# Patient Record
Sex: FEMALE | Race: WHITE | NOT HISPANIC OR LATINO | Employment: UNEMPLOYED | URBAN - NONMETROPOLITAN AREA
[De-identification: names, ages, dates, MRNs, and addresses within clinical notes are randomized per-mention and may not be internally consistent; named-entity substitution may affect disease eponyms.]

---

## 2017-02-18 RX ORDER — OSELTAMIVIR PHOSPHATE 75 MG/1
75 CAPSULE ORAL DAILY
Qty: 10 CAPSULE | Refills: 0 | Status: SHIPPED | OUTPATIENT
Start: 2017-02-18 | End: 2017-03-09

## 2017-03-08 DIAGNOSIS — M79.672 LEFT FOOT PAIN: Primary | ICD-10-CM

## 2017-03-09 ENCOUNTER — OFFICE VISIT (OUTPATIENT)
Dept: ORTHOPEDIC SURGERY | Facility: CLINIC | Age: 57
End: 2017-03-09

## 2017-03-09 VITALS
SYSTOLIC BLOOD PRESSURE: 90 MMHG | DIASTOLIC BLOOD PRESSURE: 60 MMHG | BODY MASS INDEX: 33.66 KG/M2 | WEIGHT: 190 LBS | HEIGHT: 63 IN

## 2017-03-09 DIAGNOSIS — M77.32 CALCANEAL SPUR OF LEFT FOOT: Primary | ICD-10-CM

## 2017-03-09 PROCEDURE — 99214 OFFICE O/P EST MOD 30 MIN: CPT | Performed by: ORTHOPAEDIC SURGERY

## 2017-03-09 RX ORDER — DICLOFENAC SODIUM 75 MG/1
75 TABLET, DELAYED RELEASE ORAL 2 TIMES DAILY
Qty: 60 TABLET | Refills: 3 | Status: SHIPPED | OUTPATIENT
Start: 2017-03-09 | End: 2018-01-02

## 2017-03-09 RX ORDER — DICLOFENAC SODIUM 75 MG/1
75 TABLET, DELAYED RELEASE ORAL 2 TIMES DAILY
COMMUNITY
End: 2017-03-09 | Stop reason: SDUPTHER

## 2017-03-09 RX ORDER — OMEPRAZOLE 40 MG/1
40 CAPSULE, DELAYED RELEASE ORAL 2 TIMES DAILY
COMMUNITY
End: 2020-11-02

## 2017-03-09 NOTE — PROGRESS NOTES
Subjective   Lashaun Bernal is a 56 y.o. female. Patient is here today with left foot pain.     History of Present Illness Patient complains that her left heel and foot are causing pain. Patient complains with swelling and throbbing in her left heel. She has been previously scheduled for  Removal of the spurs but she cancelled and wanted to wait ,this was several months ago. Now her symptoms have worsened despite conservative treatment, and she wants to proceed with removal of the spurs.      The following portions of the patient's history were reviewed and updated as appropriate:   She  has a past medical history of Allergic rhinitis; Asthma; Breast lump; Chest pain; Chronic low back pain; Constipation; Depressive disorder; Diarrhea; Diverticular disease of colon; Epigastric pain; DIETER (generalized anxiety disorder); GERD (gastroesophageal reflux disease); Head ache; Hematochezia; Herpes zoster; and Periumbilical pain.  She  does not have any pertinent problems on file.  She  has a past surgical history that includes Abdominoplasty (12/22/2004); Hand surgery (06/03/2013); Finger/Thumb Lesion/Cyst Excision (10/29/2014); ORIF radius & ulna fractures (07/22/2013); Cyst Removal (08/18/1961); and Tubal ligation (08/14/1985).  Her family history includes Diabetes in her other; Heart disease in her other; Hypertension in her other.  She  reports that she has never smoked. She does not have any smokeless tobacco history on file. Her alcohol and drug histories are not on file.  Current Outpatient Prescriptions   Medication Sig Dispense Refill   • diclofenac (VOLTAREN) 75 MG EC tablet Take 75 mg by mouth 2 (Two) Times a Day.     • omeprazole (priLOSEC) 40 MG capsule Take 40 mg by mouth Daily.     • estradiol (ESTRACE) 1 MG tablet        No current facility-administered medications for this visit.      Current Outpatient Prescriptions on File Prior to Visit   Medication Sig   • estradiol (ESTRACE) 1 MG tablet    •  "[DISCONTINUED] oseltamivir (TAMIFLU) 75 MG capsule Take 1 capsule by mouth Daily.     No current facility-administered medications on file prior to visit.      She is allergic to penicillins..    Review of Systems  Visit Vitals   • BP 90/60   • Ht 63\" (160 cm)   • Wt 190 lb (86.2 kg)   • LMP 03/09/1991   • BMI 33.66 kg/m2       Social History     Social History   • Marital status:      Spouse name: N/A   • Number of children: N/A   • Years of education: N/A     Occupational History   • Not on file.     Social History Main Topics   • Smoking status: Never Smoker   • Smokeless tobacco: Not on file   • Alcohol use Not on file   • Drug use: Not on file   • Sexual activity: Not on file     Other Topics Concern   • Not on file     Social History Narrative       HEENT: Normocephalic.  PERRLA.  TM's clear bilaterally.  Oropharynx: Clear.  Neck: Supple, with no adenopathy.  Chest: Equal bilateral expansion.  Clear to auscultation and percussion.  Heart: Regular sinus rhythm, S1 and S2 normal.  No murmurs or extra heart sounds heard.  Abdomen: Soft, nontender, and no organomegaly.  Neurological: cranial nerves II-XII normal Vascular: pulses are present  Dermatological: no rashes  or blemishes, or any abnormality of the skin.    REVIEW OF SYSTEMS:  Negative, other than presenting complaint.  HEENT: No headaches, diplopia, blurred vision, tinnitus, vertigo, epistaxis, hoarseness or sore throat.  Pulmonary: No cough, sputum, hemoptysis, dyspnea, wheezing, or chest pain.  Cardiac: No chest pain, palpitations, orthopnea, paroxysmal nocturnal dyspnea, shortness of breath, or pedal edema.  Gastrointestinal: No diarrhea, melena, or constipation.  Genitourinary: No dysuria, hematuria, nocturia, frequency, bladder or bowel incontinence.  Hematology: No history of any anemia, fatigue, fever, or chills or night sweats.  Dermatology: No rashes, pruritus, or increased pigmentation changes of the skin.   Objective   Physical Exam  " Pain on palpation of the spurs at the insertion of the achilles tendon and at the medial border of the calcaneous. Neuro is intact.  X rays reveal large spurs at both of those areas.      Assessment/Plan  painful sprs of the calcaneous, Plan: excision . SURGERY RISKS    Procedure has been explained to the patient.  Risks and benefits have been explained, including the risk of bleeding, stiffness, recurrent problems, blood vessel or nerve injury, blood clots, infection, and lack of healing.  Patient understands and agrees to the procedure, and we will proceed ahead with this, as an outpatient under general anesthesia, on   3/20/17        Lashaun was seen today for pain.    Diagnoses and all orders for this visit:    Calcaneal spur of left foot

## 2017-03-10 ENCOUNTER — PREP FOR SURGERY (OUTPATIENT)
Dept: ORTHOPEDIC SURGERY | Facility: CLINIC | Age: 57
End: 2017-03-10

## 2017-03-10 DIAGNOSIS — M77.31 BILATERAL CALCANEAL SPURS: Primary | ICD-10-CM

## 2017-03-10 DIAGNOSIS — M77.32 BILATERAL CALCANEAL SPURS: Primary | ICD-10-CM

## 2017-03-10 RX ORDER — SODIUM CHLORIDE 9 MG/ML
100 INJECTION, SOLUTION INTRAVENOUS CONTINUOUS
Status: CANCELLED | OUTPATIENT
Start: 2017-03-10

## 2017-03-17 ENCOUNTER — APPOINTMENT (OUTPATIENT)
Dept: PREADMISSION TESTING | Facility: HOSPITAL | Age: 57
End: 2017-03-17

## 2017-03-17 ENCOUNTER — ANESTHESIA EVENT (OUTPATIENT)
Dept: PERIOP | Facility: HOSPITAL | Age: 57
End: 2017-03-17

## 2017-03-17 VITALS
BODY MASS INDEX: 33.49 KG/M2 | WEIGHT: 189 LBS | OXYGEN SATURATION: 98 % | DIASTOLIC BLOOD PRESSURE: 80 MMHG | SYSTOLIC BLOOD PRESSURE: 130 MMHG | RESPIRATION RATE: 18 BRPM | HEIGHT: 63 IN | HEART RATE: 81 BPM

## 2017-03-20 ENCOUNTER — ANESTHESIA (OUTPATIENT)
Dept: PERIOP | Facility: HOSPITAL | Age: 57
End: 2017-03-20

## 2017-03-20 ENCOUNTER — HOSPITAL ENCOUNTER (OUTPATIENT)
Facility: HOSPITAL | Age: 57
Setting detail: HOSPITAL OUTPATIENT SURGERY
Discharge: HOME | End: 2017-03-20
Attending: ORTHOPAEDIC SURGERY | Admitting: ORTHOPAEDIC SURGERY

## 2017-03-20 VITALS
SYSTOLIC BLOOD PRESSURE: 124 MMHG | HEART RATE: 62 BPM | BODY MASS INDEX: 33.71 KG/M2 | WEIGHT: 190.26 LBS | OXYGEN SATURATION: 98 % | DIASTOLIC BLOOD PRESSURE: 66 MMHG | RESPIRATION RATE: 18 BRPM | HEIGHT: 63 IN | TEMPERATURE: 97.7 F

## 2017-03-20 DIAGNOSIS — M77.32 CALCANEAL SPUR OF LEFT FOOT: ICD-10-CM

## 2017-03-20 DIAGNOSIS — M77.31 BILATERAL CALCANEAL SPURS: ICD-10-CM

## 2017-03-20 DIAGNOSIS — M77.32 BILATERAL CALCANEAL SPURS: ICD-10-CM

## 2017-03-20 PROCEDURE — 88311 DECALCIFY TISSUE: CPT | Performed by: PATHOLOGY

## 2017-03-20 PROCEDURE — 25010000002 DEXAMETHASONE PER 1 MG: Performed by: NURSE ANESTHETIST, CERTIFIED REGISTERED

## 2017-03-20 PROCEDURE — 27654 REPAIR OF ACHILLES TENDON: CPT | Performed by: ORTHOPAEDIC SURGERY

## 2017-03-20 PROCEDURE — 25010000002 HYDROMORPHONE PER 4 MG: Performed by: NURSE ANESTHETIST, CERTIFIED REGISTERED

## 2017-03-20 PROCEDURE — 28118 REMOVAL OF HEEL BONE: CPT | Performed by: ORTHOPAEDIC SURGERY

## 2017-03-20 PROCEDURE — C1713 ANCHOR/SCREW BN/BN,TIS/BN: HCPCS | Performed by: ORTHOPAEDIC SURGERY

## 2017-03-20 PROCEDURE — 25010000002 MIDAZOLAM PER 1 MG: Performed by: NURSE ANESTHETIST, CERTIFIED REGISTERED

## 2017-03-20 PROCEDURE — 25010000002 ONDANSETRON PER 1 MG: Performed by: NURSE ANESTHETIST, CERTIFIED REGISTERED

## 2017-03-20 PROCEDURE — 88311 DECALCIFY TISSUE: CPT | Performed by: ORTHOPAEDIC SURGERY

## 2017-03-20 PROCEDURE — 25010000002 FENTANYL CITRATE (PF) 100 MCG/2ML SOLUTION: Performed by: NURSE ANESTHETIST, CERTIFIED REGISTERED

## 2017-03-20 PROCEDURE — 88304 TISSUE EXAM BY PATHOLOGIST: CPT | Performed by: ORTHOPAEDIC SURGERY

## 2017-03-20 PROCEDURE — 25010000002 NEOSTIGMINE PER 0.5 MG: Performed by: NURSE ANESTHETIST, CERTIFIED REGISTERED

## 2017-03-20 PROCEDURE — 88304 TISSUE EXAM BY PATHOLOGIST: CPT | Performed by: PATHOLOGY

## 2017-03-20 PROCEDURE — 25010000002 MEPERIDINE 25 MG/0.5ML SOLUTION: Performed by: NURSE ANESTHETIST, CERTIFIED REGISTERED

## 2017-03-20 PROCEDURE — 25010000002 PROPOFOL 10 MG/ML EMULSION: Performed by: NURSE ANESTHETIST, CERTIFIED REGISTERED

## 2017-03-20 PROCEDURE — 25010000002 SUCCINYLCHOLINE PER 20 MG: Performed by: NURSE ANESTHETIST, CERTIFIED REGISTERED

## 2017-03-20 DEVICE — SYS SUT/ANCH ACHILLES SPEEDBRIDGE BIOCOMP: Type: IMPLANTABLE DEVICE | Site: HEEL | Status: FUNCTIONAL

## 2017-03-20 RX ORDER — BACTERIOSTATIC SODIUM CHLORIDE 0.9 %
VIAL (ML) INJECTION AS NEEDED
Status: DISCONTINUED | OUTPATIENT
Start: 2017-03-20 | End: 2017-03-20 | Stop reason: HOSPADM

## 2017-03-20 RX ORDER — GLYCOPYRROLATE 0.2 MG/ML
INJECTION INTRAMUSCULAR; INTRAVENOUS AS NEEDED
Status: DISCONTINUED | OUTPATIENT
Start: 2017-03-20 | End: 2017-03-20 | Stop reason: SURG

## 2017-03-20 RX ORDER — ROCURONIUM BROMIDE 10 MG/ML
INJECTION, SOLUTION INTRAVENOUS AS NEEDED
Status: DISCONTINUED | OUTPATIENT
Start: 2017-03-20 | End: 2017-03-20 | Stop reason: SURG

## 2017-03-20 RX ORDER — ONDANSETRON 2 MG/ML
4 INJECTION INTRAMUSCULAR; INTRAVENOUS ONCE AS NEEDED
Status: DISCONTINUED | OUTPATIENT
Start: 2017-03-20 | End: 2017-03-20 | Stop reason: HOSPADM

## 2017-03-20 RX ORDER — FLUMAZENIL 0.1 MG/ML
0.2 INJECTION INTRAVENOUS AS NEEDED
Status: DISCONTINUED | OUTPATIENT
Start: 2017-03-20 | End: 2017-03-20 | Stop reason: HOSPADM

## 2017-03-20 RX ORDER — ACETAMINOPHEN 325 MG/1
650 TABLET ORAL ONCE AS NEEDED
Status: DISCONTINUED | OUTPATIENT
Start: 2017-03-20 | End: 2017-03-20 | Stop reason: HOSPADM

## 2017-03-20 RX ORDER — OXYCODONE AND ACETAMINOPHEN 7.5; 325 MG/1; MG/1
1-2 TABLET ORAL EVERY 4 HOURS PRN
Qty: 45 TABLET | Refills: 0 | Status: SHIPPED | OUTPATIENT
Start: 2017-03-20 | End: 2017-10-31

## 2017-03-20 RX ORDER — LIDOCAINE HYDROCHLORIDE 20 MG/ML
INJECTION, SOLUTION INFILTRATION; PERINEURAL AS NEEDED
Status: DISCONTINUED | OUTPATIENT
Start: 2017-03-20 | End: 2017-03-20 | Stop reason: SURG

## 2017-03-20 RX ORDER — SCOLOPAMINE TRANSDERMAL SYSTEM 1 MG/1
1 PATCH, EXTENDED RELEASE TRANSDERMAL ONCE
Status: COMPLETED | OUTPATIENT
Start: 2017-03-20 | End: 2017-03-20

## 2017-03-20 RX ORDER — HYDROMORPHONE HCL 110MG/55ML
PATIENT CONTROLLED ANALGESIA SYRINGE INTRAVENOUS AS NEEDED
Status: DISCONTINUED | OUTPATIENT
Start: 2017-03-20 | End: 2017-03-20 | Stop reason: SURG

## 2017-03-20 RX ORDER — ONDANSETRON 2 MG/ML
INJECTION INTRAMUSCULAR; INTRAVENOUS AS NEEDED
Status: DISCONTINUED | OUTPATIENT
Start: 2017-03-20 | End: 2017-03-20 | Stop reason: SURG

## 2017-03-20 RX ORDER — NALOXONE HCL 0.4 MG/ML
0.2 VIAL (ML) INJECTION AS NEEDED
Status: DISCONTINUED | OUTPATIENT
Start: 2017-03-20 | End: 2017-03-20 | Stop reason: HOSPADM

## 2017-03-20 RX ORDER — SODIUM CHLORIDE 9 MG/ML
100 INJECTION, SOLUTION INTRAVENOUS CONTINUOUS
Status: DISCONTINUED | OUTPATIENT
Start: 2017-03-20 | End: 2017-03-20 | Stop reason: HOSPADM

## 2017-03-20 RX ORDER — DEXAMETHASONE SODIUM PHOSPHATE 4 MG/ML
INJECTION, SOLUTION INTRA-ARTICULAR; INTRALESIONAL; INTRAMUSCULAR; INTRAVENOUS; SOFT TISSUE AS NEEDED
Status: DISCONTINUED | OUTPATIENT
Start: 2017-03-20 | End: 2017-03-20 | Stop reason: SURG

## 2017-03-20 RX ORDER — BACITRACIN 50000 [USP'U]/1
INJECTION, POWDER, LYOPHILIZED, FOR SOLUTION INTRAMUSCULAR AS NEEDED
Status: DISCONTINUED | OUTPATIENT
Start: 2017-03-20 | End: 2017-03-20 | Stop reason: HOSPADM

## 2017-03-20 RX ORDER — FENTANYL CITRATE 50 UG/ML
INJECTION, SOLUTION INTRAMUSCULAR; INTRAVENOUS AS NEEDED
Status: DISCONTINUED | OUTPATIENT
Start: 2017-03-20 | End: 2017-03-20 | Stop reason: SURG

## 2017-03-20 RX ORDER — PROPOFOL 10 MG/ML
VIAL (ML) INTRAVENOUS AS NEEDED
Status: DISCONTINUED | OUTPATIENT
Start: 2017-03-20 | End: 2017-03-20 | Stop reason: SURG

## 2017-03-20 RX ORDER — HYDROMORPHONE HCL 110MG/55ML
PATIENT CONTROLLED ANALGESIA SYRINGE INTRAVENOUS AS NEEDED
Status: DISCONTINUED | OUTPATIENT
Start: 2017-03-20 | End: 2017-03-20

## 2017-03-20 RX ORDER — DIPHENHYDRAMINE HYDROCHLORIDE 50 MG/ML
12.5 INJECTION INTRAMUSCULAR; INTRAVENOUS
Status: DISCONTINUED | OUTPATIENT
Start: 2017-03-20 | End: 2017-03-20 | Stop reason: HOSPADM

## 2017-03-20 RX ORDER — LABETALOL HYDROCHLORIDE 5 MG/ML
5 INJECTION, SOLUTION INTRAVENOUS
Status: DISCONTINUED | OUTPATIENT
Start: 2017-03-20 | End: 2017-03-20 | Stop reason: HOSPADM

## 2017-03-20 RX ORDER — OXYCODONE AND ACETAMINOPHEN 7.5; 325 MG/1; MG/1
1 TABLET ORAL ONCE AS NEEDED
Status: DISCONTINUED | OUTPATIENT
Start: 2017-03-20 | End: 2017-03-20 | Stop reason: HOSPADM

## 2017-03-20 RX ORDER — ACETAMINOPHEN 650 MG/1
650 SUPPOSITORY RECTAL ONCE AS NEEDED
Status: DISCONTINUED | OUTPATIENT
Start: 2017-03-20 | End: 2017-03-20 | Stop reason: HOSPADM

## 2017-03-20 RX ORDER — MIDAZOLAM HYDROCHLORIDE 1 MG/ML
INJECTION INTRAMUSCULAR; INTRAVENOUS AS NEEDED
Status: DISCONTINUED | OUTPATIENT
Start: 2017-03-20 | End: 2017-03-20 | Stop reason: SURG

## 2017-03-20 RX ORDER — SUCCINYLCHOLINE CHLORIDE 20 MG/ML
INJECTION INTRAMUSCULAR; INTRAVENOUS AS NEEDED
Status: DISCONTINUED | OUTPATIENT
Start: 2017-03-20 | End: 2017-03-20 | Stop reason: SURG

## 2017-03-20 RX ORDER — PROMETHAZINE HYDROCHLORIDE 12.5 MG/1
12.5 TABLET ORAL ONCE AS NEEDED
Status: DISCONTINUED | OUTPATIENT
Start: 2017-03-20 | End: 2017-03-20 | Stop reason: HOSPADM

## 2017-03-20 RX ADMIN — OXYCODONE HYDROCHLORIDE AND ACETAMINOPHEN 1 TABLET: 7.5; 325 TABLET ORAL at 11:27

## 2017-03-20 RX ADMIN — MEPERIDINE HYDROCHLORIDE 12.5 MG: 50 INJECTION, SOLUTION INTRAMUSCULAR; INTRAVENOUS; SUBCUTANEOUS at 09:50

## 2017-03-20 RX ADMIN — HYDROMORPHONE HYDROCHLORIDE 1 MG: 2 INJECTION, SOLUTION INTRAMUSCULAR; INTRAVENOUS; SUBCUTANEOUS at 09:40

## 2017-03-20 RX ADMIN — LIDOCAINE HYDROCHLORIDE 80 MG: 20 INJECTION, SOLUTION INFILTRATION; PERINEURAL at 07:24

## 2017-03-20 RX ADMIN — ROCURONIUM BROMIDE 5 MG: 10 INJECTION INTRAVENOUS at 07:24

## 2017-03-20 RX ADMIN — SODIUM CHLORIDE 100 ML/HR: 9 INJECTION, SOLUTION INTRAVENOUS at 06:12

## 2017-03-20 RX ADMIN — HYDROMORPHONE HYDROCHLORIDE 0.5 MG: 1 INJECTION, SOLUTION INTRAMUSCULAR; INTRAVENOUS; SUBCUTANEOUS at 10:13

## 2017-03-20 RX ADMIN — PROPOFOL 180 MG: 10 INJECTION, EMULSION INTRAVENOUS at 07:24

## 2017-03-20 RX ADMIN — SUCCINYLCHOLINE CHLORIDE 140 MG: 20 INJECTION, SOLUTION INTRAMUSCULAR; INTRAVENOUS at 07:24

## 2017-03-20 RX ADMIN — ROCURONIUM BROMIDE 20 MG: 10 INJECTION INTRAVENOUS at 07:30

## 2017-03-20 RX ADMIN — MEPERIDINE HYDROCHLORIDE 12.5 MG: 50 INJECTION, SOLUTION INTRAMUSCULAR; INTRAVENOUS; SUBCUTANEOUS at 10:00

## 2017-03-20 RX ADMIN — FENTANYL CITRATE 50 MCG: 50 INJECTION, SOLUTION INTRAMUSCULAR; INTRAVENOUS at 08:05

## 2017-03-20 RX ADMIN — SCOPALAMINE 1 PATCH: 1 PATCH, EXTENDED RELEASE TRANSDERMAL at 07:15

## 2017-03-20 RX ADMIN — MEPERIDINE HYDROCHLORIDE 12.5 MG: 50 INJECTION, SOLUTION INTRAMUSCULAR; INTRAVENOUS; SUBCUTANEOUS at 10:15

## 2017-03-20 RX ADMIN — MIDAZOLAM 2 MG: 1 INJECTION INTRAMUSCULAR; INTRAVENOUS at 07:18

## 2017-03-20 RX ADMIN — FENTANYL CITRATE 50 MCG: 50 INJECTION, SOLUTION INTRAMUSCULAR; INTRAVENOUS at 08:30

## 2017-03-20 RX ADMIN — MEPERIDINE HYDROCHLORIDE 12.5 MG: 50 INJECTION, SOLUTION INTRAMUSCULAR; INTRAVENOUS; SUBCUTANEOUS at 10:10

## 2017-03-20 RX ADMIN — VANCOMYCIN HYDROCHLORIDE 1 G: 1 INJECTION, POWDER, LYOPHILIZED, FOR SOLUTION INTRAVENOUS at 07:19

## 2017-03-20 RX ADMIN — MEPERIDINE HYDROCHLORIDE 12.5 MG: 50 INJECTION, SOLUTION INTRAMUSCULAR; INTRAVENOUS; SUBCUTANEOUS at 10:05

## 2017-03-20 RX ADMIN — ONDANSETRON 4 MG: 2 INJECTION INTRAMUSCULAR; INTRAVENOUS at 09:05

## 2017-03-20 RX ADMIN — MEPERIDINE HYDROCHLORIDE 12.5 MG: 50 INJECTION, SOLUTION INTRAMUSCULAR; INTRAVENOUS; SUBCUTANEOUS at 09:55

## 2017-03-20 RX ADMIN — GLYCOPYRROLATE 0.6 MG: 0.2 INJECTION, SOLUTION INTRAMUSCULAR; INTRAVENOUS at 09:35

## 2017-03-20 RX ADMIN — DEXAMETHASONE SODIUM PHOSPHATE 4 MG: 4 INJECTION, SOLUTION INTRAMUSCULAR; INTRAVENOUS at 07:30

## 2017-03-20 RX ADMIN — FENTANYL CITRATE 50 MCG: 50 INJECTION, SOLUTION INTRAMUSCULAR; INTRAVENOUS at 08:20

## 2017-03-20 RX ADMIN — HYDROMORPHONE HYDROCHLORIDE 0.5 MG: 1 INJECTION, SOLUTION INTRAMUSCULAR; INTRAVENOUS; SUBCUTANEOUS at 10:03

## 2017-03-20 RX ADMIN — VANCOMYCIN HYDROCHLORIDE 1000 MG: 1 INJECTION, POWDER, LYOPHILIZED, FOR SOLUTION INTRAVENOUS at 06:30

## 2017-03-20 RX ADMIN — FENTANYL CITRATE 100 MCG: 50 INJECTION, SOLUTION INTRAMUSCULAR; INTRAVENOUS at 07:24

## 2017-03-20 RX ADMIN — NEOSTIGMINE METHYLSULFATE 3 MG: 1 INJECTION, SOLUTION INTRAMUSCULAR; INTRAVENOUS; SUBCUTANEOUS at 09:35

## 2017-03-20 RX ADMIN — ROCURONIUM BROMIDE 10 MG: 10 INJECTION INTRAVENOUS at 08:10

## 2017-03-20 RX ADMIN — FENTANYL CITRATE 50 MCG: 50 INJECTION, SOLUTION INTRAMUSCULAR; INTRAVENOUS at 08:15

## 2017-03-20 NOTE — BRIEF OP NOTE
BUNIONECTOMY  Procedure Note    Lashaun Bernal  3/20/2017    Pre-op Diagnosis:   Calcaneal spur of left foot [M77.32]    Post-op Diagnosis:     Post-Op Diagnosis Codes:     * Calcaneal spur of left foot [M77.32]    Procedure/CPT® Codes:  06798, and 80708.        Procedure(s):  EXCISION OF CALCANEAL SPURS LEFT FOOT AND REATTACHMENT OF ACHILLES TENDON     Surgeon(s):  Jarrell Mar MD    Anesthesia: General    Staff:   Circulator: Og Barahona RN  Scrub Person: Arianne Casillas  Assistant: Marvin Sumner    Estimated Blood Loss: minimal    Urine Voided: none      Specimens:                  ID Type Source Tests Collected by Time Destination   A : retrocalcaneal spur left foot Tissue Foot, Left TISSUE EXAM Jarrell Mar MD 3/20/2017 0910          Drains:  None            Findings: retrocalcaneal spur.    Complications: none        Jarrell Mar MD     Date: 3/20/2017  Time: 10:01 AM

## 2017-03-20 NOTE — ANESTHESIA POSTPROCEDURE EVALUATION
Patient: Lashaun Bernal    Procedure Summary     Date Anesthesia Start Anesthesia Stop Room / Location    03/20/17 0721 0948  [facility] / [facility]       Procedure Diagnosis Surgeon Provider    EXCISION OF CALCANEAL SPURS LEFT FOOT AND REATTACHMENT OF ACHILLES TENDON  (Left Foot) Calcaneal spur of left foot  (Calcaneal spur of left foot [M77.32]) MD Jamie Bledsoe MD          Anesthesia Type: general  Last vitals  BP      Temp      Pulse     Resp      SpO2        Post Anesthesia Care and Evaluation    Patient location during evaluation: bedside  Patient participation: complete - patient participated  Level of consciousness: responsive to verbal stimuli  Pain management: adequate  Airway patency: patent  Anesthetic complications: No anesthetic complications    Cardiovascular status: acceptable  Respiratory status: acceptable  Hydration status: acceptable

## 2017-03-20 NOTE — ANESTHESIA PROCEDURE NOTES
Airway  Urgency: elective    Difficult airway    General Information and Staff    Patient location during procedure: OR  CRNA: ALLEN LOMBARDI    Indications and Patient Condition  Indications for airway management: airway protection    Preoxygenated: yes  Mask difficulty assessment: 1 - vent by mask    Final Airway Details  Final airway type: endotracheal airway      Successful airway: ETT  Cuffed: yes   Successful intubation technique: direct laryngoscopy  Facilitating devices/methods: intubating stylet  Endotracheal tube insertion site: oral  Blade: José  Blade size: #3  ETT size: 7.0 mm  Cormack-Lehane Classification: grade I - full view of glottis  Placement verified by: chest auscultation and capnometry   Measured from: teeth  ETT to teeth (cm): 20  Number of attempts at approach: 1

## 2017-03-20 NOTE — DISCHARGE INSTRUCTIONS
Minor Surgery  Outpatient Instructions    General Information  You have had a minor surgical procedure and are not expected to require extensive treatment or a long recovery period.  However, the following information/instructions that are listed serve as guidelines to help you recover at home.    Activity: non-weight bearing, use crutches    Hygiene: sponge bath, do not get cast wet.    Dressing/Wound Care: Keep cast clean and dry till seen in the office. Keep foot/leg elevated above heart when sitting or laying.    Diet: as tolerated    Important Points:  -Call your doctor to report any of the following:   -unusual or excessive bleeding/drainage   -pain not reduced/controlled by medication   -elevated temperature consistently greater that 100 degrees F   -increased swelling, redness, bruising, or tenderness in surgical area  -Take medications as prescribed by your physician  -If you have any questions, please contact your doctor or the Same Day Surgery Unit at   [phone]  -If a post-operative emergency occurs, report to your nearest ER

## 2017-03-20 NOTE — PLAN OF CARE
Problem: Patient Care Overview (Adult)  Goal: Plan of Care Review  Outcome: Ongoing (interventions implemented as appropriate)    03/20/17 1037   Coping/Psychosocial Response Interventions   Plan Of Care Reviewed With patient   Patient Care Overview   Progress improving   Outcome Evaluation   Outcome Summary/Follow up Plan vss, pain management begun, dsg noted cdi no distress noted       Goal: Adult Individualization and Mutuality  Outcome: Ongoing (interventions implemented as appropriate)    Problem: Perioperative Period (Adult)  Goal: Signs and Symptoms of Listed Potential Problems Will be Absent or Manageable (Perioperative Period)  Outcome: Ongoing (interventions implemented as appropriate)

## 2017-03-20 NOTE — OP NOTE
BUNIONECTOMY  Procedure Note    Name:    Lashaun Bernal  YOB: 1960  Date of surgery:   3/20/2017    Pre-op Diagnosis:   Calcaneal spur of left foot [M77.32]    Post-op Diagnosis:     Same    Procedure:  Procedure(s):  EXCISION OF CALCANEAL SPURS LEFT FOOT AND REATTACHMENT OF ACHILLES TENDON     Surgeon:  Surgeon(s):  Jarrell Mar MD    ASSISTANT:   [facility]    Anesthesia: General    Staff:   Circulator: Og Barahona RN  Scrub Person: Arianne Casillas  Assistant: Marvin Sumner    Estimated Blood Loss:  Minimal      Specimens:                  ID Type Source Tests Collected by Time Destination   A : retrocalcaneal spur left foot Tissue Foot, Left TISSUE EXAM Jarrell Mar MD 3/20/2017 0910          Drains:  None                             Retrocalcaneal spur.    Findings:      Complications: None    IMPLANTS:     Implant Name Type Inv. Item Serial No.  Lot No. LRB No. Used   SYS ACHILLES [employer] - [id number] - [id number] Implant SYS ACHILLES [employer] [id number] ARTHREX [id number] Left 1         PROCEDURE:excision of retrocalcaneal spur and re-attachment of achilles tendon.   after satisfactory anesthesia was accomplished, the left leg was prepped and draped in the usual fashion, and tourniquet elevated to 300 mm hg. [id number], an incision was made over the posterior aspect of the foot, down to the Achilles tendon  s splitting the tendon longitudinally, down to the spur.  The tendon was taken down to expose the spur and the spur was removed with an oscillating saw. Following removal of the spur and the retrocalcaneal bursa, the wound was irrigated and the tendon was re-attached to the calcaneous using the speed bridge  5 mm   Following reattachment of the tendon, the wound was irrigated and closed using  Number 2 fiberwire for the running locking suture of the longitudinal aspect of the tendon, the subcutaneous tissue was closed with 3-0  vicryl and the skin with 4-0nylon. Dressing was placed on the wound and a fiberglass cast applied with the ankle in 30 degrees, of plantar flexion.   procedure terminated, patient left O.R. In satisfactory condition.      Jarrell Mar MD     Date: 3/20/2017  Time: 10:04 AM

## 2017-03-20 NOTE — PLAN OF CARE
Problem: Patient Care Overview (Adult)  Goal: Plan of Care Review  Outcome: Outcome(s) achieved Date Met:  03/20/17  Discharge criteria met.  Goal: Adult Individualization and Mutuality  Outcome: Outcome(s) achieved Date Met:  03/20/17  Goal: Discharge Needs Assessment  Outcome: Outcome(s) achieved Date Met:  03/20/17    Problem: Perioperative Period (Adult)  Goal: Signs and Symptoms of Listed Potential Problems Will be Absent or Manageable (Perioperative Period)  Outcome: Outcome(s) achieved Date Met:  03/20/17

## 2017-03-20 NOTE — ANESTHESIA PREPROCEDURE EVALUATION
Anesthesia Evaluation     Patient summary reviewed and Nursing notes reviewed   NPO Status: > 8 hours   Airway   Mallampati: II  TM distance: >3 FB  possible difficult intubation  Comment: She has a thick/fatty neck  Dental - normal exam     Comment: All her upper teeth are crowned    Pulmonary - normal exam   (+) asthma ( doing well), sleep apnea ( has MAX and is waiting for her C-PAP machine),   Cardiovascular - negative cardio ROS and normal exam        Neuro/Psych  (+) psychiatric history Depression,    GI/Hepatic/Renal/Endo    (+) obesity,  GERD ( she took her omeprazole today and has no GERD symptoms today) well controlled,     Musculoskeletal     (+) back pain,   Arthralgias:  has spurs on her left heel, for surgery today.  Abdominal   (+) obese,    Substance History - negative use     OB/GYN negative ob/gyn ROS         Other   (+) arthritis                                 Anesthesia Plan    ASA 3     general     intravenous induction   Anesthetic plan and risks discussed with patient.    Plan discussed with CRNA.

## 2017-03-21 LAB
LAB AP CASE REPORT: NORMAL
Lab: NORMAL
PATH REPORT.FINAL DX SPEC: NORMAL
PATH REPORT.GROSS SPEC: NORMAL

## 2017-04-04 ENCOUNTER — OFFICE VISIT (OUTPATIENT)
Dept: ORTHOPEDIC SURGERY | Facility: CLINIC | Age: 57
End: 2017-04-04

## 2017-04-04 VITALS
DIASTOLIC BLOOD PRESSURE: 70 MMHG | BODY MASS INDEX: 33.66 KG/M2 | HEIGHT: 63 IN | SYSTOLIC BLOOD PRESSURE: 122 MMHG | WEIGHT: 190 LBS

## 2017-04-04 DIAGNOSIS — M92.62 HAGLUND'S DEFORMITY OF LEFT HEEL: Primary | ICD-10-CM

## 2017-04-04 PROCEDURE — 99024 POSTOP FOLLOW-UP VISIT: CPT | Performed by: ORTHOPAEDIC SURGERY

## 2017-04-04 PROCEDURE — 29405 APPL SHORT LEG CAST: CPT | Performed by: ORTHOPAEDIC SURGERY

## 2017-04-04 NOTE — PROGRESS NOTES
Subjective   Lashaun Bernal is a 56 y.o. female. Status post excision of calcaneal spurs left foot and reattachment of achilles tendon of the left heel.     History of Present Illness Patient is here today for follow up of excision of calcaneal spurs of the left foot with reattachment of achilles tendon. Patient is here today for cast change and suture removal. Patient is still having some pain when moving but states that she does not have pain when she is not moving.     The following portions of the patient's history were reviewed and updated as appropriate:   She  has a past medical history of Allergic rhinitis; Arthritis; Asthma; Breast lump; Chest pain; Chronic low back pain; Constipation; Depressive disorder; Diarrhea; Diverticular disease of colon; Epigastric pain; DIETER (generalized anxiety disorder); GERD (gastroesophageal reflux disease); Head ache; Hematochezia; Herpes zoster; Periumbilical pain; and PONV (postoperative nausea and vomiting).  She  does not have any pertinent problems on file.  She  has a past surgical history that includes Abdominoplasty (12/22/2004); Hand surgery (06/03/2013); Finger/Thumb Lesion/Cyst Excision (10/29/2014); ORIF radius & ulna fractures (07/22/2013); Cyst Removal (08/18/1961); Tubal ligation (08/14/1985); breast augmentation bilateral mastopexy; and Bunionectomy (Left, 3/20/2017).  Her family history includes Diabetes in her other; Heart disease in her other; Hypertension in her other.  She  reports that she has never smoked. She has never used smokeless tobacco. She reports that she drinks alcohol. She reports that she does not use illicit drugs.  Current Outpatient Prescriptions   Medication Sig Dispense Refill   • diclofenac (VOLTAREN) 75 MG EC tablet Take 1 tablet by mouth 2 (Two) Times a Day. 60 tablet 3   • estradiol (ESTRACE) 1 MG tablet Take 1 mg by mouth Daily.     • omeprazole (priLOSEC) 40 MG capsule Take 40 mg by mouth Daily.     • oxyCODONE-acetaminophen  "(PERCOCET) 7.5-325 MG per tablet Take 1-2 tablets by mouth Every 4 (Four) Hours As Needed (Pain). 45 tablet 0     No current facility-administered medications for this visit.      Current Outpatient Prescriptions on File Prior to Visit   Medication Sig   • diclofenac (VOLTAREN) 75 MG EC tablet Take 1 tablet by mouth 2 (Two) Times a Day.   • estradiol (ESTRACE) 1 MG tablet Take 1 mg by mouth Daily.   • omeprazole (priLOSEC) 40 MG capsule Take 40 mg by mouth Daily.   • oxyCODONE-acetaminophen (PERCOCET) 7.5-325 MG per tablet Take 1-2 tablets by mouth Every 4 (Four) Hours As Needed (Pain).     No current facility-administered medications on file prior to visit.      She is allergic to penicillins..    Review of Systems  /70  Ht 63\" (160 cm)  Wt 190 lb (86.2 kg)  LMP 03/09/1991  BMI 33.66 kg/m2    Social History     Social History   • Marital status:      Spouse name: N/A   • Number of children: N/A   • Years of education: N/A     Occupational History   • Not on file.     Social History Main Topics   • Smoking status: Never Smoker   • Smokeless tobacco: Never Used   • Alcohol use Yes      Comment: socially   • Drug use: No   • Sexual activity: Not on file     Other Topics Concern   • Not on file     Social History Narrative       HEENT: Normocephalic.  PERRLA.  TM's clear bilaterally.  Oropharynx: Clear.  Neck: Supple, with no adenopathy.  Chest: Equal bilateral expansion.  Clear to auscultation and percussion.  Heart: Regular sinus rhythm, S1 and S2 normal.  No murmurs or extra heart sounds heard.  Abdomen: Soft, nontender, and no organomegaly.  Neurological: cranial nerves II-XII normal Vascular: pulses are present  Dermatological: no rashes  or blemishes, or any abnormality of the skin.    REVIEW OF SYSTEMS:  Negative, other than presenting complaint.  HEENT: No headaches, diplopia, blurred vision, tinnitus, vertigo, epistaxis, hoarseness or sore throat.  Pulmonary: No cough, sputum, hemoptysis, " dyspnea, wheezing, or chest pain.  Cardiac: No chest pain, palpitations, orthopnea, paroxysmal nocturnal dyspnea, shortness of breath, or pedal edema.  Gastrointestinal: No diarrhea, melena, or constipation.  Genitourinary: No dysuria, hematuria, nocturia, frequency, bladder or bowel incontinence.  Hematology: No history of any anemia, fatigue, fever, or chills or night sweats.  Dermatology: No rashes, pruritus, or increased pigmentation changes of the skin.   Objective   Physical Examincision is well healed ,sutures removed, new cast is applied.  See back in 2 weeks.      Assessment/Plan   Lashaun was seen today for follow-up.    Diagnoses and all orders for this visit:    Chloe's deformity of left heel

## 2017-04-18 ENCOUNTER — OFFICE VISIT (OUTPATIENT)
Dept: ORTHOPEDIC SURGERY | Facility: CLINIC | Age: 57
End: 2017-04-18

## 2017-04-18 VITALS
SYSTOLIC BLOOD PRESSURE: 122 MMHG | HEIGHT: 63 IN | BODY MASS INDEX: 33.66 KG/M2 | DIASTOLIC BLOOD PRESSURE: 70 MMHG | WEIGHT: 190 LBS

## 2017-04-18 DIAGNOSIS — M92.62 HAGLUND'S DEFORMITY OF LEFT HEEL: Primary | ICD-10-CM

## 2017-04-18 PROCEDURE — 99024 POSTOP FOLLOW-UP VISIT: CPT | Performed by: ORTHOPAEDIC SURGERY

## 2017-04-18 NOTE — PROGRESS NOTES
Subjective   Lashaun Bernal is a 56 y.o. female. Status post excision of calcaneal spurs left foot and reattachment of achilles tendon of the left heel.    History of Present Illness Patient is here today for follow up of excision of calcaneal spurs of the left foot with reattachment of achilles tendon. Patient is here today for cast change.    The following portions of the patient's history were reviewed and updated as appropriate:   She  has a past medical history of Allergic rhinitis; Arthritis; Asthma; Breast lump; Chest pain; Chronic low back pain; Constipation; Depressive disorder; Diarrhea; Diverticular disease of colon; Epigastric pain; DIETER (generalized anxiety disorder); GERD (gastroesophageal reflux disease); Head ache; Hematochezia; Herpes zoster; Periumbilical pain; and PONV (postoperative nausea and vomiting).  She  does not have any pertinent problems on file.  She  has a past surgical history that includes Abdominoplasty (12/22/2004); Hand surgery (06/03/2013); Finger/Thumb Lesion/Cyst Excision (10/29/2014); ORIF radius & ulna fractures (07/22/2013); Cyst Removal (08/18/1961); Tubal ligation (08/14/1985); breast augmentation bilateral mastopexy; and Bunionectomy (Left, 3/20/2017).  Her family history includes Diabetes in her other; Heart disease in her other; Hypertension in her other.  She  reports that she has never smoked. She has never used smokeless tobacco. She reports that she drinks alcohol. She reports that she does not use illicit drugs.  Current Outpatient Prescriptions   Medication Sig Dispense Refill   • diclofenac (VOLTAREN) 75 MG EC tablet Take 1 tablet by mouth 2 (Two) Times a Day. 60 tablet 3   • estradiol (ESTRACE) 1 MG tablet Take 1 mg by mouth Daily.     • omeprazole (priLOSEC) 40 MG capsule Take 40 mg by mouth Daily.     • oxyCODONE-acetaminophen (PERCOCET) 7.5-325 MG per tablet Take 1-2 tablets by mouth Every 4 (Four) Hours As Needed (Pain). 45 tablet 0     No current  "facility-administered medications for this visit.      Current Outpatient Prescriptions on File Prior to Visit   Medication Sig   • diclofenac (VOLTAREN) 75 MG EC tablet Take 1 tablet by mouth 2 (Two) Times a Day.   • estradiol (ESTRACE) 1 MG tablet Take 1 mg by mouth Daily.   • omeprazole (priLOSEC) 40 MG capsule Take 40 mg by mouth Daily.   • oxyCODONE-acetaminophen (PERCOCET) 7.5-325 MG per tablet Take 1-2 tablets by mouth Every 4 (Four) Hours As Needed (Pain).     No current facility-administered medications on file prior to visit.      She is allergic to penicillins..    Review of Systems  /70  Ht 63\" (160 cm)  Wt 190 lb (86.2 kg)  LMP 03/09/1991  BMI 33.66 kg/m2    Social History     Social History   • Marital status:      Spouse name: N/A   • Number of children: N/A   • Years of education: N/A     Occupational History   • Not on file.     Social History Main Topics   • Smoking status: Never Smoker   • Smokeless tobacco: Never Used   • Alcohol use Yes      Comment: socially   • Drug use: No   • Sexual activity: Not on file     Other Topics Concern   • Not on file     Social History Narrative       HEENT: Normocephalic.  PERRLA.  TM's clear bilaterally.  Oropharynx: Clear.  Neck: Supple, with no adenopathy.  Chest: Equal bilateral expansion.  Clear to auscultation and percussion.  Heart: Regular sinus rhythm, S1 and S2 normal.  No murmurs or extra heart sounds heard.  Abdomen: Soft, nontender, and no organomegaly.  Neurological: cranial nerves II-XII normal Vascular: pulses are present  Dermatological: no rashes  or blemishes, or any abnormality of the skin.    REVIEW OF SYSTEMS:  Negative, other than presenting complaint.  HEENT: No headaches, diplopia, blurred vision, tinnitus, vertigo, epistaxis, hoarseness or sore throat.  Pulmonary: No cough, sputum, hemoptysis, dyspnea, wheezing, or chest pain.  Cardiac: No chest pain, palpitations, orthopnea, paroxysmal nocturnal dyspnea, shortness of " breath, or pedal edema.  Gastrointestinal: No diarrhea, melena, or constipation.  Genitourinary: No dysuria, hematuria, nocturia, frequency, bladder or bowel incontinence.  Hematology: No history of any anemia, fatigue, fever, or chills or night sweats.  Dermatology: No rashes, pruritus, or increased pigmentation changes of the skin.   Objective   Physical Exam  Wound is macerated at the distal pole the area is placed in a bacitracin ointment dressing. Plan: continue with present treatment.      Assessment/Plan   Lashaun was seen today for cast removal.    Diagnoses and all orders for this visit:    Chloe's deformity of left heel

## 2017-05-02 ENCOUNTER — OFFICE VISIT (OUTPATIENT)
Dept: ORTHOPEDIC SURGERY | Facility: CLINIC | Age: 57
End: 2017-05-02

## 2017-05-02 VITALS — HEIGHT: 63 IN | WEIGHT: 190 LBS | BODY MASS INDEX: 33.66 KG/M2

## 2017-05-02 DIAGNOSIS — M77.32 CALCANEAL SPUR OF LEFT FOOT: ICD-10-CM

## 2017-05-02 DIAGNOSIS — M92.62 HAGLUND'S DEFORMITY OF LEFT HEEL: Primary | ICD-10-CM

## 2017-05-02 PROCEDURE — 99024 POSTOP FOLLOW-UP VISIT: CPT | Performed by: ORTHOPAEDIC SURGERY

## 2017-05-23 ENCOUNTER — OFFICE VISIT (OUTPATIENT)
Dept: ORTHOPEDIC SURGERY | Facility: CLINIC | Age: 57
End: 2017-05-23

## 2017-05-23 DIAGNOSIS — M92.62 HAGLUND'S DEFORMITY OF LEFT HEEL: ICD-10-CM

## 2017-05-23 DIAGNOSIS — M77.32 CALCANEAL SPUR OF LEFT FOOT: Primary | ICD-10-CM

## 2017-05-23 PROCEDURE — 99024 POSTOP FOLLOW-UP VISIT: CPT | Performed by: ORTHOPAEDIC SURGERY

## 2017-10-31 ENCOUNTER — OFFICE VISIT (OUTPATIENT)
Dept: ORTHOPEDIC SURGERY | Facility: CLINIC | Age: 57
End: 2017-10-31

## 2017-10-31 VITALS — WEIGHT: 180 LBS | BODY MASS INDEX: 31.89 KG/M2 | HEIGHT: 63 IN

## 2017-10-31 DIAGNOSIS — M25.552 BILATERAL HIP PAIN: Primary | ICD-10-CM

## 2017-10-31 DIAGNOSIS — S76.311A RIGHT HAMSTRING MUSCLE STRAIN, INITIAL ENCOUNTER: ICD-10-CM

## 2017-10-31 DIAGNOSIS — M25.551 BILATERAL HIP PAIN: Primary | ICD-10-CM

## 2017-10-31 DIAGNOSIS — M25.552 LEFT HIP PAIN: Primary | ICD-10-CM

## 2017-10-31 PROCEDURE — 99212 OFFICE O/P EST SF 10 MIN: CPT | Performed by: ORTHOPAEDIC SURGERY

## 2017-10-31 NOTE — PROGRESS NOTES
Subjective   Lashaun Bernal is a 56 y.o. female. Left hip injury.    History of Present Illness Patient states that she fell 10/27/2017 missing a step to her porch. Patient states that she landed on her left hip and elbow. Patient states that the pain is radiating on her right hip. Patient was sent to xray upon arrival.     The following portions of the patient's history were reviewed and updated as appropriate:   She  has a past medical history of Allergic rhinitis; Arthritis; Asthma; Breast lump; Chest pain; Chronic low back pain; Constipation; Depressive disorder; Diarrhea; Diverticular disease of colon; Epigastric pain; DIETER (generalized anxiety disorder); GERD (gastroesophageal reflux disease); Head ache; Hematochezia; Herpes zoster; Periumbilical pain; and PONV (postoperative nausea and vomiting).  She  does not have any pertinent problems on file.  She  has a past surgical history that includes Abdominoplasty (12/22/2004); Hand surgery (06/03/2013); Finger/Thumb Lesion/Cyst Excision (10/29/2014); ORIF radius & ulna fractures (07/22/2013); Cyst Removal (08/18/1961); Tubal ligation (08/14/1985); breast augmentation bilateral mastopexy; and Bunionectomy (Left, 3/20/2017).  Her family history includes Diabetes in her other; Heart disease in her other; Hypertension in her other.  She  reports that she has never smoked. She has never used smokeless tobacco. She reports that she drinks alcohol. She reports that she does not use illicit drugs.  Current Outpatient Prescriptions   Medication Sig Dispense Refill   • diclofenac (VOLTAREN) 75 MG EC tablet Take 1 tablet by mouth 2 (Two) Times a Day. 60 tablet 3   • estradiol (ESTRACE) 1 MG tablet Take 1 mg by mouth Daily.     • omeprazole (priLOSEC) 40 MG capsule Take 40 mg by mouth Daily.       No current facility-administered medications for this visit.      Current Outpatient Prescriptions on File Prior to Visit   Medication Sig   • diclofenac (VOLTAREN) 75 MG EC  "tablet Take 1 tablet by mouth 2 (Two) Times a Day.   • estradiol (ESTRACE) 1 MG tablet Take 1 mg by mouth Daily.   • omeprazole (priLOSEC) 40 MG capsule Take 40 mg by mouth Daily.   • [DISCONTINUED] oxyCODONE-acetaminophen (PERCOCET) 7.5-325 MG per tablet Take 1-2 tablets by mouth Every 4 (Four) Hours As Needed (Pain).     No current facility-administered medications on file prior to visit.      She is allergic to cefprozil and penicillins..    Review of Systems  REVIEW OF SYSTEMS:  Negative, other than presenting complaint.  HEENT: No headaches, diplopia, blurred vision, tinnitus, vertigo, epistaxis, hoarseness or sore throat.  Pulmonary: No cough, sputum, hemoptysis, dyspnea, wheezing, or chest pain.  Cardiac: No chest pain, palpitations, orthopnea, paroxysmal nocturnal dyspnea, shortness of breath, or pedal edema.  Gastrointestinal: No diarrhea, melena, or constipation.  Genitourinary: No dysuria, hematuria, nocturia, frequency, bladder or bowel incontinence.  Hematology: No history of any anemia, fatigue, fever, or chills or night sweats.  Dermatology: No rashes, pruritus, or increased pigmentation changes of the skin.   Ht 63\" (160 cm)  Wt 180 lb (81.6 kg)  LMP 03/09/1991  BMI 31.89 kg/m2    Social History     Social History   • Marital status:      Spouse name: N/A   • Number of children: N/A   • Years of education: N/A     Occupational History   • Not on file.     Social History Main Topics   • Smoking status: Never Smoker   • Smokeless tobacco: Never Used   • Alcohol use Yes      Comment: socially   • Drug use: No   • Sexual activity: Not on file     Other Topics Concern   • Not on file     Social History Narrative       HEENT: Normocephalic.  PERRLA.  TM's clear bilaterally.  Oropharynx: Clear.  Neck: Supple, with no adenopathy.  Chest: Equal bilateral expansion.  Clear to auscultation and percussion.  Heart: Regular sinus rhythm, S1 and S2 normal.  No murmurs or extra heart sounds heard.  " Abdomen: Soft, nontender, and no organomegaly.  Neurological: cranial nerves II-XII normal Vascular: pulses are present  Dermatological: no rashes  or blemishes, or any abnormality of the skin.      Objective   Physical Exam  Tender to palpation of the origin of the rt hamstring. Straight leg raising reproduces the pain. Neuro intact,      Assessment/Plan   Plan: local  Measures to the site, continue with voltaren and see in 5 weeks.    Lashaun was seen today for pain.    Diagnoses and all orders for this visit:    Left hip pain    Right hamstring muscle strain, initial encounter

## 2018-01-02 ENCOUNTER — HOSPITAL ENCOUNTER (EMERGENCY)
Facility: HOSPITAL | Age: 58
Discharge: HOME | End: 2018-01-02
Attending: EMERGENCY MEDICINE | Admitting: EMERGENCY MEDICINE

## 2018-01-02 ENCOUNTER — APPOINTMENT (OUTPATIENT)
Dept: CT IMAGING | Facility: HOSPITAL | Age: 58
End: 2018-01-02

## 2018-01-02 VITALS
BODY MASS INDEX: 34.6 KG/M2 | OXYGEN SATURATION: 96 % | HEART RATE: 69 BPM | WEIGHT: 188 LBS | RESPIRATION RATE: 20 BRPM | DIASTOLIC BLOOD PRESSURE: 68 MMHG | SYSTOLIC BLOOD PRESSURE: 141 MMHG | TEMPERATURE: 98.1 F | HEIGHT: 62 IN

## 2018-01-02 DIAGNOSIS — R03.0 ELEVATED BLOOD PRESSURE READING: ICD-10-CM

## 2018-01-02 DIAGNOSIS — H81.399 PERIPHERAL VERTIGO, UNSPECIFIED LATERALITY: Primary | ICD-10-CM

## 2018-01-02 LAB
ANION GAP SERPL CALCULATED.3IONS-SCNC: 9 MMOL/L (ref 5–15)
BASOPHILS # BLD AUTO: 0.04 10*3/MM3 (ref 0–0.2)
BASOPHILS NFR BLD AUTO: 0.4 % (ref 0–2)
BUN BLD-MCNC: 16 MG/DL (ref 7–21)
BUN/CREAT SERPL: 14 (ref 7–25)
CALCIUM SPEC-SCNC: 9.4 MG/DL (ref 8.4–10.2)
CHLORIDE SERPL-SCNC: 102 MMOL/L (ref 95–110)
CO2 SERPL-SCNC: 24 MMOL/L (ref 22–31)
CREAT BLD-MCNC: 1.14 MG/DL (ref 0.5–1)
DEPRECATED RDW RBC AUTO: 39.4 FL (ref 36.4–46.3)
EOSINOPHIL # BLD AUTO: 0.2 10*3/MM3 (ref 0–0.7)
EOSINOPHIL NFR BLD AUTO: 1.8 % (ref 0–7)
ERYTHROCYTE [DISTWIDTH] IN BLOOD BY AUTOMATED COUNT: 12.2 % (ref 11.5–14.5)
GFR SERPL CREATININE-BSD FRML MDRD: 49 ML/MIN/1.73 (ref 51–120)
GLUCOSE BLD-MCNC: 86 MG/DL (ref 60–100)
HCT VFR BLD AUTO: 38.8 % (ref 35–45)
HGB BLD-MCNC: 12.6 G/DL (ref 12–15.5)
HOLD SPECIMEN: NORMAL
HOLD SPECIMEN: NORMAL
IMM GRANULOCYTES # BLD: 0.04 10*3/MM3 (ref 0–0.02)
IMM GRANULOCYTES NFR BLD: 0.4 % (ref 0–0.5)
LYMPHOCYTES # BLD AUTO: 3.63 10*3/MM3 (ref 0.6–4.2)
LYMPHOCYTES NFR BLD AUTO: 32.6 % (ref 10–50)
MCH RBC QN AUTO: 28.4 PG (ref 26.5–34)
MCHC RBC AUTO-ENTMCNC: 32.5 G/DL (ref 31.4–36)
MCV RBC AUTO: 87.6 FL (ref 80–98)
MONOCYTES # BLD AUTO: 0.99 10*3/MM3 (ref 0–0.9)
MONOCYTES NFR BLD AUTO: 8.9 % (ref 0–12)
NEUTROPHILS # BLD AUTO: 6.24 10*3/MM3 (ref 2–8.6)
NEUTROPHILS NFR BLD AUTO: 55.9 % (ref 37–80)
PLATELET # BLD AUTO: 303 10*3/MM3 (ref 150–450)
PMV BLD AUTO: 9.9 FL (ref 8–12)
POTASSIUM BLD-SCNC: 3.9 MMOL/L (ref 3.5–5.1)
RBC # BLD AUTO: 4.43 10*6/MM3 (ref 3.77–5.16)
SODIUM BLD-SCNC: 135 MMOL/L (ref 137–145)
WBC NRBC COR # BLD: 11.14 10*3/MM3 (ref 3.2–9.8)
WHOLE BLOOD HOLD SPECIMEN: NORMAL
WHOLE BLOOD HOLD SPECIMEN: NORMAL

## 2018-01-02 PROCEDURE — 85025 COMPLETE CBC W/AUTO DIFF WBC: CPT | Performed by: EMERGENCY MEDICINE

## 2018-01-02 PROCEDURE — 70450 CT HEAD/BRAIN W/O DYE: CPT

## 2018-01-02 PROCEDURE — 80048 BASIC METABOLIC PNL TOTAL CA: CPT | Performed by: EMERGENCY MEDICINE

## 2018-01-02 PROCEDURE — 93005 ELECTROCARDIOGRAM TRACING: CPT | Performed by: EMERGENCY MEDICINE

## 2018-01-02 PROCEDURE — 99284 EMERGENCY DEPT VISIT MOD MDM: CPT

## 2018-01-02 PROCEDURE — 93010 ELECTROCARDIOGRAM REPORT: CPT | Performed by: INTERNAL MEDICINE

## 2018-01-02 RX ORDER — MECLIZINE HYDROCHLORIDE 25 MG/1
25 TABLET ORAL EVERY 6 HOURS PRN
Qty: 25 TABLET | Refills: 0 | OUTPATIENT
Start: 2018-01-02 | End: 2018-12-30

## 2018-01-02 RX ORDER — SODIUM CHLORIDE 0.9 % (FLUSH) 0.9 %
10 SYRINGE (ML) INJECTION AS NEEDED
Status: DISCONTINUED | OUTPATIENT
Start: 2018-01-02 | End: 2018-01-03 | Stop reason: HOSPADM

## 2018-01-03 NOTE — DISCHARGE INSTRUCTIONS
Return ED fever, headache, motor or sensory disturbance, syncope, chest pain, worse condition, other concerns

## 2018-01-03 NOTE — ED PROVIDER NOTES
Subjective   HPI Comments: 58yo female presents ED c/o (2) episodes of brief vertigo lasting several seconds each associated with lightheadeness et elevated blood pressure.  ROS neg headache/syncope/palpitations/ diplopia/dysarthria/dysphagia/otalgia/tinnitus/paresthesia/motor weakness.    Patient is a 57 y.o. female presenting with dizziness.   Dizziness   Quality:  Imbalance and room spinning  Severity:  Mild  Onset quality:  Sudden  Duration:  1 day  Timing:  Intermittent  Chronicity:  New  Relieved by:  Nothing  Worsened by:  Nothing  Ineffective treatments:  None tried      Review of Systems   Constitutional: Negative.    HENT: Negative.    Eyes: Negative.    Respiratory: Negative.    Cardiovascular: Negative.    Gastrointestinal: Negative.    Genitourinary: Negative.    Skin: Negative.    Allergic/Immunologic: Negative for immunocompromised state.   Neurological: Positive for dizziness. Negative for syncope.       Past Medical History:   Diagnosis Date   • Allergic rhinitis    • Arthritis    • Asthma    • Breast lump    • Chest pain    • Chronic low back pain    • Constipation    • Depressive disorder    • Diarrhea    • Diverticular disease of colon    • Epigastric pain    • DIETER (generalized anxiety disorder)    • GERD (gastroesophageal reflux disease)    • Head ache    • Hematochezia    • Herpes zoster     mid back, near spine   • Periumbilical pain    • PONV (postoperative nausea and vomiting)        Allergies   Allergen Reactions   • Cefprozil Nausea And Vomiting   • Penicillins Rash       Past Surgical History:   Procedure Laterality Date   • ABDOMINOPLASTY  12/22/2004   • BREAST AUGMENTATION BILATERAL MASTOPEXY     • BUNIONECTOMY Left 3/20/2017    Procedure: EXCISION OF CALCANEAL SPURS LEFT FOOT AND REATTACHMENT OF ACHILLES TENDON ;  Surgeon: Jarrell Mar MD;  Location: [facility];  Service:    • CYST REMOVAL  08/18/1961    Excision of sebaceous cyst. Left ear   • FINGER/THUMB LESION/CYST EXCISION   10/29/2014   • HAND SURGERY  06/03/2013   • ORIF ULNA/RADIUS FRACTURES  07/22/2013   • TUBAL ABDOMINAL LIGATION  08/14/1985    Laparoscopic tubal sterilization; dilatation and curettage. Desires tubal ligation;         Family History   Problem Relation Age of Onset   • Diabetes Other    • Heart disease Other    • Hypertension Other        Social History     Social History   • Marital status:      Spouse name: N/A   • Number of children: N/A   • Years of education: N/A     Social History Main Topics   • Smoking status: Never Smoker   • Smokeless tobacco: Never Used   • Alcohol use Yes      Comment: socially   • Drug use: No   • Sexual activity: Not Asked     Other Topics Concern   • None     Social History Narrative           Objective   Physical Exam   Constitutional: She is oriented to person, place, and time. She appears well-developed and well-nourished.   HENT:   Head: Normocephalic and atraumatic.   Right Ear: External ear normal.   Left Ear: External ear normal.   Nose: Nose normal.   Mouth/Throat: Oropharynx is clear and moist.   Eyes: EOM are normal. Pupils are equal, round, and reactive to light.   Neck: Normal range of motion. Neck supple. No JVD present. No tracheal deviation present.   Cardiovascular: Normal rate, regular rhythm, normal heart sounds and intact distal pulses.  Exam reveals no gallop and no friction rub.    No murmur heard.  Pulmonary/Chest: Effort normal and breath sounds normal. She has no wheezes. She has no rales.   Abdominal: Soft. Bowel sounds are normal. There is no tenderness. There is no rebound and no guarding.   Lymphadenopathy:     She has no cervical adenopathy.   Neurological: She is alert and oriented to person, place, and time. She has normal strength. No cranial nerve deficit or sensory deficit. Coordination normal. GCS eye subscore is 4. GCS verbal subscore is 5. GCS motor subscore is 6.   Negative test of skew/head impulse. Mild lateral nystagmus.   Skin: Skin is  warm and dry.   Nursing note and vitals reviewed.      ECG 12 Lead    Date/Time: 1/2/2018 11:13 PM  Performed by: KATHI RUST  Authorized by: KATHI RUST   Interpreted by physician  Rhythm: sinus rhythm  Rate: normal  BPM: 78  QRS axis: left  Conduction: conduction normal  ST Segments: ST segments normal  T Waves: T waves normal  Clinical impression: non-specific ECG               ED Course  ED Course      Labs Reviewed   BASIC METABOLIC PANEL - Abnormal; Notable for the following:        Result Value    Creatinine 1.14 (*)     Sodium 135 (*)     eGFR Non  Amer 49 (*)     All other components within normal limits   CBC WITH AUTO DIFFERENTIAL - Abnormal; Notable for the following:     WBC 11.14 (*)     Monocytes, Absolute 0.99 (*)     Immature Grans, Absolute 0.04 (*)     All other components within normal limits   CBC AND DIFFERENTIAL    Narrative:     The following orders were created for panel order CBC & Differential.  Procedure                               Abnormality         Status                     ---------                               -----------         ------                     CBC Auto Differential[[id number]]         Abnormal            Final result                 Please view results for these tests on the individual orders.   EXTRA TUBES    Narrative:     The following orders were created for panel order Extra Tubes.  Procedure                               Abnormality         Status                     ---------                               -----------         ------                     Light Blue Top[[id number]]                                   Final result               Lavender Top[[id number]]                                     Final result               Gold Top - SST[[id number]]                                   Final result               Green Top (Gel)[[id number]]                                  Final result                 Please view results for these tests on the individual  orders.   LIGHT BLUE TOP   LAVENDER TOP   GOLD TOP - SST   GREEN TOP     Ct Head Without Contrast    Result Date: 1/2/2018  Narrative: CT head without contrast on  1/2/2018 CLINICAL INDICATION: Dizziness, hypertension TECHNIQUE: Multiple axial images are obtained throughout the head without the administration of contrast. This study was performed with techniques to keep radiation doses as low as reasonably achievable, (ALARA). Total DLP is 1018.3 mGy*cm. COMPARISON: None FINDINGS:   There is no hydrocephalus. There is no CT evidence of acute infarct. There is no hemorrhage. There are no abnormal extra-axial fluid collections. There is no mass, mass effect or midline shift. No bony abnormality is noted.     Impression: No acute intracranial abnormality. Electronically signed by:  Darius Chavez  1/2/2018 9:44 PM [facility] Workstation: [id number]                [facility]    Final diagnoses:   Peripheral vertigo, unspecified laterality   Elevated blood pressure reading            Enrico Mosher MD  01/02/18 [id number]

## 2018-03-09 ENCOUNTER — CONSULT (OUTPATIENT)
Dept: SURGERY | Facility: CLINIC | Age: 58
End: 2018-03-09

## 2018-03-09 VITALS
SYSTOLIC BLOOD PRESSURE: 136 MMHG | HEIGHT: 62 IN | DIASTOLIC BLOOD PRESSURE: 60 MMHG | WEIGHT: 187.6 LBS | BODY MASS INDEX: 34.52 KG/M2

## 2018-03-09 DIAGNOSIS — R92.8 ABNORMAL MAMMOGRAM: Primary | ICD-10-CM

## 2018-03-09 PROCEDURE — 99203 OFFICE O/P NEW LOW 30 MIN: CPT | Performed by: SURGERY

## 2018-03-09 RX ORDER — METHYLPREDNISOLONE 4 MG/1
TABLET ORAL
COMMUNITY
Start: 2018-03-08 | End: 2018-03-16

## 2018-03-09 RX ORDER — VALSARTAN 80 MG/1
40 TABLET ORAL
COMMUNITY
Start: 2018-02-20 | End: 2018-05-11

## 2018-03-09 RX ORDER — ARIPIPRAZOLE 10 MG/1
10 TABLET ORAL
COMMUNITY
Start: 2018-03-08 | End: 2018-03-21 | Stop reason: SINTOL

## 2018-03-09 RX ORDER — MELOXICAM 15 MG/1
15 TABLET ORAL
COMMUNITY
Start: 2018-02-20 | End: 2018-10-22

## 2018-03-09 NOTE — PROGRESS NOTES
Chief Complaint: This is a 57 y.o. woman seen in consultation for abnormal breast imaging right breast- biRADS 3    History of Present Illness:  Patient has noted no new masses, skin changes, nipple discharge, nipple changes prior to her most recent imaging.  Her most recent imaging includes the following:     Probably benign BI-RADS 3    Recommendation:  Short-term 6 month follow-up with unilateral right mammogram and a targeted right breast ultrasound.   Result Narrative   DIAGNOSTIC DIGITAL RIGHT MAMMOGRAPHY WITH 2-D TECHNIQUE    Indication:  Abnormal mammogram, new nodular density superior right breast.    Comparison:  Recent and prior mammograms, current targeted right breast ultrasound.    Breast Composition:  The breast tissue is heterogeneously dense which lowers mammographic sensitivity.    Technique:  The exam was done with digital 2-D technique    Discussion:  The nodular area of interest persists upon further imaging but becomes less suspicious. Targeted ultrasound in this area shows no suspicious finding. There is a small rounded echogenic focus with eccentric hypoechoic change measuring 3 x 3   mm. This could be a focus of benign fat necrosis. To be sure benignity, recommend short-term 6 month follow-up..      Probably benign BI-RADS 3    Recommendations: Short-term 6 month follow-up with unilateral right mammogram and a targeted right breast ultrasound.   Result Narrative   TARGETED RIGHT BREAST ULTRASOUND ATTENTION 12-2 O'CLOCK    Comparisons: Current, recent and prior mammograms    Indications: Abnormal mammogram, nodular density    Discussion: There are no suspicious cystic or solid lesions identified and no suspicious acoustic shadowing. At the 1:00 location, 3 cm from the areola, is a small rounded echogenic focus measuring approximately 3 x 3 mm with eccentric hypoechoic change.   This could be a small benign focus of fat necrosis.       ( I have personally reviewed the breast imaging and concur  with the findings of the radiologist- BiRADS 3)    Breast Biopsy History: Patient has not had a breast biopsy in the past.  Breast Cancer HIstory: Patient does not have a past medical history of breast cancer.  Breast Operations, and year: Breast augmentation- 2001  Obstetric/Gynecologic History:   Age menstrual periods began:13           Patient is postmenopausal due to removal of her uterus and both ovaries in the following year:1993    Number of pregnancies:2   Number of live births: 2   Number of abortions or miscarriages: 0   Age of delivery of first child: 25          Patient breast fed, for the following lenth of time: 6 months   Length of time taking birth control pills:5-6 years          Patient is presently taking the following hormone replacement: Estrace   Her family history includes the following: No breast or ovarian cancer    She is here for evaluation.    Past Medical History:   Diagnosis Date   • Allergic rhinitis    • Arthritis    • Asthma    • Breast lump    • Chest pain    • Chronic low back pain    • Constipation    • Depressive disorder    • Diarrhea    • Diverticular disease of colon    • Epigastric pain    • DIETER (generalized anxiety disorder)    • GERD (gastroesophageal reflux disease)    • Head ache    • Hematochezia    • Herpes zoster     mid back, near spine   • Periumbilical pain    • PONV (postoperative nausea and vomiting)      Past Surgical History:   Procedure Laterality Date   • ABDOMINOPLASTY  12/22/2004   • BREAST AUGMENTATION BILATERAL MASTOPEXY     • BUNIONECTOMY Left 3/20/2017    Procedure: EXCISION OF CALCANEAL SPURS LEFT FOOT AND REATTACHMENT OF ACHILLES TENDON ;  Surgeon: Jarrell Mar MD;  Location: [facility];  Service:    • CYST REMOVAL  08/18/1961    Excision of sebaceous cyst. Left ear   • FINGER/THUMB LESION/CYST EXCISION  10/29/2014   • HAND SURGERY  06/03/2013   • ORIF ULNA/RADIUS FRACTURES  07/22/2013   • TUBAL ABDOMINAL LIGATION  08/14/1985    Laparoscopic tubal  sterilization; dilatation and curettage. Desires tubal ligation;         Current Outpatient Prescriptions:   •  ARIPiprazole (ABILIFY) 10 MG tablet, Take 10 mg by mouth., Disp: , Rfl:   •  estradiol (ESTRACE) 1 MG tablet, Take 1 mg by mouth Daily., Disp: , Rfl:   •  meclizine (ANTIVERT) 25 MG tablet, Take 1 tablet by mouth Every 6 (Six) Hours As Needed for dizziness., Disp: 25 tablet, Rfl: 0  •  meloxicam (MOBIC) 15 MG tablet, Take 15 mg by mouth., Disp: , Rfl:   •  MethylPREDNISolone (MEDROL, RADHA,) 4 MG tablet, follow package directions, Disp: , Rfl:   •  omeprazole (priLOSEC) 40 MG capsule, Take 40 mg by mouth Daily., Disp: , Rfl:   •  sertraline (ZOLOFT) 50 MG tablet, Take 50 mg by mouth., Disp: , Rfl:   •  valsartan (DIOVAN) 80 MG tablet, Take 40 mg by mouth., Disp: , Rfl:   Allergies   Allergen Reactions   • Cefprozil Nausea And Vomiting   • Penicillins Rash     Family History   Problem Relation Age of Onset   • Diabetes Other    • Heart disease Other    • Hypertension Other      Social History     Social History   • Marital status:      Social History Main Topics   • Smoking status: Never Smoker   • Smokeless tobacco: Never Used   • Alcohol use Yes      Comment: socially   • Drug use: No     Review of Systems   Constitutional: Negative for appetite change, chills, fever and unexpected weight change.   HENT: Negative for hearing loss, nosebleeds and trouble swallowing.    Eyes: Negative for visual disturbance.   Respiratory: Negative for apnea, cough, choking, chest tightness, shortness of breath, wheezing and stridor.    Cardiovascular: Negative for chest pain, palpitations and leg swelling.   Gastrointestinal: Negative for abdominal distention, abdominal pain, blood in stool, constipation, diarrhea, nausea and vomiting.   Endocrine: Negative for cold intolerance, heat intolerance, polydipsia, polyphagia and polyuria.   Genitourinary: Negative for difficulty urinating, dysuria, frequency, hematuria and  urgency.   Musculoskeletal: Positive for arthralgias. Negative for back pain, myalgias and neck pain.   Skin: Negative for color change, pallor and rash.   Allergic/Immunologic: Negative for immunocompromised state.   Neurological: Negative for dizziness, seizures, syncope, light-headedness, numbness and headaches.   Hematological: Negative for adenopathy.   Psychiatric/Behavioral: Negative for suicidal ideas. The patient is not nervous/anxious.      Physical Exam   Constitutional: She appears well-developed and well-nourished.   HENT:   Head: Normocephalic and atraumatic.   Eyes: EOM are normal. Pupils are equal, round, and reactive to light. No scleral icterus.   Neck: Normal range of motion. Neck supple. No JVD present. No tracheal deviation present. No thyromegaly present.   Cardiovascular: Normal rate and regular rhythm.    Pulmonary/Chest: Effort normal and breath sounds normal. No stridor. Right breast exhibits no inverted nipple, no mass, no nipple discharge, no skin change and no tenderness. Left breast exhibits no inverted nipple, no mass, no nipple discharge, no skin change and no tenderness. Breasts are symmetrical. There is no breast swelling.   Genitourinary: No breast tenderness, discharge or bleeding.   Lymphadenopathy:     She has no cervical adenopathy.     She has no axillary adenopathy.        Right: No supraclavicular adenopathy present.        Left: No supraclavicular adenopathy present.   Skin: Skin is warm, dry and intact. No lesion noted. No erythema.   Psychiatric: She has a normal mood and affect. Her speech is normal and behavior is normal. Judgment and thought content normal. Cognition and memory are normal.   Vitals reviewed.    Vitals:    03/09/18 1019   BP: 136/60     Assessment:    Lashaun was seen today for abnormal breast imaging.    Diagnoses and all orders for this visit:    Abnormal mammogram  Comments:  BI-RADS Category 3.  No evidence of cancer.        Plan:  1.  Recheck in 6  months with right sided mammogram and examination.          This document has been electronically signed by Ruben Springer MD on March 11, 2018 7:05 PM

## 2018-03-09 NOTE — PATIENT INSTRUCTIONS

## 2018-03-11 PROBLEM — F32.A DEPRESSIVE DISORDER: Status: ACTIVE | Noted: 2018-02-21

## 2018-03-11 PROBLEM — I10 BENIGN ESSENTIAL HYPERTENSION: Status: ACTIVE | Noted: 2018-01-04

## 2018-03-11 PROBLEM — Z12.31 ENCOUNTER FOR SCREENING MAMMOGRAM FOR MALIGNANT NEOPLASM OF BREAST: Status: ACTIVE | Noted: 2017-01-24

## 2018-03-11 PROBLEM — Z79.890 POSTMENOPAUSAL HRT (HORMONE REPLACEMENT THERAPY): Status: ACTIVE | Noted: 2018-03-11

## 2018-03-19 ENCOUNTER — TELEPHONE (OUTPATIENT)
Dept: FAMILY MEDICINE CLINIC | Facility: CLINIC | Age: 58
End: 2018-03-19

## 2018-03-21 ENCOUNTER — OFFICE VISIT (OUTPATIENT)
Dept: FAMILY MEDICINE CLINIC | Facility: CLINIC | Age: 58
End: 2018-03-21

## 2018-03-21 VITALS
SYSTOLIC BLOOD PRESSURE: 120 MMHG | HEART RATE: 102 BPM | HEIGHT: 64 IN | DIASTOLIC BLOOD PRESSURE: 88 MMHG | TEMPERATURE: 98.9 F | OXYGEN SATURATION: 95 % | WEIGHT: 185 LBS | BODY MASS INDEX: 31.58 KG/M2

## 2018-03-21 DIAGNOSIS — M62.81 MUSCLE WEAKNESS: Primary | ICD-10-CM

## 2018-03-21 DIAGNOSIS — M25.50 MULTIPLE JOINT PAIN: ICD-10-CM

## 2018-03-21 DIAGNOSIS — R25.1 TREMOR: ICD-10-CM

## 2018-03-21 DIAGNOSIS — R26.9 GAIT ABNORMALITY: ICD-10-CM

## 2018-03-21 PROCEDURE — 82607 VITAMIN B-12: CPT | Performed by: FAMILY MEDICINE

## 2018-03-21 PROCEDURE — 86038 ANTINUCLEAR ANTIBODIES: CPT | Performed by: FAMILY MEDICINE

## 2018-03-21 PROCEDURE — 86618 LYME DISEASE ANTIBODY: CPT | Performed by: FAMILY MEDICINE

## 2018-03-21 PROCEDURE — 86430 RHEUMATOID FACTOR TEST QUAL: CPT | Performed by: FAMILY MEDICINE

## 2018-03-21 PROCEDURE — 99204 OFFICE O/P NEW MOD 45 MIN: CPT | Performed by: FAMILY MEDICINE

## 2018-03-21 PROCEDURE — 86140 C-REACTIVE PROTEIN: CPT | Performed by: FAMILY MEDICINE

## 2018-03-21 PROCEDURE — 85025 COMPLETE CBC W/AUTO DIFF WBC: CPT | Performed by: FAMILY MEDICINE

## 2018-03-21 PROCEDURE — 84207 ASSAY OF VITAMIN B-6: CPT | Performed by: FAMILY MEDICINE

## 2018-03-21 PROCEDURE — 82746 ASSAY OF FOLIC ACID SERUM: CPT | Performed by: FAMILY MEDICINE

## 2018-03-21 PROCEDURE — 84425 ASSAY OF VITAMIN B-1: CPT | Performed by: FAMILY MEDICINE

## 2018-03-21 NOTE — PROGRESS NOTES
Subjective   Lashaun Bernal is a 57 y.o. female.     History of Present Illness     New patient  She reports having a bout of vertigo 5-6 weeks ago.  The vertigo lasted a day or so.  After this she has started becoming very tired.  She has extreme fatigue, joints stiff most mornings and muscle ache mostly back of neck and across shoulders down to arms and elbows, her lower back and sometimes hips.  She reports muscle weakness and her friend says her gait has changed.  She will have extreme weakness in her arms everyday to the point of now having tremors in arms and hands.  She has trouble concentrating and remembering things.  She is having headaches but not all of them are migraines. She has cpap for sleep apnea, she hasn't been able to use it this week due to sinus  She has had some tick bites but not recently  Her pcp is dr serrato.  Mri of brain normal.  His lab tests normal.  He has set her up to see neurologist dr pozo in [place].  She is willing to drive to [place] to see neurologist  She is having blurred vision but no double vision  She will wake up with legs cramping front of lower legs and feet  She took abilify only for a few days and stopped it, she didn't like the way it made her feel.  She didn't take the zoloft long either.  She says she is not depressed.  She works factory and hasn't worked since 18.    Dr courtney is monitoring her right breast    Pmh:  Gerd.   Psh:  Broken wrist, tummy tuck, hysterectomy, breast augmentation  Sh:  No tobacco, alcohol, she did not answer question regarding use of pot  Fh:  Dad  4 years chf, mom alive with heart disease and htn, sister good health.    Review of Systems   Constitutional: Positive for fatigue. Negative for chills and fever.   HENT: Negative for congestion, ear discharge, ear pain, facial swelling, hearing loss, postnasal drip, rhinorrhea, sinus pressure, sore throat, trouble swallowing and voice change.    Eyes: Negative for discharge,  redness and visual disturbance.   Respiratory: Negative for cough, chest tightness, shortness of breath and wheezing.    Cardiovascular: Negative for chest pain and palpitations.   Gastrointestinal: Negative for abdominal pain, blood in stool, constipation, diarrhea, nausea and vomiting.   Endocrine: Negative for polydipsia and polyuria.   Genitourinary: Negative for dysuria, flank pain, hematuria and urgency.   Musculoskeletal: Positive for arthralgias and myalgias. Negative for back pain and joint swelling.   Skin: Negative for rash.   Neurological: Positive for dizziness, weakness and headaches. Negative for numbness.   Hematological: Negative for adenopathy.   Psychiatric/Behavioral: Negative for confusion and sleep disturbance. The patient is not nervous/anxious.        Objective   Physical Exam   Constitutional: She is oriented to person, place, and time. She appears well-developed and well-nourished.   HENT:   Head: Normocephalic and atraumatic.   Right Ear: External ear normal.   Left Ear: External ear normal.   Nose: Nose normal.   Eyes: Conjunctivae and EOM are normal. Pupils are equal, round, and reactive to light.   Neck: Normal range of motion.   Pulmonary/Chest: Effort normal.   Musculoskeletal: Normal range of motion.   Neurological: She is alert and oriented to person, place, and time.   Able to gaze upward with no eyelid drooping.  No cog wheeling upper extremities.       Psychiatric: She has a normal mood and affect. Her behavior is normal. Judgment and thought content normal.   Nursing note and vitals reviewed.      Assessment/Plan   Lashaun was seen today for establish care and fatigue.    Diagnoses and all orders for this visit:    Muscle weakness  -     Vitamin B12  -     Vitamin B6  -     Vitamin B1, Whole Blood  -     Folate  -     Lyme, Total Antibody Test / Reflex  -     Rheumatoid factor  -     C-reactive protein  -     LAMONT  -     Ambulatory Referral to Neurology  -     CBC & Differential  -      CBC Auto Differential    Tremor  -     Vitamin B12  -     Vitamin B6  -     Vitamin B1, Whole Blood  -     Folate  -     Lyme, Total Antibody Test / Reflex  -     Rheumatoid factor  -     C-reactive protein  -     LAMONT  -     Ambulatory Referral to Neurology    Gait abnormality  -     Vitamin B12  -     Vitamin B6  -     Vitamin B1, Whole Blood  -     Folate  -     Lyme, Total Antibody Test / Reflex  -     Rheumatoid factor  -     C-reactive protein  -     LAMONT  -     Ambulatory Referral to Neurology    Multiple joint pain  -     Vitamin B12  -     Vitamin B6  -     Vitamin B1, Whole Blood  -     Folate  -     Lyme, Total Antibody Test / Reflex  -     Rheumatoid factor  -     C-reactive protein  -     LAMONT  -     Ambulatory Referral to Neurology  -     CBC & Differential  -     CBC Auto Differential      Work excuse given.  See me 1 week.  Referral to neurology.     Warned work may fire her due to not working despite medical excuse.

## 2018-03-22 DIAGNOSIS — R25.1 TREMOR: Primary | ICD-10-CM

## 2018-03-22 DIAGNOSIS — M62.81 MUSCLE WEAKNESS: ICD-10-CM

## 2018-03-22 LAB
BASOPHILS # BLD AUTO: 0.03 10*3/MM3 (ref 0–0.2)
BASOPHILS NFR BLD AUTO: 0.3 % (ref 0–2)
CRP SERPL-MCNC: 1 MG/DL (ref 0–1)
DEPRECATED RDW RBC AUTO: 39.2 FL (ref 36.4–46.3)
EOSINOPHIL # BLD AUTO: 0.21 10*3/MM3 (ref 0–0.7)
EOSINOPHIL NFR BLD AUTO: 2.2 % (ref 0–7)
ERYTHROCYTE [DISTWIDTH] IN BLOOD BY AUTOMATED COUNT: 12.3 % (ref 11.5–14.5)
FOLATE SERPL-MCNC: 15.3 NG/ML (ref 2.76–21)
HCT VFR BLD AUTO: 42.1 % (ref 35–45)
HGB BLD-MCNC: 14.1 G/DL (ref 12–15.5)
IMM GRANULOCYTES # BLD: 0.05 10*3/MM3 (ref 0–0.02)
IMM GRANULOCYTES NFR BLD: 0.5 % (ref 0–0.5)
LYMPHOCYTES # BLD AUTO: 2.99 10*3/MM3 (ref 0.6–4.2)
LYMPHOCYTES NFR BLD AUTO: 30.8 % (ref 10–50)
MCH RBC QN AUTO: 29.6 PG (ref 26.5–34)
MCHC RBC AUTO-ENTMCNC: 33.5 G/DL (ref 31.4–36)
MCV RBC AUTO: 88.3 FL (ref 80–98)
MONOCYTES # BLD AUTO: 0.58 10*3/MM3 (ref 0–0.9)
MONOCYTES NFR BLD AUTO: 6 % (ref 0–12)
NEUTROPHILS # BLD AUTO: 5.84 10*3/MM3 (ref 2–8.6)
NEUTROPHILS NFR BLD AUTO: 60.2 % (ref 37–80)
NRBC BLD MANUAL-RTO: 0 /100 WBC (ref 0–0)
PLATELET # BLD AUTO: 409 10*3/MM3 (ref 150–450)
PMV BLD AUTO: 10.1 FL (ref 8–12)
RBC # BLD AUTO: 4.77 10*6/MM3 (ref 3.77–5.16)
VIT B12 BLD-MCNC: 675 PG/ML (ref 239–931)
WBC NRBC COR # BLD: 9.7 10*3/MM3 (ref 3.2–9.8)

## 2018-03-22 PROCEDURE — 83519 RIA NONANTIBODY: CPT | Performed by: FAMILY MEDICINE

## 2018-03-22 RX ORDER — PREDNISONE 20 MG/1
20 TABLET ORAL DAILY
Qty: 10 TABLET | Refills: 0 | Status: SHIPPED | OUTPATIENT
Start: 2018-03-22 | End: 2018-05-11

## 2018-03-22 RX ORDER — PREDNISONE 20 MG/1
20 TABLET ORAL DAILY
Qty: 10 TABLET | Refills: 0 | Status: SHIPPED | OUTPATIENT
Start: 2018-03-22 | End: 2018-03-22 | Stop reason: SDUPTHER

## 2018-03-23 LAB
ANA SER QL: NEGATIVE
B BURGDOR IGG+IGM SER-ACNC: <0.91 ISR (ref 0–0.9)

## 2018-03-24 LAB — RHEUMATOID FACT SERPL-ACNC: NEGATIVE [IU]/ML

## 2018-03-25 LAB
VIT B1 BLD-SCNC: 159.6 NMOL/L (ref 66.5–200)
VIT B6 SERPL-MCNC: 6.9 UG/L (ref 2–32.8)

## 2018-03-26 DIAGNOSIS — M62.81 MUSCLE WEAKNESS: ICD-10-CM

## 2018-03-26 DIAGNOSIS — R26.9 GAIT ABNORMALITY: Primary | ICD-10-CM

## 2018-03-26 LAB — ACHR AB SER-SCNC: <0.03 NMOL/L (ref 0–0.24)

## 2018-03-28 ENCOUNTER — OFFICE VISIT (OUTPATIENT)
Dept: FAMILY MEDICINE CLINIC | Facility: CLINIC | Age: 58
End: 2018-03-28

## 2018-03-28 VITALS
TEMPERATURE: 97.6 F | SYSTOLIC BLOOD PRESSURE: 130 MMHG | HEART RATE: 92 BPM | HEIGHT: 64 IN | BODY MASS INDEX: 31.79 KG/M2 | OXYGEN SATURATION: 98 % | DIASTOLIC BLOOD PRESSURE: 84 MMHG | WEIGHT: 186.2 LBS

## 2018-03-28 DIAGNOSIS — R25.1 TREMOR: Primary | ICD-10-CM

## 2018-03-28 DIAGNOSIS — M62.81 MUSCLE WEAKNESS: ICD-10-CM

## 2018-03-28 PROCEDURE — 99214 OFFICE O/P EST MOD 30 MIN: CPT | Performed by: FAMILY MEDICINE

## 2018-03-28 RX ORDER — AMITRIPTYLINE HYDROCHLORIDE 10 MG/1
10-30 TABLET, FILM COATED ORAL NIGHTLY
Qty: 90 TABLET | Refills: 2 | Status: SHIPPED | OUTPATIENT
Start: 2018-03-28 | End: 2018-10-22

## 2018-03-28 NOTE — PROGRESS NOTES
Subjective   Lashaun Bernal is a 57 y.o. female.     History of Present Illness     So far, all labs ok including crp.    Now she says she is having muscle pain along with the weakness.  She says she her muscles are quivering and cramping.  She has appointment with neurologist may 11th.  She says she cant work in this condition.     Review of Systems   Constitutional: Negative for chills, fatigue and fever.   HENT: Negative for congestion, ear discharge, ear pain, facial swelling, hearing loss, postnasal drip, rhinorrhea, sinus pressure, sore throat, trouble swallowing and voice change.    Eyes: Negative for discharge, redness and visual disturbance.   Respiratory: Negative for cough, chest tightness, shortness of breath and wheezing.    Cardiovascular: Negative for chest pain and palpitations.   Gastrointestinal: Negative for abdominal pain, blood in stool, constipation, diarrhea, nausea and vomiting.   Endocrine: Negative for polydipsia and polyuria.   Genitourinary: Negative for dysuria, flank pain, hematuria and urgency.   Musculoskeletal: Positive for myalgias. Negative for arthralgias, back pain and joint swelling.   Skin: Negative for rash.   Neurological: Positive for weakness. Negative for dizziness, numbness and headaches.   Hematological: Negative for adenopathy.   Psychiatric/Behavioral: Negative for confusion and sleep disturbance. The patient is not nervous/anxious.        Objective   Physical Exam   Constitutional: She is oriented to person, place, and time. She appears well-developed and well-nourished.   HENT:   Head: Normocephalic and atraumatic.   Right Ear: External ear normal.   Left Ear: External ear normal.   Nose: Nose normal.   Eyes: Conjunctivae and EOM are normal. Pupils are equal, round, and reactive to light.   Neck: Normal range of motion.   Pulmonary/Chest: Effort normal.   Musculoskeletal: Normal range of motion.   Neurological: She is alert and oriented to person, place, and time.    Psychiatric: She has a normal mood and affect. Her behavior is normal. Judgment and thought content normal.   Nursing note and vitals reviewed.      Assessment/Plan   Lashaun was seen today for follow-up.    Diagnoses and all orders for this visit:    Tremor  -     Heavy Metals, Blood    Muscle weakness  -     Heavy Metals, Blood    Other orders  -     amitriptyline (ELAVIL) 10 MG tablet; Take 1-3 tablets by mouth Every Night.      Trial of amitriptylline, warned drowsiness.  Warned her work would probably get rid of her.   Excuse for work, return may 14th.

## 2018-04-02 PROCEDURE — 36415 COLL VENOUS BLD VENIPUNCTURE: CPT | Performed by: FAMILY MEDICINE

## 2018-04-02 PROCEDURE — 82175 ASSAY OF ARSENIC: CPT | Performed by: FAMILY MEDICINE

## 2018-04-02 PROCEDURE — 83655 ASSAY OF LEAD: CPT | Performed by: FAMILY MEDICINE

## 2018-04-02 PROCEDURE — 83825 ASSAY OF MERCURY: CPT | Performed by: FAMILY MEDICINE

## 2018-04-03 ENCOUNTER — OFFICE VISIT (OUTPATIENT)
Dept: FAMILY MEDICINE CLINIC | Facility: CLINIC | Age: 58
End: 2018-04-03

## 2018-04-03 VITALS
HEART RATE: 83 BPM | TEMPERATURE: 98.8 F | BODY MASS INDEX: 32.37 KG/M2 | DIASTOLIC BLOOD PRESSURE: 80 MMHG | SYSTOLIC BLOOD PRESSURE: 150 MMHG | OXYGEN SATURATION: 98 % | WEIGHT: 189.6 LBS | HEIGHT: 64 IN

## 2018-04-03 DIAGNOSIS — M62.81 MUSCLE WEAKNESS: ICD-10-CM

## 2018-04-03 DIAGNOSIS — M79.10 MYALGIA: Primary | ICD-10-CM

## 2018-04-03 DIAGNOSIS — M25.50 MULTIPLE JOINT PAIN: ICD-10-CM

## 2018-04-03 DIAGNOSIS — R53.83 OTHER FATIGUE: ICD-10-CM

## 2018-04-03 PROCEDURE — 99213 OFFICE O/P EST LOW 20 MIN: CPT | Performed by: FAMILY MEDICINE

## 2018-04-03 NOTE — PROGRESS NOTES
" Subjective   Lashaun Bernal is a 57 y.o. female.     History of Present Illness     She says the amitriptylline is helping and she had one good. Day.  She is taking 10mg one and 1/2 tablets nightly.  Due to myalgia and muscle weakness she is still unable to work at all.   She has papers for me to fill out today  Normal mri of brain (found in the \"care everywhere\" tab under \"other results\" in epic and all lab tests normal with normal crp, ra, lupus, acetylcholine receptor antibodies, tick borne illnesses.  She has appointment with peña duke, neurologist 5/11/18.    Review of Systems   Constitutional: Negative for chills, fatigue and fever.   HENT: Negative for congestion, ear discharge, ear pain, facial swelling, hearing loss, postnasal drip, rhinorrhea, sinus pressure, sore throat, trouble swallowing and voice change.    Eyes: Negative for discharge, redness and visual disturbance.   Respiratory: Negative for cough, chest tightness, shortness of breath and wheezing.    Cardiovascular: Negative for chest pain and palpitations.   Gastrointestinal: Negative for abdominal pain, blood in stool, constipation, diarrhea, nausea and vomiting.   Endocrine: Negative for polydipsia and polyuria.   Genitourinary: Negative for dysuria, flank pain, hematuria and urgency.   Musculoskeletal: Positive for myalgias. Negative for arthralgias, back pain and joint swelling.   Skin: Negative for rash.   Neurological: Positive for weakness. Negative for dizziness, numbness and headaches.   Hematological: Negative for adenopathy.   Psychiatric/Behavioral: Negative for confusion and sleep disturbance. The patient is not nervous/anxious.        Objective   Physical Exam   Constitutional: She is oriented to person, place, and time. She appears well-developed and well-nourished.   HENT:   Head: Normocephalic and atraumatic.   Right Ear: External ear normal.   Left Ear: External ear normal.   Nose: Nose normal.   Eyes: Conjunctivae " "and EOM are normal. Pupils are equal, round, and reactive to light.   Neck: Normal range of motion.   Pulmonary/Chest: Effort normal.   Musculoskeletal: Normal range of motion.   Neurological: She is alert and oriented to person, place, and time.   Psychiatric: She has a normal mood and affect. Her behavior is normal. Judgment and thought content normal.   Nursing note and vitals reviewed.      Assessment/Plan   Lashaun was seen today for other.    Diagnoses and all orders for this visit:    Myalgia    Other fatigue    Muscle weakness    Multiple joint pain      I did not ask if had saline or silicon breast implants.   Silicon rarely used but it was reported in past women having myalgia complaints attributed to this?    I told her the bottom line will be she probably has \"fibromyalgia.\"  We briefly discussed fibromyalgia    I filled out papers with her help saying she may go back to work may 14th, this will follow her neurologist evaluation on 5/11/18.      Kenalog injection and oral prednisone made no difference in her condition.           "

## 2018-05-11 ENCOUNTER — OFFICE VISIT (OUTPATIENT)
Dept: NEUROLOGY | Facility: CLINIC | Age: 58
End: 2018-05-11

## 2018-05-11 VITALS
HEART RATE: 78 BPM | BODY MASS INDEX: 33.86 KG/M2 | HEIGHT: 62 IN | RESPIRATION RATE: 18 BRPM | WEIGHT: 184 LBS | SYSTOLIC BLOOD PRESSURE: 130 MMHG | DIASTOLIC BLOOD PRESSURE: 70 MMHG

## 2018-05-11 DIAGNOSIS — R53.1 WEAKNESS: Primary | ICD-10-CM

## 2018-05-11 DIAGNOSIS — R42 VERTIGO: ICD-10-CM

## 2018-05-11 DIAGNOSIS — R26.89 IMBALANCE: ICD-10-CM

## 2018-05-11 DIAGNOSIS — M54.2 NECK PAIN: ICD-10-CM

## 2018-05-11 DIAGNOSIS — G47.33 OSA (OBSTRUCTIVE SLEEP APNEA): ICD-10-CM

## 2018-05-11 PROCEDURE — 99204 OFFICE O/P NEW MOD 45 MIN: CPT | Performed by: PSYCHIATRY & NEUROLOGY

## 2018-05-11 NOTE — PROGRESS NOTES
Subjective   Lashaun Bernal, 1960, is a female who is being seen today for   Chief Complaint   Patient presents with   • Pain   • Headache   • Dizziness       HISTORY OF PRESENT ILLNESS: Patient seen for generalized weakness and dizziness.  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  Patient denies any focal hearing loss or tinnitus.  Patient went to the ER that day and had a CT head scan showed no significant abnormalities.  Blood pressure was elevated she started having chronic daily headaches over the vertex with radiation into the neck and from the neck down into the shoulders.  She had quite a bit of neck pain with this as well.  Patient had some blood work done which showed slightly low sodium and slightly elevated creatinine.  Multiple other blood work were done including LAMONT /B12/ Lyme's disease (she had no tick bite) and originally some elevation of white blood cell count.  Patient had negative myasthenia testing.  Patient is had an MRI of the brain noncontributory.  Patient says she just hurts all over and feels generally weak.  Patient has a history of migraine in the past but these are different type headaches.  She has no nausea vomiting with headaches but some blurred vision in the right eye.  Patient was started on a week of prednisone but did not seem to help her symptoms.    REVIEW OF SYSTEMS:   GENERAL: Blood pressure today 128/70 sitting 130/70 standing.  PULMONARY: Patient has history of MAX on Pap device  CVS:  No chest pain or palpitation  GASTROINTESTINAL: No acute GI distress  GENITOURINARY: No acute  distress  GYN: Status post hysterectomy on hormones  MUSCULOSKELETAL: As above  HEENT:  As above  ENDOCRINE:  No acute endocrine symptoms  PSYCHIATRIC: No acute psychiatric symptoms  HEMATOLOGY: No anemia  SKIN:  No skin changes  Family history reviewed and is positive for migraine and positive for mother with Parkinson's.      PHYSICAL EXAMINATION:    GENERAL: No acute distress.  CRANIUM: Normocephalic/atraumatic/atraumatic and no specific tenderness over the cranium  HEENT: No acute fundic abnormalities.  Pupils equal round reactive to light.       EYES: EOMs intact without nystagmus and fields full to confrontation       EARS:  Tympanic membranes normal hears tuning fork bilaterally       THROAT: No oropharynx abnormalities.  No fasciculations and no swallowing problems       NECK:  No bruits/no lymphadenopathy  CHEST: No acute cardiopulmonary abnormalities by auscultation  ABDOMEN: Nondistended  EXTREMITIES: Pulses symmetrical  NEURO: Patient alert and follows commands without difficulty  SPEECH:  Normal      CRANIAL NERVES:  Motor sensory about the face normal and symmetric    MOTOR STRENGTH:  Motor strength upper and lower extremities normal but no fasciculations noted.  Patient has some intentional tremor of the right upper extremity without cogwheeling or rigidity  STATION AND GAIT:  Gait is normal/Romberg negative  CEREBELLAR:  Finger-nose and heel shin normal    SENSORY:  Pin and vibration upper and lower extremities normal  REFLEXES:  Reflexes present and symmetric upper and lower extremities without Babinski's      ASSESSMENT AND PLAN:  Patient with vertigo and getting ENT evaluation.  Patient with dizziness and imbalance and  getting  noninvasive carotids and MRA brain and EEG.  Further blood work.  Also overnight continues oximetry on the Pap device in the setting up EMG and nerve conductions.  The nerve conductions are all 4 extremities and EMG is right upper/ right lower Patient's Body mass index is 33.65 kg/m². BMI is above normal parameters. Recommendations include: referral to primary care.  Patient is advised about her driving precautions and she says she generally has someone in a car with her driving.   Patient safety precautions reviewed.  Patient not to return to work until neuro further workup is accomplished.      Lashaun was seen today for pain, headache and dizziness.    Diagnoses and all orders for this visit:    Weakness  -     MRI Angiogram Head Without Contrast; Future  -     EMG & Nerve Conduction Test; Future  -     Heavy Metals Profile II, Urine - Urine, Clean Catch; Future    Imbalance  -     MRI Angiogram Head Without Contrast; Future  -     C-reactive Protein; Future  -     EEG; Future  -     Lipid Panel; Future  -     Magnesium; Future  -     Sedimentation Rate; Future  -     T4, Free; Future  -     US Carotid Bilateral; Future  -     CK; Future    Vertigo    MAX (obstructive sleep apnea)  -     Overnight Sleep Oximetry Study; Future    Neck pain  -     MRI Cervical Spine Without Contrast; Future    Other orders  -     Cancel: Myasthenia Gravis Full Panel; Future

## 2018-05-11 NOTE — PATIENT INSTRUCTIONS
Patient to get with PCP about weight and diet control and to get us results of previous heavy metal testing.  Patient to have driving precautions and not to work at heights or climbing ladders or work with sharp cutting tools/ no work over hot fires or water.

## 2018-08-23 ENCOUNTER — LAB (OUTPATIENT)
Dept: LAB | Facility: CLINIC | Age: 58
End: 2018-08-23

## 2018-08-23 ENCOUNTER — OFFICE VISIT (OUTPATIENT)
Dept: FAMILY MEDICINE CLINIC | Facility: CLINIC | Age: 58
End: 2018-08-23

## 2018-08-23 VITALS
TEMPERATURE: 97.8 F | BODY MASS INDEX: 36.44 KG/M2 | DIASTOLIC BLOOD PRESSURE: 84 MMHG | OXYGEN SATURATION: 98 % | HEIGHT: 62 IN | SYSTOLIC BLOOD PRESSURE: 134 MMHG | HEART RATE: 78 BPM | WEIGHT: 198 LBS

## 2018-08-23 DIAGNOSIS — R92.8 ABNORMAL MAMMOGRAM OF RIGHT BREAST: ICD-10-CM

## 2018-08-23 DIAGNOSIS — R26.89 IMBALANCE: ICD-10-CM

## 2018-08-23 DIAGNOSIS — M25.9 MULTIPLE JOINT COMPLAINTS: Primary | ICD-10-CM

## 2018-08-23 DIAGNOSIS — F41.9 ANXIETY: ICD-10-CM

## 2018-08-23 DIAGNOSIS — K59.00 CONSTIPATION, UNSPECIFIED CONSTIPATION TYPE: ICD-10-CM

## 2018-08-23 LAB
ARTICHOKE IGE QN: 91 MG/DL (ref 1–129)
CHOLEST SERPL-MCNC: 179 MG/DL (ref 0–199)
CK SERPL-CCNC: 54 U/L (ref 30–135)
CRP SERPL-MCNC: 1.4 MG/DL (ref 0–1)
ERYTHROCYTE [SEDIMENTATION RATE] IN BLOOD: 22 MM/HR (ref 0–20)
HDLC SERPL-MCNC: 57 MG/DL (ref 60–200)
LDLC/HDLC SERPL: 1.59 {RATIO} (ref 0–3.22)
MAGNESIUM SERPL-MCNC: 1.8 MG/DL (ref 1.6–2.3)
T4 FREE SERPL-MCNC: 0.97 NG/DL (ref 0.78–2.19)
TRIGL SERPL-MCNC: 158 MG/DL (ref 20–199)
TSH SERPL DL<=0.05 MIU/L-ACNC: 3.61 MIU/ML (ref 0.46–4.68)

## 2018-08-23 PROCEDURE — 85651 RBC SED RATE NONAUTOMATED: CPT | Performed by: FAMILY MEDICINE

## 2018-08-23 PROCEDURE — 80061 LIPID PANEL: CPT | Performed by: PSYCHIATRY & NEUROLOGY

## 2018-08-23 PROCEDURE — 99214 OFFICE O/P EST MOD 30 MIN: CPT | Performed by: FAMILY MEDICINE

## 2018-08-23 PROCEDURE — 84443 ASSAY THYROID STIM HORMONE: CPT | Performed by: FAMILY MEDICINE

## 2018-08-23 PROCEDURE — 84439 ASSAY OF FREE THYROXINE: CPT | Performed by: PSYCHIATRY & NEUROLOGY

## 2018-08-23 PROCEDURE — 83735 ASSAY OF MAGNESIUM: CPT | Performed by: PSYCHIATRY & NEUROLOGY

## 2018-08-23 PROCEDURE — 83825 ASSAY OF MERCURY: CPT | Performed by: FAMILY MEDICINE

## 2018-08-23 PROCEDURE — 82175 ASSAY OF ARSENIC: CPT | Performed by: FAMILY MEDICINE

## 2018-08-23 PROCEDURE — 86140 C-REACTIVE PROTEIN: CPT | Performed by: PSYCHIATRY & NEUROLOGY

## 2018-08-23 PROCEDURE — 83655 ASSAY OF LEAD: CPT | Performed by: FAMILY MEDICINE

## 2018-08-23 PROCEDURE — 82550 ASSAY OF CK (CPK): CPT | Performed by: PSYCHIATRY & NEUROLOGY

## 2018-08-23 RX ORDER — DULOXETIN HYDROCHLORIDE 30 MG/1
30 CAPSULE, DELAYED RELEASE ORAL EVERY MORNING
Qty: 30 CAPSULE | Refills: 0 | Status: SHIPPED | OUTPATIENT
Start: 2018-08-23 | End: 2018-09-06 | Stop reason: DRUGHIGH

## 2018-08-23 RX ORDER — POLYETHYLENE GLYCOL 3350 17 G/17G
17 POWDER, FOR SOLUTION ORAL 2 TIMES DAILY PRN
Qty: 1 EACH | Refills: 11 | Status: SHIPPED | OUTPATIENT
Start: 2018-08-23 | End: 2020-01-16 | Stop reason: HOSPADM

## 2018-08-23 RX ORDER — ACETAMINOPHEN 500 MG
500 TABLET ORAL EVERY 6 HOURS PRN
COMMUNITY
End: 2018-11-27 | Stop reason: SINTOL

## 2018-08-23 RX ORDER — IBUPROFEN 200 MG
800 TABLET ORAL EVERY 6 HOURS PRN
COMMUNITY
End: 2018-10-22

## 2018-08-23 RX ORDER — ESTRADIOL 1 MG/1
1 TABLET ORAL DAILY
Qty: 90 TABLET | Refills: 3 | Status: SHIPPED | OUTPATIENT
Start: 2018-08-23 | End: 2019-08-28 | Stop reason: SDUPTHER

## 2018-08-23 NOTE — PATIENT INSTRUCTIONS

## 2018-08-23 NOTE — PROGRESS NOTES
" Subjective   Lashaun Bernal is a 57 y.o. female.     History of Present Illness     Needs mammogram right breast and has follow up with dr courtney  Having anxiety/depression.  Having trouble going to sleep  Will schedule to see neurologist for follow up  Using cpap  Joint pain all over and joint swelling.  Wants to see rheumatologist  Lost her job  Taking amitriptylline at night still  Needs refill of her estrace.  Does not smoke  Constipated, new for her.  She says she is suppose to get colonoscpy every 2 years?  She says for diverticulosis? Denies colon polyp or family history colon cancer      Review of Systems   Constitutional: Negative for chills, fatigue and fever.   HENT: Negative for congestion, ear discharge, ear pain, facial swelling, hearing loss, postnasal drip, rhinorrhea, sinus pressure, sore throat, trouble swallowing and voice change.    Eyes: Negative for discharge, redness and visual disturbance.   Respiratory: Negative for cough, chest tightness, shortness of breath and wheezing.    Cardiovascular: Negative for chest pain and palpitations.   Gastrointestinal: Negative for abdominal pain, blood in stool, constipation, diarrhea, nausea and vomiting.   Endocrine: Negative for polydipsia and polyuria.   Genitourinary: Negative for dysuria, flank pain, hematuria and urgency.   Musculoskeletal: Positive for arthralgias, joint swelling and myalgias. Negative for back pain.   Skin: Negative for rash.   Neurological: Negative for dizziness, weakness, numbness and headaches.   Hematological: Negative for adenopathy.   Psychiatric/Behavioral: Negative for confusion and sleep disturbance. The patient is not nervous/anxious.            /84 (BP Location: Left arm, Patient Position: Sitting, Cuff Size: Adult)   Pulse 78   Temp 97.8 °F (36.6 °C) (Temporal Artery )   Ht 157.5 cm (62.01\")   Wt 89.8 kg (198 lb)   LMP 03/09/1991   SpO2 98%   BMI 36.21 kg/m²       Objective     Physical Exam "   Constitutional: She is oriented to person, place, and time. She appears well-developed and well-nourished.   HENT:   Head: Normocephalic and atraumatic.   Right Ear: External ear normal.   Left Ear: External ear normal.   Nose: Nose normal.   Eyes: Pupils are equal, round, and reactive to light. Conjunctivae and EOM are normal.   Neck: Normal range of motion.   Pulmonary/Chest: Effort normal.   Musculoskeletal: Normal range of motion.   Neurological: She is alert and oriented to person, place, and time.   Psychiatric: She has a normal mood and affect. Her behavior is normal. Judgment and thought content normal.   Nursing note and vitals reviewed.          PAST MEDICAL HISTORY     Past Medical History:   Diagnosis Date   • Allergic rhinitis    • Arthritis    • Asthma    • Breast lump    • Chest pain    • Chronic low back pain    • Constipation    • Depressive disorder    • Diarrhea    • Diverticular disease of colon    • Epigastric pain    • DIETER (generalized anxiety disorder)    • GERD (gastroesophageal reflux disease)    • Head ache    • Hematochezia    • Herpes zoster     mid back, near spine   • Periumbilical pain    • PONV (postoperative nausea and vomiting)       PAST SURGICAL HISTORY     Past Surgical History:   Procedure Laterality Date   • ABDOMINOPLASTY  12/22/2004   • BREAST AUGMENTATION BILATERAL MASTOPEXY     • BUNIONECTOMY Left 3/20/2017    Procedure: EXCISION OF CALCANEAL SPURS LEFT FOOT AND REATTACHMENT OF ACHILLES TENDON ;  Surgeon: Jarrell Mar MD;  Location: [facility];  Service:    • CYST REMOVAL  08/18/1961    Excision of sebaceous cyst. Left ear   • FINGER/THUMB LESION/CYST EXCISION  10/29/2014   • HAND SURGERY  06/03/2013   • ORIF ULNA/RADIUS FRACTURES  07/22/2013   • TUBAL ABDOMINAL LIGATION  08/14/1985    Laparoscopic tubal sterilization; dilatation and curettage. Desires tubal ligation;        SOCIAL HISTORY     Social History     Social History   • Marital status:      Social  History Main Topics   • Smoking status: Never Smoker   • Smokeless tobacco: Never Used   • Alcohol use No   • Drug use: No   • Sexual activity: Defer     Other Topics Concern   • Not on file      ALLERGIES   Cefprozil and Penicillins   MEDICATIONS     Current Outpatient Prescriptions   Medication Sig Dispense Refill   • acetaminophen (TYLENOL) 500 MG tablet Take 500 mg by mouth Every 6 (Six) Hours As Needed for Mild Pain .     • estradiol (ESTRACE) 1 MG tablet Take 1 tablet by mouth Daily. 90 tablet 3   • ibuprofen (ADVIL,MOTRIN) 200 MG tablet Take 800 mg by mouth Every 6 (Six) Hours As Needed for Mild Pain .     • omeprazole (priLOSEC) 40 MG capsule Take 40 mg by mouth Daily.     • amitriptyline (ELAVIL) 10 MG tablet Take 1-3 tablets by mouth Every Night. 90 tablet 2   • DULoxetine (CYMBALTA) 30 MG capsule Take 1 capsule by mouth Every Morning. 30 capsule 0   • meclizine (ANTIVERT) 25 MG tablet Take 1 tablet by mouth Every 6 (Six) Hours As Needed for dizziness. 25 tablet 0   • meloxicam (MOBIC) 15 MG tablet Take 15 mg by mouth.     • polyethylene glycol (MIRALAX) powder Take 17 g by mouth 2 (Two) Times a Day As Needed (constipation). 1 each 11     No current facility-administered medications for this visit.         The following portions of the patient's history were reviewed and updated as appropriate: allergies, current medications, past family history, past medical history, past social history, past surgical history and problem list.        Assessment/Plan   Lashaun was seen today for breast mass.    Diagnoses and all orders for this visit:    Multiple joint complaints  -     Ambulatory Referral to Rheumatology    Constipation, unspecified constipation type  -     TSH  -     Ambulatory Referral to Gastroenterology    Abnormal mammogram of right breast  -     Mammo diagnostic digital tomosynthesis right w CAD; Future    Anxiety    Other orders  -     estradiol (ESTRACE) 1 MG tablet; Take 1 tablet by mouth Daily.  -      DULoxetine (CYMBALTA) 30 MG capsule; Take 1 capsule by mouth Every Morning.  -     polyethylene glycol (MIRALAX) powder; Take 17 g by mouth 2 (Two) Times a Day As Needed (constipation).      Will call with tsh.  Referrals.  She will call neurologist  Follow up 2 weeks to see how doing with cymbalta.  Considered remeron at night to help sleep but cymbalta suppose to help with pain.  Warned of increased anxiety at first.                    No Follow-up on file.                  This document has been electronically signed by Eric Rowell MD on August 23, 2018 9:15 AM

## 2018-08-24 ENCOUNTER — LAB (OUTPATIENT)
Dept: LAB | Facility: HOSPITAL | Age: 58
End: 2018-08-24

## 2018-08-24 DIAGNOSIS — R53.1 WEAKNESS: ICD-10-CM

## 2018-08-24 PROCEDURE — 82300 ASSAY OF CADMIUM: CPT

## 2018-08-24 PROCEDURE — 82175 ASSAY OF ARSENIC: CPT

## 2018-08-24 PROCEDURE — 83825 ASSAY OF MERCURY: CPT

## 2018-08-24 PROCEDURE — 82570 ASSAY OF URINE CREATININE: CPT

## 2018-08-24 PROCEDURE — 83655 ASSAY OF LEAD: CPT

## 2018-08-27 LAB
ARSENIC 24H UR-MCNC: NORMAL UG/L (ref 0–50)
CADMIUM 24H UR-MCNC: NORMAL UG/L
CREAT 24H UR-MCNC: 0.78 G/L (ref 0.3–3)
INORG ARSENIC UR-MCNC: NORMAL UG/L (ref 0–19)
LEAD 24H UR-MCNC: NORMAL UG/L (ref 0–49)
MERCURY 24H UR-MCNC: NORMAL UG/L (ref 0–19)

## 2018-08-28 LAB
ARSENIC BLD-MCNC: 9 UG/L (ref 2–23)
LEAD BLD-MCNC: NORMAL UG/DL (ref 0–4)
MERCURY BLD-MCNC: NORMAL UG/L (ref 0–14.9)

## 2018-09-06 ENCOUNTER — OFFICE VISIT (OUTPATIENT)
Dept: FAMILY MEDICINE CLINIC | Facility: CLINIC | Age: 58
End: 2018-09-06

## 2018-09-06 VITALS
WEIGHT: 198.2 LBS | BODY MASS INDEX: 36.47 KG/M2 | TEMPERATURE: 98 F | DIASTOLIC BLOOD PRESSURE: 76 MMHG | HEIGHT: 62 IN | HEART RATE: 87 BPM | SYSTOLIC BLOOD PRESSURE: 128 MMHG | OXYGEN SATURATION: 97 %

## 2018-09-06 DIAGNOSIS — M25.50 MULTIPLE JOINT PAIN: ICD-10-CM

## 2018-09-06 DIAGNOSIS — F32.A DEPRESSIVE DISORDER: Primary | ICD-10-CM

## 2018-09-06 PROCEDURE — 99213 OFFICE O/P EST LOW 20 MIN: CPT | Performed by: FAMILY MEDICINE

## 2018-09-06 RX ORDER — FERROUS SULFATE 325(65) MG
325 TABLET ORAL
Qty: 30 TABLET | Refills: 11 | Status: SHIPPED | OUTPATIENT
Start: 2018-09-06 | End: 2018-12-27

## 2018-09-06 RX ORDER — DULOXETIN HYDROCHLORIDE 60 MG/1
60 CAPSULE, DELAYED RELEASE ORAL DAILY
Qty: 30 CAPSULE | Refills: 11 | Status: SHIPPED | OUTPATIENT
Start: 2018-09-06 | End: 2018-10-22

## 2018-09-06 NOTE — PROGRESS NOTES
" Subjective   Lashaun Bernal is a 57 y.o. female.     History of Present Illness     Two nights ago, severe pain in shoulders and hips, better today  cymbalta caused restless legs.     Review of Systems   Constitutional: Negative for chills, fatigue and fever.   HENT: Negative for congestion, ear discharge, ear pain, facial swelling, hearing loss, postnasal drip, rhinorrhea, sinus pressure, sore throat, trouble swallowing and voice change.    Eyes: Negative for discharge, redness and visual disturbance.   Respiratory: Negative for cough, chest tightness, shortness of breath and wheezing.    Cardiovascular: Negative for chest pain and palpitations.   Gastrointestinal: Negative for abdominal pain, blood in stool, constipation, diarrhea, nausea and vomiting.   Endocrine: Negative for polydipsia and polyuria.   Genitourinary: Negative for dysuria, flank pain, hematuria and urgency.   Musculoskeletal: Negative for arthralgias, back pain, joint swelling and myalgias.   Skin: Negative for rash.   Neurological: Negative for dizziness, weakness, numbness and headaches.   Hematological: Negative for adenopathy.   Psychiatric/Behavioral: Negative for confusion and sleep disturbance. The patient is not nervous/anxious.            /76 (BP Location: Left arm, Patient Position: Sitting, Cuff Size: Adult)   Pulse 87   Temp 98 °F (36.7 °C) (Temporal Artery )   Ht 157.5 cm (62.01\")   Wt 89.9 kg (198 lb 3.2 oz)   LMP 03/09/1991   SpO2 97%   BMI 36.24 kg/m²       Objective     Physical Exam   Constitutional: She is oriented to person, place, and time. She appears well-developed and well-nourished.   HENT:   Head: Normocephalic and atraumatic.   Right Ear: External ear normal.   Left Ear: External ear normal.   Nose: Nose normal.   Eyes: Pupils are equal, round, and reactive to light. Conjunctivae and EOM are normal.   Neck: Normal range of motion.   Pulmonary/Chest: Effort normal.   Musculoskeletal: Normal range of " motion.   Neurological: She is alert and oriented to person, place, and time.   Psychiatric: She has a normal mood and affect. Her behavior is normal. Judgment and thought content normal.   Nursing note and vitals reviewed.          PAST MEDICAL HISTORY     Past Medical History:   Diagnosis Date   • Allergic rhinitis    • Arthritis    • Asthma    • Breast lump    • Chest pain    • Chronic low back pain    • Constipation    • Depressive disorder    • Diarrhea    • Diverticular disease of colon    • Epigastric pain    • DIETER (generalized anxiety disorder)    • GERD (gastroesophageal reflux disease)    • Head ache    • Hematochezia    • Herpes zoster     mid back, near spine   • Periumbilical pain    • PONV (postoperative nausea and vomiting)       PAST SURGICAL HISTORY     Past Surgical History:   Procedure Laterality Date   • ABDOMINOPLASTY  12/22/2004   • BREAST AUGMENTATION BILATERAL MASTOPEXY     • BUNIONECTOMY Left 3/20/2017    Procedure: EXCISION OF CALCANEAL SPURS LEFT FOOT AND REATTACHMENT OF ACHILLES TENDON ;  Surgeon: Jarrell Mar MD;  Location: [facility];  Service:    • CYST REMOVAL  08/18/1961    Excision of sebaceous cyst. Left ear   • FINGER/THUMB LESION/CYST EXCISION  10/29/2014   • HAND SURGERY  06/03/2013   • ORIF ULNA/RADIUS FRACTURES  07/22/2013   • TUBAL ABDOMINAL LIGATION  08/14/1985    Laparoscopic tubal sterilization; dilatation and curettage. Desires tubal ligation;        SOCIAL HISTORY     Social History     Social History   • Marital status:      Social History Main Topics   • Smoking status: Never Smoker   • Smokeless tobacco: Never Used   • Alcohol use No   • Drug use: No   • Sexual activity: Defer     Other Topics Concern   • Not on file      ALLERGIES   Cefprozil and Penicillins   MEDICATIONS     Current Outpatient Prescriptions   Medication Sig Dispense Refill   • acetaminophen (TYLENOL) 500 MG tablet Take 500 mg by mouth Every 6 (Six) Hours As Needed for Mild Pain .     •  amitriptyline (ELAVIL) 10 MG tablet Take 1-3 tablets by mouth Every Night. 90 tablet 2   • estradiol (ESTRACE) 1 MG tablet Take 1 tablet by mouth Daily. 90 tablet 3   • ibuprofen (ADVIL,MOTRIN) 200 MG tablet Take 800 mg by mouth Every 6 (Six) Hours As Needed for Mild Pain .     • meclizine (ANTIVERT) 25 MG tablet Take 1 tablet by mouth Every 6 (Six) Hours As Needed for dizziness. 25 tablet 0   • meloxicam (MOBIC) 15 MG tablet Take 15 mg by mouth.     • omeprazole (priLOSEC) 40 MG capsule Take 40 mg by mouth Daily.     • polyethylene glycol (MIRALAX) powder Take 17 g by mouth 2 (Two) Times a Day As Needed (constipation). 1 each 11   • DULoxetine (CYMBALTA) 60 MG capsule Take 1 capsule by mouth Daily. 30 capsule 11   • ferrous sulfate 325 (65 FE) MG tablet Take 1 tablet by mouth Daily With Breakfast. 30 tablet 11     No current facility-administered medications for this visit.         The following portions of the patient's history were reviewed and updated as appropriate: allergies, current medications, past family history, past medical history, past social history, past surgical history and problem list.        Assessment/Plan   Lashaun was seen today for follow-up.    Diagnoses and all orders for this visit:    Depressive disorder    Multiple joint pain    Other orders  -     DULoxetine (CYMBALTA) 60 MG capsule; Take 1 capsule by mouth Daily.  -     ferrous sulfate 325 (65 FE) MG tablet; Take 1 tablet by mouth Daily With Breakfast.      Iron may help legs  Increase cymbalta to 60mg qd  Referrals made, waiting on rheumatology                 No Follow-up on file.                  This document has been electronically signed by Eric Rowell MD on September 6, 2018 8:55 AM

## 2018-09-11 ENCOUNTER — TELEPHONE (OUTPATIENT)
Dept: FAMILY MEDICINE CLINIC | Facility: CLINIC | Age: 58
End: 2018-09-11

## 2018-09-11 NOTE — TELEPHONE ENCOUNTER
I CALLED PATIENT TO GIVE HER APPOINTMENT INFO FOR RHEUMATOLOGY. DR HERNÁNDEZ WITH [facility] 4-24-19 1:00.    PATIENT STATES SHE CAN'T TAKE 60 MG OF CYMBALTA. IT CAUSED TOO MUCH ANXIETY.  CURRENTLY TAKING 30 MG CYMBALTA.

## 2018-09-17 ENCOUNTER — OFFICE VISIT (OUTPATIENT)
Dept: SURGERY | Facility: CLINIC | Age: 58
End: 2018-09-17

## 2018-09-17 VITALS
DIASTOLIC BLOOD PRESSURE: 80 MMHG | BODY MASS INDEX: 36.51 KG/M2 | HEIGHT: 62 IN | SYSTOLIC BLOOD PRESSURE: 130 MMHG | HEART RATE: 80 BPM | TEMPERATURE: 98.4 F | WEIGHT: 198.4 LBS

## 2018-09-17 DIAGNOSIS — Z87.898 HISTORY OF ABNORMAL MAMMOGRAM: Primary | ICD-10-CM

## 2018-09-17 PROCEDURE — 99213 OFFICE O/P EST LOW 20 MIN: CPT | Performed by: SURGERY

## 2018-09-17 NOTE — PROGRESS NOTES
Chief Complaint: This is a 57 y.o. woman seen in consultation for abnormal breast imaging    History of Present Illness:  Patient has noted no new masses, skin changes, nipple discharge, nipple changes prior to her most recent imaging.  Her most recent imaging includes the following:   Study Result     PROCEDURE: MAMMO BREAST DIAGNOSTIC TOMOSYNTHESIS RIGHT, US BREAST  UNILATERAL LIMITED     HISTORY: Follow-up     COMPARISON: Prior mammograms dated 2/21/2018, 2/20/2018,  1/24/2017, and 9/23/2013.     NOTE:   Computer-aided detection was utilized during this exam.   Digital breast tomosynthesis was performed.     FINDINGS: CC and MLO views of both breasts were obtained. Implant  displaced views of both breasts were obtained in the CC and MLO  projections. Spot compression views were also obtained of the  right breast. There is a subtle area of architectural distortion  in the outer slightly lower aspect of the right breast 9 cm from  the nipple which appears unchanged compared to the prior exam  from 2013 and is therefore felt to be benign, probably related to  patient's implant surgery. This is best appreciated on  tomosynthesis imaging. No suspicious mass or microcalcifications  are seen.     Targeted ultrasound imaging of the right breast in the area of  architectural distortion and the surrounding areas shows no  suspicious abnormality.     IMPRESSION:  CONCLUSION:    1. No suspicious mammographic or sonographic abnormality.  Recommend screening mammography in one year unless clinical  circumstances dictate earlier evaluation     2. BI-RADS category 2, benign findings      Electronically signed by:  Abdulkadir Majano MD  9/11/2018 10:50 AM  CDT Workstation: [id number]       ( I have personally reviewed the breast imaging and concur with the findings of the radiologist- BiRADS 2)    She is here for evaluation.    Past Medical History:   Diagnosis Date   • Allergic rhinitis    • Arthritis    • Asthma    • Breast lump    •  Chest pain    • Chronic low back pain    • Constipation    • Depressive disorder    • Diarrhea    • Diverticular disease of colon    • Epigastric pain    • DIETER (generalized anxiety disorder)    • GERD (gastroesophageal reflux disease)    • Head ache    • Hematochezia    • Herpes zoster     mid back, near spine   • Periumbilical pain    • PONV (postoperative nausea and vomiting)      Past Surgical History:   Procedure Laterality Date   • ABDOMINOPLASTY  12/22/2004   • BREAST AUGMENTATION BILATERAL MASTOPEXY     • BUNIONECTOMY Left 3/20/2017    Procedure: EXCISION OF CALCANEAL SPURS LEFT FOOT AND REATTACHMENT OF ACHILLES TENDON ;  Surgeon: Jarrell Mar MD;  Location: [facility];  Service:    • CYST REMOVAL  08/18/1961    Excision of sebaceous cyst. Left ear   • FINGER/THUMB LESION/CYST EXCISION  10/29/2014   • HAND SURGERY  06/03/2013   • HYSTERECTOMY     • OOPHORECTOMY     • ORIF ULNA/RADIUS FRACTURES  07/22/2013   • TUBAL ABDOMINAL LIGATION  08/14/1985    Laparoscopic tubal sterilization; dilatation and curettage. Desires tubal ligation;         Current Outpatient Prescriptions:   •  acetaminophen (TYLENOL) 500 MG tablet, Take 500 mg by mouth Every 6 (Six) Hours As Needed for Mild Pain ., Disp: , Rfl:   •  amitriptyline (ELAVIL) 10 MG tablet, Take 1-3 tablets by mouth Every Night., Disp: 90 tablet, Rfl: 2  •  DULoxetine (CYMBALTA) 60 MG capsule, Take 1 capsule by mouth Daily., Disp: 30 capsule, Rfl: 11  •  estradiol (ESTRACE) 1 MG tablet, Take 1 tablet by mouth Daily., Disp: 90 tablet, Rfl: 3  •  ferrous sulfate 325 (65 FE) MG tablet, Take 1 tablet by mouth Daily With Breakfast., Disp: 30 tablet, Rfl: 11  •  ibuprofen (ADVIL,MOTRIN) 200 MG tablet, Take 800 mg by mouth Every 6 (Six) Hours As Needed for Mild Pain ., Disp: , Rfl:   •  meclizine (ANTIVERT) 25 MG tablet, Take 1 tablet by mouth Every 6 (Six) Hours As Needed for dizziness., Disp: 25 tablet, Rfl: 0  •  meloxicam (MOBIC) 15 MG tablet, Take 15 mg by mouth.,  Disp: , Rfl:   •  omeprazole (priLOSEC) 40 MG capsule, Take 40 mg by mouth Daily., Disp: , Rfl:   •  polyethylene glycol (MIRALAX) powder, Take 17 g by mouth 2 (Two) Times a Day As Needed (constipation)., Disp: 1 each, Rfl: 11  Allergies   Allergen Reactions   • Cefprozil Nausea And Vomiting   • Penicillins Rash     Family History   Problem Relation Age of Onset   • Diabetes Other    • Heart disease Other    • Hypertension Other      Social History     Social History   • Marital status:      Spouse name: N/A   • Number of children: N/A   • Years of education: N/A     Occupational History   • Not on file.     Social History Main Topics   • Smoking status: Never Smoker   • Smokeless tobacco: Never Used   • Alcohol use No   • Drug use: No   • Sexual activity: Defer     Other Topics Concern   • Not on file     Social History Narrative   • No narrative on file     Review of Systems   All other systems reviewed and are negative.    Physical Exam   Constitutional: She appears well-developed and well-nourished. No distress.   HENT:   Head: Normocephalic and atraumatic.   Eyes: Pupils are equal, round, and reactive to light. EOM are normal. No scleral icterus.   Neck: Normal range of motion. Neck supple. No JVD present. No tracheal deviation present. No thyromegaly present.   Cardiovascular: Normal rate and regular rhythm.    Pulmonary/Chest: Effort normal and breath sounds normal. No stridor. Right breast exhibits no inverted nipple, no mass, no nipple discharge, no skin change and no tenderness. Left breast exhibits no inverted nipple, no mass, no nipple discharge, no skin change and no tenderness. Breasts are symmetrical. There is no breast swelling.       Genitourinary: No breast tenderness, discharge or bleeding.   Lymphadenopathy:     She has no cervical adenopathy.     She has no axillary adenopathy.   Skin: Skin is warm, dry and intact. No lesion noted. She is not diaphoretic. No erythema. No pallor.    Psychiatric: She has a normal mood and affect. Her speech is normal and behavior is normal. Judgment normal. Cognition and memory are normal.   Vitals reviewed.    Vitals:    09/17/18 0924   BP: 130/80   Pulse: 80   Temp: 98.4 °F (36.9 °C)     Assessment:    Lashaun was seen today for follow-up.    Diagnoses and all orders for this visit:    History of abnormal mammogram        Plan:  1. 6 month recheck with bilateral mammograms and exams                This document has been electronically signed by Ruben Springer MD on September 20, 2018 7:45 PM

## 2018-09-17 NOTE — PATIENT INSTRUCTIONS

## 2018-09-20 PROBLEM — Z87.898 HISTORY OF ABNORMAL MAMMOGRAM: Status: ACTIVE | Noted: 2018-09-20

## 2018-09-28 DIAGNOSIS — R92.8 ABNORMAL MAMMOGRAM: Primary | ICD-10-CM

## 2018-10-22 ENCOUNTER — OFFICE VISIT (OUTPATIENT)
Dept: FAMILY MEDICINE CLINIC | Facility: CLINIC | Age: 58
End: 2018-10-22

## 2018-10-22 VITALS
BODY MASS INDEX: 37.39 KG/M2 | WEIGHT: 203.2 LBS | HEIGHT: 62 IN | SYSTOLIC BLOOD PRESSURE: 138 MMHG | DIASTOLIC BLOOD PRESSURE: 80 MMHG

## 2018-10-22 DIAGNOSIS — M79.642 PAIN IN BOTH HANDS: Primary | ICD-10-CM

## 2018-10-22 DIAGNOSIS — M13.0 POLYARTICULAR ARTHRITIS: ICD-10-CM

## 2018-10-22 DIAGNOSIS — M79.641 PAIN IN BOTH HANDS: Primary | ICD-10-CM

## 2018-10-22 PROBLEM — Z12.31 ENCOUNTER FOR SCREENING MAMMOGRAM FOR MALIGNANT NEOPLASM OF BREAST: Status: RESOLVED | Noted: 2017-01-24 | Resolved: 2018-10-22

## 2018-10-22 PROBLEM — Z79.890 POSTMENOPAUSAL HRT (HORMONE REPLACEMENT THERAPY): Chronic | Status: ACTIVE | Noted: 2018-03-11

## 2018-10-22 PROBLEM — Z87.898 HISTORY OF ABNORMAL MAMMOGRAM: Chronic | Status: ACTIVE | Noted: 2018-09-20

## 2018-10-22 PROBLEM — I10 BENIGN ESSENTIAL HYPERTENSION: Chronic | Status: ACTIVE | Noted: 2018-01-04

## 2018-10-22 PROCEDURE — 99214 OFFICE O/P EST MOD 30 MIN: CPT | Performed by: FAMILY MEDICINE

## 2018-10-22 RX ORDER — PREDNISONE 20 MG/1
TABLET ORAL
Qty: 60 TABLET | Refills: 0 | Status: SHIPPED | OUTPATIENT
Start: 2018-10-22 | End: 2018-11-21

## 2018-10-22 NOTE — PROGRESS NOTES
Subjective   Lashaun Bernal is a 57 y.o. female.     History of Present Illness   requesting evaluation bilateral hand pain and swelling some arthralgias off and on now for several months.  Said on Thursday negative workup except for C reactive protein being elevated up.  However symptoms are consistent problem with the autoimmune arthritis probably rheumatoid.  Said bilateral hand pain and swelling now for several weeks.  Been using anti-inflammatories.  History noted.  Has appointment to see rheumatology but not till April of next year    The following portions of the patient's history were reviewed and updated as appropriate: allergies, current medications, past family history, past medical history, past social history, past surgical history and problem list.    Review of Systems   Constitutional: Negative for activity change, appetite change, fatigue and unexpected weight change.   HENT: Negative for trouble swallowing and voice change.    Eyes: Negative for redness and visual disturbance.   Respiratory: Negative for cough and wheezing.    Cardiovascular: Negative for chest pain and palpitations.   Gastrointestinal: Negative for abdominal pain, constipation, diarrhea, nausea and vomiting.   Genitourinary: Negative for urgency.   Musculoskeletal: Positive for arthralgias. Negative for joint swelling.   Neurological: Negative for syncope and headaches.   Hematological: Negative for adenopathy.   Psychiatric/Behavioral: Negative for sleep disturbance.       Objective   Physical Exam   Constitutional: She appears well-developed.   HENT:   Head: Normocephalic.   Eyes: Pupils are equal, round, and reactive to light.   Neck: Normal range of motion.   Pulmonary/Chest: Effort normal.   Abdominal: Soft.   Musculoskeletal:   Wrapping go is normal.  Both hands do show mild swelling at the MIP PIP joint DIP joint area.  DIP joints are slightly deformed somewhat Heberden's nodes some with mild inflammation.  No red streaks.   Tender to touch.   Psychiatric: Her affect is blunt.   No distress       Assessment/Plan   Lashaun was seen today for joint swelling.    Diagnoses and all orders for this visit:    Pain in both hands  -     predniSONE (DELTASONE) 20 MG tablet; 2 qd x 7 then 1qd x 7 then 1 everyother day  Till gone  -     Ambulatory Referral to Rheumatology    Polyarticular arthritis  -     predniSONE (DELTASONE) 20 MG tablet; 2 qd x 7 then 1qd x 7 then 1 everyother day  Till gone  -     Ambulatory Referral to Rheumatology    BMI 37.0-37.9, adult      Suspect autoimmune arthritis.  Short course of tapering steroids as ordered above.  Stop all anti-inflammatory stay on PPI.  Try to move her rheumatoid consultation.  Defer back to regular provider otherwise.

## 2018-11-21 ENCOUNTER — LAB (OUTPATIENT)
Dept: LAB | Facility: HOSPITAL | Age: 58
End: 2018-11-21

## 2018-11-21 ENCOUNTER — OFFICE VISIT (OUTPATIENT)
Dept: FAMILY MEDICINE CLINIC | Facility: CLINIC | Age: 58
End: 2018-11-21

## 2018-11-21 VITALS
BODY MASS INDEX: 37.97 KG/M2 | DIASTOLIC BLOOD PRESSURE: 84 MMHG | SYSTOLIC BLOOD PRESSURE: 132 MMHG | WEIGHT: 206.3 LBS | HEIGHT: 62 IN

## 2018-11-21 DIAGNOSIS — Z11.59 NEED FOR HEPATITIS C SCREENING TEST: ICD-10-CM

## 2018-11-21 DIAGNOSIS — I10 BENIGN ESSENTIAL HYPERTENSION: Primary | Chronic | ICD-10-CM

## 2018-11-21 DIAGNOSIS — G47.30 SLEEP APNEA IN ADULT: ICD-10-CM

## 2018-11-21 DIAGNOSIS — F41.9 ANXIETY: ICD-10-CM

## 2018-11-21 DIAGNOSIS — R53.82 CHRONIC FATIGUE: ICD-10-CM

## 2018-11-21 DIAGNOSIS — F32.A DEPRESSIVE DISORDER: ICD-10-CM

## 2018-11-21 DIAGNOSIS — I10 BENIGN ESSENTIAL HYPERTENSION: ICD-10-CM

## 2018-11-21 DIAGNOSIS — Z76.89 ENCOUNTER TO ESTABLISH CARE: ICD-10-CM

## 2018-11-21 DIAGNOSIS — K21.9 GASTROESOPHAGEAL REFLUX DISEASE WITHOUT ESOPHAGITIS: ICD-10-CM

## 2018-11-21 LAB
25(OH)D3 SERPL-MCNC: 52.6 NG/ML (ref 30–100)
ALBUMIN SERPL-MCNC: 4.1 G/DL (ref 3.4–4.8)
ALBUMIN/GLOB SERPL: 1.4 G/DL (ref 1.1–1.8)
ALP SERPL-CCNC: 96 U/L (ref 38–126)
ALT SERPL W P-5'-P-CCNC: 72 U/L (ref 9–52)
ANION GAP SERPL CALCULATED.3IONS-SCNC: 12 MMOL/L (ref 5–15)
AST SERPL-CCNC: 53 U/L (ref 14–36)
BASOPHILS # BLD AUTO: 0.04 10*3/MM3 (ref 0–0.2)
BASOPHILS NFR BLD AUTO: 0.5 % (ref 0–2)
BILIRUB SERPL-MCNC: 0.4 MG/DL (ref 0.2–1.3)
BUN BLD-MCNC: 14 MG/DL (ref 7–21)
BUN/CREAT SERPL: 16.3 (ref 7–25)
CALCIUM SPEC-SCNC: 8.9 MG/DL (ref 8.4–10.2)
CHLORIDE SERPL-SCNC: 102 MMOL/L (ref 95–110)
CO2 SERPL-SCNC: 23 MMOL/L (ref 22–31)
CREAT BLD-MCNC: 0.86 MG/DL (ref 0.5–1)
DEPRECATED RDW RBC AUTO: 40.3 FL (ref 36.4–46.3)
EOSINOPHIL # BLD AUTO: 0.31 10*3/MM3 (ref 0–0.7)
EOSINOPHIL NFR BLD AUTO: 3.8 % (ref 0–7)
ERYTHROCYTE [DISTWIDTH] IN BLOOD BY AUTOMATED COUNT: 12.7 % (ref 11.5–14.5)
GFR SERPL CREATININE-BSD FRML MDRD: 68 ML/MIN/1.73 (ref 51–120)
GLOBULIN UR ELPH-MCNC: 2.9 GM/DL (ref 2.3–3.5)
GLUCOSE BLD-MCNC: 101 MG/DL (ref 60–100)
HCT VFR BLD AUTO: 38.7 % (ref 35–45)
HCV AB SER DONR QL: NEGATIVE
HGB BLD-MCNC: 12.8 G/DL (ref 12–15.5)
IMM GRANULOCYTES # BLD: 0.03 10*3/MM3 (ref 0–0.02)
IMM GRANULOCYTES NFR BLD: 0.4 % (ref 0–0.5)
LYMPHOCYTES # BLD AUTO: 2.76 10*3/MM3 (ref 0.6–4.2)
LYMPHOCYTES NFR BLD AUTO: 33.6 % (ref 10–50)
MCH RBC QN AUTO: 28.5 PG (ref 26.5–34)
MCHC RBC AUTO-ENTMCNC: 33.1 G/DL (ref 31.4–36)
MCV RBC AUTO: 86.2 FL (ref 80–98)
MONOCYTES # BLD AUTO: 0.78 10*3/MM3 (ref 0–0.9)
MONOCYTES NFR BLD AUTO: 9.5 % (ref 0–12)
NEUTROPHILS # BLD AUTO: 4.3 10*3/MM3 (ref 2–8.6)
NEUTROPHILS NFR BLD AUTO: 52.2 % (ref 37–80)
PLATELET # BLD AUTO: 350 10*3/MM3 (ref 150–450)
PMV BLD AUTO: 10 FL (ref 8–12)
POTASSIUM BLD-SCNC: 4.4 MMOL/L (ref 3.5–5.1)
PROT SERPL-MCNC: 7 G/DL (ref 6.3–8.6)
RBC # BLD AUTO: 4.49 10*6/MM3 (ref 3.77–5.16)
SODIUM BLD-SCNC: 137 MMOL/L (ref 137–145)
WBC NRBC COR # BLD: 8.22 10*3/MM3 (ref 3.2–9.8)

## 2018-11-21 PROCEDURE — 85025 COMPLETE CBC W/AUTO DIFF WBC: CPT

## 2018-11-21 PROCEDURE — 82306 VITAMIN D 25 HYDROXY: CPT

## 2018-11-21 PROCEDURE — 86803 HEPATITIS C AB TEST: CPT

## 2018-11-21 PROCEDURE — 80053 COMPREHEN METABOLIC PANEL: CPT

## 2018-11-21 PROCEDURE — 99214 OFFICE O/P EST MOD 30 MIN: CPT | Performed by: NURSE PRACTITIONER

## 2018-11-21 RX ORDER — BUSPIRONE HYDROCHLORIDE 10 MG/1
10 TABLET ORAL 3 TIMES DAILY
Qty: 90 TABLET | Refills: 3 | Status: SHIPPED | OUTPATIENT
Start: 2018-11-21 | End: 2019-05-24 | Stop reason: DRUGHIGH

## 2018-11-21 NOTE — PROGRESS NOTES
"Subjective   Lashaun Bernal is a 57 y.o. female.  Establish primary care.  Four years ago she found her \" dead of an overdose.  Now my son has had to go to rehab so that is a constant worry.  My anxiety is worse at night.  I feel like I can't relax.  I sleep some but the I wake up and I get scared so I can't go back to sleep.\"  Has history of sleep apnea wears CPAP but has not seen a \"sleep doctor in years.\"  Complaints of chronic fatigue and generalized body aches.  Is currently scheduled to see a rheumatologist in March for her osteoarthritis.    Hypertension   This is a chronic problem. The current episode started more than 1 year ago. The problem is unchanged. The problem is controlled. Associated symptoms include anxiety. Pertinent negatives include no chest pain, orthopnea, peripheral edema or shortness of breath. There are no associated agents to hypertension. Risk factors for coronary artery disease include dyslipidemia, obesity, post-menopausal state and sedentary lifestyle. Past treatments include nothing. Current antihypertension treatment includes nothing. The current treatment provides mild improvement.   Heartburn   She complains of heartburn and water brash. She reports no chest pain. This is a chronic problem. The current episode started more than 1 year ago. The problem occurs occasionally. The problem has been waxing and waning. The heartburn is of moderate intensity. The symptoms are aggravated by certain foods, lying down and stress. Associated symptoms include fatigue. Risk factors include lack of exercise and obesity. She has tried a PPI for the symptoms. The treatment provided significant relief.   Depression   Visit Type: initial  Onset of symptoms: more than 1 year ago  Progression since onset: gradually worsening  Patient presents with the following symptoms: decreased concentration, depressed mood, excessive worry, fatigue, insomnia, irritability, nervousness/anxiety and " panic.  Patient is not experiencing: confusion, shortness of breath and suicidal planning.  Frequency of symptoms: constantly   Severity: moderate   Sleep per night: 4 hours  Sleep quality: fair  Patient has a history of: anxiety/panic attacks  Treatment tried: SSRI and benzodiazepine  Compliance with treatment: good  Improvement on treatment: mild      Anxiety   Presents for initial visit. Onset was 1 to 5 years ago. The problem has been gradually worsening. Symptoms include decreased concentration, depressed mood, excessive worry, insomnia, irritability, nervous/anxious behavior and panic. Patient reports no chest pain, confusion or shortness of breath. Symptoms occur constantly. The severity of symptoms is moderate. The symptoms are aggravated by family issues. The quality of sleep is fair. Nighttime awakenings: several, one to two.     Risk factors include a major life event. Her past medical history is significant for anxiety/panic attacks and depression.   Fatigue   This is a chronic problem. The current episode started more than 1 month ago. The problem occurs constantly. The problem has been gradually worsening. Associated symptoms include arthralgias, fatigue and myalgias. Pertinent negatives include no chest pain, chills, diaphoresis or fever. Nothing aggravates the symptoms. She has tried rest and relaxation for the symptoms. The treatment provided no relief.        The following portions of the patient's history were reviewed and updated as appropriate:     She  has a past medical history of Allergic rhinitis, Arthritis, Asthma, Breast lump, Chest pain, Chronic low back pain, Constipation, Depressive disorder, Diarrhea, Diverticular disease of colon, Epigastric pain, DIETER (generalized anxiety disorder), GERD (gastroesophageal reflux disease), Head ache, Hematochezia, Herpes zoster, Periumbilical pain, and PONV (postoperative nausea and vomiting).  She does not have any pertinent problems on file.  She   has a past surgical history that includes Abdominoplasty (12/22/2004); Hand surgery (06/03/2013); Finger/Thumb Lesion/Cyst Excision (10/29/2014); ORIF radius & ulna fractures (07/22/2013); Cyst Removal (08/18/1961); Tubal ligation (08/14/1985); breast augmentation bilateral mastopexy; Hysterectomy; Oophorectomy; and EXCISION OF CALCANEAL SPURS LEFT FOOT AND REATTACHMENT OF ACHILLES TENDON  (Left, 3/20/2017).  Her family history includes Diabetes in her other; Heart disease in her other; Hypertension in her other.  She  reports that  has never smoked. she has never used smokeless tobacco. She reports that she does not drink alcohol or use drugs.  Current Outpatient Medications   Medication Sig Dispense Refill   • acetaminophen (TYLENOL) 500 MG tablet Take 500 mg by mouth Every 6 (Six) Hours As Needed for Mild Pain .     • estradiol (ESTRACE) 1 MG tablet Take 1 tablet by mouth Daily. 90 tablet 3   • ferrous sulfate 325 (65 FE) MG tablet Take 1 tablet by mouth Daily With Breakfast. 30 tablet 11   • meclizine (ANTIVERT) 25 MG tablet Take 1 tablet by mouth Every 6 (Six) Hours As Needed for dizziness. (Patient taking differently: Take 1 tablet by mouth Every 6 (Six) Hours As Needed for dizziness.) 25 tablet 0   • omeprazole (priLOSEC) 40 MG capsule Take 40 mg by mouth Daily.     • polyethylene glycol (MIRALAX) powder Take 17 g by mouth 2 (Two) Times a Day As Needed (constipation). 1 each 11   • busPIRone (BUSPAR) 10 MG tablet Take 1 tablet by mouth 3 (Three) Times a Day. 90 tablet 3     No current facility-administered medications for this visit.      Current Outpatient Medications on File Prior to Visit   Medication Sig   • acetaminophen (TYLENOL) 500 MG tablet Take 500 mg by mouth Every 6 (Six) Hours As Needed for Mild Pain .   • estradiol (ESTRACE) 1 MG tablet Take 1 tablet by mouth Daily.   • ferrous sulfate 325 (65 FE) MG tablet Take 1 tablet by mouth Daily With Breakfast.   • meclizine (ANTIVERT) 25 MG tablet Take 1  tablet by mouth Every 6 (Six) Hours As Needed for dizziness. (Patient taking differently: Take 1 tablet by mouth Every 6 (Six) Hours As Needed for dizziness.)   • omeprazole (priLOSEC) 40 MG capsule Take 40 mg by mouth Daily.   • polyethylene glycol (MIRALAX) powder Take 17 g by mouth 2 (Two) Times a Day As Needed (constipation).   • [DISCONTINUED] predniSONE (DELTASONE) 20 MG tablet 2 qd x 7 then 1qd x 7 then 1 everyother day  Till gone     No current facility-administered medications on file prior to visit.      She is allergic to cefprozil and penicillins..    Review of Systems   Constitutional: Positive for fatigue and irritability. Negative for chills, diaphoresis and fever.   HENT: Negative.    Eyes: Negative.    Respiratory: Negative.  Negative for shortness of breath.    Cardiovascular: Negative.  Negative for chest pain and orthopnea.   Gastrointestinal: Positive for heartburn.   Genitourinary: Negative.    Musculoskeletal: Positive for arthralgias and myalgias.   Skin: Negative.    Neurological: Negative.    Psychiatric/Behavioral: Positive for decreased concentration. Negative for confusion. The patient is nervous/anxious and has insomnia.        Objective   Physical Exam   Constitutional: She is oriented to person, place, and time. She appears well-developed and well-nourished.   HENT:   Head: Normocephalic and atraumatic.   Right Ear: External ear normal.   Left Ear: External ear normal.   Nose: Nose normal.   Mouth/Throat: Oropharynx is clear and moist.   Eyes: Conjunctivae and EOM are normal. Pupils are equal, round, and reactive to light.   Neck: Normal range of motion. Neck supple.   Cardiovascular: Normal rate, regular rhythm and normal heart sounds.   Pulmonary/Chest: Effort normal and breath sounds normal.   Abdominal: Soft. Bowel sounds are normal.   Musculoskeletal: Normal range of motion.   Neurological: She is alert and oriented to person, place, and time.   Skin: Skin is warm. Capillary  refill takes less than 2 seconds.   Psychiatric: Her speech is normal and behavior is normal. Judgment normal. Her mood appears anxious. Cognition and memory are normal. She exhibits a depressed mood. She expresses no homicidal and no suicidal ideation. She expresses no suicidal plans and no homicidal plans.   Nursing note and vitals reviewed.      Assessment/Plan   Problems Addressed this Visit        Cardiovascular and Mediastinum    Benign essential hypertension - Primary (Chronic)    Relevant Orders    CBC & Differential    Comprehensive Metabolic Panel       Digestive    GERD (gastroesophageal reflux disease)       Other    Depressive disorder    Relevant Medications    busPIRone (BUSPAR) 10 MG tablet    Other Relevant Orders    Ambulatory Referral to Psychiatry      Other Visit Diagnoses     Anxiety        Relevant Medications    busPIRone (BUSPAR) 10 MG tablet    Other Relevant Orders    Ambulatory Referral to Psychiatry    Chronic fatigue        Relevant Orders    Vitamin D 25 hydroxy    Sleep apnea in adult        Relevant Orders    Ambulatory Referral to Sleep Medicine    Need for hepatitis C screening test        Relevant Orders    Hepatitis C antibody    Encounter to establish care            1.  Benign essential hypertension:  Complete CBC and chemistry panel as ordered and will notify of results when available  Encouraged to reduce sodium intake to #2000 mg per day    2.  GERD:  Continue on Prilosec as previously prescribed  Encouraged to reduce fried, fatty, spicy and greasy foods in diet in order to reduce chest discomfort  May elevate head of bed 15° at night to reduce nighttime gastrointestinal discomfort    3.  Depressive disorder:  Encouraged to seek emergency medical treatment for any new or worsening signs of depression, suicidal or homicidal ideations    4.  Anxiety:  Begin BuSpar as prescribed be taken 1 by mouth 3 times a day when necessary for anxiety  Educated on possible side of this  medication including but not limited possible suicidal ideations  Encouraged to discontinue medication immediately if suicidal ideations occur and seek emergency medical treatment    5.  Chronic fatigue:  Complete vitamin D level as ordered and will notify of results when available    6.  Sleep apnea in adult:  Ambulatory referral placed to sleep medicine will call to schedule appointment  Continue CPAP as previously prescribed    7.  Need for hepatitis C screening tests:  Complete hepatitis C antibody is ordered and will notify of results when available    8.  Encounter to establish care:  Continue on current medications as previously prescribed   Schedule follow-up appointment with this office in 6 months for recheck or sooner as needed        This document has been electronically signed by MAYELA Flores on November 21, 2018 9:03 AM

## 2018-11-27 ENCOUNTER — OFFICE VISIT (OUTPATIENT)
Dept: FAMILY MEDICINE CLINIC | Facility: CLINIC | Age: 58
End: 2018-11-27

## 2018-11-27 VITALS
SYSTOLIC BLOOD PRESSURE: 132 MMHG | HEART RATE: 90 BPM | BODY MASS INDEX: 38.31 KG/M2 | HEIGHT: 62 IN | OXYGEN SATURATION: 98 % | WEIGHT: 208.2 LBS | DIASTOLIC BLOOD PRESSURE: 84 MMHG

## 2018-11-27 DIAGNOSIS — J45.21 MILD INTERMITTENT ASTHMA WITH ACUTE EXACERBATION: Primary | Chronic | ICD-10-CM

## 2018-11-27 DIAGNOSIS — R74.8 ELEVATED LIVER ENZYMES: ICD-10-CM

## 2018-11-27 PROCEDURE — 99213 OFFICE O/P EST LOW 20 MIN: CPT | Performed by: NURSE PRACTITIONER

## 2018-11-27 PROCEDURE — 96372 THER/PROPH/DIAG INJ SC/IM: CPT | Performed by: NURSE PRACTITIONER

## 2018-11-27 PROCEDURE — 94640 AIRWAY INHALATION TREATMENT: CPT | Performed by: NURSE PRACTITIONER

## 2018-11-27 RX ORDER — TRIAMCINOLONE ACETONIDE 40 MG/ML
40 INJECTION, SUSPENSION INTRA-ARTICULAR; INTRAMUSCULAR ONCE
Status: COMPLETED | OUTPATIENT
Start: 2018-11-27 | End: 2018-11-27

## 2018-11-27 RX ORDER — ALBUTEROL SULFATE 90 UG/1
2 AEROSOL, METERED RESPIRATORY (INHALATION) EVERY 4 HOURS PRN
Qty: 18 G | Refills: 2 | Status: SHIPPED | OUTPATIENT
Start: 2018-11-27 | End: 2021-06-29 | Stop reason: SDUPTHER

## 2018-11-27 RX ORDER — ALBUTEROL SULFATE 2.5 MG/3ML
2.5 SOLUTION RESPIRATORY (INHALATION) EVERY 6 HOURS PRN
Qty: 120 VIAL | Refills: 2 | Status: SHIPPED | OUTPATIENT
Start: 2018-11-27 | End: 2022-12-23 | Stop reason: HOSPADM

## 2018-11-27 RX ORDER — IPRATROPIUM BROMIDE AND ALBUTEROL SULFATE 2.5; .5 MG/3ML; MG/3ML
3 SOLUTION RESPIRATORY (INHALATION)
Status: DISCONTINUED | OUTPATIENT
Start: 2018-11-27 | End: 2018-11-27

## 2018-11-27 RX ADMIN — TRIAMCINOLONE ACETONIDE 40 MG: 40 INJECTION, SUSPENSION INTRA-ARTICULAR; INTRAMUSCULAR at 11:17

## 2018-11-27 RX ADMIN — IPRATROPIUM BROMIDE AND ALBUTEROL SULFATE 3 ML: 2.5; .5 SOLUTION RESPIRATORY (INHALATION) at 11:18

## 2018-11-27 NOTE — PROGRESS NOTES
"Subjective   Lashaun Bernal is a 57 y.o. female. Two days ago began having a flare up of her asthma.  \"I am out of my inhalers and my nebulizer medicine.\"  Has a dry cough and \"it feels like my chest is raw.\" Would also like to discuss the results of her most recent lab work.       Shortness of Breath   This is a new problem. The current episode started in the past 7 days. The problem occurs constantly. The problem has been gradually worsening. Associated symptoms include orthopnea and wheezing. Pertinent negatives include no chest pain, fever or sputum production. The symptoms are aggravated by any activity. The patient has no known risk factors for DVT/PE. She has tried nothing for the symptoms. The treatment provided no relief. Her past medical history is significant for asthma.        The following portions of the patient's history were reviewed and updated as appropriate: allergies, current medications, past family history, past medical history, past social history, past surgical history and problem list.    Review of Systems   Constitutional: Negative.  Negative for chills, diaphoresis, fatigue and fever.   Respiratory: Positive for cough, shortness of breath and wheezing. Negative for sputum production.    Cardiovascular: Positive for orthopnea. Negative for chest pain.   Gastrointestinal: Negative.    Genitourinary: Negative.    Musculoskeletal: Negative.    Skin: Negative.    Neurological: Negative.    Psychiatric/Behavioral: Negative.  Negative for confusion.       Objective   Physical Exam   Constitutional: She is oriented to person, place, and time. She appears well-developed and well-nourished.   HENT:   Head: Normocephalic.   Eyes: Pupils are equal, round, and reactive to light.   Neck: Normal range of motion.   Cardiovascular: Normal rate, regular rhythm and normal heart sounds.   Pulmonary/Chest: Effort normal. She has wheezes in the right upper field, the right lower field, the left upper field " and the left lower field.   Abdominal: Soft. Bowel sounds are normal.   Musculoskeletal: Normal range of motion.   Neurological: She is alert and oriented to person, place, and time.   Skin: Skin is warm. Capillary refill takes less than 2 seconds.   Psychiatric: She has a normal mood and affect. Her behavior is normal.   Nursing note and vitals reviewed.    Results for orders placed or performed in visit on 11/21/18   Comprehensive Metabolic Panel   Result Value Ref Range    Glucose 101 (H) 60 - 100 mg/dL    BUN 14 7 - 21 mg/dL    Creatinine 0.86 0.50 - 1.00 mg/dL    Sodium 137 137 - 145 mmol/L    Potassium 4.4 3.5 - 5.1 mmol/L    Chloride 102 95 - 110 mmol/L    CO2 23.0 22.0 - 31.0 mmol/L    Calcium 8.9 8.4 - 10.2 mg/dL    Total Protein 7.0 6.3 - 8.6 g/dL    Albumin 4.10 3.40 - 4.80 g/dL    ALT (SGPT) 72 (H) 9 - 52 U/L    AST (SGOT) 53 (H) 14 - 36 U/L    Alkaline Phosphatase 96 38 - 126 U/L    Total Bilirubin 0.4 0.2 - 1.3 mg/dL    eGFR Non  Amer 68 51 - 120 mL/min/1.73    Globulin 2.9 2.3 - 3.5 gm/dL    A/G Ratio 1.4 1.1 - 1.8 g/dL    BUN/Creatinine Ratio 16.3 7.0 - 25.0    Anion Gap 12.0 5.0 - 15.0 mmol/L   Hepatitis C antibody   Result Value Ref Range    Hepatitis C Ab Negative Negative   Vitamin D 25 hydroxy   Result Value Ref Range    25 Hydroxy, Vitamin D 52.6 30.0 - 100.0 ng/ml   CBC Auto Differential   Result Value Ref Range    WBC 8.22 3.20 - 9.80 10*3/mm3    RBC 4.49 3.77 - 5.16 10*6/mm3    Hemoglobin 12.8 12.0 - 15.5 g/dL    Hematocrit 38.7 35.0 - 45.0 %    MCV 86.2 80.0 - 98.0 fL    MCH 28.5 26.5 - 34.0 pg    MCHC 33.1 31.4 - 36.0 g/dL    RDW 12.7 11.5 - 14.5 %    RDW-SD 40.3 36.4 - 46.3 fl    MPV 10.0 8.0 - 12.0 fL    Platelets 350 150 - 450 10*3/mm3    Neutrophil % 52.2 37.0 - 80.0 %    Lymphocyte % 33.6 10.0 - 50.0 %    Monocyte % 9.5 0.0 - 12.0 %    Eosinophil % 3.8 0.0 - 7.0 %    Basophil % 0.5 0.0 - 2.0 %    Immature Grans % 0.4 0.0 - 0.5 %    Neutrophils, Absolute 4.30 2.00 - 8.60  10*3/mm3    Lymphocytes, Absolute 2.76 0.60 - 4.20 10*3/mm3    Monocytes, Absolute 0.78 0.00 - 0.90 10*3/mm3    Eosinophils, Absolute 0.31 0.00 - 0.70 10*3/mm3    Basophils, Absolute 0.04 0.00 - 0.20 10*3/mm3    Immature Grans, Absolute 0.03 (H) 0.00 - 0.02 10*3/mm3       Assessment/Plan   Problems Addressed this Visit        Respiratory    Asthma - Primary (Chronic)    Relevant Medications    ipratropium-albuterol (DUO-NEB) nebulizer solution 3 mL (Start on 11/27/2018 12:30 PM)    triamcinolone acetonide (KENALOG-40) injection 40 mg    albuterol (PROVENTIL HFA;VENTOLIN HFA) 108 (90 Base) MCG/ACT inhaler    albuterol (PROVENTIL) (2.5 MG/3ML) 0.083% nebulizer solution      Other Visit Diagnoses     Elevated liver enzymes        Relevant Orders    US Liver        1.  Asthma with acute exacerbation:  Begin albuterol inhaler as prescribed to be taken 1-2 puffs every 4 hours when necessary for shortness of breath or wheezing  DuoNeb nebulizer performed in office and lungs clear to auscultation except for slight wheeze in right, posterior upper lobe post nebulization  Continue on nebulizer at home and refill prescription sent to pharmacy  Kenalog 40 mg IM given in office  Encouraged to seek emergency medical treatment for any new or worsening shortness of breath or chest pain    2.  Elevated liver enzymes:  Radiology will call to schedule ultrasound of the liver  Encouraged to discontinue all acetaminophen products and abstain from alcohol  Discussed how weight can be a direct factor and elevated liver enzymes  Encouraged weight loss and encouraged to begin exercise regimen to promote cardiovascular and pulmonology health        This document has been electronically signed by MAYELA Flores on November 27, 2018 11:16 AM

## 2018-12-24 DIAGNOSIS — K76.0 FATTY LIVER: Primary | ICD-10-CM

## 2018-12-26 ENCOUNTER — TELEPHONE (OUTPATIENT)
Dept: FAMILY MEDICINE CLINIC | Facility: CLINIC | Age: 58
End: 2018-12-26

## 2018-12-26 NOTE — TELEPHONE ENCOUNTER
Notes recorded by Janet Rey LPN on 12/26/2018 at 2:08 PM CST  Per MAYELA Dunaway, Ms. Bernal has been called with her recent Liver US results & recommendations.  Continue her current medications and follow-up as planned or sooner if any problems.    She states she has an appointment with MAYELA Dunaway in the morning and will discuss these results and recommendations with her then.    ----- Message from MAYELA Flores sent at 12/24/2018 12:40 PM CST -----  U/S shows a fatty liver and I have sent in a referral to gastroenterology for further evaluation. She also has a gallstone in the neck of her gallbladder but no inflammation.  Please ask her if she has been having any pain in her RUQ, especially after eating?  If so I will refer her to general surgeon for further evaluation.  If not, then just need to monitor.  Thank you.

## 2018-12-27 ENCOUNTER — OFFICE VISIT (OUTPATIENT)
Dept: FAMILY MEDICINE CLINIC | Facility: CLINIC | Age: 58
End: 2018-12-27

## 2018-12-27 VITALS
BODY MASS INDEX: 38.07 KG/M2 | SYSTOLIC BLOOD PRESSURE: 124 MMHG | HEIGHT: 62 IN | WEIGHT: 206.9 LBS | DIASTOLIC BLOOD PRESSURE: 82 MMHG

## 2018-12-27 DIAGNOSIS — E66.01 MORBID OBESITY (HCC): Primary | ICD-10-CM

## 2018-12-27 DIAGNOSIS — R42 VERTIGO: ICD-10-CM

## 2018-12-27 PROCEDURE — 99213 OFFICE O/P EST LOW 20 MIN: CPT | Performed by: NURSE PRACTITIONER

## 2018-12-27 NOTE — PROGRESS NOTES
Subjective   Lashaun Bernal is a 57 y.o. female.  One month follow-up for asthma exacerbation.  Feels much better.  Would like to discuss options to help with weight loss.  Has been attempting to diet and exercise at home but has had no significant weight loss.  Has been trying to watch her calories and is even tried Weight Watchers.  Has been having intermittent bouts of vertigo and has had them off and on for the past several years.  Has seen Dr. Dos Santos, neurologist out of [place], in the past and would like a referral placed back to him.    Obesity   This is a chronic problem. The current episode started more than 1 year ago. The problem occurs constantly. The problem has been gradually worsening. Pertinent negatives include no chills, diaphoresis, fatigue or fever. The symptoms are aggravated by drinking and eating. She has tried walking (dieting ) for the symptoms. The treatment provided no relief.   Dizziness   This is a chronic problem. The current episode started more than 1 year ago. The problem occurs intermittently. The problem has been waxing and waning. Pertinent negatives include no chills, diaphoresis, fatigue or fever. Nothing aggravates the symptoms.        The following portions of the patient's history were reviewed and updated as appropriate: allergies, current medications, past family history, past medical history, past social history, past surgical history and problem list.    Review of Systems   Constitutional: Negative for chills, diaphoresis, fatigue and fever.   HENT: Negative.    Respiratory: Negative.    Cardiovascular: Negative.    Gastrointestinal: Negative.    Genitourinary: Negative.    Skin: Negative.    Neurological: Positive for dizziness.   Psychiatric/Behavioral: Negative for confusion.       Objective   Physical Exam   Constitutional: She is oriented to person, place, and time. She appears well-developed and well-nourished.   Cardiovascular: Normal rate, regular rhythm and  normal heart sounds.   Pulmonary/Chest: Effort normal and breath sounds normal.   Musculoskeletal: Normal range of motion.   Neurological: She is alert and oriented to person, place, and time.   Skin: Skin is warm.   Psychiatric: She has a normal mood and affect. Her behavior is normal.   Nursing note and vitals reviewed.      Assessment/Plan   Problems Addressed this Visit     None      Visit Diagnoses     Morbid obesity (CMS/HCC)    -  Primary    Relevant Medications    Lorcaserin HCl ER 20 MG tablet sustained-release 24 hour    Vertigo        Relevant Orders    Ambulatory Referral to Neurology        1.  Morbid obesity:  Begin Belviq is prescribed be taken 1 by mouth daily  Educated on possible side effects of this medication including but not limited to increased risk for gastrointestinal discomfort, nausea and diarrhea/constipation  Encouraged to reduce daily caloric intake to no more than 2000 chao per day  Encouraged the use of a food journal for self-care ability  Educated to increase physical activity regimen to a minimum of 150 minutes per week  Educated on healthier food choices such as baked fish and chicken, fresh fruits and vegetables and salads    2.  Vertigo:  Referral placed to Dr. Dos Santos with neurology out of [facility] and will call to schedule appointment    Continue on current medications as previously prescribed   Schedule follow-up appointment with this office in 3 months for recheck of obesity or may be seen sooner as needed.con          This document has been electronically signed by MAYELA Flores on December 27, 2018 9:22 AM

## 2019-01-07 DIAGNOSIS — M25.531 RIGHT WRIST PAIN: Primary | ICD-10-CM

## 2019-01-08 DIAGNOSIS — M25.531 RIGHT WRIST PAIN: Primary | ICD-10-CM

## 2019-01-09 ENCOUNTER — OFFICE VISIT (OUTPATIENT)
Dept: ORTHOPEDIC SURGERY | Facility: CLINIC | Age: 59
End: 2019-01-09

## 2019-01-09 VITALS — WEIGHT: 207 LBS | HEIGHT: 63 IN | BODY MASS INDEX: 36.68 KG/M2

## 2019-01-09 DIAGNOSIS — M25.531 RIGHT WRIST PAIN: Primary | ICD-10-CM

## 2019-01-09 DIAGNOSIS — M65.4 DE QUERVAIN'S DISEASE (RADIAL STYLOID TENOSYNOVITIS): ICD-10-CM

## 2019-01-09 DIAGNOSIS — M79.642 LEFT HAND PAIN: Primary | ICD-10-CM

## 2019-01-09 DIAGNOSIS — M79.641 RIGHT HAND PAIN: ICD-10-CM

## 2019-01-09 DIAGNOSIS — M25.531 RIGHT WRIST PAIN: ICD-10-CM

## 2019-01-09 PROCEDURE — 20605 DRAIN/INJ JOINT/BURSA W/O US: CPT | Performed by: ORTHOPAEDIC SURGERY

## 2019-01-09 PROCEDURE — 99203 OFFICE O/P NEW LOW 30 MIN: CPT | Performed by: ORTHOPAEDIC SURGERY

## 2019-01-09 RX ADMIN — TRIAMCINOLONE ACETONIDE 40 MG: 40 INJECTION, SUSPENSION INTRA-ARTICULAR; INTRAMUSCULAR at 10:55

## 2019-01-09 RX ADMIN — LIDOCAINE HYDROCHLORIDE 2 ML: 10 INJECTION, SOLUTION INFILTRATION; PERINEURAL at 10:55

## 2019-01-09 NOTE — PROGRESS NOTES
Lashaun Bernal is a 58 y.o. female   Primary provider:  Gretchen Gauthier APRN       Chief Complaint   Patient presents with   • Right Wrist - Pain   • Right Hand - Pain       HISTORY OF PRESENT ILLNESS: Patient is here today for bilateral hand and right wrist pain. Patient was sent to xray upon arrival. Patient states that her pain is 4/10 today.  Has a history of arthritis and 1. was told she might have rheumatoid arthritis.  The biggest problem is a right wrist which she broke about 5 or 6 years ago and underwent open reduction internal fixation by Dr. Mar.  She has persistent painwith a year and a half hurts with activity better with rest.  This is on the radial aspect by her thumb.  She also has tenderness at her fingers.  She's tried anti-inflammatories and some creams that have not been helpful in the past.    History of Present Illness     CONCURRENT MEDICAL HISTORY:    Past Medical History:   Diagnosis Date   • Allergic rhinitis    • Arthritis    • Asthma    • Breast lump    • Chest pain    • Chronic low back pain    • Constipation    • Depressive disorder    • Diarrhea    • Diverticular disease of colon    • Epigastric pain    • DIETER (generalized anxiety disorder)    • GERD (gastroesophageal reflux disease)    • Head ache    • Hematochezia    • Herpes zoster     mid back, near spine   • Periumbilical pain    • PONV (postoperative nausea and vomiting)        Allergies   Allergen Reactions   • Cefprozil Nausea And Vomiting   • Penicillins Rash         Current Outpatient Medications:   •  albuterol (PROVENTIL HFA;VENTOLIN HFA) 108 (90 Base) MCG/ACT inhaler, Inhale 2 puffs Every 4 (Four) Hours As Needed for Wheezing., Disp: 18 g, Rfl: 2  •  albuterol (PROVENTIL) (2.5 MG/3ML) 0.083% nebulizer solution, Take 2.5 mg by nebulization Every 6 (Six) Hours As Needed for Wheezing., Disp: 120 vial, Rfl: 2  •  azithromycin (ZITHROMAX) 250 MG tablet, Take 2 tablets the first day, then 1 tablet daily for 4 days., Disp: 6  tablet, Rfl: 0  •  busPIRone (BUSPAR) 10 MG tablet, Take 1 tablet by mouth 3 (Three) Times a Day., Disp: 90 tablet, Rfl: 3  •  diphenhydrAMINE (BENADRYL) 25 mg capsule, Take 1 capsule by mouth 3 (Three) Times a Day., Disp: 21 capsule, Rfl: 0  •  estradiol (ESTRACE) 1 MG tablet, Take 1 tablet by mouth Daily., Disp: 90 tablet, Rfl: 3  •  Lorcaserin HCl ER 20 MG tablet sustained-release 24 hour, Take 20 mg by mouth Daily., Disp: 30 tablet, Rfl: 2  •  omeprazole (priLOSEC) 40 MG capsule, Take 40 mg by mouth Daily., Disp: , Rfl:   •  polyethylene glycol (MIRALAX) powder, Take 17 g by mouth 2 (Two) Times a Day As Needed (constipation)., Disp: 1 each, Rfl: 11    Past Surgical History:   Procedure Laterality Date   • ABDOMINOPLASTY  12/22/2004   • BREAST AUGMENTATION BILATERAL MASTOPEXY     • BUNIONECTOMY Left 3/20/2017    Procedure: EXCISION OF CALCANEAL SPURS LEFT FOOT AND REATTACHMENT OF ACHILLES TENDON ;  Surgeon: Jarrell Mar MD;  Location: [facility];  Service:    • CYST REMOVAL  08/18/1961    Excision of sebaceous cyst. Left ear   • FINGER/THUMB LESION/CYST EXCISION  10/29/2014   • HAND SURGERY  06/03/2013   • HYSTERECTOMY     • OOPHORECTOMY     • ORIF ULNA/RADIUS FRACTURES  07/22/2013   • TUBAL ABDOMINAL LIGATION  08/14/1985    Laparoscopic tubal sterilization; dilatation and curettage. Desires tubal ligation;         Family History   Problem Relation Age of Onset   • Diabetes Other    • Heart disease Other    • Hypertension Other         Social History     Socioeconomic History   • Marital status:      Spouse name: Not on file   • Number of children: Not on file   • Years of education: Not on file   • Highest education level: Not on file   Social Needs   • Financial resource strain: Not on file   • Food insecurity - worry: Not on file   • Food insecurity - inability: Not on file   • Transportation needs - medical: Not on file   • Transportation needs - non-medical: Not on file   Occupational History   • Not  "on file   Tobacco Use   • Smoking status: Never Smoker   • Smokeless tobacco: Never Used   Substance and Sexual Activity   • Alcohol use: No   • Drug use: No   • Sexual activity: Defer   Other Topics Concern   • Not on file   Social History Narrative   • Not on file        Review of Systems   Constitutional: Positive for activity change. Negative for chills and fever.   HENT: Negative for facial swelling.    Respiratory: Negative for apnea and shortness of breath.    Cardiovascular: Negative for chest pain and leg swelling.   Gastrointestinal: Negative for abdominal pain, nausea and vomiting.   Genitourinary: Negative for dysuria.   Musculoskeletal: Positive for arthralgias and joint swelling.   Skin: Negative for color change.   Neurological: Negative for seizures and syncope.   Hematological: Negative for adenopathy.   Psychiatric/Behavioral: Negative for dysphoric mood.       PHYSICAL EXAMINATION:       Ht 160 cm (63\")   Wt 93.9 kg (207 lb)   LMP 03/09/1991   BMI 36.67 kg/m²     Physical Exam   Constitutional: She is oriented to person, place, and time. She appears well-developed.   HENT:   Head: Normocephalic and atraumatic.   Eyes: EOM are normal. Pupils are equal, round, and reactive to light.   Neck: Neck supple.   Pulmonary/Chest: Effort normal.   Musculoskeletal: She exhibits tenderness and deformity.   Neurological: She is alert and oriented to person, place, and time.   Skin: Skin is warm and dry.   Psychiatric: She has a normal mood and affect.   Vitals reviewed.      GAIT:     [x]  Normal  []  Antalgic    Assistive device: []  None  []  Walker     []  Crutches  []  Cane     []  Wheelchair  []  Stretcher    Ortho Exam  Make a complete fist on the right side.  Very limited motion of the DIP joints bilaterally worse on the right.  Ligaments are stable she has a well-healed scar anteriorly no numbness pretty good motion of her wrist pronation supination.  She is tender over the first dorsal compartment " with a positive Finkelstein's.  Tinel's is negative.    Us Liver    Result Date: 12/20/2018  Narrative: Ultrasound liver HISTORY: Elevated liver enzymes Ultrasound examination of the right upper quadrant was performed. COMPARISON: None Fatty infiltration of the liver. 1.4 cm calculus in the neck of the gallbladder. No wall thickening or pericholecystic fluid. Common bile duct is within normal limits at 6.0 mm. Images of the right kidney are unremarkable. Right kidney 10.94 cm in length by 6.24 cm x 3.94 cm transverse with a volume of 140.3 mL Pancreas partially obscured by bowel gas.     Impression: CONCLUSION: Fatty infiltration of the liver. 1.4 cm calculus in the neck of the gallbladder. [id number] Electronically signed by:  Claudy Bobby MD  12/20/2018 1:41 PM CST Workstation: [id number]    X-rays are reviewed of the wrist which show hardware here.  There may be a little radial screw penetration.  This does not appear new      ASSESSMENT:    Diagnoses and all orders for this visit:    Left hand pain  -     Medium Joint Arthrocentesis    Right hand pain  -     Medium Joint Arthrocentesis    Right wrist pain  -     Medium Joint Arthrocentesis    De Quervain's disease (radial styloid tenosynovitis)    Other orders  -     Cancel: Small Joint Arthrocentesis  -     Cancel: Orthopedic Injury Treatment          PLAN she has significant arthritis of the DIPs right worse than left with instruction the DIP joints.  Also significant osteoarthritis of the CMC joint is noted bilaterally worse on the right side.  I've injected the first dorsal compartment with mixture of Xylocaine and Celestone as above with good initial relief put her.  We'll put her in a thumb spica splint he'll follow-up in 3-4 weeks.  He'll be some discussion about the screw causing some irritation of the tendon but she went for over 3 years without any problem with this.  It is may just be a separate episode a week and treat this conservatively.  She has an  appointment and able to see the rheumatologist and BX it interesting to see what they have to say I look at her rheumatoid markers those were all negative done fairly recently.  She will ice this down tonight, if any problems whatsoever  Medium Joint Arthrocentesis  Date/Time: 1/9/2019 10:55 AM  Consent given by: patient  Site marked: site marked  Timeout: Immediately prior to procedure a time out was called to verify the correct patient, procedure, equipment, support staff and site/side marked as required   Supporting Documentation  Indications: pain   Procedure Details  Location: wrist -   Needle size: 25 G  Medications administered: 2 mL lidocaine 1 %; 40 mg triamcinolone acetonide 40 MG/ML              No Follow-up on file.    Romain Clifton MD

## 2019-01-10 PROBLEM — M65.4 DE QUERVAIN'S DISEASE (RADIAL STYLOID TENOSYNOVITIS): Status: ACTIVE | Noted: 2019-01-10

## 2019-01-10 RX ORDER — LIDOCAINE HYDROCHLORIDE 10 MG/ML
2 INJECTION, SOLUTION INFILTRATION; PERINEURAL
Status: COMPLETED | OUTPATIENT
Start: 2019-01-09 | End: 2019-01-09

## 2019-01-10 RX ORDER — TRIAMCINOLONE ACETONIDE 40 MG/ML
40 INJECTION, SUSPENSION INTRA-ARTICULAR; INTRAMUSCULAR
Status: COMPLETED | OUTPATIENT
Start: 2019-01-09 | End: 2019-01-09

## 2019-01-24 ENCOUNTER — OFFICE VISIT (OUTPATIENT)
Dept: GASTROENTEROLOGY | Facility: CLINIC | Age: 59
End: 2019-01-24

## 2019-01-24 VITALS
WEIGHT: 211.6 LBS | SYSTOLIC BLOOD PRESSURE: 108 MMHG | BODY MASS INDEX: 37.49 KG/M2 | HEART RATE: 90 BPM | HEIGHT: 63 IN | DIASTOLIC BLOOD PRESSURE: 72 MMHG

## 2019-01-24 DIAGNOSIS — R15.1 FECAL SMEARING: ICD-10-CM

## 2019-01-24 DIAGNOSIS — K21.00 GASTROESOPHAGEAL REFLUX DISEASE WITH ESOPHAGITIS: Primary | ICD-10-CM

## 2019-01-24 DIAGNOSIS — K76.0 FATTY LIVER: ICD-10-CM

## 2019-01-24 DIAGNOSIS — R74.8 ELEVATED LIVER ENZYMES: ICD-10-CM

## 2019-01-24 DIAGNOSIS — R10.11 RIGHT UPPER QUADRANT ABDOMINAL PAIN: ICD-10-CM

## 2019-01-24 PROCEDURE — 99213 OFFICE O/P EST LOW 20 MIN: CPT | Performed by: PHYSICIAN ASSISTANT

## 2019-01-24 NOTE — PROGRESS NOTES
Chief Complaint   Patient presents with   • Fatty Liver     Ref. Gretchen PEDRO       ENDO PROCEDURE ORDERED:     Subjective    Lashaun Bernal is a 58 y.o. female. she is being seen for consultation today at the request of MAYELA Dunaway.    History of Present Illness    This 58-year-old female was sent for consultation for abdominal pain and obesity by MAYELA Dunaway, who saw the patient on 12/27/2018. She had started her on Belviq. The patient was last seen with abdominal pain, constipation, blood in her stool, and food allergies on 02/12/2015, but never returned for a followup. She thinks she may have Parkinson's disease; her mother had Parkinson's disease, it is strong in her family, and she has had some tremor in her hands. She is worried about that. She has been having some right-sided abdominal pain not clearly affected by p.o. intake. She states she has a dull ache all of the time. She does not think her GERD is controlled very well and takes Prilosec 40 mg b.i.d. just to keep it under a tolerable level. She denied nausea, vomiting, or dysphagia. She is very concerned about seepage of stool which has been going on for the past few months. She has not seen any mucus in her stool recently. She denied blood or color changes in her stool and states that her bowel movements are usually formed. Weight is up 8.5 pounds since last visit. Last colonoscopy showed hemorrhoids with diverticular disease on 01/23/2015.     Prior studies: She had a CMP on 01/19/2015 that showed a creatinine 1.1, otherwise normal. Laboratories by Santhosh Hawkins MD, for elevated liver enzymes on 02/22/2018: Lupus panel was negative. Negative RA, sedimentation rate, vitamin D, TSH, CMP, and urinalysis. She claims she was told for the past year or so that she had elevated liver enzymes.     Laboratory on 08/23/2018 and 08/24/2018 showed a heavy metal urine and blood was negative. Negative CK, TSH, T4, magnesium, and cholesterol panel.  Sedimentation rate was 22. CRP 1.4.    Laboratory on 2018: Vitamin D was normal. Hepatitis C antibody was negative. CBC normal. CMP showed glucose 101, AST 53, ALT 72, otherwise normal. Liver ultrasound on 2018 showed a fatty liver, a 1.6 cm calculus in the neck of the gallbladder, 6.0 mm common bile duct.     The patient currently denies tobacco, alcohol, or illicit substance use. She has a history of asthma, cholecystectomy, broken wrist, hysterectomy, tummy tuck, and breast augmentation. Family history of diabetes, heart disease, gallstones, and hypertension. No known family history of GI tract malignancy or liver disease. Father  age 78 with CHF, mother in good health. Spouse  age 60 of an overdose of heroin. One sister in good health, two children in good health.     ASSESSMENT AND PLAN: Patient with mild elevation in liver enzymes with fatty liver on imaging, uncontrolled GERD, abdominal pain, and fecal incontinence. Discussed possible EGD and colonoscopy to further evaluate. I suggested she increase her fiber and do Kegel exercises for her incontinence to see if that will help. We discussed dietary modification and significant weight loss for her fatty liver and GERD symptoms. I did recommend further studies of her elevated liver enzymes to include hepatitis diagnostic panel, LFTs, INR, CADENA FibroSure, LAMONT, AMA, SMA, iron studies, AFP, ceruloplasmin, alpha-1 antitrypsin. It is possible the elevation in her liver enzymes is related to gallbladder and would consider HIDA scan to further evaluate if her pain persists but we will see her in followup after the above, further pending clinical course and the results of the above.    Thank you very much, Gretchen, for this consultation and for allowing us to participate in the care of your patient. We will keep you informed.        The following portions of the patient's history were reviewed and updated as appropriate:   Past Medical History:    Diagnosis Date   • Allergic rhinitis    • Arthritis    • Asthma    • Breast lump    • Chest pain    • Chronic low back pain    • Constipation    • Depressive disorder    • Diarrhea    • Diverticular disease of colon    • Epigastric pain    • DIETER (generalized anxiety disorder)    • GERD (gastroesophageal reflux disease)    • Head ache    • Hematochezia    • Herpes zoster     mid back, near spine   • Periumbilical pain    • PONV (postoperative nausea and vomiting)      Past Surgical History:   Procedure Laterality Date   • ABDOMINOPLASTY  2004   • BREAST AUGMENTATION BILATERAL MASTOPEXY     • BUNIONECTOMY Left 3/20/2017    Procedure: EXCISION OF CALCANEAL SPURS LEFT FOOT AND REATTACHMENT OF ACHILLES TENDON ;  Surgeon: Jarrell Mar MD;  Location: [facility];  Service:    • COLONOSCOPY  2015   • CYST REMOVAL  1961    Excision of sebaceous cyst. Left ear   • FINGER/THUMB LESION/CYST EXCISION  10/29/2014   • HAND SURGERY  2013   • HYSTERECTOMY     • OOPHORECTOMY     • ORIF ULNA/RADIUS FRACTURES  2013   • TUBAL ABDOMINAL LIGATION  1985    Laparoscopic tubal sterilization; dilatation and curettage. Desires tubal ligation;       Family History   Problem Relation Age of Onset   • Diabetes Other    • Heart disease Other    • Hypertension Other      OB History      Para Term  AB Living    2 2 2          SAB TAB Ectopic Molar Multiple Live Births                       Allergies   Allergen Reactions   • Cefprozil Nausea And Vomiting   • Penicillins Rash     Social History     Socioeconomic History   • Marital status:      Spouse name: Not on file   • Number of children: Not on file   • Years of education: Not on file   • Highest education level: Not on file   Tobacco Use   • Smoking status: Never Smoker   • Smokeless tobacco: Never Used   Substance and Sexual Activity   • Alcohol use: No   • Drug use: No   • Sexual activity: Defer       Current Outpatient Medications:  "  •  albuterol (PROVENTIL HFA;VENTOLIN HFA) 108 (90 Base) MCG/ACT inhaler, Inhale 2 puffs Every 4 (Four) Hours As Needed for Wheezing., Disp: 18 g, Rfl: 2  •  albuterol (PROVENTIL) (2.5 MG/3ML) 0.083% nebulizer solution, Take 2.5 mg by nebulization Every 6 (Six) Hours As Needed for Wheezing., Disp: 120 vial, Rfl: 2  •  busPIRone (BUSPAR) 10 MG tablet, Take 1 tablet by mouth 3 (Three) Times a Day., Disp: 90 tablet, Rfl: 3  •  estradiol (ESTRACE) 1 MG tablet, Take 1 tablet by mouth Daily., Disp: 90 tablet, Rfl: 3  •  omeprazole (priLOSEC) 40 MG capsule, Take 40 mg by mouth 2 (Two) Times a Day., Disp: , Rfl:   •  polyethylene glycol (MIRALAX) powder, Take 17 g by mouth 2 (Two) Times a Day As Needed (constipation)., Disp: 1 each, Rfl: 11  •  Lorcaserin HCl ER 20 MG tablet sustained-release 24 hour, Take 20 mg by mouth Daily., Disp: 30 tablet, Rfl: 2  •  Magnesium 400 MG capsule, 1 bid to tid till cramps gone, Disp: 30 capsule, Rfl: 2  Review of Systems  Review of Systems   Constitutional: Positive for unexpected weight change.   HENT: Negative for trouble swallowing.    Gastrointestinal: Positive for abdominal pain and nausea. Negative for abdominal distention, anal bleeding, blood in stool, constipation, diarrhea, rectal pain and vomiting.   Genitourinary: Negative for difficulty urinating.   Neurological: Positive for dizziness and tremors.   Psychiatric/Behavioral: Negative for agitation and behavioral problems.   All other systems reviewed and are negative.         Objective    /72 (BP Location: Left arm)   Pulse 90   Ht 160 cm (63\")   Wt 96 kg (211 lb 9.6 oz)   LMP 03/09/1991   BMI 37.48 kg/m²   Physical Exam   Constitutional: She is oriented to person, place, and time. She appears well-developed and well-nourished. No distress.   HENT:   Head: Normocephalic and atraumatic.   Eyes: EOM are normal. Pupils are equal, round, and reactive to light.   Neck: Normal range of motion.   Cardiovascular: Normal " rate, regular rhythm and normal heart sounds.   Pulmonary/Chest: Effort normal and breath sounds normal.   Abdominal: Soft. Bowel sounds are normal. She exhibits no shifting dullness, no distension, no abdominal bruit, no ascites and no mass. There is no hepatosplenomegaly. There is tenderness. There is no rigidity, no rebound, no guarding and no CVA tenderness. No hernia. Hernia confirmed negative in the ventral area.   RUQ, obese   Musculoskeletal: Normal range of motion.   Neurological: She is alert and oriented to person, place, and time.   Skin: Skin is warm and dry.   Psychiatric: She has a normal mood and affect. Her behavior is normal. Judgment and thought content normal.   Nursing note and vitals reviewed.    Assessment/Plan      1. Gastroesophageal reflux disease with esophagitis    2. Fecal smearing    3. Elevated liver enzymes    4. Right upper quadrant abdominal pain    5. Fatty liver    .   Lashaun was seen today for fatty liver.    Diagnoses and all orders for this visit:    Gastroesophageal reflux disease with esophagitis    Fecal smearing    Elevated liver enzymes  -     Hepatic Function Panel  -     Iron and TIBC  -     Ferritin  -     AFP Tumor Marker  -     Alpha - 1 - Antitrypsin  -     Anti-Smooth Muscle Antibody Titer  -     Ceruloplasmin  -     Protime-INR  -     Nuclear Antigen Antibody, IFA  -     CADENA Fibrosure  -     Mitochondrial Antibodies, M2  -     Hepatitis Panel, Acute    Right upper quadrant abdominal pain  -     Hepatic Function Panel  -     Iron and TIBC  -     Ferritin  -     AFP Tumor Marker  -     Alpha - 1 - Antitrypsin  -     Anti-Smooth Muscle Antibody Titer  -     Ceruloplasmin  -     Protime-INR  -     Nuclear Antigen Antibody, IFA  -     CADENA Fibrosure  -     Mitochondrial Antibodies, M2  -     Hepatitis Panel, Acute    Fatty liver  -     Hepatic Function Panel  -     Iron and TIBC  -     Ferritin  -     AFP Tumor Marker  -     Alpha - 1 - Antitrypsin  -     Anti-Smooth  Muscle Antibody Titer  -     Ceruloplasmin  -     Protime-INR  -     Nuclear Antigen Antibody, IFA  -     CADENA Fibrosure  -     Mitochondrial Antibodies, M2  -     Hepatitis Panel, Acute        Orders placed during this encounter include:  Orders Placed This Encounter   Procedures   • Hepatic Function Panel   • Iron and TIBC   • Ferritin   • AFP Tumor Marker   • Alpha - 1 - Antitrypsin   • Anti-Smooth Muscle Antibody Titer   • Ceruloplasmin   • Protime-INR   • Nuclear Antigen Antibody, IFA   • CADENA Fibrosure   • Mitochondrial Antibodies, M2   • Hepatitis Panel, Acute       Medications prescribed:  No orders of the defined types were placed in this encounter.    Discontinued Medications       Reason for Discontinue    azithromycin (ZITHROMAX) 250 MG tablet *Therapy completed    diphenhydrAMINE (BENADRYL) 25 mg capsule *Therapy completed        Requested Prescriptions      No prescriptions requested or ordered in this encounter       Review and/or summary of lab tests, radiology, procedures, medications. Review and summary of old records and obtaining of history. The risks and benefits of my recommendations, as well as other treatment options were discussed with the patient today. Questions were answered.    Follow-up: Return if symptoms worsen or fail to improve, for After the above.     * Surgery not found *      This document has been electronically signed by Marcial Walker PA-C on January 28, 2019 4:15 PM      Results for orders placed or performed in visit on 01/25/19   Magnesium   Result Value Ref Range    Magnesium 2.1 1.6 - 2.3 mg/dL   Basic Metabolic Panel   Result Value Ref Range    Glucose 102 (H) 60 - 100 mg/dL    BUN 21 7 - 21 mg/dL    Creatinine 1.01 (H) 0.50 - 1.00 mg/dL    Sodium 136 (L) 137 - 145 mmol/L    Potassium 4.9 3.5 - 5.1 mmol/L    Chloride 105 95 - 110 mmol/L    CO2 26.0 22.0 - 31.0 mmol/L    Calcium 9.6 8.4 - 10.2 mg/dL    eGFR Non  Amer 56 51 - 120 mL/min/1.73    BUN/Creatinine  Ratio 20.8 7.0 - 25.0    Anion Gap 5.0 5.0 - 15.0 mmol/L   Results for orders placed or performed in visit on 01/24/19   Nuclear Antigen Antibody, IFA   Result Value Ref Range    LAMONT Negative    Iron and TIBC   Result Value Ref Range    Iron 70 37 - 170 mcg/dL    TIBC 326 265 - 497 mcg/dL    Iron Saturation 21 15 - 50 %   Alpha - 1 - Antitrypsin   Result Value Ref Range    A-1 Antitrypsin 163 90 - 200 mg/dL   Mitochondrial Antibodies, M2   Result Value Ref Range    Mitochondrial Ab 21.8 (H) 0.0 - 20.0 Units   Ceruloplasmin   Result Value Ref Range    Ceruloplasmin 36.2 19.0 - 39.0 mg/dL   AFP Tumor Marker   Result Value Ref Range    AFP Tumor Marker 3.0 0.0 - 8.3 ng/mL   Hepatitis Panel, Acute   Result Value Ref Range    Hepatitis C Ab Negative Negative    Hep A IgM Negative Negative    Hep B C IgM Negative Negative    Hepatitis B Surface Ag Negative Negative   Anti-Smooth Muscle Antibody Titer   Result Value Ref Range    Smooth Muscle Ab 5 0 - 19 Units   Protime-INR   Result Value Ref Range    Protime 12.3 11.1 - 15.3 Seconds    INR 0.93 0.80 - 1.20   Ferritin   Result Value Ref Range    Ferritin 30.20 11.10 - 264.00 ng/mL   Hepatic Function Panel   Result Value Ref Range    Total Protein 6.7 6.3 - 8.6 g/dL    Albumin 3.80 3.40 - 4.80 g/dL    ALT (SGPT) 42 9 - 52 U/L    AST (SGOT) 22 14 - 36 U/L    Alkaline Phosphatase 99 38 - 126 U/L    Total Bilirubin 0.4 0.2 - 1.3 mg/dL    Bilirubin, Direct 0.0 0.0 - 0.3 mg/dL    Bilirubin, Indirect 0.6 0.0 - 1.1 mg/dL   Results for orders placed or performed in visit on 11/21/18   CBC Auto Differential   Result Value Ref Range    WBC 8.22 3.20 - 9.80 10*3/mm3    RBC 4.49 3.77 - 5.16 10*6/mm3    Hemoglobin 12.8 12.0 - 15.5 g/dL    Hematocrit 38.7 35.0 - 45.0 %    MCV 86.2 80.0 - 98.0 fL    MCH 28.5 26.5 - 34.0 pg    MCHC 33.1 31.4 - 36.0 g/dL    RDW 12.7 11.5 - 14.5 %    RDW-SD 40.3 36.4 - 46.3 fl    MPV 10.0 8.0 - 12.0 fL    Platelets 350 150 - 450 10*3/mm3    Neutrophil % 52.2  37.0 - 80.0 %    Lymphocyte % 33.6 10.0 - 50.0 %    Monocyte % 9.5 0.0 - 12.0 %    Eosinophil % 3.8 0.0 - 7.0 %    Basophil % 0.5 0.0 - 2.0 %    Immature Grans % 0.4 0.0 - 0.5 %    Neutrophils, Absolute 4.30 2.00 - 8.60 10*3/mm3    Lymphocytes, Absolute 2.76 0.60 - 4.20 10*3/mm3    Monocytes, Absolute 0.78 0.00 - 0.90 10*3/mm3    Eosinophils, Absolute 0.31 0.00 - 0.70 10*3/mm3    Basophils, Absolute 0.04 0.00 - 0.20 10*3/mm3    Immature Grans, Absolute 0.03 (H) 0.00 - 0.02 10*3/mm3   Hepatitis C antibody   Result Value Ref Range    Hepatitis C Ab Negative Negative   Vitamin D 25 hydroxy   Result Value Ref Range    25 Hydroxy, Vitamin D 52.6 30.0 - 100.0 ng/ml   Comprehensive Metabolic Panel   Result Value Ref Range    Glucose 101 (H) 60 - 100 mg/dL    BUN 14 7 - 21 mg/dL    Creatinine 0.86 0.50 - 1.00 mg/dL    Sodium 137 137 - 145 mmol/L    Potassium 4.4 3.5 - 5.1 mmol/L    Chloride 102 95 - 110 mmol/L    CO2 23.0 22.0 - 31.0 mmol/L    Calcium 8.9 8.4 - 10.2 mg/dL    Total Protein 7.0 6.3 - 8.6 g/dL    Albumin 4.10 3.40 - 4.80 g/dL    ALT (SGPT) 72 (H) 9 - 52 U/L    AST (SGOT) 53 (H) 14 - 36 U/L    Alkaline Phosphatase 96 38 - 126 U/L    Total Bilirubin 0.4 0.2 - 1.3 mg/dL    eGFR Non  Amer 68 51 - 120 mL/min/1.73    Globulin 2.9 2.3 - 3.5 gm/dL    A/G Ratio 1.4 1.1 - 1.8 g/dL    BUN/Creatinine Ratio 16.3 7.0 - 25.0    Anion Gap 12.0 5.0 - 15.0 mmol/L   Results for orders placed or performed in visit on 08/24/18   Heavy Metals Profile II, Urine - Urine, Clean Catch   Result Value Ref Range    Creatinine, Urine 0.78 0.30 - 3.00 g/L    Arsenic Ur None Detected 0 - 50 ug/L    Arsenic(Inorganic),U None Detected 0 - 19 ug/L    Lead, Urine None Detected 0 - 49 ug/L    Mercury, Urine None Detected 0 - 19 ug/L    Cadmium, Urine None Detected None detected ug/L   Results for orders placed or performed in visit on 08/23/18   Sedimentation Rate   Result Value Ref Range    Sed Rate 22 (H) 0 - 20 mm/hr   C-reactive  Protein   Result Value Ref Range    C-Reactive Protein 1.40 (H) 0.00 - 1.00 mg/dL   T4, Free   Result Value Ref Range    Free T4 0.97 0.78 - 2.19 ng/dL   Magnesium   Result Value Ref Range    Magnesium 1.8 1.6 - 2.3 mg/dL   CK   Result Value Ref Range    Creatine Kinase 54 30 - 135 U/L   Lipid Panel   Result Value Ref Range    Total Cholesterol 179 0 - 199 mg/dL    Triglycerides 158 20 - 199 mg/dL    HDL Cholesterol 57 (L) 60 - 200 mg/dL    LDL Cholesterol  91 1 - 129 mg/dL    LDL/HDL Ratio 1.59 0.00 - 3.22   Results for orders placed or performed in visit on 08/23/18   TSH   Result Value Ref Range    TSH 3.610 0.460 - 4.680 mIU/mL   Results for orders placed or performed in visit on 03/28/18   Heavy Metals, Blood   Result Value Ref Range    Lead None Detected 0 - 4 ug/dL    Arsenic 9 2 - 23 ug/L    Mercury None Detected 0.0 - 14.9 ug/L     *Note: Due to a large number of results and/or encounters for the requested time period, some results have not been displayed. A complete set of results can be found in Results Review.       Some portions of this note have been dictated using voice recognition software and may contain errors and/or omissions.

## 2019-01-24 NOTE — PATIENT INSTRUCTIONS

## 2019-01-25 ENCOUNTER — OFFICE VISIT (OUTPATIENT)
Dept: FAMILY MEDICINE CLINIC | Facility: CLINIC | Age: 59
End: 2019-01-25

## 2019-01-25 ENCOUNTER — APPOINTMENT (OUTPATIENT)
Dept: LAB | Facility: HOSPITAL | Age: 59
End: 2019-01-25

## 2019-01-25 VITALS
WEIGHT: 213.1 LBS | HEIGHT: 63 IN | DIASTOLIC BLOOD PRESSURE: 68 MMHG | BODY MASS INDEX: 37.76 KG/M2 | SYSTOLIC BLOOD PRESSURE: 106 MMHG

## 2019-01-25 DIAGNOSIS — R25.2 MUSCLE CRAMPS: Primary | ICD-10-CM

## 2019-01-25 DIAGNOSIS — M13.0 POLYARTICULAR ARTHRITIS: Chronic | ICD-10-CM

## 2019-01-25 DIAGNOSIS — R25.1 TREMOR: ICD-10-CM

## 2019-01-25 PROBLEM — I10 BENIGN ESSENTIAL HYPERTENSION: Chronic | Status: RESOLVED | Noted: 2018-01-04 | Resolved: 2019-01-25

## 2019-01-25 LAB
ALBUMIN SERPL-MCNC: 3.8 G/DL (ref 3.4–4.8)
ALP SERPL-CCNC: 99 U/L (ref 38–126)
ALT SERPL W P-5'-P-CCNC: 42 U/L (ref 9–52)
ANION GAP SERPL CALCULATED.3IONS-SCNC: 5 MMOL/L (ref 5–15)
AST SERPL-CCNC: 22 U/L (ref 14–36)
BILIRUB CONJ SERPL-MCNC: 0 MG/DL (ref 0–0.3)
BILIRUB INDIRECT SERPL-MCNC: 0.6 MG/DL (ref 0–1.1)
BILIRUB SERPL-MCNC: 0.4 MG/DL (ref 0.2–1.3)
BUN BLD-MCNC: 21 MG/DL (ref 7–21)
BUN/CREAT SERPL: 20.8 (ref 7–25)
CALCIUM SPEC-SCNC: 9.6 MG/DL (ref 8.4–10.2)
CHLORIDE SERPL-SCNC: 105 MMOL/L (ref 95–110)
CO2 SERPL-SCNC: 26 MMOL/L (ref 22–31)
CREAT BLD-MCNC: 1.01 MG/DL (ref 0.5–1)
FERRITIN SERPL-MCNC: 30.2 NG/ML (ref 11.1–264)
GFR SERPL CREATININE-BSD FRML MDRD: 56 ML/MIN/1.73 (ref 51–120)
GLUCOSE BLD-MCNC: 102 MG/DL (ref 60–100)
HAV IGM SERPL QL IA: NEGATIVE
HBV CORE IGM SERPL QL IA: NEGATIVE
HBV SURFACE AG SERPL QL IA: NEGATIVE
HCV AB SER DONR QL: NEGATIVE
INR PPP: 0.93 (ref 0.8–1.2)
IRON 24H UR-MRATE: 70 MCG/DL (ref 37–170)
IRON SATN MFR SERPL: 21 % (ref 15–50)
MAGNESIUM SERPL-MCNC: 2.1 MG/DL (ref 1.6–2.3)
POTASSIUM BLD-SCNC: 4.9 MMOL/L (ref 3.5–5.1)
PROT SERPL-MCNC: 6.7 G/DL (ref 6.3–8.6)
PROTHROMBIN TIME: 12.3 SECONDS (ref 11.1–15.3)
SODIUM BLD-SCNC: 136 MMOL/L (ref 137–145)
TIBC SERPL-MCNC: 326 MCG/DL (ref 265–497)

## 2019-01-25 PROCEDURE — 83883 ASSAY NEPHELOMETRY NOT SPEC: CPT | Performed by: PHYSICIAN ASSISTANT

## 2019-01-25 PROCEDURE — 99214 OFFICE O/P EST MOD 30 MIN: CPT | Performed by: FAMILY MEDICINE

## 2019-01-25 PROCEDURE — 84478 ASSAY OF TRIGLYCERIDES: CPT | Performed by: PHYSICIAN ASSISTANT

## 2019-01-25 PROCEDURE — 83540 ASSAY OF IRON: CPT | Performed by: PHYSICIAN ASSISTANT

## 2019-01-25 PROCEDURE — 84450 TRANSFERASE (AST) (SGOT): CPT | Performed by: PHYSICIAN ASSISTANT

## 2019-01-25 PROCEDURE — 86038 ANTINUCLEAR ANTIBODIES: CPT | Performed by: PHYSICIAN ASSISTANT

## 2019-01-25 PROCEDURE — 82390 ASSAY OF CERULOPLASMIN: CPT | Performed by: PHYSICIAN ASSISTANT

## 2019-01-25 PROCEDURE — 82465 ASSAY BLD/SERUM CHOLESTEROL: CPT | Performed by: PHYSICIAN ASSISTANT

## 2019-01-25 PROCEDURE — 82103 ALPHA-1-ANTITRYPSIN TOTAL: CPT | Performed by: PHYSICIAN ASSISTANT

## 2019-01-25 PROCEDURE — 80074 ACUTE HEPATITIS PANEL: CPT | Performed by: PHYSICIAN ASSISTANT

## 2019-01-25 PROCEDURE — 83516 IMMUNOASSAY NONANTIBODY: CPT | Performed by: PHYSICIAN ASSISTANT

## 2019-01-25 PROCEDURE — 82728 ASSAY OF FERRITIN: CPT | Performed by: PHYSICIAN ASSISTANT

## 2019-01-25 PROCEDURE — 82947 ASSAY GLUCOSE BLOOD QUANT: CPT | Performed by: PHYSICIAN ASSISTANT

## 2019-01-25 PROCEDURE — 85610 PROTHROMBIN TIME: CPT | Performed by: PHYSICIAN ASSISTANT

## 2019-01-25 PROCEDURE — 82105 ALPHA-FETOPROTEIN SERUM: CPT | Performed by: PHYSICIAN ASSISTANT

## 2019-01-25 PROCEDURE — 83550 IRON BINDING TEST: CPT | Performed by: PHYSICIAN ASSISTANT

## 2019-01-25 PROCEDURE — 83735 ASSAY OF MAGNESIUM: CPT | Performed by: PHYSICIAN ASSISTANT

## 2019-01-25 PROCEDURE — 82977 ASSAY OF GGT: CPT | Performed by: PHYSICIAN ASSISTANT

## 2019-01-25 PROCEDURE — 83010 ASSAY OF HAPTOGLOBIN QUANT: CPT | Performed by: PHYSICIAN ASSISTANT

## 2019-01-25 PROCEDURE — 80076 HEPATIC FUNCTION PANEL: CPT | Performed by: PHYSICIAN ASSISTANT

## 2019-01-25 PROCEDURE — 82247 BILIRUBIN TOTAL: CPT | Performed by: PHYSICIAN ASSISTANT

## 2019-01-25 PROCEDURE — 82172 ASSAY OF APOLIPOPROTEIN: CPT | Performed by: PHYSICIAN ASSISTANT

## 2019-01-25 PROCEDURE — 80048 BASIC METABOLIC PNL TOTAL CA: CPT | Performed by: PHYSICIAN ASSISTANT

## 2019-01-25 PROCEDURE — 36415 COLL VENOUS BLD VENIPUNCTURE: CPT | Performed by: PHYSICIAN ASSISTANT

## 2019-01-25 PROCEDURE — 84460 ALANINE AMINO (ALT) (SGPT): CPT | Performed by: PHYSICIAN ASSISTANT

## 2019-01-25 NOTE — PROGRESS NOTES
Subjective   Lashaun Bernal is a 58 y.o. female.     History of Present Illness  requesting evaluation increasing muscle cramps.  Stated had muscle cramps fingers and toes.  Drawing upper back cramps since yesterday.  States no change in medications.  The lorcaserin   has not been approved yet,.. Continues to have her of upper body tremor dating back over a year.  Has not seen a neurologist yet.  Symptoms appear more familial however.  Some amount of anxiety.    The following portions of the patient's history were reviewed and updated as appropriate: allergies, current medications, past family history, past medical history, past social history, past surgical history and problem list.    Review of Systems   Constitutional: Negative for activity change, appetite change, fatigue and unexpected weight change.   HENT: Negative for trouble swallowing and voice change.    Eyes: Negative for redness and visual disturbance.   Respiratory: Negative for cough and wheezing.    Cardiovascular: Negative for chest pain and palpitations.   Gastrointestinal: Negative for abdominal pain, constipation, diarrhea, nausea and vomiting.   Genitourinary: Negative for urgency.   Musculoskeletal: Positive for myalgias. Negative for joint swelling.   Neurological: Positive for tremors. Negative for syncope and headaches.   Hematological: Negative for adenopathy.   Psychiatric/Behavioral: Negative for sleep disturbance.       Objective   Physical Exam   Constitutional: She appears well-developed.   HENT:   Head: Normocephalic.   Eyes: Pupils are equal, round, and reactive to light.   Neck: Normal range of motion.   Cardiovascular: Normal rate.   Pulmonary/Chest: Effort normal.   Abdominal: Soft.   Musculoskeletal: Normal range of motion. She exhibits tenderness (No acute joint swelling.  Mild degenerative changes.  The fingers.).   Neurological:   Mild fine bilateral hand tremor worse with anxiety.  Gait normal   Psychiatric: Her speech is  normal and behavior is normal. Her mood appears anxious.       Assessment/Plan   Lashaun was seen today for spasms.    Diagnoses and all orders for this visit:    Muscle cramps  -     Basic Metabolic Panel  -     Magnesium    Polyarticular arthritis    Tremor      Check electrolytes to ensure no abnormality.  Negative telemetry probably with the high dose magnesium.  Counseled following up with regular provider's as directed as well as all subspecialists.  Counseled on tremor process.

## 2019-01-26 LAB
A1AT SERPL-MCNC: 163 MG/DL (ref 90–200)
ACTIN IGG SERPL-ACNC: 5 UNITS (ref 0–19)
AFP-TM SERPL-MCNC: 3 NG/ML (ref 0–8.3)
ANA SER QL IA: NEGATIVE
CERULOPLASMIN SERPL-MCNC: 36.2 MG/DL (ref 19–39)
DEPRECATED MITOCHONDRIA M2 IGG SER-ACNC: 21.8 UNITS (ref 0–20)

## 2019-01-28 ENCOUNTER — TELEPHONE (OUTPATIENT)
Dept: FAMILY MEDICINE CLINIC | Facility: CLINIC | Age: 59
End: 2019-01-28

## 2019-01-29 ENCOUNTER — OFFICE VISIT (OUTPATIENT)
Dept: FAMILY MEDICINE CLINIC | Facility: CLINIC | Age: 59
End: 2019-01-29

## 2019-01-29 ENCOUNTER — LAB (OUTPATIENT)
Dept: LAB | Facility: HOSPITAL | Age: 59
End: 2019-01-29

## 2019-01-29 ENCOUNTER — TELEPHONE (OUTPATIENT)
Dept: FAMILY MEDICINE CLINIC | Facility: CLINIC | Age: 59
End: 2019-01-29

## 2019-01-29 VITALS
WEIGHT: 211 LBS | SYSTOLIC BLOOD PRESSURE: 146 MMHG | BODY MASS INDEX: 37.39 KG/M2 | DIASTOLIC BLOOD PRESSURE: 72 MMHG | HEIGHT: 63 IN

## 2019-01-29 DIAGNOSIS — R25.2 MUSCLE CRAMPS: ICD-10-CM

## 2019-01-29 DIAGNOSIS — M25.542 ARTHRALGIA OF BOTH HANDS: Primary | ICD-10-CM

## 2019-01-29 DIAGNOSIS — M25.541 ARTHRALGIA OF BOTH HANDS: Primary | ICD-10-CM

## 2019-01-29 LAB
A2 MACROGLOB SERPL-MCNC: 168 MG/DL (ref 110–276)
ALBUMIN SERPL-MCNC: 4 G/DL (ref 3.4–4.8)
ALBUMIN/GLOB SERPL: 1.4 G/DL (ref 1.1–1.8)
ALP SERPL-CCNC: 102 U/L (ref 38–126)
ALT SERPL W P-5'-P-CCNC: 30 U/L (ref 9–52)
ALT SERPL W P-5'-P-CCNC: 38 IU/L (ref 0–40)
ANION GAP SERPL CALCULATED.3IONS-SCNC: 7 MMOL/L (ref 5–15)
APO A-I SERPL-MCNC: 209 MG/DL (ref 116–209)
AST SERPL W P-5'-P-CCNC: 25 IU/L (ref 0–40)
AST SERPL-CCNC: 34 U/L (ref 14–36)
BILIRUB SERPL-MCNC: 0.1 MG/DL (ref 0–1.2)
BILIRUB SERPL-MCNC: 0.2 MG/DL (ref 0.2–1.3)
BUN BLD-MCNC: 22 MG/DL (ref 7–21)
BUN/CREAT SERPL: 20.8 (ref 7–25)
CALCIUM SPEC-SCNC: 9.7 MG/DL (ref 8.4–10.2)
CHLORIDE SERPL-SCNC: 106 MMOL/L (ref 95–110)
CHOLEST SERPL-MCNC: 218 MG/DL (ref 100–199)
CO2 SERPL-SCNC: 22 MMOL/L (ref 22–31)
CREAT BLD-MCNC: 1.06 MG/DL (ref 0.5–1)
FIBROSIS SCORING:: ABNORMAL
FIBROSIS STAGE SERPL QL: ABNORMAL
GFR SERPL CREATININE-BSD FRML MDRD: 53 ML/MIN/1.73 (ref 51–120)
GGT SERPL-CCNC: 11 IU/L (ref 0–60)
GLOBULIN UR ELPH-MCNC: 2.9 GM/DL (ref 2.3–3.5)
GLUCOSE BLD-MCNC: 125 MG/DL (ref 60–100)
GLUCOSE SERPL-MCNC: 102 MG/DL (ref 65–99)
HAPTOGLOB SERPL-MCNC: 168 MG/DL (ref 34–200)
INTERPRETATIONS: (REFERENCE): ABNORMAL
LABORATORY COMMENT REPORT: ABNORMAL
LIMITATIONS: (REFERENCE): ABNORMAL
LIVER FIBR SCORE SERPL CALC.FIBROSURE: 0.01 (ref 0–0.21)
MAGNESIUM SERPL-MCNC: 1.9 MG/DL (ref 1.6–2.3)
NASH GRADE (REFERENCE): ABNORMAL
NASH SCORE (REFERENCE): 0.5
NASH SCORING (REFERENCE): ABNORMAL
POTASSIUM BLD-SCNC: 4.3 MMOL/L (ref 3.5–5.1)
PROT SERPL-MCNC: 6.9 G/DL (ref 6.3–8.6)
SODIUM BLD-SCNC: 135 MMOL/L (ref 137–145)
STEATOSIS GRADE (REFERENCE): ABNORMAL
STEATOSIS GRADING (REFERENCE): ABNORMAL
STEATOSIS SCORE (REFERENCE): 0.39 (ref 0–0.3)
TRIGL SERPL-MCNC: 125 MG/DL (ref 0–149)
WEIGHT: (REFERENCE): 213 LBS

## 2019-01-29 PROCEDURE — 80053 COMPREHEN METABOLIC PANEL: CPT | Performed by: NURSE PRACTITIONER

## 2019-01-29 PROCEDURE — 99213 OFFICE O/P EST LOW 20 MIN: CPT | Performed by: NURSE PRACTITIONER

## 2019-01-29 PROCEDURE — 86430 RHEUMATOID FACTOR TEST QUAL: CPT | Performed by: NURSE PRACTITIONER

## 2019-01-29 PROCEDURE — 83735 ASSAY OF MAGNESIUM: CPT

## 2019-01-29 RX ORDER — CYCLOBENZAPRINE HCL 10 MG
10 TABLET ORAL 3 TIMES DAILY PRN
Qty: 90 TABLET | Refills: 1 | Status: SHIPPED | OUTPATIENT
Start: 2019-01-29 | End: 2019-05-08 | Stop reason: SDUPTHER

## 2019-01-29 NOTE — PROGRESS NOTES
Subjective   Lashaun Bernal is a 58 y.o. female.  For the past 10 years has been complaing of intermittent generalized body cramps that have been in her hands, arms, legs and even her face.  Has had her magnesium level checked in the past and everything is within normal.  Was even referred to rheumatology but they never called to schedule appointment.    Muscle Pain   This is a chronic problem. The current episode started more than 1 year ago. The problem occurs intermittently. The problem has been gradually worsening since onset. The pain is present in the neck, lower back, left arm, left foot, left forearm, left hand, left lower leg, left upper leg, right arm, right foot, right forearm, right hand, right lower leg, right upper leg, upper back, upper left arm and upper right arm. The pain is severe. Nothing aggravates the symptoms. Associated symptoms include fatigue. Pertinent negatives include no fever. Past treatments include OTC NSAID, prescription NSAID and acetaminophen. The treatment provided no relief. There is no swelling present. She has been behaving normally.        The following portions of the patient's history were reviewed and updated as appropriate: allergies, current medications, past family history, past medical history, past social history, past surgical history and problem list.    Review of Systems   Constitutional: Positive for fatigue. Negative for chills, diaphoresis and fever.   Respiratory: Negative.    Cardiovascular: Negative.    Gastrointestinal: Negative.    Genitourinary: Negative.    Musculoskeletal: Positive for myalgias.   Skin: Negative.    Neurological: Negative.        Objective   Physical Exam   Constitutional: She is oriented to person, place, and time. She appears well-developed and well-nourished.   HENT:   Head: Normocephalic.   Cardiovascular: Normal rate, regular rhythm and normal heart sounds.   Pulmonary/Chest: Effort normal and breath sounds normal.   Abdominal:  Soft. Bowel sounds are normal.   Musculoskeletal: Normal range of motion.   Neurological: She is alert and oriented to person, place, and time.   Skin: Skin is warm.   Psychiatric: She has a normal mood and affect. Her behavior is normal.   Nursing note and vitals reviewed.      Assessment/Plan   Problems Addressed this Visit     None      Visit Diagnoses     Arthralgia of both hands    -  Primary    Relevant Orders    Rheumatoid Factor    Comprehensive Metabolic Panel (Completed)    Ambulatory Referral to Rheumatology    Muscle cramps        Relevant Medications    cyclobenzaprine (FLEXERIL) 10 MG tablet    Other Relevant Orders    Magnesium (Completed)        1.  Muscle cramps:  Begin Flexeril as prescribed to be taken 1 by mouth 3 times a day when necessary for muscle cramping  Educated on possible side effects of this medication including but not limited to increased risk for sedation and drowsiness  Complete magnesium level as ordered and will notify of results when available  Encouraged increase fluids in diet especially water to help promote hydration    2.  Arthralgia of both hands:  Complete rheumatoid factor and CMP as ordered and will notify of results when available  Referral placed to rheumatology for further evaluation will call to schedule appointment    Continue on current medications as previously prescribed   Keep upcoming scheduled appointment in March for follow-up or may be seen sooner as needed        This document has been electronically signed by MAYELA Flores on January 31, 2019 7:54 AM

## 2019-01-30 NOTE — PROGRESS NOTES
Per MAYELA Terry, Ms. Bernal has been called with recent lab results & recommendations.  Continue current medications and follow-up as planned or sooner if any problems.

## 2019-01-30 NOTE — TELEPHONE ENCOUNTER
-Per MAYELA Terry, Ms. Bernal has been called with recent lab results & recommendations.  Continue current medications and follow-up as planned or sooner if any problems.      ---- Message from MAYELA lFores sent at 1/29/2019  6:16 PM CST -----  Magnesium level is still within normal limits but she needs to continue her magnesium supplement.  Kidney function is slightly elevated and her sodium is a little low.  She is a little dehydrated and needs to drink water as discussed at her visit.  I am still waiting on the results of her RF.  Thank you.

## 2019-02-01 ENCOUNTER — TELEPHONE (OUTPATIENT)
Dept: FAMILY MEDICINE CLINIC | Facility: CLINIC | Age: 59
End: 2019-02-01

## 2019-02-01 LAB — RHEUMATOID FACT SERPL-ACNC: NEGATIVE [IU]/ML

## 2019-02-01 NOTE — TELEPHONE ENCOUNTER
She called and said Gretchen was referring her to rheumatologist and office called and said was having problem finding one with her insurance. She checked with insurance co and was given these names to check with: Dr Constance Guzman in [facility]-phone number is [phone] then Dr Eric Ennis in [facility] [phone] and two in [facility] -Dr Hernandez and Dr Evangelina Rangel both in same office [phone] .She said she does not care who she sees-just the first available appt and can be reached at [phone].

## 2019-02-04 DIAGNOSIS — M13.0 POLYARTICULAR ARTHRITIS: Primary | ICD-10-CM

## 2019-02-18 ENCOUNTER — TELEPHONE (OUTPATIENT)
Dept: FAMILY MEDICINE CLINIC | Facility: CLINIC | Age: 59
End: 2019-02-18

## 2019-02-18 DIAGNOSIS — M25.542 ARTHRALGIA OF BOTH HANDS: Primary | ICD-10-CM

## 2019-02-18 DIAGNOSIS — M25.541 ARTHRALGIA OF BOTH HANDS: Primary | ICD-10-CM

## 2019-02-18 NOTE — TELEPHONE ENCOUNTER
Pt was suppose to call back and give you the name of the Rheumatologist that she wanted to be referred to. She wants to be referred to Dr Evangelina Farah in [place], [place]. Office phone is [phone].

## 2019-02-25 ENCOUNTER — OFFICE VISIT (OUTPATIENT)
Dept: FAMILY MEDICINE CLINIC | Facility: CLINIC | Age: 59
End: 2019-02-25

## 2019-02-25 ENCOUNTER — APPOINTMENT (OUTPATIENT)
Dept: LAB | Facility: HOSPITAL | Age: 59
End: 2019-02-25

## 2019-02-25 VITALS
DIASTOLIC BLOOD PRESSURE: 82 MMHG | BODY MASS INDEX: 38.09 KG/M2 | HEIGHT: 63 IN | SYSTOLIC BLOOD PRESSURE: 124 MMHG | WEIGHT: 215 LBS

## 2019-02-25 DIAGNOSIS — M79.7 FIBROMYALGIA: Primary | ICD-10-CM

## 2019-02-25 DIAGNOSIS — Z79.899 HIGH RISK MEDICATION USE: ICD-10-CM

## 2019-02-25 LAB
AMPHET+METHAMPHET UR QL: NEGATIVE
BARBITURATES UR QL SCN: NEGATIVE
BENZODIAZ UR QL SCN: NEGATIVE
CANNABINOIDS SERPL QL: NEGATIVE
COCAINE UR QL: NEGATIVE
METHADONE UR QL SCN: NEGATIVE
OPIATES UR QL: NEGATIVE
OXYCODONE UR QL SCN: NEGATIVE

## 2019-02-25 PROCEDURE — 80307 DRUG TEST PRSMV CHEM ANLYZR: CPT | Performed by: NURSE PRACTITIONER

## 2019-02-25 PROCEDURE — 99213 OFFICE O/P EST LOW 20 MIN: CPT | Performed by: NURSE PRACTITIONER

## 2019-02-25 RX ORDER — GABAPENTIN 100 MG/1
100 CAPSULE ORAL 3 TIMES DAILY
Qty: 90 CAPSULE | Refills: 0 | Status: SHIPPED | OUTPATIENT
Start: 2019-02-25 | End: 2019-02-27 | Stop reason: SINTOL

## 2019-02-25 RX ORDER — FLUOXETINE HYDROCHLORIDE 20 MG/1
20 CAPSULE ORAL DAILY
COMMUNITY
End: 2020-11-02 | Stop reason: ALTCHOICE

## 2019-02-25 NOTE — PROGRESS NOTES
"Subjective   Lashaun Bernal is a 58 y.o. female. 1 month Follow up.  The nurse practitioner at [facility] increased her Buspar and Prozac. States she gets about 8 hours but does not feel rested when she wakes up.  She complains of extreme fatigue as well as as multiple joint pain. \"Even my collar bones hurt\".    Fatigue   This is a chronic problem. The current episode started more than 1 month ago. The problem has been waxing and waning. Associated symptoms include arthralgias, fatigue, myalgias and weakness. Pertinent negatives include no chest pain or fever. The symptoms are aggravated by exertion. She has tried nothing for the symptoms. The treatment provided no relief.   Muscle Pain   This is a chronic problem. The current episode started more than 1 year ago. The problem occurs intermittently. The problem has been gradually worsening since onset. The pain is present in the neck, lower back, left arm, left foot, left forearm, left hand, left lower leg, left upper leg, right arm, right foot, right forearm, right hand, right lower leg, right upper leg, upper back, upper left arm and upper right arm. The pain is severe. Nothing aggravates the symptoms. Associated symptoms include fatigue and weakness. Pertinent negatives include no chest pain, fever or shortness of breath. Past treatments include OTC NSAID, prescription NSAID and acetaminophen. The treatment provided no relief. There is no swelling present. She has been behaving normally.        The following portions of the patient's history were reviewed and updated as appropriate:   Current Outpatient Medications   Medication Sig Dispense Refill   • albuterol (PROVENTIL HFA;VENTOLIN HFA) 108 (90 Base) MCG/ACT inhaler Inhale 2 puffs Every 4 (Four) Hours As Needed for Wheezing. 18 g 2   • albuterol (PROVENTIL) (2.5 MG/3ML) 0.083% nebulizer solution Take 2.5 mg by nebulization Every 6 (Six) Hours As Needed for Wheezing. 120 vial 2   • busPIRone " (BUSPAR) 10 MG tablet Take 1 tablet by mouth 3 (Three) Times a Day. (Patient taking differently: Take 15 mg by mouth 3 (Three) Times a Day.) 90 tablet 3   • cyclobenzaprine (FLEXERIL) 10 MG tablet Take 1 tablet by mouth 3 (Three) Times a Day As Needed for Muscle Spasms. 90 tablet 1   • estradiol (ESTRACE) 1 MG tablet Take 1 tablet by mouth Daily. 90 tablet 3   • FLUoxetine (PROzac) 20 MG capsule Take 20 mg by mouth 2 (Two) Times a Day.     • Magnesium 400 MG capsule 1 bid to tid till cramps gone 30 capsule 2   • omeprazole (priLOSEC) 40 MG capsule Take 40 mg by mouth 2 (Two) Times a Day.     • polyethylene glycol (MIRALAX) powder Take 17 g by mouth 2 (Two) Times a Day As Needed (constipation). 1 each 11   • gabapentin (NEURONTIN) 100 MG capsule Take 1 capsule by mouth 3 (Three) Times a Day. 90 capsule 0     No current facility-administered medications for this visit.      Current Outpatient Medications on File Prior to Visit   Medication Sig   • albuterol (PROVENTIL HFA;VENTOLIN HFA) 108 (90 Base) MCG/ACT inhaler Inhale 2 puffs Every 4 (Four) Hours As Needed for Wheezing.   • albuterol (PROVENTIL) (2.5 MG/3ML) 0.083% nebulizer solution Take 2.5 mg by nebulization Every 6 (Six) Hours As Needed for Wheezing.   • busPIRone (BUSPAR) 10 MG tablet Take 1 tablet by mouth 3 (Three) Times a Day. (Patient taking differently: Take 15 mg by mouth 3 (Three) Times a Day.)   • cyclobenzaprine (FLEXERIL) 10 MG tablet Take 1 tablet by mouth 3 (Three) Times a Day As Needed for Muscle Spasms.   • estradiol (ESTRACE) 1 MG tablet Take 1 tablet by mouth Daily.   • FLUoxetine (PROzac) 20 MG capsule Take 20 mg by mouth 2 (Two) Times a Day.   • Magnesium 400 MG capsule 1 bid to tid till cramps gone   • omeprazole (priLOSEC) 40 MG capsule Take 40 mg by mouth 2 (Two) Times a Day.   • polyethylene glycol (MIRALAX) powder Take 17 g by mouth 2 (Two) Times a Day As Needed (constipation).   • [DISCONTINUED] Lorcaserin HCl ER 20 MG tablet  "sustained-release 24 hour Take 20 mg by mouth Daily.     No current facility-administered medications on file prior to visit.      She is allergic to cefprozil and penicillins..    Review of Systems   Constitutional: Positive for fatigue. Negative for fever.   Respiratory: Negative.  Negative for chest tightness and shortness of breath.    Cardiovascular: Negative.  Negative for chest pain and leg swelling.   Gastrointestinal: Negative.    Genitourinary: Negative.    Musculoskeletal: Positive for arthralgias and myalgias.   Skin: Negative.    Neurological: Positive for weakness. Negative for dizziness.   Hematological: Negative.    Psychiatric/Behavioral: Negative.        Objective    Visit Vitals  /82   Ht 160 cm (63\")   Wt 97.5 kg (215 lb)   LMP 03/09/1991   BMI 38.09 kg/m²       Physical Exam   Constitutional: She is oriented to person, place, and time. She appears well-developed and well-nourished.   HENT:   Head: Normocephalic.   Cardiovascular: Normal rate, regular rhythm and normal heart sounds.   Pulmonary/Chest: Effort normal and breath sounds normal.   Abdominal: Soft. Bowel sounds are normal.   Musculoskeletal: Normal range of motion.        Right shoulder: She exhibits tenderness.        Left shoulder: She exhibits tenderness.        Right elbow: Tenderness found.        Left elbow: Tenderness found.        Right hip: She exhibits tenderness.        Cervical back: She exhibits tenderness.        Thoracic back: She exhibits tenderness.        Lumbar back: She exhibits tenderness.   Exaggerated point tenderness at multiple locations    Neurological: She is alert and oriented to person, place, and time.   Skin: Skin is warm.   Psychiatric: She has a normal mood and affect. Her behavior is normal.   Nursing note and vitals reviewed.      Assessment/Plan   Problems Addressed this Visit     None      Visit Diagnoses     Fibromyalgia    -  Primary    Relevant Medications    gabapentin (NEURONTIN) 100 MG " capsule    High risk medication use        Relevant Orders    Urine Drug Screen - Urine, Clean Catch          1.  Fibromyalgia  Begin taking Neurontin up to 3 times a day as needed for nerve Pain  This medication may cause sedative side effects and did not drive or work until side effects of medication are known    2.  High-risk medication needs:  TEE reviewed by me and will be scanned into medical record  Controlled substance contract signed and dated and will be scanned into medical record  Complete urine drug screen as ordered     Continue on current medications as previously prescribed   Return in about 3 months (around 5/25/2019).      This document has been electronically signed by MAYELA Flores on February 25, 2019 3:29 PM

## 2019-02-26 ENCOUNTER — TELEPHONE (OUTPATIENT)
Dept: FAMILY MEDICINE CLINIC | Facility: CLINIC | Age: 59
End: 2019-02-26

## 2019-02-26 NOTE — TELEPHONE ENCOUNTER
JENNIE SMITH CALLED TO LET DARION KNOW SHE CANNOT TAKE THE GABAPENTIN IT HAS CAUSED HER TO BREAK OUT IN RASH AND SHE IS WHEEZING...SHE IS ASKING IF THERE IS ANYTHING ELSE THAT IS NOT A NARCOTIC SHE CAN BE GIVEN?CALLBACK    SHE USES [facility]

## 2019-02-27 DIAGNOSIS — M79.7 FIBROMYALGIA: Primary | ICD-10-CM

## 2019-02-27 RX ORDER — DULOXETIN HYDROCHLORIDE 20 MG/1
20 CAPSULE, DELAYED RELEASE ORAL DAILY
Qty: 30 CAPSULE | Refills: 2 | Status: SHIPPED | OUTPATIENT
Start: 2019-02-27 | End: 2019-07-01 | Stop reason: SDUPTHER

## 2019-02-28 ENCOUNTER — OFFICE VISIT (OUTPATIENT)
Dept: GASTROENTEROLOGY | Facility: CLINIC | Age: 59
End: 2019-02-28

## 2019-02-28 VITALS
HEIGHT: 63 IN | WEIGHT: 215.8 LBS | DIASTOLIC BLOOD PRESSURE: 80 MMHG | HEART RATE: 103 BPM | BODY MASS INDEX: 38.24 KG/M2 | SYSTOLIC BLOOD PRESSURE: 120 MMHG

## 2019-02-28 DIAGNOSIS — K74.3 PRIMARY BILIARY CHOLANGITIS (HCC): ICD-10-CM

## 2019-02-28 DIAGNOSIS — R15.1 FECAL SMEARING: ICD-10-CM

## 2019-02-28 DIAGNOSIS — R74.8 ELEVATED LIVER ENZYMES: ICD-10-CM

## 2019-02-28 DIAGNOSIS — K76.0 FATTY LIVER: ICD-10-CM

## 2019-02-28 DIAGNOSIS — K21.00 GASTROESOPHAGEAL REFLUX DISEASE WITH ESOPHAGITIS: Primary | ICD-10-CM

## 2019-02-28 PROCEDURE — 99213 OFFICE O/P EST LOW 20 MIN: CPT | Performed by: PHYSICIAN ASSISTANT

## 2019-03-06 ENCOUNTER — OFFICE VISIT (OUTPATIENT)
Dept: SLEEP MEDICINE | Facility: HOSPITAL | Age: 59
End: 2019-03-06

## 2019-03-06 VITALS
HEIGHT: 63 IN | OXYGEN SATURATION: 96 % | WEIGHT: 216.2 LBS | SYSTOLIC BLOOD PRESSURE: 132 MMHG | BODY MASS INDEX: 38.31 KG/M2 | DIASTOLIC BLOOD PRESSURE: 68 MMHG | HEART RATE: 114 BPM

## 2019-03-06 DIAGNOSIS — G47.33 OBSTRUCTIVE SLEEP APNEA, ADULT: Primary | ICD-10-CM

## 2019-03-06 PROCEDURE — 99203 OFFICE O/P NEW LOW 30 MIN: CPT | Performed by: INTERNAL MEDICINE

## 2019-03-06 NOTE — PROGRESS NOTES
New Patient Sleep Medicine Consultation    Encounter Date: 3/6/2019         Patient's PCP: Gretchen Gauthier APRN  Referring provider: No ref. provider found  Reason for consultation chief complaint: snoring and excessive daytime sleepiness    Lashaun Bernal is a 58 y.o. female who admits to snoring, stop breathing during sleep and Disturbed or restless sleep. She denies cataplexy, sleep paralysis, or hypnagogic hallucinations. Her bedtime is ~ 2230. She  falls asleep after 15-30 minutes, and is up 3-4 times per night. She wakes up ~ 5974-5426. She endorses 7-9 hours of sleep. She drinks 0 cups of coffee, 0 teas, and 1 sodas per day. She drinks 0 alcoholic beverages per week. She is not a current smoker. She does not take sedatives or hypnotics. She has no sleepiness with driving. She naps most days.    She had episode of ' vertigo' in 01/2018. Since that time she feels odd with sporadic and intermittent muscle cramps (neck, jaw, back, legs, hands, and feet).    She was diagnosed with MAX in [place] and moved here to establish care    PAP Data:    Time frame: 04/19/2017 - 03/05/2019   Compliance 85.3%  Average use on days used: 6hrs 49 min  Percent of days with usage greater than or equal to 4 hours: 80.6%  PAP range : 10 cm H2O  Average 90% pressure: 10 cmH2O  Leak: 0 minutes  Average AHI 1.6 events/hr  Mask type: pillows  DME: [employer]      Manning - 6    Past Medical History:   Diagnosis Date   • Allergic rhinitis    • Arthritis    • Asthma    • Breast lump    • Chest pain    • Chronic low back pain    • Constipation    • Depressive disorder    • Diarrhea    • Diverticular disease of colon    • Epigastric pain    • DIETER (generalized anxiety disorder)    • GERD (gastroesophageal reflux disease)    • Head ache    • Hematochezia    • Herpes zoster     mid back, near spine   • Periumbilical pain    • PONV (postoperative nausea and vomiting)      Social History     Socioeconomic History   • Marital status:       "Spouse name: Not on file   • Number of children: Not on file   • Years of education: Not on file   • Highest education level: Not on file   Social Needs   • Financial resource strain: Not on file   • Food insecurity - worry: Not on file   • Food insecurity - inability: Not on file   • Transportation needs - medical: Not on file   • Transportation needs - non-medical: Not on file   Occupational History   • Not on file   Tobacco Use   • Smoking status: Never Smoker   • Smokeless tobacco: Never Used   Substance and Sexual Activity   • Alcohol use: No   • Drug use: No   • Sexual activity: Defer   Other Topics Concern   • Not on file   Social History Narrative   • Not on file     Family History   Problem Relation Age of Onset   • Diabetes Other    • Heart disease Other    • Hypertension Other     (found  overdosed on heroin)  2 children  Former factory work  0 brothers and 1 sisters  Other family history + for: heart disease, diabetes, arthritis, mom with advanced Parkinson's  Smoking history: smoked never  FH of sleep disorders: self    Review of Systems:  Constitutional: negative  Eyes: negative  Ears, nose, mouth, throat, and face: negative  Respiratory: negative  Cardiovascular: negative  Gastrointestinal: negative  Genitourinary:negative  Integument/breast: negative  Hematologic/lymphatic: negative  Musculoskeletal:negative  Neurological: positive for coordination problems, dizziness, gait problems, paresthesia, tremors, vertigo and weakness  Behavioral/Psych: negative  Endocrine: negative  Allergic/Immunologic: negative Patient advised to discuss any positive ROS with PCP.      Vitals:    03/06/19 1417   BP: 132/68   Pulse: 114   SpO2: 96%           03/06/19  1417   Weight: 98.1 kg (216 lb 3.2 oz)       Body mass index is 38.31 kg/m². Patient's Body mass index is 38.31 kg/m². BMI is above normal parameters. Recommendations include: referral to primary care.    Neck circumference: 17\"          " General: Alert. Cooperative. Well developed. No acute distress.             Head:  Normocephalic. Symmetrical. Atraumatic.              Eyes: Sclera clear. No icterus. PERRLA. Normal EOM.             Ears: No deformities. Normal hearing.             Nose: No septal deviation. No drainage.          Throat: No oral lesions. No thrush. Moist mucous membranes. Trachea midline    Tongue is enlarged    Dentition is good       Pharynx: Posterior pharyngeal pillars are narrow    Mallampati score of IV (only hard palate visible)    Pharynx is normal with unrermarkable tonsils   Chest Wall:  Normal shape. Symmetric expansion with respiration. No tenderness.          Lungs:  Clear to auscultation bilaterally. No wheezes. No rhonchi. No rales. Respirations regular, even and unlabored.            Heart:  Regular rhythm and normal rate. Normal S1 and S2. No murmur.     Abdomen:  Soft, non-tender and non-distended. Normal bowel sounds. No masses.  Extremities:  Moves all extremities well. No edema.           Pulses: Pulses palpable and equal bilaterally.               Skin: Dry. Intact. No bleeding. No rash.           Neuro: Moves all 4 extremities and cranial nerves grossly intact.  Psychiatric: Normal mood and affect.      Current Outpatient Medications:   •  albuterol (PROVENTIL HFA;VENTOLIN HFA) 108 (90 Base) MCG/ACT inhaler, Inhale 2 puffs Every 4 (Four) Hours As Needed for Wheezing., Disp: 18 g, Rfl: 2  •  albuterol (PROVENTIL) (2.5 MG/3ML) 0.083% nebulizer solution, Take 2.5 mg by nebulization Every 6 (Six) Hours As Needed for Wheezing., Disp: 120 vial, Rfl: 2  •  busPIRone (BUSPAR) 10 MG tablet, Take 1 tablet by mouth 3 (Three) Times a Day. (Patient taking differently: Take 15 mg by mouth 3 (Three) Times a Day.), Disp: 90 tablet, Rfl: 3  •  cyclobenzaprine (FLEXERIL) 10 MG tablet, Take 1 tablet by mouth 3 (Three) Times a Day As Needed for Muscle Spasms., Disp: 90 tablet, Rfl: 1  •  DULoxetine (CYMBALTA) 20 MG capsule, Take  1 capsule by mouth Daily., Disp: 30 capsule, Rfl: 2  •  estradiol (ESTRACE) 1 MG tablet, Take 1 tablet by mouth Daily., Disp: 90 tablet, Rfl: 3  •  FLUoxetine (PROzac) 20 MG capsule, Take 20 mg by mouth 2 (Two) Times a Day., Disp: , Rfl:   •  omeprazole (priLOSEC) 40 MG capsule, Take 40 mg by mouth 2 (Two) Times a Day., Disp: , Rfl:   •  polyethylene glycol (MIRALAX) powder, Take 17 g by mouth 2 (Two) Times a Day As Needed (constipation)., Disp: 1 each, Rfl: 11    WBC   Date Value Ref Range Status   11/21/2018 8.22 3.20 - 9.80 10*3/mm3 Final     RBC   Date Value Ref Range Status   11/21/2018 4.49 3.77 - 5.16 10*6/mm3 Final     Hemoglobin   Date Value Ref Range Status   11/21/2018 12.8 12.0 - 15.5 g/dL Final     Hematocrit   Date Value Ref Range Status   11/21/2018 38.7 35.0 - 45.0 % Final     MCV   Date Value Ref Range Status   11/21/2018 86.2 80.0 - 98.0 fL Final     MCH   Date Value Ref Range Status   11/21/2018 28.5 26.5 - 34.0 pg Final     MCHC   Date Value Ref Range Status   11/21/2018 33.1 31.4 - 36.0 g/dL Final     RDW   Date Value Ref Range Status   11/21/2018 12.7 11.5 - 14.5 % Final     RDW-SD   Date Value Ref Range Status   11/21/2018 40.3 36.4 - 46.3 fl Final     MPV   Date Value Ref Range Status   11/21/2018 10.0 8.0 - 12.0 fL Final     Platelets   Date Value Ref Range Status   11/21/2018 350 150 - 450 10*3/mm3 Final     Neutrophil %   Date Value Ref Range Status   11/21/2018 52.2 37.0 - 80.0 % Final     Lymphocyte %   Date Value Ref Range Status   11/21/2018 33.6 10.0 - 50.0 % Final     Monocyte %   Date Value Ref Range Status   11/21/2018 9.5 0.0 - 12.0 % Final     Eosinophil %   Date Value Ref Range Status   11/21/2018 3.8 0.0 - 7.0 % Final     Basophil %   Date Value Ref Range Status   11/21/2018 0.5 0.0 - 2.0 % Final     Immature Grans %   Date Value Ref Range Status   11/21/2018 0.4 0.0 - 0.5 % Final     Neutrophils, Absolute   Date Value Ref Range Status   11/21/2018 4.30 2.00 - 8.60 10*3/mm3  Final     Lymphocytes, Absolute   Date Value Ref Range Status   11/21/2018 2.76 0.60 - 4.20 10*3/mm3 Final     Monocytes, Absolute   Date Value Ref Range Status   11/21/2018 0.78 0.00 - 0.90 10*3/mm3 Final     Eosinophils, Absolute   Date Value Ref Range Status   11/21/2018 0.31 0.00 - 0.70 10*3/mm3 Final     Basophils, Absolute   Date Value Ref Range Status   11/21/2018 0.04 0.00 - 0.20 10*3/mm3 Final     Immature Grans, Absolute   Date Value Ref Range Status   11/21/2018 0.03 (H) 0.00 - 0.02 10*3/mm3 Final     nRBC   Date Value Ref Range Status   03/21/2018 0.0 0.0 - 0.0 /100 WBC Final       Contraindications to home sleep test: Neuromuscular impairment such as Parkinson's disease or ALS    ASSESSMENT:  1. Obstructive sleep apnea New, further workup planned (4)  1. Check split night polysomnography   2. Tremor  3. Ataxia and vertigo      RTC 2 weeks after testing         This document has been electronically signed by Emmett Mendoza MD on March 6, 2019         CC: Gretchen Gauthier, APRN          No ref. provider found

## 2019-03-12 ENCOUNTER — TELEPHONE (OUTPATIENT)
Dept: FAMILY MEDICINE CLINIC | Facility: CLINIC | Age: 59
End: 2019-03-12

## 2019-03-12 DIAGNOSIS — G47.33 OBSTRUCTIVE SLEEP APNEA, ADULT: Primary | ICD-10-CM

## 2019-03-12 NOTE — TELEPHONE ENCOUNTER
She called and wants to talk to Gretchen- said she has been seen for bad muscle cramps-still having them all the time-said she has been taking muscle relaxers but not helping.She can be reached at [phone].

## 2019-03-12 NOTE — TELEPHONE ENCOUNTER
Per MAYELA Terry, Ms. Bernal has been called with recommendations.  She states understanding ot these recommendations and will follow-up as planned or sooner if problems persist.       ----- Message from MAYELA Flores sent at 3/12/2019  4:34 PM CDT -----  The only treatment option that I have left to try is a tonic water a day.  Tonic water contains quinine which used to be available over-the-counter for the treatment of muscle cramps but has not been available in quite some time.  If she gets no relief or if they are that bad she needs to go to the emergency department.  Thanks        She called and wants to talk to Gretchen- said she has been seen for bad muscle cramps-still having them all the time-said she has been taking muscle relaxers but not helping.She can be reached at [phone].

## 2019-03-20 ENCOUNTER — HOSPITAL ENCOUNTER (OUTPATIENT)
Dept: SLEEP MEDICINE | Facility: HOSPITAL | Age: 59
Discharge: HOME | End: 2019-03-20
Admitting: INTERNAL MEDICINE

## 2019-03-20 DIAGNOSIS — G47.33 OBSTRUCTIVE SLEEP APNEA, ADULT: ICD-10-CM

## 2019-03-20 PROCEDURE — 95810 POLYSOM 6/> YRS 4/> PARAM: CPT

## 2019-03-20 PROCEDURE — 95810 POLYSOM 6/> YRS 4/> PARAM: CPT | Performed by: INTERNAL MEDICINE

## 2019-03-29 ENCOUNTER — OFFICE VISIT (OUTPATIENT)
Dept: SURGERY | Facility: CLINIC | Age: 59
End: 2019-03-29

## 2019-03-29 VITALS
SYSTOLIC BLOOD PRESSURE: 118 MMHG | HEART RATE: 88 BPM | BODY MASS INDEX: 38.31 KG/M2 | HEIGHT: 63 IN | DIASTOLIC BLOOD PRESSURE: 74 MMHG | TEMPERATURE: 97.7 F | WEIGHT: 216.2 LBS

## 2019-03-29 DIAGNOSIS — Z87.898 HISTORY OF ABNORMAL MAMMOGRAM: Primary | Chronic | ICD-10-CM

## 2019-03-29 PROCEDURE — 99213 OFFICE O/P EST LOW 20 MIN: CPT | Performed by: SURGERY

## 2019-03-29 RX ORDER — BUSPIRONE HYDROCHLORIDE 15 MG/1
1 TABLET ORAL 3 TIMES DAILY
COMMUNITY
Start: 2019-03-07 | End: 2019-06-12 | Stop reason: DRUGHIGH

## 2019-03-29 NOTE — PROGRESS NOTES
Chief Complaint: This is a 58 y.o. woman seen in consultation for abnormal breast imaging    History of Present Illness:  Patient has noted no new masses, skin changes, nipple discharge, nipple changes prior to her most recent imaging.  Her most recent imaging includes the following:     ( I have personally reviewed the breast imaging and concur with the findings of the radiologist- BiRADS 2)  Breast Cancer HIstory: Patient does not have a past medical history of breast cancer.  She is here for evaluation.    Past Medical History:   Diagnosis Date   • Allergic rhinitis    • Arthritis    • Asthma    • Breast lump    • Chest pain    • Chronic low back pain    • Constipation    • Depressive disorder    • Diarrhea    • Diverticular disease of colon    • Epigastric pain    • DIETER (generalized anxiety disorder)    • GERD (gastroesophageal reflux disease)    • Head ache    • Hematochezia    • Herpes zoster     mid back, near spine   • Periumbilical pain    • PONV (postoperative nausea and vomiting)      Past Surgical History:   Procedure Laterality Date   • ABDOMINOPLASTY  12/22/2004   • AUGMENTATION MAMMAPLASTY     • BREAST AUGMENTATION BILATERAL MASTOPEXY     • BUNIONECTOMY Left 3/20/2017    Procedure: EXCISION OF CALCANEAL SPURS LEFT FOOT AND REATTACHMENT OF ACHILLES TENDON ;  Surgeon: Jarrell Mar MD;  Location: [facility];  Service:    • COLONOSCOPY  01/23/2015   • CYST REMOVAL  08/18/1961    Excision of sebaceous cyst. Left ear   • FINGER/THUMB LESION/CYST EXCISION  10/29/2014   • HAND SURGERY  06/03/2013   • HYSTERECTOMY     • OOPHORECTOMY     • ORIF ULNA/RADIUS FRACTURES  07/22/2013   • TUBAL ABDOMINAL LIGATION  08/14/1985    Laparoscopic tubal sterilization; dilatation and curettage. Desires tubal ligation;       Prior to Admission medications    Medication Sig Start Date End Date Taking? Authorizing Provider   albuterol (PROVENTIL HFA;VENTOLIN HFA) 108 (90 Base) MCG/ACT inhaler Inhale 2 puffs Every 4 (Four) Hours  As Needed for Wheezing. 11/27/18  Yes Gretchen Gauthier APRN   albuterol (PROVENTIL) (2.5 MG/3ML) 0.083% nebulizer solution Take 2.5 mg by nebulization Every 6 (Six) Hours As Needed for Wheezing. 11/27/18  Yes Gretchen Gauthier APRN   busPIRone (BUSPAR) 15 MG tablet Take 1 tablet by mouth 3 (Three) Times a Day. 3/7/19  Yes Maryam Mustafa MD   cyclobenzaprine (FLEXERIL) 10 MG tablet Take 1 tablet by mouth 3 (Three) Times a Day As Needed for Muscle Spasms. 1/29/19  Yes Gretchen Gauthier APRN   DULoxetine (CYMBALTA) 20 MG capsule Take 1 capsule by mouth Daily. 2/27/19  Yes Gretchen Gauthier APRN   estradiol (ESTRACE) 1 MG tablet Take 1 tablet by mouth Daily. 8/23/18  Yes Eric Rowell MD   FLUoxetine (PROzac) 20 MG capsule Take 20 mg by mouth 2 (Two) Times a Day.   Yes ProviderMaryam MD   busPIRone (BUSPAR) 10 MG tablet Take 1 tablet by mouth 3 (Three) Times a Day.  Patient taking differently: Take 15 mg by mouth 3 (Three) Times a Day. 11/21/18   Gretchen Gauthier APRN   omeprazole (priLOSEC) 40 MG capsule Take 40 mg by mouth 2 (Two) Times a Day.    ProviderMaryam MD   polyethylene glycol (MIRALAX) powder Take 17 g by mouth 2 (Two) Times a Day As Needed (constipation). 8/23/18   Eric Rowell MD     Allergies   Allergen Reactions   • Cefprozil Nausea And Vomiting   • Penicillins Rash     Family History   Problem Relation Age of Onset   • Diabetes Other    • Heart disease Other    • Hypertension Other      Social History     Socioeconomic History   • Marital status:      Spouse name: Not on file   • Number of children: Not on file   • Years of education: Not on file   • Highest education level: Not on file   Tobacco Use   • Smoking status: Never Smoker   • Smokeless tobacco: Never Used   Substance and Sexual Activity   • Alcohol use: No   • Drug use: No   • Sexual activity: Defer       Physical Exam   Constitutional: She is oriented to person, place, and time. She appears well-developed and  well-nourished. No distress.   HENT:   Head: Normocephalic and atraumatic.   Mouth/Throat: No oropharyngeal exudate.   Eyes: EOM are normal. Pupils are equal, round, and reactive to light. No scleral icterus.   Neck: Normal range of motion. Neck supple. No JVD present. No tracheal deviation present. No thyromegaly present.   Cardiovascular: Normal rate, regular rhythm and normal heart sounds.   Pulmonary/Chest: Effort normal and breath sounds normal. No stridor. No respiratory distress. She has no wheezes. She has no rales. She exhibits no tenderness. Right breast exhibits no inverted nipple, no mass, no nipple discharge, no skin change and no tenderness. Left breast exhibits no inverted nipple, no mass, no nipple discharge, no skin change and no tenderness. Breasts are symmetrical. There is no breast swelling.   Genitourinary: No breast tenderness, discharge or bleeding.   Musculoskeletal: Normal range of motion. She exhibits no edema, tenderness or deformity.   Lymphadenopathy:     She has no cervical adenopathy.     She has no axillary adenopathy.   Neurological: She is alert and oriented to person, place, and time.   Skin: Skin is warm and dry. No rash noted. She is not diaphoretic. No erythema. No pallor.   Psychiatric: She has a normal mood and affect. Her behavior is normal. Judgment normal.   Vitals reviewed.    Vitals:    03/29/19 0929   BP: 118/74   Pulse: 88   Temp: 97.7 °F (36.5 °C)     Assessment:    Lashaun was seen today for follow-up.    Diagnoses and all orders for this visit:    History of abnormal mammogram        Plan:  1. Yearly mammograms and examination                This document has been electronically signed by Ruben Springer MD on March 31, 2019 8:04 PM

## 2019-03-29 NOTE — PATIENT INSTRUCTIONS

## 2019-03-30 DIAGNOSIS — G47.33 OBSTRUCTIVE SLEEP APNEA, ADULT: Primary | ICD-10-CM

## 2019-04-29 ENCOUNTER — OFFICE VISIT (OUTPATIENT)
Dept: NEUROLOGY | Facility: CLINIC | Age: 59
End: 2019-04-29

## 2019-04-29 VITALS
HEIGHT: 63 IN | DIASTOLIC BLOOD PRESSURE: 70 MMHG | SYSTOLIC BLOOD PRESSURE: 130 MMHG | HEART RATE: 80 BPM | RESPIRATION RATE: 18 BRPM | BODY MASS INDEX: 38.45 KG/M2 | WEIGHT: 217 LBS

## 2019-04-29 DIAGNOSIS — R25.1 TREMOR: Primary | ICD-10-CM

## 2019-04-29 DIAGNOSIS — R26.89 IMBALANCE: ICD-10-CM

## 2019-04-29 DIAGNOSIS — Z53.21 PATIENT LEFT WITHOUT BEING SEEN: Primary | ICD-10-CM

## 2019-04-29 DIAGNOSIS — R25.2 MUSCLE CRAMPING: ICD-10-CM

## 2019-04-29 PROCEDURE — 99215 OFFICE O/P EST HI 40 MIN: CPT | Performed by: PSYCHIATRY & NEUROLOGY

## 2019-04-29 RX ORDER — HYDROXYCHLOROQUINE SULFATE 200 MG/1
200 TABLET, FILM COATED ORAL DAILY
COMMUNITY
End: 2022-05-19 | Stop reason: ALTCHOICE

## 2019-05-01 ENCOUNTER — LAB (OUTPATIENT)
Dept: LAB | Facility: HOSPITAL | Age: 59
End: 2019-05-01

## 2019-05-01 DIAGNOSIS — R25.2 MUSCLE CRAMPING: ICD-10-CM

## 2019-05-01 DIAGNOSIS — R25.1 TREMOR: ICD-10-CM

## 2019-05-01 LAB
ALBUMIN SERPL-MCNC: 3.7 G/DL (ref 3.5–5.2)
ALBUMIN/GLOB SERPL: 1.2 G/DL
ALP SERPL-CCNC: 85 U/L (ref 39–117)
ALT SERPL W P-5'-P-CCNC: 34 U/L (ref 1–33)
ANION GAP SERPL CALCULATED.3IONS-SCNC: 12.2 MMOL/L
AST SERPL-CCNC: 26 U/L (ref 1–32)
BASOPHILS # BLD AUTO: 0.06 10*3/MM3 (ref 0–0.2)
BASOPHILS NFR BLD AUTO: 0.8 % (ref 0–1.5)
BILIRUB SERPL-MCNC: 0.2 MG/DL (ref 0.2–1.2)
BUN BLD-MCNC: 13 MG/DL (ref 6–20)
BUN/CREAT SERPL: 12.7 (ref 7–25)
CALCIUM SPEC-SCNC: 9.3 MG/DL (ref 8.6–10.5)
CHLORIDE SERPL-SCNC: 104 MMOL/L (ref 98–107)
CK SERPL-CCNC: 131 U/L (ref 20–180)
CO2 SERPL-SCNC: 22.8 MMOL/L (ref 22–29)
CREAT BLD-MCNC: 1.02 MG/DL (ref 0.57–1)
DEPRECATED RDW RBC AUTO: 40.2 FL (ref 37–54)
EOSINOPHIL # BLD AUTO: 0.22 10*3/MM3 (ref 0–0.4)
EOSINOPHIL NFR BLD AUTO: 3 % (ref 0.3–6.2)
ERYTHROCYTE [DISTWIDTH] IN BLOOD BY AUTOMATED COUNT: 12.3 % (ref 12.3–15.4)
GFR SERPL CREATININE-BSD FRML MDRD: 56 ML/MIN/1.73
GLOBULIN UR ELPH-MCNC: 3 GM/DL
GLUCOSE BLD-MCNC: 105 MG/DL (ref 65–99)
HCT VFR BLD AUTO: 38.4 % (ref 34–46.6)
HGB BLD-MCNC: 12.6 G/DL (ref 12–15.9)
IMM GRANULOCYTES # BLD AUTO: 0.04 10*3/MM3 (ref 0–0.05)
IMM GRANULOCYTES NFR BLD AUTO: 0.5 % (ref 0–0.5)
LYMPHOCYTES # BLD AUTO: 2.68 10*3/MM3 (ref 0.7–3.1)
LYMPHOCYTES NFR BLD AUTO: 36.5 % (ref 19.6–45.3)
MAGNESIUM SERPL-MCNC: 2.2 MG/DL (ref 1.6–2.6)
MCH RBC QN AUTO: 29.1 PG (ref 26.6–33)
MCHC RBC AUTO-ENTMCNC: 32.8 G/DL (ref 31.5–35.7)
MCV RBC AUTO: 88.7 FL (ref 79–97)
MONOCYTES # BLD AUTO: 0.67 10*3/MM3 (ref 0.1–0.9)
MONOCYTES NFR BLD AUTO: 9.1 % (ref 5–12)
NEUTROPHILS # BLD AUTO: 3.67 10*3/MM3 (ref 1.7–7)
NEUTROPHILS NFR BLD AUTO: 50.1 % (ref 42.7–76)
NRBC BLD AUTO-RTO: 0 /100 WBC (ref 0–0.2)
PLATELET # BLD AUTO: 316 10*3/MM3 (ref 140–450)
PMV BLD AUTO: 10.7 FL (ref 6–12)
POTASSIUM BLD-SCNC: 4.7 MMOL/L (ref 3.5–5.2)
PROT SERPL-MCNC: 6.7 G/DL (ref 6–8.5)
RBC # BLD AUTO: 4.33 10*6/MM3 (ref 3.77–5.28)
SODIUM BLD-SCNC: 139 MMOL/L (ref 136–145)
URATE SERPL-MCNC: 6.1 MG/DL (ref 2.4–5.7)
WBC NRBC COR # BLD: 7.34 10*3/MM3 (ref 3.4–10.8)

## 2019-05-01 PROCEDURE — 83519 RIA NONANTIBODY: CPT

## 2019-05-01 PROCEDURE — 84550 ASSAY OF BLOOD/URIC ACID: CPT

## 2019-05-01 PROCEDURE — 86255 FLUORESCENT ANTIBODY SCREEN: CPT

## 2019-05-01 PROCEDURE — 80053 COMPREHEN METABOLIC PANEL: CPT

## 2019-05-01 PROCEDURE — 85025 COMPLETE CBC W/AUTO DIFF WBC: CPT

## 2019-05-01 PROCEDURE — 84165 PROTEIN E-PHORESIS SERUM: CPT

## 2019-05-01 PROCEDURE — 83735 ASSAY OF MAGNESIUM: CPT

## 2019-05-01 PROCEDURE — 82550 ASSAY OF CK (CPK): CPT

## 2019-05-02 ENCOUNTER — HOSPITAL ENCOUNTER (OUTPATIENT)
Dept: NEUROLOGY | Facility: HOSPITAL | Age: 59
Discharge: HOME | End: 2019-05-02

## 2019-05-02 ENCOUNTER — APPOINTMENT (OUTPATIENT)
Dept: NEUROLOGY | Facility: HOSPITAL | Age: 59
End: 2019-05-02

## 2019-05-02 ENCOUNTER — HOSPITAL ENCOUNTER (OUTPATIENT)
Dept: NEUROLOGY | Facility: HOSPITAL | Age: 59
Discharge: HOME | End: 2019-05-02
Admitting: PSYCHIATRY & NEUROLOGY

## 2019-05-02 DIAGNOSIS — R25.2 MUSCLE CRAMPING: ICD-10-CM

## 2019-05-02 DIAGNOSIS — R25.1 TREMOR: ICD-10-CM

## 2019-05-02 LAB
ALBUMIN SERPL-MCNC: 3.2 G/DL (ref 2.9–4.4)
ALBUMIN/GLOB SERPL: 1 {RATIO} (ref 0.7–1.7)
ALPHA1 GLOB FLD ELPH-MCNC: 0.3 G/DL (ref 0–0.4)
ALPHA2 GLOB SERPL ELPH-MCNC: 0.8 G/DL (ref 0.4–1)
B-GLOBULIN SERPL ELPH-MCNC: 1.2 G/DL (ref 0.7–1.3)
GAMMA GLOB SERPL ELPH-MCNC: 1 G/DL (ref 0.4–1.8)
GLOBULIN SER CALC-MCNC: 3.3 G/DL (ref 2.2–3.9)
Lab: NORMAL
M-SPIKE: NORMAL G/DL
PROT SERPL-MCNC: 6.5 G/DL (ref 6–8.5)

## 2019-05-02 PROCEDURE — 95910 NRV CNDJ TEST 7-8 STUDIES: CPT | Performed by: PSYCHIATRY & NEUROLOGY

## 2019-05-02 PROCEDURE — 95910 NRV CNDJ TEST 7-8 STUDIES: CPT

## 2019-05-02 PROCEDURE — 95816 EEG AWAKE AND DROWSY: CPT | Performed by: PSYCHIATRY & NEUROLOGY

## 2019-05-02 PROCEDURE — 95886 MUSC TEST DONE W/N TEST COMP: CPT

## 2019-05-02 PROCEDURE — 95886 MUSC TEST DONE W/N TEST COMP: CPT | Performed by: PSYCHIATRY & NEUROLOGY

## 2019-05-02 PROCEDURE — 95816 EEG AWAKE AND DROWSY: CPT

## 2019-05-08 DIAGNOSIS — R25.2 MUSCLE CRAMPS: ICD-10-CM

## 2019-05-09 RX ORDER — CYCLOBENZAPRINE HCL 10 MG
TABLET ORAL
Qty: 90 TABLET | Refills: 1 | Status: SHIPPED | OUTPATIENT
Start: 2019-05-09 | End: 2019-05-24

## 2019-05-10 LAB
ACHR AB SER-SCNC: <0.03 NMOL/L (ref 0–0.24)
ACHR BLOCK AB/ACHR TOTAL SFR SER: 14 % (ref 0–25)
ACHR MOD AB/ACHR TOTAL SFR SER: <12 % (ref 0–20)
STRIA MUS AB TITR SER IF: NEGATIVE {TITER}

## 2019-05-17 ENCOUNTER — HOSPITAL ENCOUNTER (OUTPATIENT)
Dept: MRI IMAGING | Facility: HOSPITAL | Age: 59
Discharge: HOME | End: 2019-05-17

## 2019-05-17 ENCOUNTER — HOSPITAL ENCOUNTER (OUTPATIENT)
Dept: MRI IMAGING | Facility: HOSPITAL | Age: 59
Discharge: HOME | End: 2019-05-17
Admitting: PSYCHIATRY & NEUROLOGY

## 2019-05-17 DIAGNOSIS — R25.1 TREMOR: ICD-10-CM

## 2019-05-17 PROCEDURE — 70544 MR ANGIOGRAPHY HEAD W/O DYE: CPT

## 2019-05-17 PROCEDURE — 70551 MRI BRAIN STEM W/O DYE: CPT

## 2019-05-20 ENCOUNTER — HOSPITAL ENCOUNTER (OUTPATIENT)
Dept: ULTRASOUND IMAGING | Facility: HOSPITAL | Age: 59
Discharge: HOME | End: 2019-05-20
Admitting: PSYCHIATRY & NEUROLOGY

## 2019-05-20 DIAGNOSIS — R26.89 IMBALANCE: ICD-10-CM

## 2019-05-20 PROCEDURE — 93880 EXTRACRANIAL BILAT STUDY: CPT

## 2019-05-21 ENCOUNTER — TELEPHONE (OUTPATIENT)
Dept: VASCULAR SURGERY | Facility: CLINIC | Age: 59
End: 2019-05-21

## 2019-05-21 DIAGNOSIS — I65.22 STENOSIS OF LEFT CAROTID ARTERY: Primary | ICD-10-CM

## 2019-05-21 NOTE — TELEPHONE ENCOUNTER
Spoke with patient and advised of upcoming appointment with Dr. Nur based on referral from Dr. Dos Santos.  Patient expressed understanding for all that was discussed.  Patient confirmed address on file and a reminder was mailed.

## 2019-05-24 ENCOUNTER — OFFICE VISIT (OUTPATIENT)
Dept: FAMILY MEDICINE CLINIC | Facility: CLINIC | Age: 59
End: 2019-05-24

## 2019-05-24 VITALS
SYSTOLIC BLOOD PRESSURE: 126 MMHG | HEIGHT: 63 IN | BODY MASS INDEX: 38.27 KG/M2 | DIASTOLIC BLOOD PRESSURE: 74 MMHG | WEIGHT: 216 LBS

## 2019-05-24 DIAGNOSIS — M79.7 FIBROMYALGIA: ICD-10-CM

## 2019-05-24 DIAGNOSIS — R25.2 MUSCLE CRAMPING: Primary | ICD-10-CM

## 2019-05-24 DIAGNOSIS — I65.22 STENOSIS OF LEFT CAROTID ARTERY: ICD-10-CM

## 2019-05-24 PROCEDURE — 99214 OFFICE O/P EST MOD 30 MIN: CPT | Performed by: NURSE PRACTITIONER

## 2019-05-24 RX ORDER — BACLOFEN 20 MG/1
20 TABLET ORAL 2 TIMES DAILY
Qty: 60 TABLET | Refills: 1 | Status: SHIPPED | OUTPATIENT
Start: 2019-05-24 | End: 2019-06-13 | Stop reason: SINTOL

## 2019-05-24 NOTE — PROGRESS NOTES
"Subjective   Lashaun Bernal is a 58 y.o. female.  Three month follow-up for fibromyalgia.  Was placed on Cymbalta at previous visit.  Has noticed an improvement in muscle aches with the Cymbalta.  Continues to complain of intermittent muscle cramping despite using Flexeril at night.  Has give some relief \"but I still wake up at least once at night with cramps.\"  Recently had a carotid ultrasound done by her neurologist who recommended she see a vascular surgeon.  They referred her to [facility] \"but I cannot make that drive and I would like to see someone here.\"    Muscle Pain   This is a chronic problem. The current episode started more than 1 year ago. The problem occurs intermittently. The problem has been gradually worsening since onset. The pain is present in the neck, lower back, left arm, left foot, left forearm, left hand, left lower leg, left upper leg, right arm, right foot, right forearm, right hand, right lower leg, right upper leg, upper back, upper left arm and upper right arm. The pain is severe. Nothing aggravates the symptoms. Associated symptoms include fatigue. Pertinent negatives include no shortness of breath. Past treatments include OTC NSAID, prescription NSAID and acetaminophen. The treatment provided no relief. There is no swelling present. She has been behaving normally.   Fibromyalgia   This is a chronic problem. The current episode started more than 1 year ago. The problem occurs constantly. The problem has been gradually improving. Associated symptoms include fatigue and myalgias. Nothing aggravates the symptoms. Treatments tried: Cymbalta. The treatment provided mild relief.        The following portions of the patient's history were reviewed and updated as appropriate:     Current Outpatient Medications   Medication Sig Dispense Refill   • albuterol (PROVENTIL HFA;VENTOLIN HFA) 108 (90 Base) MCG/ACT inhaler Inhale 2 puffs Every 4 (Four) Hours As Needed for Wheezing. 18 g 2   • albuterol " (PROVENTIL) (2.5 MG/3ML) 0.083% nebulizer solution Take 2.5 mg by nebulization Every 6 (Six) Hours As Needed for Wheezing. 120 vial 2   • busPIRone (BUSPAR) 15 MG tablet Take 1 tablet by mouth 3 (Three) Times a Day.     • DULoxetine (CYMBALTA) 20 MG capsule Take 1 capsule by mouth Daily. 30 capsule 2   • estradiol (ESTRACE) 1 MG tablet Take 1 tablet by mouth Daily. 90 tablet 3   • FLUoxetine (PROzac) 20 MG capsule Take 20 mg by mouth 2 (Two) Times a Day.     • hydroxychloroquine (PLAQUENIL) 200 MG tablet Take 200 mg by mouth Daily.     • omeprazole (priLOSEC) 40 MG capsule Take 40 mg by mouth 2 (Two) Times a Day.     • polyethylene glycol (MIRALAX) powder Take 17 g by mouth 2 (Two) Times a Day As Needed (constipation). 1 each 11   • baclofen (LIORESAL) 20 MG tablet Take 1 tablet by mouth 2 (Two) Times a Day. 60 tablet 1     No current facility-administered medications for this visit.      Current Outpatient Medications on File Prior to Visit   Medication Sig   • albuterol (PROVENTIL HFA;VENTOLIN HFA) 108 (90 Base) MCG/ACT inhaler Inhale 2 puffs Every 4 (Four) Hours As Needed for Wheezing.   • albuterol (PROVENTIL) (2.5 MG/3ML) 0.083% nebulizer solution Take 2.5 mg by nebulization Every 6 (Six) Hours As Needed for Wheezing.   • busPIRone (BUSPAR) 15 MG tablet Take 1 tablet by mouth 3 (Three) Times a Day.   • DULoxetine (CYMBALTA) 20 MG capsule Take 1 capsule by mouth Daily.   • estradiol (ESTRACE) 1 MG tablet Take 1 tablet by mouth Daily.   • FLUoxetine (PROzac) 20 MG capsule Take 20 mg by mouth 2 (Two) Times a Day.   • hydroxychloroquine (PLAQUENIL) 200 MG tablet Take 200 mg by mouth Daily.   • omeprazole (priLOSEC) 40 MG capsule Take 40 mg by mouth 2 (Two) Times a Day.   • polyethylene glycol (MIRALAX) powder Take 17 g by mouth 2 (Two) Times a Day As Needed (constipation).   • [DISCONTINUED] cyclobenzaprine (FLEXERIL) 10 MG tablet TAKE 1 TABLET BY MOUTH 3 TIMES A DAY AS NEEDED FOR MUSCLE SPASMS.   • [DISCONTINUED]  "busPIRone (BUSPAR) 10 MG tablet Take 1 tablet by mouth 3 (Three) Times a Day. (Patient taking differently: Take 15 mg by mouth 3 (Three) Times a Day.)     No current facility-administered medications on file prior to visit.      She is allergic to cefprozil and penicillins..    Review of Systems   Constitutional: Positive for fatigue.   HENT: Negative.    Eyes: Negative.    Respiratory: Negative.  Negative for shortness of breath.    Cardiovascular: Negative.    Gastrointestinal: Negative.    Genitourinary: Negative.    Musculoskeletal: Positive for myalgias.   Skin: Negative.    Neurological: Negative.    Psychiatric/Behavioral: Negative.  Negative for confusion.       Objective    Visit Vitals  /74   Ht 160 cm (63\")   Wt 98 kg (216 lb)   LMP 03/09/1991   BMI 38.26 kg/m²       Physical Exam   Constitutional: She is oriented to person, place, and time. She appears well-developed and well-nourished.   HENT:   Head: Normocephalic.   Right Ear: External ear normal.   Left Ear: External ear normal.   Eyes: EOM are normal. Pupils are equal, round, and reactive to light.   Neck: Normal range of motion. Neck supple.   Cardiovascular: Normal rate, regular rhythm and normal heart sounds.   Pulmonary/Chest: Effort normal and breath sounds normal.   Abdominal: Soft. Bowel sounds are normal.   Musculoskeletal: Normal range of motion.   Neurological: She is alert and oriented to person, place, and time.   Skin: Skin is warm. Capillary refill takes less than 2 seconds.   Psychiatric: She has a normal mood and affect. Her behavior is normal.   Nursing note and vitals reviewed.      Assessment/Plan   Problems Addressed this Visit     None      Visit Diagnoses     Muscle cramping    -  Primary    Relevant Medications    baclofen (LIORESAL) 20 MG tablet    Fibromyalgia        Stenosis of left carotid artery        Relevant Orders    Ambulatory Referral to Vascular Surgery        New Medications Ordered This Visit   Medications "   • baclofen (LIORESAL) 20 MG tablet     Sig: Take 1 tablet by mouth 2 (Two) Times a Day.     Dispense:  60 tablet     Refill:  1       1.  Fibromyalgia  Continue on Cymbalta as previously prescribed   Educated on possible side effects of this medication including increased risk for suicidal ideations and exacerbation of depression    2.  Muscle cramping:  Discontinue Flexeril as previously prescribed  Begin baclofen as prescribed to be taken 1 p.o. twice daily as needed  Educated on possible side effects of this medication including but not limited to increased risk for drowsiness and sedation  Encouraged not to take this medication prior to working or driving    3.  Stenosis of left carotid artery:  Referral placed to vascular surgery to schedule appointment  Instructed to call Daily Ugarte and cancel her appointment that has already been scheduled with vascular     Continue on current medications as previously prescribed   Return in about 6 months (around 11/24/2019) for Annual physical.        This document has been electronically signed by MAYELA Flores on May 24, 2019 1:09 PM

## 2019-06-03 ENCOUNTER — LAB (OUTPATIENT)
Dept: LAB | Facility: HOSPITAL | Age: 59
End: 2019-06-03

## 2019-06-03 ENCOUNTER — OFFICE VISIT (OUTPATIENT)
Dept: NEUROLOGY | Facility: CLINIC | Age: 59
End: 2019-06-03

## 2019-06-03 VITALS
BODY MASS INDEX: 38.45 KG/M2 | SYSTOLIC BLOOD PRESSURE: 114 MMHG | RESPIRATION RATE: 18 BRPM | HEIGHT: 63 IN | HEART RATE: 78 BPM | DIASTOLIC BLOOD PRESSURE: 62 MMHG | WEIGHT: 217 LBS

## 2019-06-03 DIAGNOSIS — K74.3 PRIMARY BILIARY CHOLANGITIS (HCC): ICD-10-CM

## 2019-06-03 DIAGNOSIS — R74.8 ELEVATED LIVER ENZYMES: ICD-10-CM

## 2019-06-03 DIAGNOSIS — K76.0 FATTY LIVER: ICD-10-CM

## 2019-06-03 DIAGNOSIS — M54.2 NECK PAIN: Primary | ICD-10-CM

## 2019-06-03 DIAGNOSIS — R94.131 ABNORMAL EMG: ICD-10-CM

## 2019-06-03 LAB
ALBUMIN SERPL-MCNC: 3.7 G/DL (ref 3.5–5.2)
ALBUMIN/GLOB SERPL: 1.4 G/DL
ALP SERPL-CCNC: 69 U/L (ref 39–117)
ALT SERPL W P-5'-P-CCNC: 32 U/L (ref 1–33)
ANION GAP SERPL CALCULATED.3IONS-SCNC: 12.6 MMOL/L
AST SERPL-CCNC: 25 U/L (ref 1–32)
BILIRUB SERPL-MCNC: 0.2 MG/DL (ref 0.2–1.2)
BUN BLD-MCNC: 13 MG/DL (ref 6–20)
BUN/CREAT SERPL: 13.1 (ref 7–25)
CALCIUM SPEC-SCNC: 8.7 MG/DL (ref 8.6–10.5)
CHLORIDE SERPL-SCNC: 109 MMOL/L (ref 98–107)
CO2 SERPL-SCNC: 21.4 MMOL/L (ref 22–29)
CREAT BLD-MCNC: 0.99 MG/DL (ref 0.57–1)
GFR SERPL CREATININE-BSD FRML MDRD: 58 ML/MIN/1.73
GLOBULIN UR ELPH-MCNC: 2.7 GM/DL
GLUCOSE BLD-MCNC: 110 MG/DL (ref 65–99)
POTASSIUM BLD-SCNC: 4.6 MMOL/L (ref 3.5–5.2)
PROT SERPL-MCNC: 6.4 G/DL (ref 6–8.5)
SODIUM BLD-SCNC: 143 MMOL/L (ref 136–145)

## 2019-06-03 PROCEDURE — 36415 COLL VENOUS BLD VENIPUNCTURE: CPT

## 2019-06-03 PROCEDURE — 99214 OFFICE O/P EST MOD 30 MIN: CPT | Performed by: PSYCHIATRY & NEUROLOGY

## 2019-06-03 PROCEDURE — 83516 IMMUNOASSAY NONANTIBODY: CPT

## 2019-06-03 PROCEDURE — 80053 COMPREHEN METABOLIC PANEL: CPT

## 2019-06-03 NOTE — PROGRESS NOTES
Subjective   Lashaun Bernal, 1960, is a female who is being seen today for   Chief Complaint   Patient presents with   • Tremors       HISTORY OF PRESENT ILLNESS: Extended follow-up.  Patient has been seen for tremor which is stable and multiple other symptoms.  Patient does have neck pain into the right shoulder.  Patient had an EMG is suggested possible nerve root irritation C5/C6.  EEG noncontributory.  2D echo noncontributory.  Carotid ultrasound show internal carotid artery possible stenosis 50 to 69% patient is seeing Dr. Nur for that.  Patient blood work showed borderline elevation of creatinine and elevation of ALT.  Also some increased uric acid.  Patient takes over-the-counter magnesium supplement as well as other medication for her muscle cramps.  CK was normal and myasthenia panel negative.  Rest of blood work otherwise noncontributory.  MRI/MRA brain showed no abnormalities.    REVIEW OF SYSTEMS:   GENERAL: Blood pressure today 114/62 with pulse 78  PULMONARY: No acute respiratory difficulty and follows with PAP device  CVS: No chest pain or palpitation  GASTROINTESTINAL: No acute GI distress  GENITOURINARY: No acute  distress  GYN: Postmenopausal/post hysterectomy  MUSCULOSKELETAL: As above  HEENT: No acute vision or hearing change  ENDOCRINE: No acute endocrine symptoms  PSYCHIATRIC: No acute psychiatric symptoms  HEMATOLOGY: No anemia  SKIN: No acute skin changes  Family history reviewed and otherwise noncontributory  Social history: Patient denies smoking or drug or alcohol use      PHYSICAL EXAMINATION:    GENERAL: Patient has mild intentional tremor.  No cogwheeling or rigidity.  Patient's medications could be contributory  CRANIUM: Normocephalic/atraumatic  HEENT: No acute fundic abnormalities.  Pupils equal round reactive to light.       EYES: EOMs intact without nystagmus and fields full to confrontation       EARS: Tympanic membrane on the right normal/left obscured by wax and  hears tuning fork bilaterally       THROAT: No oropharynx abnormalities       NECK: No bruit/no lymphadenopathy  CHEST: No acute cardiopulmonary normalities by auscultation  ABDOMEN: Nondistended  EXTREMITIES: Pulses symmetrical  NEURO: Patient alert and follows commands with difficulty  SPEECH: Normal  CRANIAL NERVES: Motor/sensory about the face normal and symmetric    MOTOR STRENGTH: Motor strength upper and lower extremities normal  STATION AND GAIT: Gait normal/Romberg negative  CEREBELLAR: Finger-to-nose and heel-to-shin normal  SENSORY: Normal pin and vibration upper and lower extremities  REFLEXES: Reflexes present symmetric upper and lower extremity without clonus or Babinski  OTHER: I see no abnormal muscle activity by inspection or palpation    ASSESSMENT AND PLAN: Patient with intentional tremor.  Patient with questionable carotid stenosis.  Patient has possible cervical nerve root irritation.  Patient to get MRI of the cervical spine noncontrast.Patient's Body mass index is 38.44 kg/m². BMI is above normal parameters. Recommendations include: referral to primary care.  I spent 25 minutes with this patient with 15 minutes counseling.      Lashaun was seen today for tremors.    Diagnoses and all orders for this visit:    Neck pain  -     MRI Cervical Spine Without Contrast; Future    Abnormal EMG  -     MRI Cervical Spine Without Contrast; Future        Dictated utilizing Dragon voice recognition software

## 2019-06-04 LAB — DEPRECATED MITOCHONDRIA M2 IGG SER-ACNC: 20.3 UNITS (ref 0–20)

## 2019-06-12 ENCOUNTER — OFFICE VISIT (OUTPATIENT)
Dept: FAMILY MEDICINE CLINIC | Facility: CLINIC | Age: 59
End: 2019-06-12

## 2019-06-12 VITALS
HEIGHT: 63 IN | DIASTOLIC BLOOD PRESSURE: 76 MMHG | SYSTOLIC BLOOD PRESSURE: 126 MMHG | WEIGHT: 217.9 LBS | BODY MASS INDEX: 38.61 KG/M2

## 2019-06-12 DIAGNOSIS — M79.10 MUSCLE PAIN: ICD-10-CM

## 2019-06-12 DIAGNOSIS — M79.7 FIBROMYALGIA: Primary | ICD-10-CM

## 2019-06-12 DIAGNOSIS — R07.9 CHEST PAIN, UNSPECIFIED TYPE: ICD-10-CM

## 2019-06-12 DIAGNOSIS — Z78.0 POSTMENOPAUSAL: ICD-10-CM

## 2019-06-12 PROCEDURE — 99214 OFFICE O/P EST MOD 30 MIN: CPT | Performed by: NURSE PRACTITIONER

## 2019-06-12 PROCEDURE — 93005 ELECTROCARDIOGRAM TRACING: CPT | Performed by: NURSE PRACTITIONER

## 2019-06-12 PROCEDURE — 93010 ELECTROCARDIOGRAM REPORT: CPT | Performed by: INTERNAL MEDICINE

## 2019-06-12 RX ORDER — CLONAZEPAM 0.5 MG/1
0.5 TABLET ORAL 2 TIMES DAILY PRN
COMMUNITY
Start: 2019-06-11 | End: 2022-08-26 | Stop reason: DRUGHIGH

## 2019-06-12 RX ORDER — BUSPIRONE HYDROCHLORIDE 10 MG/1
15 TABLET ORAL 3 TIMES DAILY
COMMUNITY
Start: 2019-06-11 | End: 2019-09-10

## 2019-06-12 RX ORDER — CYCLOBENZAPRINE HCL 10 MG
10 TABLET ORAL 3 TIMES DAILY PRN
Qty: 90 TABLET | Refills: 5 | Status: SHIPPED | OUTPATIENT
Start: 2019-06-12 | End: 2020-02-03 | Stop reason: SDUPTHER

## 2019-06-12 NOTE — PROGRESS NOTES
"Subjective   Lashaun Bernal is a 58 y.o. female.   Follow-up for fibromyalgia.  Changed from Flexeril to Baclofen at previous visit.  \"The baclofen made me sick to my stomach and I was still having cramps.\"  Currently sees Dr. Ugalde with rheumatology.  Reports yesterday morning she woke up with chest pain \"at about four in the morning and it lasted for about thirty minutes.  I just felt horrible yesterday.  I felt week and I don't feel well today.\"    Muscle Pain   This is a chronic problem. The current episode started more than 1 year ago. The problem occurs intermittently. The problem has been gradually worsening since onset. The pain is present in the neck, lower back, left arm, left foot, left forearm, left hand, left lower leg, left upper leg, right arm, right foot, right forearm, right hand, right lower leg, right upper leg, upper back, upper left arm and upper right arm. The pain is severe. Nothing aggravates the symptoms. Associated symptoms include chest pain and nausea. Pertinent negatives include no fatigue, fever or shortness of breath. Past treatments include OTC NSAID, prescription NSAID and acetaminophen. The treatment provided no relief. There is no swelling present. She has been behaving normally.   Fibromyalgia   This is a chronic problem. The current episode started more than 1 year ago. The problem occurs constantly. The problem has been gradually improving. Associated symptoms include arthralgias, chest pain, myalgias and nausea. Pertinent negatives include no chills, diaphoresis, fatigue or fever. Nothing aggravates the symptoms. Treatments tried: Cymbalta. The treatment provided mild relief.   Chest Pain    This is a new problem. The current episode started yesterday. The onset quality is sudden. The problem has been resolved. The pain is present in the substernal region. The pain is at a severity of 8/10. The pain is severe. The quality of the pain is described as stabbing. The pain does not " radiate. Associated symptoms include nausea. Pertinent negatives include no diaphoresis, fever or shortness of breath. The pain is aggravated by nothing. She has tried rest for the symptoms. The treatment provided significant relief. Risk factors include obesity, lack of exercise, stress, post-menopausal and sedentary lifestyle.   Her past medical history is significant for anxiety/panic attacks.        The following portions of the patient's history were reviewed and updated as appropriate:     Current Outpatient Medications   Medication Sig Dispense Refill   • albuterol (PROVENTIL HFA;VENTOLIN HFA) 108 (90 Base) MCG/ACT inhaler Inhale 2 puffs Every 4 (Four) Hours As Needed for Wheezing. 18 g 2   • albuterol (PROVENTIL) (2.5 MG/3ML) 0.083% nebulizer solution Take 2.5 mg by nebulization Every 6 (Six) Hours As Needed for Wheezing. 120 vial 2   • baclofen (LIORESAL) 20 MG tablet Take 1 tablet by mouth 2 (Two) Times a Day. 60 tablet 1   • busPIRone (BUSPAR) 10 MG tablet Take 10 mg by mouth 3 (Three) Times a Day.     • clonazePAM (KlonoPIN) 0.5 MG tablet Take 0.5 mg by mouth 2 (Two) Times a Day As Needed for Anxiety.     • DULoxetine (CYMBALTA) 20 MG capsule Take 1 capsule by mouth Daily. 30 capsule 2   • estradiol (ESTRACE) 1 MG tablet Take 1 tablet by mouth Daily. 90 tablet 3   • FLUoxetine (PROzac) 20 MG capsule Take 20 mg by mouth 2 (Two) Times a Day.     • hydroxychloroquine (PLAQUENIL) 200 MG tablet Take 200 mg by mouth Daily.     • omeprazole (priLOSEC) 40 MG capsule Take 40 mg by mouth 2 (Two) Times a Day.     • polyethylene glycol (MIRALAX) powder Take 17 g by mouth 2 (Two) Times a Day As Needed (constipation). 1 each 11   • cyclobenzaprine (FLEXERIL) 10 MG tablet Take 1 tablet by mouth 3 (Three) Times a Day As Needed for Muscle Spasms. 90 tablet 5     No current facility-administered medications for this visit.      Current Outpatient Medications on File Prior to Visit   Medication Sig   • albuterol (PROVENTIL  "HFA;VENTOLIN HFA) 108 (90 Base) MCG/ACT inhaler Inhale 2 puffs Every 4 (Four) Hours As Needed for Wheezing.   • albuterol (PROVENTIL) (2.5 MG/3ML) 0.083% nebulizer solution Take 2.5 mg by nebulization Every 6 (Six) Hours As Needed for Wheezing.   • baclofen (LIORESAL) 20 MG tablet Take 1 tablet by mouth 2 (Two) Times a Day.   • busPIRone (BUSPAR) 10 MG tablet Take 10 mg by mouth 3 (Three) Times a Day.   • clonazePAM (KlonoPIN) 0.5 MG tablet Take 0.5 mg by mouth 2 (Two) Times a Day As Needed for Anxiety.   • DULoxetine (CYMBALTA) 20 MG capsule Take 1 capsule by mouth Daily.   • estradiol (ESTRACE) 1 MG tablet Take 1 tablet by mouth Daily.   • FLUoxetine (PROzac) 20 MG capsule Take 20 mg by mouth 2 (Two) Times a Day.   • hydroxychloroquine (PLAQUENIL) 200 MG tablet Take 200 mg by mouth Daily.   • omeprazole (priLOSEC) 40 MG capsule Take 40 mg by mouth 2 (Two) Times a Day.   • polyethylene glycol (MIRALAX) powder Take 17 g by mouth 2 (Two) Times a Day As Needed (constipation).   • [DISCONTINUED] busPIRone (BUSPAR) 15 MG tablet Take 1 tablet by mouth 3 (Three) Times a Day.     No current facility-administered medications on file prior to visit.      She is allergic to cefprozil and penicillins..    Review of Systems   Constitutional: Negative.  Negative for chills, diaphoresis, fatigue and fever.   HENT: Negative.    Eyes: Negative.    Respiratory: Negative.  Negative for shortness of breath.    Cardiovascular: Positive for chest pain.   Gastrointestinal: Positive for nausea.   Genitourinary: Negative.    Musculoskeletal: Positive for arthralgias and myalgias.   Skin: Negative.    Neurological: Negative.    Psychiatric/Behavioral: Negative.  Negative for confusion.       Objective    Visit Vitals  /76   Ht 160 cm (63\")   Wt 98.8 kg (217 lb 14.4 oz)   LMP 03/09/1991   BMI 38.60 kg/m²       Physical Exam   Constitutional: She is oriented to person, place, and time. She appears well-developed and well-nourished. "   HENT:   Head: Normocephalic.   Right Ear: External ear normal.   Left Ear: External ear normal.   Eyes: EOM are normal. Pupils are equal, round, and reactive to light.   Neck: Normal range of motion. Neck supple.   Cardiovascular: Normal rate, regular rhythm and normal heart sounds.   Pulmonary/Chest: Effort normal and breath sounds normal.   Abdominal: Soft. Bowel sounds are normal.   Musculoskeletal: Normal range of motion.   Neurological: She is alert and oriented to person, place, and time.   Skin: Skin is warm. Capillary refill takes less than 2 seconds.   Psychiatric: She has a normal mood and affect. Her behavior is normal.   Nursing note and vitals reviewed.      Assessment/Plan   Problems Addressed this Visit     None      Visit Diagnoses     Fibromyalgia    -  Primary    Relevant Medications    cyclobenzaprine (FLEXERIL) 10 MG tablet    Muscle pain        Relevant Medications    cyclobenzaprine (FLEXERIL) 10 MG tablet    Chest pain, unspecified type        Relevant Orders    ECG 12 Lead (Completed)    Postmenopausal        Relevant Orders    DEXA Bone Density Axial        New Medications Ordered This Visit   Medications   • cyclobenzaprine (FLEXERIL) 10 MG tablet     Sig: Take 1 tablet by mouth 3 (Three) Times a Day As Needed for Muscle Spasms.     Dispense:  90 tablet     Refill:  5       1.  Fibromyalgia  Continue on Cymbalta as previously prescribed   Educated on possible side effects of this medication including increased risk for suicidal ideations and exacerbation of depression     2.  Muscle cramping:  Resume Flexeril as previously prescribed and refill prescription sent to pharmacy  Educated on possible side effects of this medication including but not limited to increased risk for drowsiness and sedation  Encouraged not to take this medication prior to working or driving     3.  Chest pain, unspecified type:  Chest pain has resolved at this time  ECG performed in office and shows normal sinus  rhythm  Encouraged to seek emergency medical treatment for any new or worsening chest pain as this could be impending signs of MI    4.  Postmenopausal:  Radiology will call to schedule DEXA scan and will notify results when available    Continue on current medications as previously prescribed   Return in about 3 months (around 9/12/2019) for Recheck.        This document has been electronically signed by MAYELA Flores on June 12, 2019 2:27 PM

## 2019-06-13 ENCOUNTER — HOSPITAL ENCOUNTER (OUTPATIENT)
Dept: MRI IMAGING | Facility: HOSPITAL | Age: 59
Discharge: HOME | End: 2019-06-13
Admitting: PSYCHIATRY & NEUROLOGY

## 2019-06-13 ENCOUNTER — OFFICE VISIT (OUTPATIENT)
Dept: GASTROENTEROLOGY | Facility: CLINIC | Age: 59
End: 2019-06-13

## 2019-06-13 VITALS
BODY MASS INDEX: 38.13 KG/M2 | DIASTOLIC BLOOD PRESSURE: 82 MMHG | WEIGHT: 215.2 LBS | HEART RATE: 97 BPM | HEIGHT: 63 IN | SYSTOLIC BLOOD PRESSURE: 128 MMHG

## 2019-06-13 DIAGNOSIS — R74.8 ELEVATED LIVER ENZYMES: ICD-10-CM

## 2019-06-13 DIAGNOSIS — M54.2 NECK PAIN: ICD-10-CM

## 2019-06-13 DIAGNOSIS — K21.00 GASTROESOPHAGEAL REFLUX DISEASE WITH ESOPHAGITIS: Primary | ICD-10-CM

## 2019-06-13 DIAGNOSIS — K76.0 FATTY LIVER: ICD-10-CM

## 2019-06-13 DIAGNOSIS — R94.131 ABNORMAL EMG: ICD-10-CM

## 2019-06-13 DIAGNOSIS — N18.30 STAGE 3 CHRONIC KIDNEY DISEASE (HCC): ICD-10-CM

## 2019-06-13 PROCEDURE — 99213 OFFICE O/P EST LOW 20 MIN: CPT | Performed by: PHYSICIAN ASSISTANT

## 2019-06-13 PROCEDURE — 72141 MRI NECK SPINE W/O DYE: CPT

## 2019-06-13 NOTE — PATIENT INSTRUCTIONS

## 2019-06-13 NOTE — PROGRESS NOTES
Chief Complaint   Patient presents with   • Heartburn   • Elevated Hepatic Enzymes   • Fatty Liver       ENDO PROCEDURE ORDERED:    Subjective    Lashaun Bernal is a 58 y.o. female. she is here today for follow-up.    History of Present Illness    SUBJECTIVE:  Patient was seen on a recheck of her GERD, elevated liver enzymes, fatty liver, weekly positive LAMONT, known gallstones, F0/S1/N1.  Last seen 02/28/2019.  Patient states she just does not feel good.  She is on Prilosec 40 mg b.i.d.  She denied nausea, vomiting, dysphagia.  She takes MiraLax as needed for constipation.  Bowels are without blood or mucus.  Weight is down half a pound since last visit.  Last colonoscopy 01/23/2015 showed diverticulosis and hemorrhoids.  Patient states she did see a rheumatologist and they did give her Plaquenil, but did not give her a clear diagnosis.    She had laboratories on 05/01/2019.  Protein electrophoresis was normal.  CMP showed glucose 105, creatinine 1.02, ALT 34, GFR 56, otherwise normal.  Uric acid 6.1, myasthenia gravis panel negative, magnesium and CBC were normal.    Laboratories on 06/03/2019:  AMA was still slightly positive at 20.3 of questionable significance.  CMP showed glucose 110, chloride 109, CO2 of 21.4, GFR 58, otherwise normal.    ASSESSMENT/PLAN:  Patient with chronic fatigue and malaise with probable autoimmune disease, not clearly defined at this time.  She does have a low renal function and has never seen a nephrologist.  I did recommend consultation with Dr. Will.  We will continue on current regimen.  We will need to likely decrease her Prilosec.  She is encouraged to continue dietary modification and significant weight loss for her fatty liver.  We will plan follow up in 4 months with CADENA Fibrosure prior.  Further pending clinical course and the results of the above.       The following portions of the patient's history were reviewed and updated as appropriate:   Past Medical History:    Diagnosis Date   • Allergic rhinitis    • Arthritis    • Asthma    • Breast lump    • Chest pain    • Chronic low back pain    • Constipation    • Depressive disorder    • Diarrhea    • Diverticular disease of colon    • Epigastric pain    • DIETER (generalized anxiety disorder)    • GERD (gastroesophageal reflux disease)    • Head ache    • Hematochezia    • Herpes zoster     mid back, near spine   • Periumbilical pain    • PONV (postoperative nausea and vomiting)      Past Surgical History:   Procedure Laterality Date   • ABDOMINOPLASTY  2004   • AUGMENTATION MAMMAPLASTY     • BREAST AUGMENTATION BILATERAL MASTOPEXY     • BUNIONECTOMY Left 3/20/2017    Procedure: EXCISION OF CALCANEAL SPURS LEFT FOOT AND REATTACHMENT OF ACHILLES TENDON ;  Surgeon: Jarrell Mar MD;  Location: [facility];  Service:    • COLONOSCOPY  2015   • CYST REMOVAL  1961    Excision of sebaceous cyst. Left ear   • FINGER/THUMB LESION/CYST EXCISION  10/29/2014   • HAND SURGERY  2013   • HYSTERECTOMY     • OOPHORECTOMY     • ORIF ULNA/RADIUS FRACTURES  2013   • TUBAL ABDOMINAL LIGATION  1985    Laparoscopic tubal sterilization; dilatation and curettage. Desires tubal ligation;       Family History   Problem Relation Age of Onset   • Diabetes Other    • Heart disease Other    • Hypertension Other      OB History      Para Term  AB Living    2 2 2          SAB TAB Ectopic Molar Multiple Live Births                       Allergies   Allergen Reactions   • Cefprozil Nausea And Vomiting   • Penicillins Rash     Social History     Socioeconomic History   • Marital status: Single     Spouse name: Not on file   • Number of children: Not on file   • Years of education: Not on file   • Highest education level: Not on file   Tobacco Use   • Smoking status: Never Smoker   • Smokeless tobacco: Never Used   Substance and Sexual Activity   • Alcohol use: No   • Drug use: No   • Sexual activity: Defer  "    Current Medications:  Prior to Admission medications    Medication Sig Start Date End Date Taking? Authorizing Provider   albuterol (PROVENTIL HFA;VENTOLIN HFA) 108 (90 Base) MCG/ACT inhaler Inhale 2 puffs Every 4 (Four) Hours As Needed for Wheezing. 11/27/18  Yes Gretchen Gauthier APRN   albuterol (PROVENTIL) (2.5 MG/3ML) 0.083% nebulizer solution Take 2.5 mg by nebulization Every 6 (Six) Hours As Needed for Wheezing. 11/27/18  Yes Gretchen Gauthier APRN   busPIRone (BUSPAR) 10 MG tablet Take 10 mg by mouth 3 (Three) Times a Day. 6/11/19  Yes Maryam Mustafa MD   clonazePAM (KlonoPIN) 0.5 MG tablet Take 0.5 mg by mouth 2 (Two) Times a Day As Needed for Anxiety. 6/11/19  Yes Maryam Mustafa MD   cyclobenzaprine (FLEXERIL) 10 MG tablet Take 1 tablet by mouth 3 (Three) Times a Day As Needed for Muscle Spasms. 6/12/19  Yes Gretchen Gauthier APRN   DULoxetine (CYMBALTA) 20 MG capsule Take 1 capsule by mouth Daily. 2/27/19  Yes Gretchen Gauthier APRN   estradiol (ESTRACE) 1 MG tablet Take 1 tablet by mouth Daily. 8/23/18  Yes Eric Rowell MD   FLUoxetine (PROzac) 20 MG capsule Take 20 mg by mouth 2 (Two) Times a Day.   Yes ProviderMaryam MD   hydroxychloroquine (PLAQUENIL) 200 MG tablet Take 200 mg by mouth Daily.   Yes ProviderMaryam MD   omeprazole (priLOSEC) 40 MG capsule Take 40 mg by mouth 2 (Two) Times a Day.   Yes ProviderMaryam MD   polyethylene glycol (MIRALAX) powder Take 17 g by mouth 2 (Two) Times a Day As Needed (constipation). 8/23/18   Eric Rowell MD   baclofen (LIORESAL) 20 MG tablet Take 1 tablet by mouth 2 (Two) Times a Day. 5/24/19 6/13/19  Gretchen Gauthier APRN     Review of Systems  Review of Systems       Objective    /82 (BP Location: Right arm)   Pulse 97   Ht 160 cm (63\")   Wt 97.6 kg (215 lb 3.2 oz)   LMP 03/09/1991   BMI 38.12 kg/m²   Physical Exam   Constitutional: She is oriented to person, place, and time. She appears well-developed and " well-nourished. No distress.   HENT:   Head: Normocephalic and atraumatic.   Eyes: EOM are normal. Pupils are equal, round, and reactive to light.   Neck: Normal range of motion.   Cardiovascular: Normal rate, regular rhythm and normal heart sounds.   Pulmonary/Chest: Effort normal and breath sounds normal.   Abdominal: Soft. Bowel sounds are normal. She exhibits no shifting dullness, no distension, no abdominal bruit, no ascites and no mass. There is no hepatosplenomegaly. There is tenderness. There is no rigidity, no rebound, no guarding and no CVA tenderness. No hernia. Hernia confirmed negative in the ventral area.   RUQ, obese   Musculoskeletal: Normal range of motion.   Neurological: She is alert and oriented to person, place, and time.   Skin: Skin is warm and dry.   Psychiatric: She has a normal mood and affect. Her behavior is normal. Judgment and thought content normal.   Nursing note and vitals reviewed.    Assessment/Plan      1. Gastroesophageal reflux disease with esophagitis    2. Elevated liver enzymes    3. Fatty liver    4. Stage 3 chronic kidney disease (CMS/HCC)    .   Lashaun was seen today for heartburn, elevated hepatic enzymes and fatty liver.    Diagnoses and all orders for this visit:    Gastroesophageal reflux disease with esophagitis    Elevated liver enzymes  -     CADENA Fibrosure; Future    Fatty liver  -     CADENA Fibrosure; Future    Stage 3 chronic kidney disease (CMS/HCC)  -     Ambulatory Referral to Nephrology        Orders placed during this encounter include:  Orders Placed This Encounter   Procedures   • CADENA Fibrosure     Standing Status:   Future     Standing Expiration Date:   12/10/2019   • Ambulatory Referral to Nephrology     Referral Priority:   Routine     Referral Type:   Consultation     Referral Reason:   Specialty Services Required     Referred to Provider:   Suman Castro MD     Requested Specialty:   Nephrology     Number of Visits Requested:   1        Medications prescribed:  No orders of the defined types were placed in this encounter.    Discontinued Medications       Reason for Discontinue    baclofen (LIORESAL) 20 MG tablet Side effects        Requested Prescriptions      No prescriptions requested or ordered in this encounter       Review and/or summary of lab tests, radiology, procedures, medications. Review and summary of old records and obtaining of history. The risks and benefits of my recommendations, as well as other treatment options were discussed with the patient today. Questions were answered.    Follow-up: Return in about 4 months (around 10/13/2019), or if symptoms worsen or fail to improve, for lab prior.     * Surgery not found *      This document has been electronically signed by Marcial Walker PA-C on June 16, 2019 3:43 PM      Results for orders placed or performed in visit on 06/03/19   Mitochondrial Antibodies, M2   Result Value Ref Range    Mitochondrial Ab 20.3 (H) 0.0 - 20.0 Units   Comprehensive Metabolic Panel   Result Value Ref Range    Glucose 110 (H) 65 - 99 mg/dL    BUN 13 6 - 20 mg/dL    Creatinine 0.99 0.57 - 1.00 mg/dL    Sodium 143 136 - 145 mmol/L    Potassium 4.6 3.5 - 5.2 mmol/L    Chloride 109 (H) 98 - 107 mmol/L    CO2 21.4 (L) 22.0 - 29.0 mmol/L    Calcium 8.7 8.6 - 10.5 mg/dL    Total Protein 6.4 6.0 - 8.5 g/dL    Albumin 3.70 3.50 - 5.20 g/dL    ALT (SGPT) 32 1 - 33 U/L    AST (SGOT) 25 1 - 32 U/L    Alkaline Phosphatase 69 39 - 117 U/L    Total Bilirubin 0.2 0.2 - 1.2 mg/dL    eGFR Non African Amer 58 (L) >60 mL/min/1.73    Globulin 2.7 gm/dL    A/G Ratio 1.4 g/dL    BUN/Creatinine Ratio 13.1 7.0 - 25.0    Anion Gap 12.6 mmol/L   Results for orders placed or performed in visit on 05/01/19   Myasthenia Gravis Full Panel   Result Value Ref Range    AChR Binding Ab <0.03 0.00 - 0.24 nmol/L    AChR Blocking Abs 14 0 - 25 %    Acetylcholine Modulating Ab <12 0 - 20 %    Striated Muscle Antibody Negative Neg:<1:40    CBC Auto Differential   Result Value Ref Range    WBC 7.34 3.40 - 10.80 10*3/mm3    RBC 4.33 3.77 - 5.28 10*6/mm3    Hemoglobin 12.6 12.0 - 15.9 g/dL    Hematocrit 38.4 34.0 - 46.6 %    MCV 88.7 79.0 - 97.0 fL    MCH 29.1 26.6 - 33.0 pg    MCHC 32.8 31.5 - 35.7 g/dL    RDW 12.3 12.3 - 15.4 %    RDW-SD 40.2 37.0 - 54.0 fl    MPV 10.7 6.0 - 12.0 fL    Platelets 316 140 - 450 10*3/mm3    Neutrophil % 50.1 42.7 - 76.0 %    Lymphocyte % 36.5 19.6 - 45.3 %    Monocyte % 9.1 5.0 - 12.0 %    Eosinophil % 3.0 0.3 - 6.2 %    Basophil % 0.8 0.0 - 1.5 %    Immature Grans % 0.5 0.0 - 0.5 %    Neutrophils, Absolute 3.67 1.70 - 7.00 10*3/mm3    Lymphocytes, Absolute 2.68 0.70 - 3.10 10*3/mm3    Monocytes, Absolute 0.67 0.10 - 0.90 10*3/mm3    Eosinophils, Absolute 0.22 0.00 - 0.40 10*3/mm3    Basophils, Absolute 0.06 0.00 - 0.20 10*3/mm3    Immature Grans, Absolute 0.04 0.00 - 0.05 10*3/mm3    nRBC 0.0 0.0 - 0.2 /100 WBC   Protein Electrophoresis, Total   Result Value Ref Range    Total Protein 6.5 6.0 - 8.5 g/dL    Albumin 3.2 2.9 - 4.4 g/dL    Alpha-1-Globulin 0.3 0.0 - 0.4 g/dL    Alpha-2-Globulin 0.8 0.4 - 1.0 g/dL    Beta Globulin 1.2 0.7 - 1.3 g/dL    Gamma Globulin 1.0 0.4 - 1.8 g/dL    M-Patel Not Observed Not Observed g/dL    Globulin 3.3 2.2 - 3.9 g/dL    A/G Ratio 1.0 0.7 - 1.7    Please note Comment    Uric Acid   Result Value Ref Range    Uric Acid 6.1 (H) 2.4 - 5.7 mg/dL   Magnesium   Result Value Ref Range    Magnesium 2.2 1.6 - 2.6 mg/dL   CK   Result Value Ref Range    Creatine Kinase 131 20 - 180 U/L   Comprehensive Metabolic Panel   Result Value Ref Range    Glucose 105 (H) 65 - 99 mg/dL    BUN 13 6 - 20 mg/dL    Creatinine 1.02 (H) 0.57 - 1.00 mg/dL    Sodium 139 136 - 145 mmol/L    Potassium 4.7 3.5 - 5.2 mmol/L    Chloride 104 98 - 107 mmol/L    CO2 22.8 22.0 - 29.0 mmol/L    Calcium 9.3 8.6 - 10.5 mg/dL    Total Protein 6.7 6.0 - 8.5 g/dL    Albumin 3.70 3.50 - 5.20 g/dL    ALT (SGPT) 34 (H) 1 - 33 U/L     AST (SGOT) 26 1 - 32 U/L    Alkaline Phosphatase 85 39 - 117 U/L    Total Bilirubin 0.2 0.2 - 1.2 mg/dL    eGFR Non African Amer 56 (L) >60 mL/min/1.73    Globulin 3.0 gm/dL    A/G Ratio 1.2 g/dL    BUN/Creatinine Ratio 12.7 7.0 - 25.0    Anion Gap 12.2 mmol/L   Results for orders placed or performed in visit on 02/25/19   Urine Drug Screen - Urine, Clean Catch   Result Value Ref Range    Amphetamine Screen, Urine Negative Negative    Barbiturates Screen, Urine Negative Negative    Benzodiazepine Screen, Urine Negative Negative    Cocaine Screen, Urine Negative Negative    Methadone Screen, Urine Negative Negative    Opiate Screen Negative Negative    Oxycodone Screen, Urine Negative Negative    THC, Screen, Urine Negative Negative   Results for orders placed or performed in visit on 01/29/19   Magnesium   Result Value Ref Range    Magnesium 1.9 1.6 - 2.3 mg/dL   Results for orders placed or performed in visit on 01/29/19   Rheumatoid Factor   Result Value Ref Range    Rheumatoid Factor Qualitative Negative Negative   Comprehensive Metabolic Panel   Result Value Ref Range    Glucose 125 (H) 60 - 100 mg/dL    BUN 22 (H) 7 - 21 mg/dL    Creatinine 1.06 (H) 0.50 - 1.00 mg/dL    Sodium 135 (L) 137 - 145 mmol/L    Potassium 4.3 3.5 - 5.1 mmol/L    Chloride 106 95 - 110 mmol/L    CO2 22.0 22.0 - 31.0 mmol/L    Calcium 9.7 8.4 - 10.2 mg/dL    Total Protein 6.9 6.3 - 8.6 g/dL    Albumin 4.00 3.40 - 4.80 g/dL    ALT (SGPT) 30 9 - 52 U/L    AST (SGOT) 34 14 - 36 U/L    Alkaline Phosphatase 102 38 - 126 U/L    Total Bilirubin 0.2 0.2 - 1.3 mg/dL    eGFR Non  Amer 53 51 - 120 mL/min/1.73    Globulin 2.9 2.3 - 3.5 gm/dL    A/G Ratio 1.4 1.1 - 1.8 g/dL    BUN/Creatinine Ratio 20.8 7.0 - 25.0    Anion Gap 7.0 5.0 - 15.0 mmol/L     *Note: Due to a large number of results and/or encounters for the requested time period, some results have not been displayed. A complete set of results can be found in Results Review.       Some  portions of this note have been dictated using voice recognition software and may contain errors and/or omissions.

## 2019-06-24 ENCOUNTER — OFFICE VISIT (OUTPATIENT)
Dept: CARDIAC SURGERY | Facility: CLINIC | Age: 59
End: 2019-06-24

## 2019-06-24 VITALS
OXYGEN SATURATION: 98 % | WEIGHT: 218.6 LBS | DIASTOLIC BLOOD PRESSURE: 82 MMHG | BODY MASS INDEX: 38.73 KG/M2 | SYSTOLIC BLOOD PRESSURE: 118 MMHG | HEART RATE: 91 BPM | HEIGHT: 63 IN

## 2019-06-24 DIAGNOSIS — E66.01 CLASS 2 SEVERE OBESITY DUE TO EXCESS CALORIES WITH SERIOUS COMORBIDITY AND BODY MASS INDEX (BMI) OF 38.0 TO 38.9 IN ADULT (HCC): ICD-10-CM

## 2019-06-24 DIAGNOSIS — E78.2 MIXED HYPERLIPIDEMIA: ICD-10-CM

## 2019-06-24 DIAGNOSIS — I10 BENIGN ESSENTIAL HYPERTENSION: Chronic | ICD-10-CM

## 2019-06-24 DIAGNOSIS — N18.2 CHRONIC KIDNEY DISEASE, STAGE 2 (MILD): ICD-10-CM

## 2019-06-24 DIAGNOSIS — I65.23 BILATERAL CAROTID ARTERY STENOSIS: Primary | ICD-10-CM

## 2019-06-24 DIAGNOSIS — R25.1 TREMOR: ICD-10-CM

## 2019-06-24 DIAGNOSIS — M79.7 FIBROMYALGIA: ICD-10-CM

## 2019-06-24 PROCEDURE — 99204 OFFICE O/P NEW MOD 45 MIN: CPT | Performed by: THORACIC SURGERY (CARDIOTHORACIC VASCULAR SURGERY)

## 2019-07-01 DIAGNOSIS — M79.7 FIBROMYALGIA: ICD-10-CM

## 2019-07-01 PROBLEM — N18.2 CHRONIC KIDNEY DISEASE, STAGE 2 (MILD): Status: ACTIVE | Noted: 2019-07-01

## 2019-07-01 PROBLEM — I65.23 BILATERAL CAROTID ARTERY STENOSIS: Status: ACTIVE | Noted: 2019-07-01

## 2019-07-01 PROBLEM — R25.1 TREMOR: Status: ACTIVE | Noted: 2019-07-01

## 2019-07-01 PROBLEM — E66.01 CLASS 2 SEVERE OBESITY DUE TO EXCESS CALORIES WITH SERIOUS COMORBIDITY AND BODY MASS INDEX (BMI) OF 38.0 TO 38.9 IN ADULT (HCC): Status: ACTIVE | Noted: 2019-07-01

## 2019-07-01 RX ORDER — DULOXETIN HYDROCHLORIDE 20 MG/1
20 CAPSULE, DELAYED RELEASE ORAL DAILY
Qty: 30 CAPSULE | Refills: 2 | Status: SHIPPED | OUTPATIENT
Start: 2019-07-01 | End: 2019-10-04 | Stop reason: SDUPTHER

## 2019-07-01 NOTE — PROGRESS NOTES
6/24/2019    Lashaun Bernal  1960    Chief Complaint:    Chief Complaint   Patient presents with   • Carotid Artery Disease       HPI:      PCP:  Gretchen Gauthier, APRN  Neurology:  Dr Dos Santos ([place])  Nephrology:  Dr Will  GI:  Marcial Walker PA-C    58 y.o. female with HTN(stable, increased risk stroke, rupture), Hyperlipidemia(stable, increased risk cardiovascular events), Obesity(uncontrolled, increased risk cardiovascular events) and Chronic Kidney Disease(stable, increased risk renal failure) , Carotid Stenosis(new, increase risk stroke).  never smoked.  Asymptomatic carotid stenosis.  Mild to moderate vertigo, tremor RIGHT hand x 6months.  No TIA stroke amaurosis.  No MI claudication. No other associated signs, symptoms or modifying factors.    5/2019 Carotid Duplex([facility]):  MARIANO 0-49% (118cm/sj, ratio 1.4).  LICA 50-69% (129cm/s, ratio 1.3).  Antegrade verts.    10/2014 Stress Test:  No ischemic changes.  Low risk study.  6/2019 ECG:  NSR 77, QTc 434    The following portions of the patient's history were reviewed and updated as appropriate: allergies, current medications, past family history, past medical history, past social history, past surgical history and problem list.  Recent images independently reviewed.  Available laboratory values reviewed.    PMH:  Past Medical History:   Diagnosis Date   • Allergic rhinitis    • Arthritis    • Asthma    • Breast lump    • Chest pain    • Chronic low back pain    • Constipation    • Depressive disorder    • Diarrhea    • Diverticular disease of colon    • Epigastric pain    • DIETER (generalized anxiety disorder)    • GERD (gastroesophageal reflux disease)    • Head ache    • Hematochezia    • Herpes zoster     mid back, near spine   • Periumbilical pain    • PONV (postoperative nausea and vomiting)      Past Surgical History:   Procedure Laterality Date   • ABDOMINOPLASTY  12/22/2004   • AUGMENTATION MAMMAPLASTY     • BREAST AUGMENTATION BILATERAL MASTOPEXY      • BUNIONECTOMY Left 3/20/2017    Procedure: EXCISION OF CALCANEAL SPURS LEFT FOOT AND REATTACHMENT OF ACHILLES TENDON ;  Surgeon: Jarrell Mar MD;  Location: [facility];  Service:    • COLONOSCOPY  01/23/2015   • CYST REMOVAL  08/18/1961    Excision of sebaceous cyst. Left ear   • FINGER/THUMB LESION/CYST EXCISION  10/29/2014   • HAND SURGERY  06/03/2013   • HYSTERECTOMY     • OOPHORECTOMY     • ORIF ULNA/RADIUS FRACTURES  07/22/2013   • TUBAL ABDOMINAL LIGATION  08/14/1985    Laparoscopic tubal sterilization; dilatation and curettage. Desires tubal ligation;       Family History   Problem Relation Age of Onset   • Diabetes Other    • Heart disease Other    • Hypertension Other      Social History     Tobacco Use   • Smoking status: Never Smoker   • Smokeless tobacco: Never Used   Substance Use Topics   • Alcohol use: No   • Drug use: No       ALLERGIES:  Allergies   Allergen Reactions   • Cefprozil Nausea And Vomiting   • Penicillins Rash         MEDICATIONS:    Current Outpatient Medications:   •  albuterol (PROVENTIL HFA;VENTOLIN HFA) 108 (90 Base) MCG/ACT inhaler, Inhale 2 puffs Every 4 (Four) Hours As Needed for Wheezing., Disp: 18 g, Rfl: 2  •  albuterol (PROVENTIL) (2.5 MG/3ML) 0.083% nebulizer solution, Take 2.5 mg by nebulization Every 6 (Six) Hours As Needed for Wheezing., Disp: 120 vial, Rfl: 2  •  busPIRone (BUSPAR) 10 MG tablet, Take 10 mg by mouth 3 (Three) Times a Day., Disp: , Rfl:   •  clonazePAM (KlonoPIN) 0.5 MG tablet, Take 0.5 mg by mouth 2 (Two) Times a Day As Needed for Anxiety., Disp: , Rfl:   •  cyclobenzaprine (FLEXERIL) 10 MG tablet, Take 1 tablet by mouth 3 (Three) Times a Day As Needed for Muscle Spasms., Disp: 90 tablet, Rfl: 5  •  estradiol (ESTRACE) 1 MG tablet, Take 1 tablet by mouth Daily., Disp: 90 tablet, Rfl: 3  •  FLUoxetine (PROzac) 20 MG capsule, Take 20 mg by mouth 2 (Two) Times a Day., Disp: , Rfl:   •  hydroxychloroquine (PLAQUENIL) 200 MG tablet, Take 200 mg by mouth  "Daily., Disp: , Rfl:   •  omeprazole (priLOSEC) 40 MG capsule, Take 40 mg by mouth 2 (Two) Times a Day., Disp: , Rfl:   •  polyethylene glycol (MIRALAX) powder, Take 17 g by mouth 2 (Two) Times a Day As Needed (constipation)., Disp: 1 each, Rfl: 11  •  DULoxetine (CYMBALTA) 20 MG capsule, TAKE 1 CAPSULE BY MOUTH DAILY, Disp: 30 capsule, Rfl: 2    Review of Systems   Review of Systems   Constitution: Positive for weakness, malaise/fatigue and weight gain. Negative for night sweats and weight loss.   HENT: Negative for hearing loss, hoarse voice and stridor.    Eyes: Negative for vision loss in left eye, vision loss in right eye and visual disturbance.   Cardiovascular: Negative for chest pain, leg swelling and palpitations.   Respiratory: Positive for shortness of breath, sleep disturbances due to breathing and wheezing. Negative for cough and hemoptysis.    Hematologic/Lymphatic: Negative for adenopathy and bleeding problem. Does not bruise/bleed easily.   Skin: Negative for color change, poor wound healing and rash.   Musculoskeletal: Positive for muscle weakness. Negative for arthritis, back pain and neck pain.   Gastrointestinal: Negative for abdominal pain, dysphagia and heartburn.   Neurological: Negative for dizziness, numbness and seizures.   Psychiatric/Behavioral: Positive for depression. Negative for altered mental status and memory loss. The patient is nervous/anxious.        Physical Exam   Vitals:    06/24/19 0857 06/24/19 0859   BP: 140/90 118/82   BP Location: Left arm Right arm   Pulse: 91    SpO2: 98%    Weight: 99.2 kg (218 lb 9.6 oz)    Height: 160 cm (63\")      Physical Exam   Constitutional: She is oriented to person, place, and time. She is active and cooperative. She does not appear ill. No distress.   HENT:   Head: Atraumatic.   Right Ear: Hearing normal.   Left Ear: Hearing normal.   Nose: No nasal deformity. No epistaxis.   Mouth/Throat: She does not have dentures. Normal dentition.   Eyes: " Conjunctivae and lids are normal. Right pupil is round and reactive. Left pupil is round and reactive.   Neck: No JVD present. Carotid bruit is not present. No tracheal deviation present. No thyroid mass and no thyromegaly present.   Cardiovascular: Normal rate and regular rhythm.   No murmur heard.  Pulses:       Carotid pulses are 2+ on the right side, and 2+ on the left side.       Radial pulses are 2+ on the right side, and 2+ on the left side.        Dorsalis pedis pulses are 2+ on the right side, and 2+ on the left side.   Pulmonary/Chest: Effort normal and breath sounds normal.   Abdominal: Soft. She exhibits no distension and no mass. There is no splenomegaly or hepatomegaly. There is no tenderness.   Musculoskeletal: She exhibits no deformity.   Gait normal.    Lymphadenopathy:     She has no cervical adenopathy.        Right: No supraclavicular adenopathy present.        Left: No supraclavicular adenopathy present.   Neurological: She is alert and oriented to person, place, and time. She has normal strength.   Skin: Skin is warm and dry. No cyanosis or erythema. No pallor.   No venous staining   Psychiatric: She has a normal mood and affect. Her speech is normal. Judgment and thought content normal.     Results for JENNIE BERNAL (MRN [id number]) as of 7/1/2019 10:54  GFR 58 Ref. Range 6/3/2019 10:29   Creatinine Latest Ref Range: 0.57 - 1.00 mg/dL 0.99   BUN Latest Ref Range: 6 - 20 mg/dL 13     ASSESSMENT:  Jennie was seen today for carotid artery disease.    Diagnoses and all orders for this visit:    Bilateral carotid artery stenosis  -     Duplex Carotid Ultrasound CAR; Future    Mixed hyperlipidemia    Benign essential hypertension    Class 2 severe obesity due to excess calories with serious comorbidity and body mass index (BMI) of 38.0 to 38.9 in adult (CMS/[facility])    Chronic kidney disease, stage 2 (mild)    Tremor    Fibromyalgia      PLAN:  Detailed discussion with Jennie Bernal regarding  situation and options.  Asymptomatic moderate carotid stenosis.  Multiple risk factors with severe comorbidities.  No intervention indicated at this time.  Will follow with interval imaging.  Risks, benefits discussed.  Understands and wishes to proceed with plan.    Return in 6 months with Carotid Duplex    Return after above studies complete  Obesity Class  2. Increased risk cardiovascular events, sleep and breathing disorders, joint issues, type 2 diabetes mellitus. Options for weight management, heart healthy diet, exercise programs, and associated health risks of obesity discussed.  Recommended regular physical activity, progressive walking program.  Continue current medications as directed.  Will Obtain relevant old records.    Thank you for the opportunity to participate in this patient's care.    Copy to primary care provider.    EMR Dragon/Transcription disclaimer:   Much of this encounter note is an electronic transcription/translation of spoken language to printed text. The electronic translation of spoken language may permit erroneous, or at times, nonsensical words or phrases to be inadvertently transcribed; Although I have reviewed the note for such errors, some may still exist.

## 2019-07-17 ENCOUNTER — LAB (OUTPATIENT)
Dept: LAB | Facility: HOSPITAL | Age: 59
End: 2019-07-17

## 2019-07-17 ENCOUNTER — OFFICE VISIT (OUTPATIENT)
Dept: FAMILY MEDICINE CLINIC | Facility: CLINIC | Age: 59
End: 2019-07-17

## 2019-07-17 VITALS
BODY MASS INDEX: 38.82 KG/M2 | SYSTOLIC BLOOD PRESSURE: 126 MMHG | HEIGHT: 63 IN | DIASTOLIC BLOOD PRESSURE: 82 MMHG | WEIGHT: 219.1 LBS

## 2019-07-17 DIAGNOSIS — R53.83 FATIGUE, UNSPECIFIED TYPE: ICD-10-CM

## 2019-07-17 DIAGNOSIS — R20.0 FACIAL NUMBNESS: ICD-10-CM

## 2019-07-17 DIAGNOSIS — M79.7 FIBROMYALGIA: Primary | ICD-10-CM

## 2019-07-17 DIAGNOSIS — R25.2 MUSCLE CRAMPING: ICD-10-CM

## 2019-07-17 PROCEDURE — 83735 ASSAY OF MAGNESIUM: CPT

## 2019-07-17 PROCEDURE — 86666 EHRLICHIA ANTIBODY: CPT

## 2019-07-17 PROCEDURE — 82306 VITAMIN D 25 HYDROXY: CPT

## 2019-07-17 PROCEDURE — 86618 LYME DISEASE ANTIBODY: CPT

## 2019-07-17 PROCEDURE — 99214 OFFICE O/P EST MOD 30 MIN: CPT | Performed by: NURSE PRACTITIONER

## 2019-07-17 PROCEDURE — 86757 RICKETTSIA ANTIBODY: CPT

## 2019-07-17 PROCEDURE — 82607 VITAMIN B-12: CPT

## 2019-07-17 PROCEDURE — 85025 COMPLETE CBC W/AUTO DIFF WBC: CPT

## 2019-07-17 PROCEDURE — 80053 COMPREHEN METABOLIC PANEL: CPT

## 2019-07-17 NOTE — PROGRESS NOTES
"Subjective   Lashaun Bernal is a 58 y.o. female.  Four week recheck.   Currently sees Dr. Farah rheumatology and Dr. Dos Santos for neurology.  Has fibromyalgia.  Since this past Saturday has been com plaining of facial numbness with fatigue.  \"[place] whole face went numb.\"  Continues to complain of increasing fatigue over the past several months.    Muscle Pain   This is a chronic problem. The current episode started more than 1 year ago. The problem occurs intermittently. The problem has been gradually worsening since onset. The pain is present in the neck, lower back, left arm, left foot, left forearm, left hand, left lower leg, left upper leg, right arm, right foot, right forearm, right hand, right lower leg, right upper leg, upper back, upper left arm and upper right arm. The pain is severe. Nothing aggravates the symptoms. Associated symptoms include fatigue. Past treatments include OTC NSAID, prescription NSAID and acetaminophen. The treatment provided no relief. There is no swelling present. She has been behaving normally.   Fibromyalgia   This is a chronic problem. The current episode started more than 1 year ago. The problem occurs constantly. The problem has been gradually improving. Associated symptoms include arthralgias, fatigue, myalgias and numbness. Pertinent negatives include no chills. Nothing aggravates the symptoms. Treatments tried: Cymbalta. The treatment provided mild relief.   Fatigue   This is a chronic problem. The current episode started more than 1 month ago. The problem occurs constantly. The problem has been gradually worsening. Associated symptoms include arthralgias, fatigue, myalgias and numbness. Pertinent negatives include no chills. The symptoms are aggravated by exertion. She has tried rest and relaxation for the symptoms. The treatment provided no relief.        The following portions of the patient's history were reviewed and updated as " appropriate:     Current Outpatient Medications   Medication Sig Dispense Refill   • albuterol (PROVENTIL HFA;VENTOLIN HFA) 108 (90 Base) MCG/ACT inhaler Inhale 2 puffs Every 4 (Four) Hours As Needed for Wheezing. 18 g 2   • albuterol (PROVENTIL) (2.5 MG/3ML) 0.083% nebulizer solution Take 2.5 mg by nebulization Every 6 (Six) Hours As Needed for Wheezing. 120 vial 2   • busPIRone (BUSPAR) 10 MG tablet Take 15 mg by mouth 3 (Three) Times a Day.     • clonazePAM (KlonoPIN) 0.5 MG tablet Take 0.5 mg by mouth 2 (Two) Times a Day As Needed for Anxiety.     • cyclobenzaprine (FLEXERIL) 10 MG tablet Take 1 tablet by mouth 3 (Three) Times a Day As Needed for Muscle Spasms. 90 tablet 5   • DULoxetine (CYMBALTA) 20 MG capsule TAKE 1 CAPSULE BY MOUTH DAILY 30 capsule 2   • estradiol (ESTRACE) 1 MG tablet Take 1 tablet by mouth Daily. 90 tablet 3   • FLUoxetine (PROzac) 20 MG capsule Take 20 mg by mouth 2 (Two) Times a Day.     • hydroxychloroquine (PLAQUENIL) 200 MG tablet Take 200 mg by mouth Daily.     • omeprazole (priLOSEC) 40 MG capsule Take 40 mg by mouth 2 (Two) Times a Day.     • polyethylene glycol (MIRALAX) powder Take 17 g by mouth 2 (Two) Times a Day As Needed (constipation). 1 each 11     No current facility-administered medications for this visit.      Current Outpatient Medications on File Prior to Visit   Medication Sig   • albuterol (PROVENTIL HFA;VENTOLIN HFA) 108 (90 Base) MCG/ACT inhaler Inhale 2 puffs Every 4 (Four) Hours As Needed for Wheezing.   • albuterol (PROVENTIL) (2.5 MG/3ML) 0.083% nebulizer solution Take 2.5 mg by nebulization Every 6 (Six) Hours As Needed for Wheezing.   • busPIRone (BUSPAR) 10 MG tablet Take 15 mg by mouth 3 (Three) Times a Day.   • clonazePAM (KlonoPIN) 0.5 MG tablet Take 0.5 mg by mouth 2 (Two) Times a Day As Needed for Anxiety.   • cyclobenzaprine (FLEXERIL) 10 MG tablet Take 1 tablet by mouth 3 (Three) Times a Day As Needed for Muscle Spasms.   • DULoxetine (CYMBALTA) 20 MG  "capsule TAKE 1 CAPSULE BY MOUTH DAILY   • estradiol (ESTRACE) 1 MG tablet Take 1 tablet by mouth Daily.   • FLUoxetine (PROzac) 20 MG capsule Take 20 mg by mouth 2 (Two) Times a Day.   • hydroxychloroquine (PLAQUENIL) 200 MG tablet Take 200 mg by mouth Daily.   • omeprazole (priLOSEC) 40 MG capsule Take 40 mg by mouth 2 (Two) Times a Day.   • polyethylene glycol (MIRALAX) powder Take 17 g by mouth 2 (Two) Times a Day As Needed (constipation).     No current facility-administered medications on file prior to visit.      She is allergic to cefprozil and penicillins..    Review of Systems   Constitutional: Positive for fatigue. Negative for chills.   HENT: Negative.    Eyes: Negative.    Respiratory: Negative.    Gastrointestinal: Negative.    Genitourinary: Negative.    Musculoskeletal: Positive for arthralgias and myalgias.   Skin: Negative.    Neurological: Positive for numbness.   Psychiatric/Behavioral: Negative.  Negative for confusion.       Objective    Visit Vitals  /82   Ht 160 cm (63\")   Wt 99.4 kg (219 lb 1.6 oz)   LMP 03/09/1991   BMI 38.81 kg/m²       Physical Exam   Constitutional: She is oriented to person, place, and time. She appears well-developed and well-nourished.   HENT:   Head: Normocephalic.   Right Ear: External ear normal.   Left Ear: External ear normal.   Eyes: EOM are normal. Pupils are equal, round, and reactive to light.   Neck: Normal range of motion. Neck supple.   Cardiovascular: Normal rate, regular rhythm and normal heart sounds.   Pulmonary/Chest: Effort normal and breath sounds normal.   Abdominal: Soft. Bowel sounds are normal.   Musculoskeletal: Normal range of motion.   Neurological: She is alert and oriented to person, place, and time.   Tongue midline, smile symmetrical and speech is clear and understandable.  No facial drooping or scarring of the forehead   Skin: Skin is warm. Capillary refill takes less than 2 seconds.   First-degree sunburn to face and upper chest "   Psychiatric: She has a normal mood and affect. Her behavior is normal.   Nursing note and vitals reviewed.      Assessment/Plan   Problems Addressed this Visit        Nervous and Auditory    Fibromyalgia - Primary      Other Visit Diagnoses     Muscle cramping        Facial numbness        Fatigue, unspecified type        Relevant Orders    CBC & Differential (Completed)    Comprehensive Metabolic Panel (Completed)    Vitamin B12 (Completed)    Vitamin D 25 hydroxy (Completed)    Magnesium (Completed)    [facility] (IgG / M)    Lyme, Total Antibody Test / Reflex (Completed)    Ehrlichia Antibody Panel      No orders of the defined types were placed in this encounter.    1.  Fibromyalgia  Continue on Cymbalta as previously prescribed   Educated on possible side effects of this medication including increased risk for suicidal ideations and exacerbation of depression     2.  Muscle cramping:  Continue on Flexeril as previously prescribed  Educated on possible side effects of this medication including but not limited to increased risk for drowsiness and sedation  Encouraged not to take this medication prior to working or driving     3.  Fatigue, unspecified type:  Complete CBC, chemistry panel, vitamin B12, vitamin D, magnesium, [place] spotted fever, Lyme titer and Ehrlichia antibody panel as ordered and will notify results when available  Encourage frequent rest periods throughout the day  Encouraged increased fluids to help promote hydration    4.  Facial numbness:  All symptoms have resolved at this time  Discussed CT if symptoms return    Continue on current medications as previously prescribed   Return if symptoms worsen or fail to improve, for Recheck, Next scheduled follow up.        This document has been electronically signed by MAYELA Flores on July 22, 2019 7:48 AM

## 2019-07-18 LAB
25(OH)D3 SERPL-MCNC: 64.1 NG/ML (ref 30–100)
ALBUMIN SERPL-MCNC: 3.7 G/DL (ref 3.5–5.2)
ALBUMIN/GLOB SERPL: 1.3 G/DL
ALP SERPL-CCNC: 91 U/L (ref 39–117)
ALT SERPL W P-5'-P-CCNC: 41 U/L (ref 1–33)
ANION GAP SERPL CALCULATED.3IONS-SCNC: 12.6 MMOL/L (ref 5–15)
AST SERPL-CCNC: 32 U/L (ref 1–32)
BASOPHILS # BLD AUTO: 0.04 10*3/MM3 (ref 0–0.2)
BASOPHILS NFR BLD AUTO: 0.7 % (ref 0–1.5)
BILIRUB SERPL-MCNC: 0.2 MG/DL (ref 0.2–1.2)
BUN BLD-MCNC: 14 MG/DL (ref 6–20)
BUN/CREAT SERPL: 15.7 (ref 7–25)
CALCIUM SPEC-SCNC: 9.1 MG/DL (ref 8.6–10.5)
CHLORIDE SERPL-SCNC: 107 MMOL/L (ref 98–107)
CO2 SERPL-SCNC: 22.4 MMOL/L (ref 22–29)
CREAT BLD-MCNC: 0.89 MG/DL (ref 0.57–1)
DEPRECATED RDW RBC AUTO: 40.9 FL (ref 37–54)
EOSINOPHIL # BLD AUTO: 0.22 10*3/MM3 (ref 0–0.4)
EOSINOPHIL NFR BLD AUTO: 3.6 % (ref 0.3–6.2)
ERYTHROCYTE [DISTWIDTH] IN BLOOD BY AUTOMATED COUNT: 12.4 % (ref 12.3–15.4)
GFR SERPL CREATININE-BSD FRML MDRD: 65 ML/MIN/1.73
GLOBULIN UR ELPH-MCNC: 2.8 GM/DL
GLUCOSE BLD-MCNC: 130 MG/DL (ref 65–99)
HCT VFR BLD AUTO: 38.3 % (ref 34–46.6)
HGB BLD-MCNC: 12.5 G/DL (ref 12–15.9)
IMM GRANULOCYTES # BLD AUTO: 0.04 10*3/MM3 (ref 0–0.05)
IMM GRANULOCYTES NFR BLD AUTO: 0.7 % (ref 0–0.5)
LYMPHOCYTES # BLD AUTO: 2.08 10*3/MM3 (ref 0.7–3.1)
LYMPHOCYTES NFR BLD AUTO: 34.3 % (ref 19.6–45.3)
MAGNESIUM SERPL-MCNC: 1.9 MG/DL (ref 1.6–2.6)
MCH RBC QN AUTO: 29 PG (ref 26.6–33)
MCHC RBC AUTO-ENTMCNC: 32.6 G/DL (ref 31.5–35.7)
MCV RBC AUTO: 88.9 FL (ref 79–97)
MONOCYTES # BLD AUTO: 0.61 10*3/MM3 (ref 0.1–0.9)
MONOCYTES NFR BLD AUTO: 10.1 % (ref 5–12)
NEUTROPHILS # BLD AUTO: 3.07 10*3/MM3 (ref 1.7–7)
NEUTROPHILS NFR BLD AUTO: 50.6 % (ref 42.7–76)
NRBC BLD AUTO-RTO: 0 /100 WBC (ref 0–0.2)
PLATELET # BLD AUTO: 298 10*3/MM3 (ref 140–450)
PMV BLD AUTO: 11.5 FL (ref 6–12)
POTASSIUM BLD-SCNC: 4.6 MMOL/L (ref 3.5–5.2)
PROT SERPL-MCNC: 6.5 G/DL (ref 6–8.5)
RBC # BLD AUTO: 4.31 10*6/MM3 (ref 3.77–5.28)
SODIUM BLD-SCNC: 142 MMOL/L (ref 136–145)
VIT B12 BLD-MCNC: 502 PG/ML (ref 211–946)
WBC NRBC COR # BLD: 6.06 10*3/MM3 (ref 3.4–10.8)

## 2019-07-19 ENCOUNTER — TRANSCRIBE ORDERS (OUTPATIENT)
Dept: LAB | Facility: HOSPITAL | Age: 59
End: 2019-07-19

## 2019-07-19 ENCOUNTER — LAB (OUTPATIENT)
Dept: LAB | Facility: HOSPITAL | Age: 59
End: 2019-07-19

## 2019-07-19 DIAGNOSIS — R94.4 ABNORMAL RESULTS OF KIDNEY FUNCTION STUDIES: ICD-10-CM

## 2019-07-19 DIAGNOSIS — R94.4 ABNORMAL RESULTS OF KIDNEY FUNCTION STUDIES: Primary | ICD-10-CM

## 2019-07-19 LAB
ALBUMIN SERPL-MCNC: 4.1 G/DL (ref 3.5–5.2)
ANION GAP SERPL CALCULATED.3IONS-SCNC: 15.1 MMOL/L (ref 5–15)
B BURGDOR IGG+IGM SER-ACNC: <0.91 ISR (ref 0–0.9)
BACTERIA UR QL AUTO: ABNORMAL /HPF
BILIRUB UR QL STRIP: NEGATIVE
BUN BLD-MCNC: 17 MG/DL (ref 6–20)
BUN/CREAT SERPL: 17.5 (ref 7–25)
CALCIUM SPEC-SCNC: 9.5 MG/DL (ref 8.6–10.5)
CHLORIDE SERPL-SCNC: 101 MMOL/L (ref 98–107)
CLARITY UR: ABNORMAL
CO2 SERPL-SCNC: 20.9 MMOL/L (ref 22–29)
COLOR UR: YELLOW
CREAT BLD-MCNC: 0.97 MG/DL (ref 0.57–1)
ERYTHROCYTE [SEDIMENTATION RATE] IN BLOOD: 22 MM/HR (ref 0–30)
GFR SERPL CREATININE-BSD FRML MDRD: 59 ML/MIN/1.73
GLUCOSE BLD-MCNC: 104 MG/DL (ref 65–99)
GLUCOSE UR STRIP-MCNC: NEGATIVE MG/DL
HGB UR QL STRIP.AUTO: NEGATIVE
HYALINE CASTS UR QL AUTO: ABNORMAL /LPF
KETONES UR QL STRIP: NEGATIVE
LEUKOCYTE ESTERASE UR QL STRIP.AUTO: ABNORMAL
NITRITE UR QL STRIP: NEGATIVE
PH UR STRIP.AUTO: 5.5 [PH] (ref 5–8)
PHOSPHATE SERPL-MCNC: 3.4 MG/DL (ref 2.5–4.5)
POTASSIUM BLD-SCNC: 4.6 MMOL/L (ref 3.5–5.2)
PROT UR QL STRIP: NEGATIVE
RBC # UR: ABNORMAL /HPF
REF LAB TEST METHOD: ABNORMAL
SODIUM BLD-SCNC: 137 MMOL/L (ref 136–145)
SP GR UR STRIP: 1.02 (ref 1–1.03)
SQUAMOUS #/AREA URNS HPF: ABNORMAL /HPF
UROBILINOGEN UR QL STRIP: ABNORMAL
WBC UR QL AUTO: ABNORMAL /HPF

## 2019-07-19 PROCEDURE — 36415 COLL VENOUS BLD VENIPUNCTURE: CPT

## 2019-07-19 PROCEDURE — 86160 COMPLEMENT ANTIGEN: CPT

## 2019-07-19 PROCEDURE — 81001 URINALYSIS AUTO W/SCOPE: CPT

## 2019-07-19 PROCEDURE — 85652 RBC SED RATE AUTOMATED: CPT

## 2019-07-19 PROCEDURE — 80069 RENAL FUNCTION PANEL: CPT

## 2019-07-20 LAB
C3 SERPL-MCNC: 169 MG/DL (ref 82–167)
C4 SERPL-MCNC: 31 MG/DL (ref 14–44)

## 2019-07-22 ENCOUNTER — TELEPHONE (OUTPATIENT)
Dept: FAMILY MEDICINE CLINIC | Facility: CLINIC | Age: 59
End: 2019-07-22

## 2019-07-22 LAB
A PHAGOCYTOPH IGM TITR SER IF: NEGATIVE {TITER}
CONV HGE IGG TITER: NEGATIVE
E CHAFFEENSIS IGG TITR SER IF: NEGATIVE {TITER}
E. CHAFFEENSIS (HME) IGM TITER: NEGATIVE
R RICKETTSI IGG SER QL IA: ABNORMAL
R RICKETTSI IGG SER QL IA: POSITIVE
R RICKETTSI IGM TITR SER: 1.03 INDEX (ref 0–0.89)

## 2019-07-22 NOTE — PROGRESS NOTES
Per MAYELA Terry ms. Bernal has been called with recent lab results & recommendations.  Continue current medications and follow-up as planned or sooner if any problems.

## 2019-07-22 NOTE — TELEPHONE ENCOUNTER
-Per MAYELA Terry ms. Bernal has been called with recent lab results & recommendations.  Continue current medications and follow-up as planned or sooner if any problems.      ---- Message from MAYELA Flores sent at 7/19/2019  3:57 PM CDT -----  Glucose elevated.  She needs to stop eating concentrated sweets.  Lyme negative.  Still waiting on RMSF.

## 2019-07-22 NOTE — TELEPHONE ENCOUNTER
She needs to call her neurologist, Dr. Dos Santos, and notify him of this.  She needs to see what his recommendations are.  Thank you.

## 2019-07-23 ENCOUNTER — TELEPHONE (OUTPATIENT)
Dept: NEUROLOGY | Facility: CLINIC | Age: 59
End: 2019-07-23

## 2019-07-23 DIAGNOSIS — A77.0 RMSF (ROCKY MOUNTAIN SPOTTED FEVER): Primary | ICD-10-CM

## 2019-07-23 RX ORDER — DOXYCYCLINE HYCLATE 100 MG/1
100 CAPSULE ORAL 2 TIMES DAILY
Qty: 20 CAPSULE | Refills: 0 | Status: SHIPPED | OUTPATIENT
Start: 2019-07-23 | End: 2019-09-10

## 2019-07-23 NOTE — TELEPHONE ENCOUNTER
----- Message from Santhosh Dos Santos MD sent at 7/23/2019  1:53 PM CDT -----  Patient needs to go the emergency room today to be evaluated for possible stroke.  ----- Message -----  From: Latasha Winter LPN  Sent: 7/23/2019  11:36 AM  To: Santhosh Dos Santos MD    Lashaun said she had numbness around her jaw line on Saturday.  Her entire face has been numb since Monday.  She has never had this sensation before.  There is no tingling, it feels like having a shot of Novocain.

## 2019-07-26 ENCOUNTER — HOSPITAL ENCOUNTER (EMERGENCY)
Facility: HOSPITAL | Age: 59
Discharge: HOME | End: 2019-07-26
Attending: EMERGENCY MEDICINE | Admitting: EMERGENCY MEDICINE

## 2019-07-26 ENCOUNTER — APPOINTMENT (OUTPATIENT)
Dept: GENERAL RADIOLOGY | Facility: HOSPITAL | Age: 59
End: 2019-07-26

## 2019-07-26 ENCOUNTER — APPOINTMENT (OUTPATIENT)
Dept: CT IMAGING | Facility: HOSPITAL | Age: 59
End: 2019-07-26

## 2019-07-26 VITALS
DIASTOLIC BLOOD PRESSURE: 59 MMHG | SYSTOLIC BLOOD PRESSURE: 117 MMHG | TEMPERATURE: 98 F | BODY MASS INDEX: 37.05 KG/M2 | WEIGHT: 217 LBS | OXYGEN SATURATION: 96 % | HEIGHT: 64 IN | HEART RATE: 72 BPM | RESPIRATION RATE: 18 BRPM

## 2019-07-26 DIAGNOSIS — R20.0 FACIAL NUMBNESS: Primary | ICD-10-CM

## 2019-07-26 LAB
ANION GAP SERPL CALCULATED.3IONS-SCNC: 13 MMOL/L (ref 5–15)
APTT PPP: 25.3 SECONDS (ref 20–40.3)
BASOPHILS # BLD AUTO: 0.06 10*3/MM3 (ref 0–0.2)
BASOPHILS NFR BLD AUTO: 0.8 % (ref 0–1.5)
BUN BLD-MCNC: 19 MG/DL (ref 6–20)
BUN/CREAT SERPL: 21.6 (ref 7–25)
CALCIUM SPEC-SCNC: 8.6 MG/DL (ref 8.6–10.5)
CHLORIDE SERPL-SCNC: 103 MMOL/L (ref 98–107)
CO2 SERPL-SCNC: 22 MMOL/L (ref 22–29)
CREAT BLD-MCNC: 0.88 MG/DL (ref 0.57–1)
DEPRECATED RDW RBC AUTO: 39.5 FL (ref 37–54)
EOSINOPHIL # BLD AUTO: 0.28 10*3/MM3 (ref 0–0.4)
EOSINOPHIL NFR BLD AUTO: 3.6 % (ref 0.3–6.2)
ERYTHROCYTE [DISTWIDTH] IN BLOOD BY AUTOMATED COUNT: 12.5 % (ref 12.3–15.4)
GFR SERPL CREATININE-BSD FRML MDRD: 66 ML/MIN/1.73
GLUCOSE BLD-MCNC: 134 MG/DL (ref 65–99)
GLUCOSE BLDC GLUCOMTR-MCNC: 141 MG/DL (ref 70–130)
HCT VFR BLD AUTO: 35.5 % (ref 34–46.6)
HGB BLD-MCNC: 11.8 G/DL (ref 12–15.9)
HOLD SPECIMEN: NORMAL
IMM GRANULOCYTES # BLD AUTO: 0.05 10*3/MM3 (ref 0–0.05)
IMM GRANULOCYTES NFR BLD AUTO: 0.6 % (ref 0–0.5)
INR PPP: 0.94 (ref 0.8–1.2)
LYMPHOCYTES # BLD AUTO: 3.09 10*3/MM3 (ref 0.7–3.1)
LYMPHOCYTES NFR BLD AUTO: 40.1 % (ref 19.6–45.3)
MCH RBC QN AUTO: 28.9 PG (ref 26.6–33)
MCHC RBC AUTO-ENTMCNC: 33.2 G/DL (ref 31.5–35.7)
MCV RBC AUTO: 86.8 FL (ref 79–97)
MONOCYTES # BLD AUTO: 0.78 10*3/MM3 (ref 0.1–0.9)
MONOCYTES NFR BLD AUTO: 10.1 % (ref 5–12)
NEUTROPHILS # BLD AUTO: 3.44 10*3/MM3 (ref 1.7–7)
NEUTROPHILS NFR BLD AUTO: 44.8 % (ref 42.7–76)
NRBC BLD AUTO-RTO: 0 /100 WBC (ref 0–0.2)
PLATELET # BLD AUTO: 269 10*3/MM3 (ref 140–450)
PMV BLD AUTO: 9.9 FL (ref 6–12)
POTASSIUM BLD-SCNC: 3.7 MMOL/L (ref 3.5–5.2)
PROTHROMBIN TIME: 12.4 SECONDS (ref 11.1–15.3)
RBC # BLD AUTO: 4.09 10*6/MM3 (ref 3.77–5.28)
SODIUM BLD-SCNC: 138 MMOL/L (ref 136–145)
TROPONIN T SERPL-MCNC: <0.01 NG/ML (ref 0–0.03)
WBC NRBC COR # BLD: 7.7 10*3/MM3 (ref 3.4–10.8)

## 2019-07-26 PROCEDURE — 80048 BASIC METABOLIC PNL TOTAL CA: CPT | Performed by: EMERGENCY MEDICINE

## 2019-07-26 PROCEDURE — 85610 PROTHROMBIN TIME: CPT | Performed by: EMERGENCY MEDICINE

## 2019-07-26 PROCEDURE — 99283 EMERGENCY DEPT VISIT LOW MDM: CPT

## 2019-07-26 PROCEDURE — 70450 CT HEAD/BRAIN W/O DYE: CPT

## 2019-07-26 PROCEDURE — 93005 ELECTROCARDIOGRAM TRACING: CPT | Performed by: EMERGENCY MEDICINE

## 2019-07-26 PROCEDURE — 82962 GLUCOSE BLOOD TEST: CPT

## 2019-07-26 PROCEDURE — 85025 COMPLETE CBC W/AUTO DIFF WBC: CPT | Performed by: EMERGENCY MEDICINE

## 2019-07-26 PROCEDURE — 71045 X-RAY EXAM CHEST 1 VIEW: CPT

## 2019-07-26 PROCEDURE — 85730 THROMBOPLASTIN TIME PARTIAL: CPT | Performed by: EMERGENCY MEDICINE

## 2019-07-26 PROCEDURE — 84484 ASSAY OF TROPONIN QUANT: CPT | Performed by: EMERGENCY MEDICINE

## 2019-07-26 PROCEDURE — 93010 ELECTROCARDIOGRAM REPORT: CPT | Performed by: INTERNAL MEDICINE

## 2019-07-26 RX ORDER — SODIUM CHLORIDE 0.9 % (FLUSH) 0.9 %
10 SYRINGE (ML) INJECTION AS NEEDED
Status: DISCONTINUED | OUTPATIENT
Start: 2019-07-26 | End: 2019-07-26 | Stop reason: HOSPADM

## 2019-07-29 ENCOUNTER — HOSPITAL ENCOUNTER (OUTPATIENT)
Dept: ULTRASOUND IMAGING | Facility: HOSPITAL | Age: 59
Discharge: HOME | End: 2019-07-29
Admitting: INTERNAL MEDICINE

## 2019-07-29 DIAGNOSIS — R94.4 ABNORMAL RESULTS OF KIDNEY FUNCTION STUDIES: ICD-10-CM

## 2019-07-29 PROCEDURE — 76775 US EXAM ABDO BACK WALL LIM: CPT

## 2019-08-02 ENCOUNTER — TELEPHONE (OUTPATIENT)
Dept: FAMILY MEDICINE CLINIC | Facility: CLINIC | Age: 59
End: 2019-08-02

## 2019-08-02 DIAGNOSIS — B37.9 YEAST INFECTION: Primary | ICD-10-CM

## 2019-08-02 RX ORDER — FLUCONAZOLE 150 MG/1
150 TABLET ORAL ONCE
Qty: 1 TABLET | Refills: 0 | Status: SHIPPED | OUTPATIENT
Start: 2019-08-02 | End: 2019-08-02

## 2019-08-02 NOTE — TELEPHONE ENCOUNTER
Pt called and left message on machine that she has been taking medicine for [place] Spotted tick fever and she now has a yeast infection. Can you call in something for this to [facility]. She said that she needs it bad. Her phone is [phone].

## 2019-08-05 ENCOUNTER — TELEPHONE (OUTPATIENT)
Dept: FAMILY MEDICINE CLINIC | Facility: CLINIC | Age: 59
End: 2019-08-05

## 2019-08-05 NOTE — TELEPHONE ENCOUNTER
Lashaun left a phone message and said her yeast infection did not clear up with one pill dose and wanted to know if she could get another sent to [facility]

## 2019-08-05 NOTE — TELEPHONE ENCOUNTER
Now.  She needs to either go to the urgent care or stop by the lab here for a vaginal swab.  This may not be yeast infection it may be bacterial which requires a different treatment.  Thank you.

## 2019-08-06 NOTE — TELEPHONE ENCOUNTER
Spoke with patient and she said that the itching is a little better today. She will wait until Thursday and if it is not any better will go to the lab and do a vaginal swab.

## 2019-08-28 RX ORDER — ESTRADIOL 1 MG/1
1 TABLET ORAL DAILY
Qty: 90 TABLET | Refills: 0 | Status: SHIPPED | OUTPATIENT
Start: 2019-08-28 | End: 2019-12-06 | Stop reason: SDUPTHER

## 2019-09-10 ENCOUNTER — OFFICE VISIT (OUTPATIENT)
Dept: SLEEP MEDICINE | Facility: HOSPITAL | Age: 59
End: 2019-09-10

## 2019-09-10 VITALS
HEIGHT: 64 IN | BODY MASS INDEX: 37.75 KG/M2 | SYSTOLIC BLOOD PRESSURE: 144 MMHG | WEIGHT: 221.1 LBS | DIASTOLIC BLOOD PRESSURE: 63 MMHG | HEART RATE: 91 BPM | OXYGEN SATURATION: 97 %

## 2019-09-10 DIAGNOSIS — G47.33 OBSTRUCTIVE SLEEP APNEA, ADULT: Primary | ICD-10-CM

## 2019-09-10 PROCEDURE — 99213 OFFICE O/P EST LOW 20 MIN: CPT | Performed by: INTERNAL MEDICINE

## 2019-09-10 NOTE — PROGRESS NOTES
Sleep Clinic Follow Up    Date: 9/10/2019  Primary Care Physician: Gretchen Gauthier APRN    Last office visit: 2019 (I reviewed this note)    CC: Follow up: MAX    Sleep Testin. PSG on 2019, AHI of 10, REM AHI of 28.7, PLMI of 13   2. Currently on 10 cm H2O    Assessment and Plan:    1. Obstructive sleep apnea Established, stable (1)  1. Compliant and improved with PAP therapy  2. Continue PAP as prescribed.   3. Script for PAP supplies  2. Insomnia - psychophysiologic   1. Meditation exercises  3. Tremor  4. Ataxia and vertigo       Interim History:  Since the last visit:    1) mild MAX -  Lashaun Bernal has remained compliant with CPAP. She denies mask and machine issues, dry mouth, headaches, or pressures intolerance. She denies abnormal dreams, sleep paralysis, nasal congestion, URI sx.    PAP Data:    Time frame: 2019 - 2019   Compliance 97.3 %  Average use on days used: 8hrs 28 min  Percent of days with usage greater than or equal to 4 hours: 96.3%  PAP range : 10 cm H2O  Average 90% pressure: 10 cmH2O  Leak: 0 minutes  Average AHI 1.5 events/hr  Mask type: Nasal pillows  [facility]: [place]    Bed time: 1156-8889  Sleep latency: 60 minutes  Number of times awakens during the night: 0  Wake time: 0228-0135  Estimated total sleep time at night: 7-8 hours  Caffeine intake: 0oz of coffee, 0oz of tea, and 0oz of soda  Alcohol intake: no drinks per week  Nap time: rare   Sleepiness with Driving: none    2) Patient has occasional RLS symptoms - has appt with Neurology in [place] in 6 days    PMHx, FH, SH reviewed and pertinent changes are: tick bite, facial numbness, and paresthesias      REVIEW OF SYSTEMS:   Negative for chest pain, fever, cough, SOA, abdominal pain. Smoking:none    Exam:  Vitals:    09/10/19 1443   BP: 144/63   Pulse: 91   SpO2: 97%           09/10/19  1443   Weight: 100 kg (221 lb 1.6 oz)     Body mass index is 37.93 kg/m². Patient's Body mass index is 37.93 kg/m². BMI is  "above normal parameters. Recommendations include: referral to primary care.      Gen:  No acute distress, alert, oriented  Lungs:  CTA with normal effort   CV:  RRR, no M/R/G  GI:  soft, non-tender  Psych:  Appropriate affect    Past Medical History:   Diagnosis Date   • Allergic rhinitis    • Arthritis    • Asthma    • Breast lump    • Chest pain    • Chronic low back pain    • Constipation    • Depressive disorder    • Diarrhea    • Diverticular disease of colon    • Epigastric pain    • DIETER (generalized anxiety disorder)    • GERD (gastroesophageal reflux disease)    • Head ache    • Hematochezia    • Herpes zoster     mid back, near spine   • Periumbilical pain    • PONV (postoperative nausea and vomiting)    • [place] spotted fever     pt states \"currently has it\"       Current Outpatient Medications:   •  albuterol (PROVENTIL HFA;VENTOLIN HFA) 108 (90 Base) MCG/ACT inhaler, Inhale 2 puffs Every 4 (Four) Hours As Needed for Wheezing., Disp: 18 g, Rfl: 2  •  albuterol (PROVENTIL) (2.5 MG/3ML) 0.083% nebulizer solution, Take 2.5 mg by nebulization Every 6 (Six) Hours As Needed for Wheezing., Disp: 120 vial, Rfl: 2  •  clonazePAM (KlonoPIN) 0.5 MG tablet, Take 0.5 mg by mouth 2 (Two) Times a Day As Needed for Anxiety., Disp: , Rfl:   •  cyclobenzaprine (FLEXERIL) 10 MG tablet, Take 1 tablet by mouth 3 (Three) Times a Day As Needed for Muscle Spasms., Disp: 90 tablet, Rfl: 5  •  DULoxetine (CYMBALTA) 20 MG capsule, TAKE 1 CAPSULE BY MOUTH DAILY, Disp: 30 capsule, Rfl: 2  •  estradiol (ESTRACE) 1 MG tablet, TAKE 1 TABLET BY MOUTH DAILY., Disp: 90 tablet, Rfl: 0  •  FLUoxetine (PROzac) 20 MG capsule, Take 20 mg by mouth 2 (Two) Times a Day., Disp: , Rfl:   •  hydroxychloroquine (PLAQUENIL) 200 MG tablet, Take 200 mg by mouth Daily., Disp: , Rfl:   •  omeprazole (priLOSEC) 40 MG capsule, Take 40 mg by mouth 2 (Two) Times a Day., Disp: , Rfl:   •  polyethylene glycol (MIRALAX) powder, Take 17 g by mouth 2 (Two) " Times a Day As Needed (constipation)., Disp: 1 each, Rfl: 11    Total time 15 min, more than half spent in face to face counseling and coordination of care.    RTC in 12 months     This document has been electronically signed by Emmett Mendoza MD on September 10, 2019         CC: Gretchen Gauthier, APRN          No ref. provider found

## 2019-09-12 ENCOUNTER — OFFICE VISIT (OUTPATIENT)
Dept: FAMILY MEDICINE CLINIC | Facility: CLINIC | Age: 59
End: 2019-09-12

## 2019-09-12 ENCOUNTER — LAB (OUTPATIENT)
Dept: LAB | Facility: HOSPITAL | Age: 59
End: 2019-09-12

## 2019-09-12 VITALS
HEIGHT: 64 IN | BODY MASS INDEX: 37.28 KG/M2 | DIASTOLIC BLOOD PRESSURE: 82 MMHG | SYSTOLIC BLOOD PRESSURE: 130 MMHG | WEIGHT: 218.4 LBS

## 2019-09-12 DIAGNOSIS — R20.2 NUMBNESS AND TINGLING IN BOTH HANDS: ICD-10-CM

## 2019-09-12 DIAGNOSIS — Z13.29 SCREENING FOR HYPOTHYROIDISM: ICD-10-CM

## 2019-09-12 DIAGNOSIS — I10 BENIGN ESSENTIAL HYPERTENSION: ICD-10-CM

## 2019-09-12 DIAGNOSIS — E78.2 MIXED HYPERLIPIDEMIA: ICD-10-CM

## 2019-09-12 DIAGNOSIS — R20.0 NUMBNESS AND TINGLING IN BOTH HANDS: ICD-10-CM

## 2019-09-12 DIAGNOSIS — R29.898 WEAKNESS OF BOTH ARMS: ICD-10-CM

## 2019-09-12 DIAGNOSIS — I10 BENIGN ESSENTIAL HYPERTENSION: Primary | Chronic | ICD-10-CM

## 2019-09-12 DIAGNOSIS — M79.7 FIBROMYALGIA: ICD-10-CM

## 2019-09-12 LAB
ALBUMIN SERPL-MCNC: 4.1 G/DL (ref 3.5–5.2)
ALBUMIN/GLOB SERPL: 1.5 G/DL
ALP SERPL-CCNC: 94 U/L (ref 39–117)
ALT SERPL W P-5'-P-CCNC: 61 U/L (ref 1–33)
ANION GAP SERPL CALCULATED.3IONS-SCNC: 13.5 MMOL/L (ref 5–15)
AST SERPL-CCNC: 39 U/L (ref 1–32)
BASOPHILS # BLD AUTO: 0.05 10*3/MM3 (ref 0–0.2)
BASOPHILS NFR BLD AUTO: 0.7 % (ref 0–1.5)
BILIRUB SERPL-MCNC: 0.2 MG/DL (ref 0.2–1.2)
BUN BLD-MCNC: 17 MG/DL (ref 6–20)
BUN/CREAT SERPL: 18.9 (ref 7–25)
CALCIUM SPEC-SCNC: 9.1 MG/DL (ref 8.6–10.5)
CHLORIDE SERPL-SCNC: 106 MMOL/L (ref 98–107)
CHOLEST SERPL-MCNC: 188 MG/DL (ref 0–200)
CO2 SERPL-SCNC: 21.5 MMOL/L (ref 22–29)
CREAT BLD-MCNC: 0.9 MG/DL (ref 0.57–1)
DEPRECATED RDW RBC AUTO: 39.4 FL (ref 37–54)
EOSINOPHIL # BLD AUTO: 0.3 10*3/MM3 (ref 0–0.4)
EOSINOPHIL NFR BLD AUTO: 4.2 % (ref 0.3–6.2)
ERYTHROCYTE [DISTWIDTH] IN BLOOD BY AUTOMATED COUNT: 12.3 % (ref 12.3–15.4)
GFR SERPL CREATININE-BSD FRML MDRD: 64 ML/MIN/1.73
GLOBULIN UR ELPH-MCNC: 2.8 GM/DL
GLUCOSE BLD-MCNC: 116 MG/DL (ref 65–99)
HCT VFR BLD AUTO: 39 % (ref 34–46.6)
HDLC SERPL-MCNC: 43 MG/DL (ref 40–60)
HGB BLD-MCNC: 13.1 G/DL (ref 12–15.9)
IMM GRANULOCYTES # BLD AUTO: 0.04 10*3/MM3 (ref 0–0.05)
IMM GRANULOCYTES NFR BLD AUTO: 0.6 % (ref 0–0.5)
LDLC SERPL CALC-MCNC: 92 MG/DL (ref 0–100)
LDLC/HDLC SERPL: 2.14 {RATIO}
LYMPHOCYTES # BLD AUTO: 2.84 10*3/MM3 (ref 0.7–3.1)
LYMPHOCYTES NFR BLD AUTO: 39.9 % (ref 19.6–45.3)
MCH RBC QN AUTO: 29.4 PG (ref 26.6–33)
MCHC RBC AUTO-ENTMCNC: 33.6 G/DL (ref 31.5–35.7)
MCV RBC AUTO: 87.6 FL (ref 79–97)
MONOCYTES # BLD AUTO: 0.62 10*3/MM3 (ref 0.1–0.9)
MONOCYTES NFR BLD AUTO: 8.7 % (ref 5–12)
NEUTROPHILS # BLD AUTO: 3.27 10*3/MM3 (ref 1.7–7)
NEUTROPHILS NFR BLD AUTO: 45.9 % (ref 42.7–76)
NRBC BLD AUTO-RTO: 0 /100 WBC (ref 0–0.2)
PLATELET # BLD AUTO: 300 10*3/MM3 (ref 140–450)
PMV BLD AUTO: 10.8 FL (ref 6–12)
POTASSIUM BLD-SCNC: 4.3 MMOL/L (ref 3.5–5.2)
PROT SERPL-MCNC: 6.9 G/DL (ref 6–8.5)
RBC # BLD AUTO: 4.45 10*6/MM3 (ref 3.77–5.28)
SODIUM BLD-SCNC: 141 MMOL/L (ref 136–145)
TRIGL SERPL-MCNC: 265 MG/DL (ref 0–150)
TSH SERPL DL<=0.05 MIU/L-ACNC: 4.22 UIU/ML (ref 0.27–4.2)
VLDLC SERPL-MCNC: 53 MG/DL (ref 5–40)
WBC NRBC COR # BLD: 7.12 10*3/MM3 (ref 3.4–10.8)

## 2019-09-12 PROCEDURE — 80061 LIPID PANEL: CPT

## 2019-09-12 PROCEDURE — 86038 ANTINUCLEAR ANTIBODIES: CPT | Performed by: NURSE PRACTITIONER

## 2019-09-12 PROCEDURE — 85025 COMPLETE CBC W/AUTO DIFF WBC: CPT

## 2019-09-12 PROCEDURE — 80053 COMPREHEN METABOLIC PANEL: CPT

## 2019-09-12 PROCEDURE — 99214 OFFICE O/P EST MOD 30 MIN: CPT | Performed by: NURSE PRACTITIONER

## 2019-09-12 PROCEDURE — 84443 ASSAY THYROID STIM HORMONE: CPT

## 2019-09-12 RX ORDER — FLUOXETINE HYDROCHLORIDE 40 MG/1
CAPSULE ORAL
COMMUNITY
Start: 2019-09-04 | End: 2019-11-27 | Stop reason: DRUGHIGH

## 2019-09-12 NOTE — PROGRESS NOTES
"Subjective   Lashaun Bernal is a 58 y.o. female.  Three month follow-up.  Continues to complain of increasing weakness to her arms.  \"I am not feeling good.  My head is still numb and the numbness and tingling are still in my arms and down to my fingers.\"  Was seen at [facility] emergency department and had negative head CT to rule out stroke.  Has upcoming appointment scheduled with Dr. Dos Santos for further evaluation.    Muscle Pain   This is a chronic problem. The current episode started more than 1 year ago. The problem occurs intermittently. The problem has been gradually worsening since onset. The pain is present in the neck, lower back, left arm, left foot, left forearm, left hand, left lower leg, left upper leg, right arm, right foot, right forearm, right hand, right lower leg, right upper leg, upper back, upper left arm and upper right arm. The pain is severe. Nothing aggravates the symptoms. Associated symptoms include weakness. Pertinent negatives include no chest pain or shortness of breath. Past treatments include OTC NSAID, prescription NSAID and acetaminophen. The treatment provided no relief. There is no swelling present. She has been behaving normally.   Fibromyalgia   This is a chronic problem. The current episode started more than 1 year ago. The problem occurs constantly. The problem has been gradually improving. Associated symptoms include arthralgias, myalgias and weakness. Pertinent negatives include no chest pain. Nothing aggravates the symptoms. Treatments tried: Cymbalta. The treatment provided mild relief.   Hyperlipidemia   This is a chronic problem. The current episode started more than 1 year ago. The problem is controlled. Exacerbating diseases include obesity. Factors aggravating her hyperlipidemia include fatty foods. Associated symptoms include myalgias. Pertinent negatives include no chest pain or shortness of breath. She is currently on no antihyperlipidemic " treatment.   Hypertension   This is a chronic problem. The current episode started more than 1 year ago. The problem is unchanged. The problem is controlled. Pertinent negatives include no chest pain, orthopnea, peripheral edema or shortness of breath. Risk factors for coronary artery disease include dyslipidemia, family history, obesity, post-menopausal state and sedentary lifestyle. Past treatments include lifestyle changes. The current treatment provides moderate improvement. Compliance problems include exercise.         The following portions of the patient's history were reviewed and updated as appropriate:     Current Outpatient Medications   Medication Sig Dispense Refill   • albuterol (PROVENTIL HFA;VENTOLIN HFA) 108 (90 Base) MCG/ACT inhaler Inhale 2 puffs Every 4 (Four) Hours As Needed for Wheezing. 18 g 2   • albuterol (PROVENTIL) (2.5 MG/3ML) 0.083% nebulizer solution Take 2.5 mg by nebulization Every 6 (Six) Hours As Needed for Wheezing. 120 vial 2   • clonazePAM (KlonoPIN) 0.5 MG tablet Take 0.5 mg by mouth 2 (Two) Times a Day As Needed for Anxiety.     • cyclobenzaprine (FLEXERIL) 10 MG tablet Take 1 tablet by mouth 3 (Three) Times a Day As Needed for Muscle Spasms. 90 tablet 5   • DULoxetine (CYMBALTA) 20 MG capsule TAKE 1 CAPSULE BY MOUTH DAILY 30 capsule 2   • estradiol (ESTRACE) 1 MG tablet TAKE 1 TABLET BY MOUTH DAILY. 90 tablet 0   • FLUoxetine (PROzac) 20 MG capsule Take 20 mg by mouth 2 (Two) Times a Day.     • hydroxychloroquine (PLAQUENIL) 200 MG tablet Take 200 mg by mouth Daily.     • omeprazole (priLOSEC) 40 MG capsule Take 40 mg by mouth 2 (Two) Times a Day.     • polyethylene glycol (MIRALAX) powder Take 17 g by mouth 2 (Two) Times a Day As Needed (constipation). 1 each 11   • FLUoxetine (PROzac) 40 MG capsule        No current facility-administered medications for this visit.      Current Outpatient Medications on File Prior to Visit   Medication Sig   • albuterol (PROVENTIL  "HFA;VENTOLIN HFA) 108 (90 Base) MCG/ACT inhaler Inhale 2 puffs Every 4 (Four) Hours As Needed for Wheezing.   • albuterol (PROVENTIL) (2.5 MG/3ML) 0.083% nebulizer solution Take 2.5 mg by nebulization Every 6 (Six) Hours As Needed for Wheezing.   • clonazePAM (KlonoPIN) 0.5 MG tablet Take 0.5 mg by mouth 2 (Two) Times a Day As Needed for Anxiety.   • cyclobenzaprine (FLEXERIL) 10 MG tablet Take 1 tablet by mouth 3 (Three) Times a Day As Needed for Muscle Spasms.   • DULoxetine (CYMBALTA) 20 MG capsule TAKE 1 CAPSULE BY MOUTH DAILY   • estradiol (ESTRACE) 1 MG tablet TAKE 1 TABLET BY MOUTH DAILY.   • FLUoxetine (PROzac) 20 MG capsule Take 20 mg by mouth 2 (Two) Times a Day.   • hydroxychloroquine (PLAQUENIL) 200 MG tablet Take 200 mg by mouth Daily.   • omeprazole (priLOSEC) 40 MG capsule Take 40 mg by mouth 2 (Two) Times a Day.   • polyethylene glycol (MIRALAX) powder Take 17 g by mouth 2 (Two) Times a Day As Needed (constipation).   • FLUoxetine (PROzac) 40 MG capsule      No current facility-administered medications on file prior to visit.      She is allergic to cefprozil and penicillins..    Review of Systems   HENT: Negative.    Eyes: Negative.    Respiratory: Negative for shortness of breath.    Cardiovascular: Negative.  Negative for chest pain and orthopnea.   Gastrointestinal: Negative.    Genitourinary: Negative.    Musculoskeletal: Positive for arthralgias and myalgias.   Skin: Negative.    Neurological: Positive for weakness.   Psychiatric/Behavioral: Negative.  Negative for confusion.       Objective    Visit Vitals  /82   Ht 162.6 cm (64\")   Wt 99.1 kg (218 lb 6.4 oz)   LMP 03/09/1991   BMI 37.49 kg/m²       Physical Exam   Constitutional: She is oriented to person, place, and time. She appears well-developed and well-nourished.   HENT:   Head: Normocephalic.   Right Ear: External ear normal.   Left Ear: External ear normal.   Eyes: EOM are normal. Pupils are equal, round, and reactive to light. "   Neck: Normal range of motion. Neck supple.   Cardiovascular: Normal rate, regular rhythm and normal heart sounds.   Pulmonary/Chest: Effort normal and breath sounds normal.   Abdominal: Soft. Bowel sounds are normal.   Musculoskeletal: Normal range of motion.   Neurological: She is alert and oriented to person, place, and time.   Bilateral hand grasp equal in moderate in strength.  Tongue midline and speech clear and understandable.  No sparring of the forehead.   Skin: Skin is warm. Capillary refill takes less than 2 seconds.   Psychiatric: She has a normal mood and affect. Her behavior is normal.   Nursing note and vitals reviewed.      Assessment/Plan   Problems Addressed this Visit        Cardiovascular and Mediastinum    Benign essential hypertension - Primary (Chronic)    Relevant Orders    CBC & Differential    Comprehensive Metabolic Panel    Mixed hyperlipidemia    Relevant Orders    Lipid panel       Nervous and Auditory    Fibromyalgia      Other Visit Diagnoses     Numbness and tingling in both hands        Relevant Orders    [facility]    MRI Brain Without Contrast    Weakness of both arms        Relevant Orders    MRI Brain Without Contrast    Screening for hypothyroidism        Relevant Orders    TSH      No orders of the defined types were placed in this encounter.    1.  Benign essential hypertension:  Complete CBC and chemistry panel as ordered and will notify results when available    2.  Fibromyalgia  Continue on Cymbalta as previously prescribed   Educated on possible side effects of this medication including increased risk for suicidal ideations and exacerbation of depression     3.  Muscle cramping:  Continue on Flexeril as previously prescribed  Educated on possible side effects of this medication including but not limited to increased risk for drowsiness and sedation  Encouraged not to take this medication prior to working or driving     4.  Mixed hyperlipidemia:  Complete fasting lipid panel as  ordered and will notify results when available    5.  Numbness and tingling in both hands:  Complete [facility] as ordered and will notify results with when available  We will continue neurosurgical follow-up with Dr. Dos Santos as scheduled    6.  Weakness of both arms:  Radiology will call to schedule MRI and will notify results when available  Copy of MRI results will be available to neurosurgery    7.  Screening for hypothyroidism:  Complete TSH is ordered and will notify results when available    Continue on current medications as previously prescribed   Return in about 3 months (around 12/12/2019) for Recheck.        This document has been electronically signed by MAYELA Flores on September 12, 2019 8:22 AM

## 2019-09-13 LAB — ANA SER QL: NEGATIVE

## 2019-09-16 ENCOUNTER — TELEPHONE (OUTPATIENT)
Dept: FAMILY MEDICINE CLINIC | Facility: CLINIC | Age: 59
End: 2019-09-16

## 2019-09-16 DIAGNOSIS — R79.89 ELEVATED TSH: Primary | ICD-10-CM

## 2019-09-16 DIAGNOSIS — R74.8 ELEVATED LIVER ENZYMES: Primary | ICD-10-CM

## 2019-09-16 NOTE — TELEPHONE ENCOUNTER
Per MAYELA Terry, Ms. Bernal has been called with recent lab results & recommendations.  Continue current medications and follow-up as planned or sooner if any problems.    Gretchen,  She has  Had an US of the liver back in December of 2018. It showed a fatty liver and gallstones.  Do you want her to have it repeated??  Please Advise  Thank you        ----- Message from Gretchen SAMARA MAYELA Gauthier sent at 9/16/2019  6:44 AM CDT -----  Can you please call and let her know that her antinuclear antibody which is the test for autoimmune disorders was negative.  Thyroid-stimulating hormone was slightly elevated.  I have ordered additional testing for further evaluation.  If she can stop by the lab at her next convenience for more blood work.  She does not have to be fasting.  Cholesterol was within normal limits but triglycerides are elevated at 265.  She needs to adhere to a low-fat diet and increase her exercises such as walking.  She needs to begin an over-the-counter fish oil of at least at thousand milligrams daily.  Also her liver enzymes are elevated.  This could be from a number of reasons including Tylenol use and alcohol.  I have ordered an ultrasound of her liver for further evaluation.  Radiology will call to schedule appointment.

## 2019-09-16 NOTE — PROGRESS NOTES
Per MAYELA Terry, Ms. Bernal has been called with recent lab results & recommendations.  Continue current medications and follow-up as planned or sooner if any problems.    Gretchen,  She has  Had an US of the liver back in December of 2018. It showed a fatty liver and gallstones.  Do you want her to have it repeated??  Please Advise  Thank you

## 2019-09-17 ENCOUNTER — TELEPHONE (OUTPATIENT)
Dept: FAMILY MEDICINE CLINIC | Facility: CLINIC | Age: 59
End: 2019-09-17

## 2019-09-17 NOTE — PROGRESS NOTES
Per MAYELA Terry, Ms. Bernal has been called with new recommendations.  Continue current medications and follow-up as planned or sooner if any problems.    She has an appointment scheduled with SEEMA Walker on 10/14/19

## 2019-09-19 ENCOUNTER — OFFICE VISIT (OUTPATIENT)
Dept: FAMILY MEDICINE CLINIC | Facility: CLINIC | Age: 59
End: 2019-09-19

## 2019-09-19 ENCOUNTER — LAB (OUTPATIENT)
Dept: LAB | Facility: HOSPITAL | Age: 59
End: 2019-09-19

## 2019-09-19 VITALS
HEART RATE: 88 BPM | OXYGEN SATURATION: 98 % | BODY MASS INDEX: 37.24 KG/M2 | WEIGHT: 218.1 LBS | DIASTOLIC BLOOD PRESSURE: 74 MMHG | SYSTOLIC BLOOD PRESSURE: 120 MMHG | HEIGHT: 64 IN

## 2019-09-19 DIAGNOSIS — R74.8 ELEVATED LIVER ENZYMES: ICD-10-CM

## 2019-09-19 DIAGNOSIS — R79.89 ELEVATED TSH: ICD-10-CM

## 2019-09-19 DIAGNOSIS — R59.9 SWOLLEN LYMPH NODES: Primary | ICD-10-CM

## 2019-09-19 LAB
HAV IGM SERPL QL IA: NORMAL
HBV CORE IGM SERPL QL IA: NORMAL
HBV SURFACE AG SERPL QL IA: NORMAL
HCV AB SER DONR QL: NORMAL
T3 SERPL-MCNC: 178 NG/DL (ref 80–200)
T4 FREE SERPL-MCNC: 0.93 NG/DL (ref 0.93–1.7)

## 2019-09-19 PROCEDURE — 80074 ACUTE HEPATITIS PANEL: CPT

## 2019-09-19 PROCEDURE — 84439 ASSAY OF FREE THYROXINE: CPT

## 2019-09-19 PROCEDURE — 99213 OFFICE O/P EST LOW 20 MIN: CPT | Performed by: FAMILY MEDICINE

## 2019-09-19 PROCEDURE — 84480 ASSAY TRIIODOTHYRONINE (T3): CPT

## 2019-09-19 NOTE — PROGRESS NOTES
"Chief Complaint   Patient presents with   • Adenopathy       Subjective:  Lashaun Bernal is a 58 y.o. female who presents for evaluation of a swollen lymph node under her jaw.  Patient reports that she has had swelling with intermittent tenderness under her right jaw for 3 months.  She reports that the swelling has gotten larger over the last 3 months.  She does admit to recent history of [place] spotted fever around 6 weeks ago; however, she reports that this swelling was present months prior to this.  She denies any recent injury or scrapes to her face or scalp.  Denies any fevers or chills.  Denies any night sweats.  Denies weight loss.  No difficulty swallowing.  No sore throat.  She is never had swollen lymph nodes in the past.  No further complaints at this time.      The following portions of the patient's history were reviewed and updated as appropriate: allergies, current medications, past family history, past medical history, past social history, past surgical history and problem list.      Past Medical History:   Diagnosis Date   • Allergic rhinitis    • Arthritis    • Asthma    • Breast lump    • Chest pain    • Chronic low back pain    • Constipation    • Depressive disorder    • Diarrhea    • Diverticular disease of colon    • Epigastric pain    • DIETER (generalized anxiety disorder)    • GERD (gastroesophageal reflux disease)    • Head ache    • Hematochezia    • Herpes zoster     mid back, near spine   • Periumbilical pain    • PONV (postoperative nausea and vomiting)    • [place] spotted fever     pt states \"currently has it\"         Current Outpatient Medications:   •  albuterol (PROVENTIL HFA;VENTOLIN HFA) 108 (90 Base) MCG/ACT inhaler, Inhale 2 puffs Every 4 (Four) Hours As Needed for Wheezing., Disp: 18 g, Rfl: 2  •  albuterol (PROVENTIL) (2.5 MG/3ML) 0.083% nebulizer solution, Take 2.5 mg by nebulization Every 6 (Six) Hours As Needed for Wheezing., Disp: 120 vial, Rfl: 2  •  " clonazePAM (KlonoPIN) 0.5 MG tablet, Take 0.5 mg by mouth 2 (Two) Times a Day As Needed for Anxiety., Disp: , Rfl:   •  cyclobenzaprine (FLEXERIL) 10 MG tablet, Take 1 tablet by mouth 3 (Three) Times a Day As Needed for Muscle Spasms., Disp: 90 tablet, Rfl: 5  •  DULoxetine (CYMBALTA) 20 MG capsule, TAKE 1 CAPSULE BY MOUTH DAILY, Disp: 30 capsule, Rfl: 2  •  estradiol (ESTRACE) 1 MG tablet, TAKE 1 TABLET BY MOUTH DAILY., Disp: 90 tablet, Rfl: 0  •  FLUoxetine (PROzac) 20 MG capsule, Take 20 mg by mouth 2 (Two) Times a Day., Disp: , Rfl:   •  FLUoxetine (PROzac) 40 MG capsule, , Disp: , Rfl:   •  hydroxychloroquine (PLAQUENIL) 200 MG tablet, Take 200 mg by mouth Daily., Disp: , Rfl:   •  omeprazole (priLOSEC) 40 MG capsule, Take 40 mg by mouth 2 (Two) Times a Day., Disp: , Rfl:   •  polyethylene glycol (MIRALAX) powder, Take 17 g by mouth 2 (Two) Times a Day As Needed (constipation)., Disp: 1 each, Rfl: 11    Review of Systems    Review of Systems   Constitutional: Positive for fatigue. Negative for activity change, chills, fever and unexpected weight change.   HENT: Negative for hearing loss, rhinorrhea and sore throat.    Eyes: Negative for visual disturbance.   Respiratory: Negative for cough and shortness of breath.    Cardiovascular: Negative for chest pain, palpitations and leg swelling.   Gastrointestinal: Negative for abdominal pain, blood in stool, constipation, diarrhea, nausea and vomiting.   Endocrine: Negative for polydipsia, polyphagia and polyuria.   Genitourinary: Negative for dysuria, frequency, hematuria and urgency.   Musculoskeletal: Negative for arthralgias and myalgias.   Skin: Negative for rash.   Neurological: Negative for dizziness, light-headedness, numbness and headaches.   Hematological: Positive for adenopathy.   Psychiatric/Behavioral: Negative for sleep disturbance and suicidal ideas.       Objective  Vitals:    09/19/19 0827   BP: 120/74   BP Location: Left arm   Patient Position:  "Sitting   Pulse: 88   SpO2: 98%   Weight: 98.9 kg (218 lb 1.6 oz)   Height: 162.6 cm (64\")       Physical Exam   Constitutional: She is oriented to person, place, and time. Vital signs are normal. She appears well-developed and well-nourished. No distress.   Obese   HENT:   Head: Normocephalic and atraumatic.   Right Ear: External ear normal.   Left Ear: External ear normal.   Eyes: Conjunctivae and EOM are normal. Pupils are equal, round, and reactive to light. Right eye exhibits no discharge. Left eye exhibits no discharge. No scleral icterus.   Neck: Trachea normal and normal range of motion. Neck supple. No tracheal deviation present. No thyromegaly present.   Cardiovascular: Normal rate, regular rhythm and normal heart sounds. Exam reveals no gallop and no friction rub.   No murmur heard.  Pulmonary/Chest: Effort normal and breath sounds normal. No stridor. No respiratory distress. She has no wheezes. She has no rales.   Musculoskeletal: She exhibits no edema.   Lymphadenopathy:        Head (right side): Submandibular (Some fullness under the mandible on the right) adenopathy present.     She has no cervical adenopathy.   Neurological: She is alert and oriented to person, place, and time. No cranial nerve deficit. She exhibits normal muscle tone.   Skin: Skin is warm and dry. No rash noted. She is not diaphoretic. No erythema.   Psychiatric: She has a normal mood and affect. Her behavior is normal. Judgment and thought content normal.   Nursing note and vitals reviewed.        Lashaun was seen today for adenopathy.    Diagnoses and all orders for this visit:    Swollen lymph nodes  With 3-month history of fullness under her mandible on right.  Tender intermittently.  No other symptoms reported.  Will start work-up with ultrasound of head and neck to evaluate further.  We will follow-up in 2 weeks or sooner if needed.  Patient instructed that if she develops any new or worsening symptoms she is to return or seek " immediate medical attention.  Patient agreeable with plan and verbalized understanding.  -     US Head Neck Soft Tissue; Future            I spent 15 minutes in direct face to face contact with patient.  Greater than 50% of this time was spent counseling patient and discussing plan of care.    PHQ-2/PHQ-9 Depression Screening 9/6/2018   Little interest or pleasure in doing things 0   Feeling down, depressed, or hopeless 1   Total Score 1           This document has been electronically signed by Cesar Fisher DO on September 19, 2019 9:07 AM

## 2019-09-23 ENCOUNTER — HOSPITAL ENCOUNTER (OUTPATIENT)
Dept: MRI IMAGING | Facility: HOSPITAL | Age: 59
Discharge: HOME | End: 2019-09-23
Admitting: NURSE PRACTITIONER

## 2019-09-23 ENCOUNTER — TELEPHONE (OUTPATIENT)
Dept: FAMILY MEDICINE CLINIC | Facility: CLINIC | Age: 59
End: 2019-09-23

## 2019-09-23 ENCOUNTER — HOSPITAL ENCOUNTER (OUTPATIENT)
Dept: ULTRASOUND IMAGING | Facility: HOSPITAL | Age: 59
Discharge: HOME | End: 2019-09-23

## 2019-09-23 DIAGNOSIS — R59.9 SWOLLEN LYMPH NODES: ICD-10-CM

## 2019-09-23 PROCEDURE — 70551 MRI BRAIN STEM W/O DYE: CPT

## 2019-09-23 PROCEDURE — 76536 US EXAM OF HEAD AND NECK: CPT

## 2019-09-23 NOTE — PROGRESS NOTES
Per MAYELA Terry, Ms. Bernal has been called with recent MRI of the Brain results & recommendations.  Continue current medications and follow-up as planned or sooner if any problems.

## 2019-09-23 NOTE — TELEPHONE ENCOUNTER
Per MAYELA Terry, Ms. Bernal has been called with recent MRI of the Brain results & recommendations.  Continue current medications and follow-up as planned or sooner if any problems.        ----- Message from MAYELA Flores sent at 9/23/2019  5:54 PM CDT -----  X-ray normal, please call or send note!

## 2019-09-25 ENCOUNTER — TELEPHONE (OUTPATIENT)
Dept: FAMILY MEDICINE CLINIC | Facility: CLINIC | Age: 59
End: 2019-09-25

## 2019-09-25 NOTE — TELEPHONE ENCOUNTER
Per MAYELA Terry, Ms. Bernal has been called with recent lab results & recommendations.  Continue current medications and follow-up as planned or sooner if any problems.      ----- Message from MAYELA Flores sent at 9/24/2019  7:41 AM CDT -----  Further thyroid studies were normal and Hepatitis panel was negative.  Will recheck thyroid studies at her next visit.

## 2019-10-03 ENCOUNTER — OFFICE VISIT (OUTPATIENT)
Dept: FAMILY MEDICINE CLINIC | Facility: CLINIC | Age: 59
End: 2019-10-03

## 2019-10-03 VITALS
HEART RATE: 87 BPM | BODY MASS INDEX: 37.69 KG/M2 | HEIGHT: 64 IN | DIASTOLIC BLOOD PRESSURE: 76 MMHG | SYSTOLIC BLOOD PRESSURE: 124 MMHG | OXYGEN SATURATION: 98 % | WEIGHT: 220.8 LBS

## 2019-10-03 DIAGNOSIS — R59.0 LOCALIZED ENLARGED LYMPH NODES: Primary | ICD-10-CM

## 2019-10-03 PROCEDURE — 99213 OFFICE O/P EST LOW 20 MIN: CPT | Performed by: FAMILY MEDICINE

## 2019-10-03 RX ORDER — CETIRIZINE HYDROCHLORIDE 10 MG/1
10 TABLET ORAL DAILY
Qty: 30 TABLET | Refills: 0 | Status: SHIPPED | OUTPATIENT
Start: 2019-10-03 | End: 2020-10-02

## 2019-10-03 NOTE — PROGRESS NOTES
"Chief Complaint   Patient presents with   • Follow-up   • Results   • Adenopathy       Subjective:  Lashaun Bernal is a 58 y.o. female who presents for follow-up of swollen right submandibular lymph node.  Patient reports fullness has not changed since last visit.  She did have an ultrasound in the interim that showed fatty infiltration of her submandibular lymph nodes bilaterally worse on the right.  No other concerning findings noted.  Patient's lymph nodes are not tender.  She has not had any recent infections or illnesses.  No night sweats or weight loss.  No other changes at this time.  She states she has been doing well with no other complaints.      The following portions of the patient's history were reviewed and updated as appropriate: allergies, current medications, past family history, past medical history, past social history, past surgical history and problem list.      Past Medical History:   Diagnosis Date   • Allergic rhinitis    • Arthritis    • Asthma    • Breast lump    • Chest pain    • Chronic low back pain    • Constipation    • Depressive disorder    • Diarrhea    • Diverticular disease of colon    • Epigastric pain    • DIETER (generalized anxiety disorder)    • GERD (gastroesophageal reflux disease)    • Head ache    • Hematochezia    • Herpes zoster     mid back, near spine   • Periumbilical pain    • PONV (postoperative nausea and vomiting)    • [place] spotted fever     pt states \"currently has it\"         Current Outpatient Medications:   •  albuterol (PROVENTIL HFA;VENTOLIN HFA) 108 (90 Base) MCG/ACT inhaler, Inhale 2 puffs Every 4 (Four) Hours As Needed for Wheezing., Disp: 18 g, Rfl: 2  •  albuterol (PROVENTIL) (2.5 MG/3ML) 0.083% nebulizer solution, Take 2.5 mg by nebulization Every 6 (Six) Hours As Needed for Wheezing., Disp: 120 vial, Rfl: 2  •  clonazePAM (KlonoPIN) 0.5 MG tablet, Take 0.5 mg by mouth 2 (Two) Times a Day As Needed for Anxiety., Disp: , Rfl:   •  " "cyclobenzaprine (FLEXERIL) 10 MG tablet, Take 1 tablet by mouth 3 (Three) Times a Day As Needed for Muscle Spasms., Disp: 90 tablet, Rfl: 5  •  estradiol (ESTRACE) 1 MG tablet, TAKE 1 TABLET BY MOUTH DAILY., Disp: 90 tablet, Rfl: 0  •  FLUoxetine (PROzac) 20 MG capsule, Take 20 mg by mouth 2 (Two) Times a Day., Disp: , Rfl:   •  FLUoxetine (PROzac) 40 MG capsule, , Disp: , Rfl:   •  hydroxychloroquine (PLAQUENIL) 200 MG tablet, Take 200 mg by mouth Daily., Disp: , Rfl:   •  omeprazole (priLOSEC) 40 MG capsule, Take 40 mg by mouth 2 (Two) Times a Day., Disp: , Rfl:   •  polyethylene glycol (MIRALAX) powder, Take 17 g by mouth 2 (Two) Times a Day As Needed (constipation)., Disp: 1 each, Rfl: 11  •  cetirizine (zyrTEC) 10 MG tablet, Take 1 tablet by mouth Daily., Disp: 30 tablet, Rfl: 0  •  DULoxetine (CYMBALTA) 20 MG capsule, Take 1 capsule by mouth Daily., Disp: 30 capsule, Rfl: 2    Review of Systems    Review of Systems   Constitutional: Negative for activity change, chills, fever and unexpected weight change.   HENT: Negative for hearing loss, rhinorrhea and sore throat.    Eyes: Negative for visual disturbance.   Respiratory: Negative for cough and shortness of breath.    Cardiovascular: Negative for chest pain, palpitations and leg swelling.   Gastrointestinal: Negative for abdominal pain, blood in stool, constipation, diarrhea, nausea and vomiting.   Endocrine: Negative for polydipsia, polyphagia and polyuria.   Genitourinary: Negative for dysuria, frequency, hematuria and urgency.   Musculoskeletal: Negative for arthralgias and myalgias.   Skin: Negative for rash.   Neurological: Negative for dizziness, light-headedness, numbness and headaches.   Psychiatric/Behavioral: Negative for sleep disturbance and suicidal ideas.       Objective  Vitals:    10/03/19 1041   BP: 124/76   BP Location: Left arm   Patient Position: Sitting   Pulse: 87   SpO2: 98%   Weight: 100 kg (220 lb 12.8 oz)   Height: 162.6 cm (64\") "       Physical Exam   Constitutional: She is oriented to person, place, and time. She appears well-developed and well-nourished. No distress.   Obese   HENT:   Head: Normocephalic and atraumatic.   Right Ear: External ear normal. A middle ear effusion is present.   Left Ear: External ear normal.   Eyes: Conjunctivae and EOM are normal. Pupils are equal, round, and reactive to light. Right eye exhibits no discharge. Left eye exhibits no discharge. No scleral icterus.   Neck: Normal range of motion. Neck supple. No tracheal deviation present. No thyromegaly present.   Cardiovascular: Normal rate, regular rhythm and normal heart sounds. Exam reveals no gallop and no friction rub.   No murmur heard.  Pulmonary/Chest: Effort normal and breath sounds normal. No stridor. No respiratory distress. She has no wheezes. She has no rales.   Musculoskeletal: She exhibits no edema.   Lymphadenopathy:        Head (right side): Submandibular adenopathy present.     She has no cervical adenopathy.   Neurological: She is alert and oriented to person, place, and time. No cranial nerve deficit. She exhibits normal muscle tone.   Skin: Skin is warm and dry. No rash noted. She is not diaphoretic. No erythema.   Psychiatric: She has a normal mood and affect. Her behavior is normal. Judgment and thought content normal.   Nursing note and vitals reviewed.        Lashaun was seen today for follow-up, results and adenopathy.    Diagnoses and all orders for this visit:    Localized enlarged lymph nodes  Patient with fatty infiltration of submandibular lymph nodes worse on right.  Reassured patient that this was a benign process.  Instructed her that if this continues to worsen or she develops any new symptoms to follow-up immediately.  Patient agreeable with plan verbalized understanding.  No further complaints or questions today.  Other orders  -     cetirizine (zyrTEC) 10 MG tablet; Take 1 tablet by mouth Daily.            I spent 15 minutes in  direct face to face contact with patient.  Greater than 50% of this time was spent counseling patient and discussing plan of care.    PHQ-2/PHQ-9 Depression Screening 9/6/2018   Little interest or pleasure in doing things 0   Feeling down, depressed, or hopeless 1   Total Score 1           This document has been electronically signed by Cesar Fisher DO on October 7, 2019 7:47 AM

## 2019-10-04 DIAGNOSIS — M79.7 FIBROMYALGIA: ICD-10-CM

## 2019-10-04 RX ORDER — DULOXETIN HYDROCHLORIDE 20 MG/1
20 CAPSULE, DELAYED RELEASE ORAL DAILY
Qty: 30 CAPSULE | Refills: 2 | Status: SHIPPED | OUTPATIENT
Start: 2019-10-04 | End: 2019-10-28

## 2019-10-08 ENCOUNTER — OFFICE VISIT (OUTPATIENT)
Dept: NEUROLOGY | Facility: CLINIC | Age: 59
End: 2019-10-08

## 2019-10-08 VITALS
RESPIRATION RATE: 18 BRPM | HEIGHT: 64 IN | HEART RATE: 78 BPM | BODY MASS INDEX: 37.9 KG/M2 | DIASTOLIC BLOOD PRESSURE: 80 MMHG | WEIGHT: 222 LBS | SYSTOLIC BLOOD PRESSURE: 130 MMHG

## 2019-10-08 DIAGNOSIS — R25.1 TREMOR: ICD-10-CM

## 2019-10-08 DIAGNOSIS — R20.0 NUMBNESS OF UPPER EXTREMITY: ICD-10-CM

## 2019-10-08 DIAGNOSIS — M54.2 NECK PAIN: ICD-10-CM

## 2019-10-08 DIAGNOSIS — R20.0 FACIAL NUMBNESS: Primary | ICD-10-CM

## 2019-10-08 PROCEDURE — 99215 OFFICE O/P EST HI 40 MIN: CPT | Performed by: PSYCHIATRY & NEUROLOGY

## 2019-10-08 RX ORDER — PRIMIDONE 50 MG/1
TABLET ORAL
Qty: 15 TABLET | Refills: 2 | Status: SHIPPED | OUTPATIENT
Start: 2019-10-08 | End: 2019-12-11 | Stop reason: SDUPTHER

## 2019-10-08 NOTE — PROGRESS NOTES
Subjective   Lashaun Bernal, 1960, is a female who is being seen today for   Chief Complaint   Patient presents with   • Tremors       HISTORY OF PRESENT ILLNESS: Extended follow-up.  Patient seen for multiple problems.  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  Patient has family history of Parkinson tremor in mother.  Patient is to start low-dose primidone at 50 mg one half p.o. nightly not driving for 3 days after starting it and watching for side effects.  Patient has asthma and is not a candidate for beta-blocker.  Patient has neck pain and cervical disc protrusion more to the left than the right with abnormality seen on EMG.  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  She still had some numbness lateral face but most of the other numbness is gone away.  Patient was treated for the RMSF.  Patient was seen for her carotid stenosis by vascular surgery in [place] Dr. Mike.  She had no further treatment of that.  Patient has had recent MRI brain noncontributory and previous MRA brain noncontributory.  Patient denies headaches    REVIEW OF SYSTEMS:   GENERAL: Patient's blood pressure today 130/80 left arm seated in same standing with pulse 78.  PULMONARY: Patient wears CPAP  CVS: No acute chest pain or palpitation  GASTROINTESTINAL: No acute GI distress  GENITOURINARY: No acute  distress  GYN: I discussed with her possible interaction between primidone and her estradiol and is to watch for that.  MUSCULOSKELETAL: As above  HEENT: No acute vision or hearing change  ENDOCRINE: No acute endocrine symptoms  PSYCHIATRIC: No acute psychiatric symptoms  HEMATOLOGY: Borderline low hemoglobin  SKIN: No acute skin changes  Family history reviewed and otherwise noncontributory  Social history: Patient denies smoking drug or alcohol use    PHYSICAL EXAMINATION:    GENERAL: No acute  distress.  Negative Tinel's wrist and elbow.  Questionable positive Adson's maneuvers on the right.  Patient has fairly significant intentional tremor both upper extremities with no cogwheeling or rigidity or tremor at rest.  CRANIUM: Normocephalic/atraumatic  HEENT:        EYES: EOMs intact without nystagmus and fields full to confrontation.  No acute fundic abnormalities.  Pupils equal round reactive to light.       EARS: Tympanic membranes normal and hears tuning fork bilaterally       THROAT: Somewhat crowded posterior pharynx       NECK: Patient apparently has had some lymphadenopathy is being worked up  CHEST: No acute cardiopulmonary abnormalities by auscultation  ABDOMEN: Nondistended    NEURO: Patient alert and follows commands without difficulty  SPEECH: Normal    CRANIAL NERVES: Motor/sensory about the face normal except for some decreased pin sensation laterally bilaterally.  EXTREMITIES: Dorsalis pedis pulses symmetrical  MOTOR STRENGTH: Motor strength upper lower extremities normal  STATION AND GAIT: Gait normal/Romberg negative  CEREBELLAR: Finger-nose and heel shin normal  SENSORY: Normal pin and vibration upper and lower extremities   REFLEXES: Reflexes present symmetric upper lower extremity without clonus or Babinski      ASSESSMENT AND PLAN: Patient with facial numbness which may relate to the RMSF.  Patient with cervical spine MRI changes and seeing orthopedic surgery about that.  Patient with intentional tremor and is to start low-dose primidone warning of side effects not to be driving for at least 3 days after starting the medication.  Patient already on clonazepam which may be helping the tremor as well.  I spent 40 minutes with this patient with 30 minutes counseling.Patient's Body mass index is 38.11 kg/m². BMI is above normal parameters. Recommendations include: referral to primary care.  Safety precautions reviewed      Lashaun was seen today for tremors.    Diagnoses and all orders for  this visit:    Facial numbness    Numbness of upper extremity  -     US Segmental Limbs Upper Arterial With Stress; Future    Tremor    Neck pain  -     Ambulatory Referral to Orthopedic Surgery    Other orders  -     primidone (MYSOLINE) 50 MG tablet; Take 1/2 tablet by mouth at hs        Dictated utilizing Dragon voice recognition software

## 2019-10-11 ENCOUNTER — TELEPHONE (OUTPATIENT)
Dept: NEUROLOGY | Facility: CLINIC | Age: 59
End: 2019-10-11

## 2019-10-11 NOTE — TELEPHONE ENCOUNTER
----- Message from Santhosh DosS antos MD sent at 10/11/2019 11:50 AM CDT -----  I discussed this with the patient when she was in the office here.  I did mention that there was interaction with the primidone and the Estrace.  The low-dose primidone should not have a dramatic change as far as the Estrace however if the patient is taking the Estrace only for postmenopausal symptoms and the patient's postmenopausal symptoms return we did would need to be notified about that.  If patient is okay to except that we can go on and contact the pharmacy.  If the patient is taking Estrace for some other reason we need to know about it.  ----- Message -----  From: Latasha Winter LPN  Sent: 10/11/2019  11:23 AM  To: Santhosh Dos Santos MD    [facility] said Mysoline interact and decrease the level of Estrace.  They want to know if you want them to fill the Mysoline.

## 2019-10-11 NOTE — TELEPHONE ENCOUNTER
Lashaun notified, she said she had a hysterectomy and has taken Estrace since.  She wants to try Mysoline.  [facility] notified to fill script.

## 2019-10-14 ENCOUNTER — OFFICE VISIT (OUTPATIENT)
Dept: GASTROENTEROLOGY | Facility: CLINIC | Age: 59
End: 2019-10-14

## 2019-10-14 VITALS
HEART RATE: 103 BPM | BODY MASS INDEX: 39.16 KG/M2 | WEIGHT: 221 LBS | SYSTOLIC BLOOD PRESSURE: 136 MMHG | HEIGHT: 63 IN | DIASTOLIC BLOOD PRESSURE: 82 MMHG

## 2019-10-14 DIAGNOSIS — K21.00 GASTROESOPHAGEAL REFLUX DISEASE WITH ESOPHAGITIS: Primary | ICD-10-CM

## 2019-10-14 DIAGNOSIS — R74.8 ELEVATED LIVER ENZYMES: ICD-10-CM

## 2019-10-14 DIAGNOSIS — K76.0 FATTY LIVER: ICD-10-CM

## 2019-10-14 PROCEDURE — 99213 OFFICE O/P EST LOW 20 MIN: CPT | Performed by: PHYSICIAN ASSISTANT

## 2019-10-14 NOTE — PROGRESS NOTES
Chief Complaint   Patient presents with   • Heartburn   • Fatty Liver   • Elevated Hepatic Enzymes       ENDO PROCEDURE ORDERED:    Subjective    Lashaun Bernal is a 58 y.o. female. she is here today for follow-up.    History of Present Illness    The patient is seen on a recheck of her GERD, fatty liver, elevated liver enzymes, F0/S2/N1. Last seen 06/13/2019. She has been following with Dr. Ponce. Bilateral renal ultrasound was noted on 07/29/2019. She currently complains of a sore throat today. GERD is doing okay on the Prilosec 40 mg b.i.d. She denied nausea, vomiting or dysphagia. She is on MiraLAX for the constipation. She denies blood or mucus in her stool. Weight is up 4.7 pounds since last visit. Last colonoscopy showed diverticulosis and hemorrhoids on 01/23/2015.    Laboratory on 07/17/2019, she was positive for kira mountain spotted fever. Negative for Lyme or Ehrlichia. She was treated with doxycycline she states. Magnesium, vitamin D, B12 and CBC were normal. CMP showed glucose of 130, ALT 41, otherwise normal. Laboratory on 07/19/2019, renal function panel showed glucose 104, CO2 of 20.9, GFR 59, otherwise normal. Urinalysis showed some trace abnormalities.    Laboratory on 09/12/2019, TSH was 4.22. Cholesterol panel showed triglycerides 265, negative LAMONT. Fairly normal CBC. CMP showed glucose 116, CO2 of 21.5, AST 39, ALT 61, GFR 64, otherwise normal. Laboratory on 09/19/2019 showed negative hepatitis diagnostic panel, normal T3 and T4.    ASSESSMENT/PLAN: Patient with chronic GERD, steatosis with elevated liver enzymes secondary to the above. I encouraged dietary modification and weight loss. It is likely the reflux is worse with the weight gain and her enzymes are up likely secondary to that as well. Again discussed the importance of dietary modifications and significant weight loss. We will plan followup in 4 months with CADENA FibroSure and INR prior. We will continue on the Prilosec.       The  "following portions of the patient's history were reviewed and updated as appropriate:   Past Medical History:   Diagnosis Date   • Allergic rhinitis    • Arthritis    • Asthma    • Breast lump    • Chest pain    • Chronic low back pain    • Constipation    • Depressive disorder    • Diarrhea    • Diverticular disease of colon    • Epigastric pain    • DIETER (generalized anxiety disorder)    • GERD (gastroesophageal reflux disease)    • Head ache    • Hematochezia    • Herpes zoster     mid back, near spine   • Periumbilical pain    • PONV (postoperative nausea and vomiting)    • Maurice Mountain spotted fever     pt states \"currently has it\"     Past Surgical History:   Procedure Laterality Date   • ABDOMINOPLASTY  2004   • AUGMENTATION MAMMAPLASTY     • BREAST AUGMENTATION BILATERAL MASTOPEXY     • BUNIONECTOMY Left 3/20/2017    Procedure: EXCISION OF CALCANEAL SPURS LEFT FOOT AND REATTACHMENT OF ACHILLES TENDON ;  Surgeon: Jarrell Mar MD;  Location: [facility];  Service:    • COLONOSCOPY  2015   • CYST REMOVAL  1961    Excision of sebaceous cyst. Left ear   • FINGER/THUMB LESION/CYST EXCISION  10/29/2014   • HAND SURGERY  2013   • HYSTERECTOMY     • OOPHORECTOMY     • ORIF ULNA/RADIUS FRACTURES  2013   • TUBAL ABDOMINAL LIGATION  1985    Laparoscopic tubal sterilization; dilatation and curettage. Desires tubal ligation;       Family History   Problem Relation Age of Onset   • Diabetes Other    • Heart disease Other    • Hypertension Other      OB History      Para Term  AB Living    2 2 2          SAB TAB Ectopic Molar Multiple Live Births                       Allergies   Allergen Reactions   • Cefprozil Nausea And Vomiting   • Penicillins Rash     Social History     Socioeconomic History   • Marital status: Single     Spouse name: Not on file   • Number of children: Not on file   • Years of education: Not on file   • Highest education level: Not on file   Tobacco " Use   • Smoking status: Never Smoker   • Smokeless tobacco: Never Used   Substance and Sexual Activity   • Alcohol use: No   • Drug use: No   • Sexual activity: Defer     Current Medications:  Prior to Admission medications    Medication Sig Start Date End Date Taking? Authorizing Provider   albuterol (PROVENTIL HFA;VENTOLIN HFA) 108 (90 Base) MCG/ACT inhaler Inhale 2 puffs Every 4 (Four) Hours As Needed for Wheezing. 11/27/18  Yes Gretchen Gauthier APRN   albuterol (PROVENTIL) (2.5 MG/3ML) 0.083% nebulizer solution Take 2.5 mg by nebulization Every 6 (Six) Hours As Needed for Wheezing. 11/27/18  Yes Gretchen Gauthier APRN   cetirizine (zyrTEC) 10 MG tablet Take 1 tablet by mouth Daily. 10/3/19 10/2/20 Yes Cesar Fisher DO   clonazePAM (KlonoPIN) 0.5 MG tablet Take 0.5 mg by mouth 2 (Two) Times a Day As Needed for Anxiety. 6/11/19  Yes Maryam Mustafa MD   cyclobenzaprine (FLEXERIL) 10 MG tablet Take 1 tablet by mouth 3 (Three) Times a Day As Needed for Muscle Spasms. 6/12/19  Yes Gretchen Gauthier APRN   DULoxetine (CYMBALTA) 20 MG capsule Take 1 capsule by mouth Daily. 10/4/19  Yes Gretchen Gauthier APRN   estradiol (ESTRACE) 1 MG tablet TAKE 1 TABLET BY MOUTH DAILY. 8/28/19  Yes Eric Rowell MD   FLUoxetine (PROzac) 20 MG capsule Take 20 mg by mouth 2 (Two) Times a Day.   Yes Maryam Mustafa MD   FLUoxetine (PROzac) 40 MG capsule  9/4/19  Yes Maryam Mustafa MD   hydroxychloroquine (PLAQUENIL) 200 MG tablet Take 200 mg by mouth Daily.   Yes Maryam Mustafa MD   omeprazole (priLOSEC) 40 MG capsule Take 40 mg by mouth 2 (Two) Times a Day.   Yes Maryam Mustafa MD   polyethylene glycol (MIRALAX) powder Take 17 g by mouth 2 (Two) Times a Day As Needed (constipation). 8/23/18  Yes Eric Rowell MD   primidone (MYSOLINE) 50 MG tablet Take 1/2 tablet by mouth at hs 10/8/19  Yes Santhosh Dos Santos MD     Review of Systems  Review of Systems       Objective    /82 (BP Location: Left  "arm)   Pulse 103   Ht 160 cm (63\")   Wt 100 kg (221 lb)   LMP 03/09/1991 (Approximate)   BMI 39.15 kg/m²   Physical Exam   Constitutional: She is oriented to person, place, and time. She appears well-developed and well-nourished. No distress.   HENT:   Head: Normocephalic and atraumatic.   Eyes: EOM are normal. Pupils are equal, round, and reactive to light.   Neck: Normal range of motion.   Cardiovascular: Normal rate, regular rhythm and normal heart sounds.   Pulmonary/Chest: Effort normal and breath sounds normal.   Abdominal: Soft. Bowel sounds are normal. She exhibits no shifting dullness, no distension, no abdominal bruit, no ascites and no mass. There is no hepatosplenomegaly. There is tenderness. There is no rigidity, no rebound, no guarding and no CVA tenderness. No hernia. Hernia confirmed negative in the ventral area.   RUQ, obese   Musculoskeletal: Normal range of motion.   Neurological: She is alert and oriented to person, place, and time.   Skin: Skin is warm and dry.   Psychiatric: She has a normal mood and affect. Her behavior is normal. Judgment and thought content normal.   Nursing note and vitals reviewed.    Assessment/Plan      1. Gastroesophageal reflux disease with esophagitis    2. Elevated liver enzymes    3. Fatty liver    .   Lashaun was seen today for heartburn, fatty liver and elevated hepatic enzymes.    Diagnoses and all orders for this visit:    Gastroesophageal reflux disease with esophagitis    Elevated liver enzymes  -     CADENA Fibrosure; Future  -     Protime-INR; Future    Fatty liver  Comments:  F0/S2/N1  Orders:  -     CADENA Fibrosure; Future  -     Protime-INR; Future        Orders placed during this encounter include:  Orders Placed This Encounter   Procedures   • CADENA Fibrosure     Standing Status:   Future     Standing Expiration Date:   4/11/2020   • Protime-INR     Standing Status:   Future     Standing Expiration Date:   4/11/2020       Medications prescribed:  No " orders of the defined types were placed in this encounter.      Requested Prescriptions      No prescriptions requested or ordered in this encounter       Review and/or summary of lab tests, radiology, procedures, medications. Review and summary of old records and obtaining of history. The risks and benefits of my recommendations, as well as other treatment options were discussed with the patient today. Questions were answered.    Follow-up: Return in about 4 months (around 2/14/2020), or if symptoms worsen or fail to improve, for lab prior.     * Surgery not found *      This document has been electronically signed by Marcial Walker PA-C on October 14, 2019 6:08 PM      Results for orders placed or performed in visit on 09/19/19   Hepatitis panel, acute   Result Value Ref Range    Hepatitis B Surface Ag Non-Reactive Non-Reactive    Hep A IgM Non-Reactive Non-Reactive    Hep B C IgM Non-Reactive Non-Reactive    Hepatitis C Ab Non-Reactive Non-Reactive   T3   Result Value Ref Range    T3, Total 178.0 80.0 - 200.0 ng/dl   T4, free   Result Value Ref Range    Free T4 0.93 0.93 - 1.70 ng/dL   Results for orders placed or performed in visit on 09/12/19   CBC Auto Differential   Result Value Ref Range    WBC 7.12 3.40 - 10.80 10*3/mm3    RBC 4.45 3.77 - 5.28 10*6/mm3    Hemoglobin 13.1 12.0 - 15.9 g/dL    Hematocrit 39.0 34.0 - 46.6 %    MCV 87.6 79.0 - 97.0 fL    MCH 29.4 26.6 - 33.0 pg    MCHC 33.6 31.5 - 35.7 g/dL    RDW 12.3 12.3 - 15.4 %    RDW-SD 39.4 37.0 - 54.0 fl    MPV 10.8 6.0 - 12.0 fL    Platelets 300 140 - 450 10*3/mm3    Neutrophil % 45.9 42.7 - 76.0 %    Lymphocyte % 39.9 19.6 - 45.3 %    Monocyte % 8.7 5.0 - 12.0 %    Eosinophil % 4.2 0.3 - 6.2 %    Basophil % 0.7 0.0 - 1.5 %    Immature Grans % 0.6 (H) 0.0 - 0.5 %    Neutrophils, Absolute 3.27 1.70 - 7.00 10*3/mm3    Lymphocytes, Absolute 2.84 0.70 - 3.10 10*3/mm3    Monocytes, Absolute 0.62 0.10 - 0.90 10*3/mm3    Eosinophils, Absolute 0.30 0.00 -  0.40 10*3/mm3    Basophils, Absolute 0.05 0.00 - 0.20 10*3/mm3    Immature Grans, Absolute 0.04 0.00 - 0.05 10*3/mm3    nRBC 0.0 0.0 - 0.2 /100 WBC   TSH   Result Value Ref Range    TSH 4.220 (H) 0.270 - 4.200 uIU/mL   Lipid panel   Result Value Ref Range    Total Cholesterol 188 0 - 200 mg/dL    Triglycerides 265 (H) 0 - 150 mg/dL    HDL Cholesterol 43 40 - 60 mg/dL    LDL Cholesterol  92 0 - 100 mg/dL    VLDL Cholesterol 53 (H) 5 - 40 mg/dL    LDL/HDL Ratio 2.14    Comprehensive Metabolic Panel   Result Value Ref Range    Glucose 116 (H) 65 - 99 mg/dL    BUN 17 6 - 20 mg/dL    Creatinine 0.90 0.57 - 1.00 mg/dL    Sodium 141 136 - 145 mmol/L    Potassium 4.3 3.5 - 5.2 mmol/L    Chloride 106 98 - 107 mmol/L    CO2 21.5 (L) 22.0 - 29.0 mmol/L    Calcium 9.1 8.6 - 10.5 mg/dL    Total Protein 6.9 6.0 - 8.5 g/dL    Albumin 4.10 3.50 - 5.20 g/dL    ALT (SGPT) 61 (H) 1 - 33 U/L    AST (SGOT) 39 (H) 1 - 32 U/L    Alkaline Phosphatase 94 39 - 117 U/L    Total Bilirubin 0.2 0.2 - 1.2 mg/dL    eGFR Non African Amer 64 >60 mL/min/1.73    Globulin 2.8 gm/dL    A/G Ratio 1.5 g/dL    BUN/Creatinine Ratio 18.9 7.0 - 25.0    Anion Gap 13.5 5.0 - 15.0 mmol/L   Results for orders placed or performed in visit on 09/12/19   LAMONT   Result Value Ref Range    LAMONT Direct Negative Negative   Results for orders placed or performed during the hospital encounter of 07/26/19   [facility]   Result Value Ref Range    Extra Tube Hold for add-ons.    CBC Auto Differential   Result Value Ref Range    WBC 7.70 3.40 - 10.80 10*3/mm3    RBC 4.09 3.77 - 5.28 10*6/mm3    Hemoglobin 11.8 (L) 12.0 - 15.9 g/dL    Hematocrit 35.5 34.0 - 46.6 %    MCV 86.8 79.0 - 97.0 fL    MCH 28.9 26.6 - 33.0 pg    MCHC 33.2 31.5 - 35.7 g/dL    RDW 12.5 12.3 - 15.4 %    RDW-SD 39.5 37.0 - 54.0 fl    MPV 9.9 6.0 - 12.0 fL    Platelets 269 140 - 450 10*3/mm3    Neutrophil % 44.8 42.7 - 76.0 %    Lymphocyte % 40.1 19.6 - 45.3 %    Monocyte % 10.1 5.0 - 12.0 %    Eosinophil  % 3.6 0.3 - 6.2 %    Basophil % 0.8 0.0 - 1.5 %    Immature Grans % 0.6 (H) 0.0 - 0.5 %    Neutrophils, Absolute 3.44 1.70 - 7.00 10*3/mm3    Lymphocytes, Absolute 3.09 0.70 - 3.10 10*3/mm3    Monocytes, Absolute 0.78 0.10 - 0.90 10*3/mm3    Eosinophils, Absolute 0.28 0.00 - 0.40 10*3/mm3    Basophils, Absolute 0.06 0.00 - 0.20 10*3/mm3    Immature Grans, Absolute 0.05 0.00 - 0.05 10*3/mm3    nRBC 0.0 0.0 - 0.2 /100 WBC   Troponin   Result Value Ref Range    Troponin T <0.010 0.000 - 0.030 ng/mL   aPTT   Result Value Ref Range    PTT 25.3 20.0 - 40.3 seconds   Protime-INR   Result Value Ref Range    Protime 12.4 11.1 - 15.3 Seconds    INR 0.94 0.80 - 1.20   POC Glucose Once   Result Value Ref Range    Glucose 141 (H) 70 - 130 mg/dL   Basic Metabolic Panel   Result Value Ref Range    Glucose 134 (H) 65 - 99 mg/dL    BUN 19 6 - 20 mg/dL    Creatinine 0.88 0.57 - 1.00 mg/dL    Sodium 138 136 - 145 mmol/L    Potassium 3.7 3.5 - 5.2 mmol/L    Chloride 103 98 - 107 mmol/L    CO2 22.0 22.0 - 29.0 mmol/L    Calcium 8.6 8.6 - 10.5 mg/dL    eGFR Non African Amer 66 >60 mL/min/1.73    BUN/Creatinine Ratio 21.6 7.0 - 25.0    Anion Gap 13.0 5.0 - 15.0 mmol/L   Results for orders placed or performed in visit on 07/19/19   Urinalysis, Microscopic Only - Urine, Clean Catch   Result Value Ref Range    RBC, UA 3-5 (A) None Seen, 0-2 /HPF    WBC, UA 0-2 None Seen, 0-2 /HPF    Bacteria, UA 1+ (A) None Seen /HPF    Squamous Epithelial Cells, UA 21-30 (A) None Seen, 0-2 /HPF    Hyaline Casts, UA 3-6 None Seen /LPF    Methodology Automated Microscopy      *Note: Due to a large number of results and/or encounters for the requested time period, some results have not been displayed. A complete set of results can be found in Results Review.       Some portions of this note have been dictated using voice recognition software and may contain errors and/or omissions.

## 2019-10-14 NOTE — PATIENT INSTRUCTIONS

## 2019-10-21 ENCOUNTER — HOSPITAL ENCOUNTER (OUTPATIENT)
Dept: ULTRASOUND IMAGING | Facility: HOSPITAL | Age: 59
Discharge: HOME | End: 2019-10-21
Admitting: PSYCHIATRY & NEUROLOGY

## 2019-10-21 DIAGNOSIS — R20.0 NUMBNESS OF UPPER EXTREMITY: ICD-10-CM

## 2019-10-21 PROCEDURE — 93923 UPR/LXTR ART STDY 3+ LVLS: CPT

## 2019-10-21 PROCEDURE — 93923 UPR/LXTR ART STDY 3+ LVLS: CPT | Performed by: SURGERY

## 2019-10-23 DIAGNOSIS — R20.0 NUMBNESS OF UPPER EXTREMITY: Primary | ICD-10-CM

## 2019-10-25 ENCOUNTER — TELEPHONE (OUTPATIENT)
Dept: VASCULAR SURGERY | Facility: CLINIC | Age: 59
End: 2019-10-25

## 2019-10-28 ENCOUNTER — APPOINTMENT (OUTPATIENT)
Dept: LAB | Facility: HOSPITAL | Age: 59
End: 2019-10-28

## 2019-10-28 ENCOUNTER — OFFICE VISIT (OUTPATIENT)
Dept: FAMILY MEDICINE CLINIC | Facility: CLINIC | Age: 59
End: 2019-10-28

## 2019-10-28 VITALS
HEIGHT: 63 IN | DIASTOLIC BLOOD PRESSURE: 76 MMHG | SYSTOLIC BLOOD PRESSURE: 124 MMHG | WEIGHT: 218.1 LBS | BODY MASS INDEX: 38.64 KG/M2

## 2019-10-28 DIAGNOSIS — G54.0 THORACIC OUTLET SYNDROME: Primary | ICD-10-CM

## 2019-10-28 DIAGNOSIS — Z79.899 HIGH RISK MEDICATION USE: ICD-10-CM

## 2019-10-28 LAB
AMPHET+METHAMPHET UR QL: NEGATIVE
AMPHETAMINES UR QL: NEGATIVE
BARBITURATES UR QL SCN: NEGATIVE
BENZODIAZ UR QL SCN: NEGATIVE
BUPRENORPHINE SERPL-MCNC: NEGATIVE NG/ML
CANNABINOIDS SERPL QL: NEGATIVE
COCAINE UR QL: NEGATIVE
METHADONE UR QL SCN: NEGATIVE
OPIATES UR QL: NEGATIVE
OXYCODONE UR QL SCN: NEGATIVE
PCP UR QL SCN: NEGATIVE
PROPOXYPH UR QL: NEGATIVE
TRICYCLICS UR QL SCN: POSITIVE

## 2019-10-28 PROCEDURE — 99213 OFFICE O/P EST LOW 20 MIN: CPT | Performed by: NURSE PRACTITIONER

## 2019-10-28 PROCEDURE — 80307 DRUG TEST PRSMV CHEM ANLYZR: CPT | Performed by: NURSE PRACTITIONER

## 2019-10-28 RX ORDER — GABAPENTIN 300 MG/1
300 CAPSULE ORAL 2 TIMES DAILY
Qty: 60 CAPSULE | Refills: 2 | Status: SHIPPED | OUTPATIENT
Start: 2019-10-28 | End: 2020-02-05 | Stop reason: SDUPTHER

## 2019-10-28 NOTE — PROGRESS NOTES
"Subjective   Lashaun Bernal is a 58 y.o. female.  Here today with complaints of increasing muscle aches and pain.  Placed on Cymbalta for fibromyalgia.  Currently sees neurologist at [place] who is since diagnosed her with thoracic outlet syndrome.  She is going to be scheduled to see Dr. Mike for further evaluation.  Cymbalta \"has not been helping my pain at all.\"    Pain   This is a chronic problem. The current episode started more than 1 month ago. The problem occurs constantly. The problem has been unchanged. Associated symptoms include arthralgias and myalgias. Pertinent negatives include no chills, diaphoresis, fatigue or fever. Nothing aggravates the symptoms. She has tried rest, acetaminophen and NSAIDs (Cymbalta) for the symptoms. The treatment provided mild relief.        The following portions of the patient's history were reviewed and updated as appropriate:     Current Outpatient Medications   Medication Sig Dispense Refill   • albuterol (PROVENTIL HFA;VENTOLIN HFA) 108 (90 Base) MCG/ACT inhaler Inhale 2 puffs Every 4 (Four) Hours As Needed for Wheezing. 18 g 2   • albuterol (PROVENTIL) (2.5 MG/3ML) 0.083% nebulizer solution Take 2.5 mg by nebulization Every 6 (Six) Hours As Needed for Wheezing. 120 vial 2   • cetirizine (zyrTEC) 10 MG tablet Take 1 tablet by mouth Daily. 30 tablet 0   • clonazePAM (KlonoPIN) 0.5 MG tablet Take 0.5 mg by mouth 2 (Two) Times a Day As Needed for Anxiety.     • cyclobenzaprine (FLEXERIL) 10 MG tablet Take 1 tablet by mouth 3 (Three) Times a Day As Needed for Muscle Spasms. 90 tablet 5   • estradiol (ESTRACE) 1 MG tablet TAKE 1 TABLET BY MOUTH DAILY. 90 tablet 0   • FLUoxetine (PROzac) 20 MG capsule Take 20 mg by mouth 2 (Two) Times a Day.     • FLUoxetine (PROzac) 40 MG capsule      • hydroxychloroquine (PLAQUENIL) 200 MG tablet Take 200 mg by mouth Daily.     • omeprazole (priLOSEC) 40 MG capsule Take 40 mg by mouth 2 (Two) Times a Day.     • polyethylene glycol " (MIRALAX) powder Take 17 g by mouth 2 (Two) Times a Day As Needed (constipation). 1 each 11   • primidone (MYSOLINE) 50 MG tablet Take 1/2 tablet by mouth at hs 15 tablet 2   • gabapentin (NEURONTIN) 300 MG capsule Take 1 capsule by mouth 2 (Two) Times a Day. 60 capsule 2     No current facility-administered medications for this visit.      Current Outpatient Medications on File Prior to Visit   Medication Sig   • albuterol (PROVENTIL HFA;VENTOLIN HFA) 108 (90 Base) MCG/ACT inhaler Inhale 2 puffs Every 4 (Four) Hours As Needed for Wheezing.   • albuterol (PROVENTIL) (2.5 MG/3ML) 0.083% nebulizer solution Take 2.5 mg by nebulization Every 6 (Six) Hours As Needed for Wheezing.   • cetirizine (zyrTEC) 10 MG tablet Take 1 tablet by mouth Daily.   • clonazePAM (KlonoPIN) 0.5 MG tablet Take 0.5 mg by mouth 2 (Two) Times a Day As Needed for Anxiety.   • cyclobenzaprine (FLEXERIL) 10 MG tablet Take 1 tablet by mouth 3 (Three) Times a Day As Needed for Muscle Spasms.   • estradiol (ESTRACE) 1 MG tablet TAKE 1 TABLET BY MOUTH DAILY.   • FLUoxetine (PROzac) 20 MG capsule Take 20 mg by mouth 2 (Two) Times a Day.   • FLUoxetine (PROzac) 40 MG capsule    • hydroxychloroquine (PLAQUENIL) 200 MG tablet Take 200 mg by mouth Daily.   • omeprazole (priLOSEC) 40 MG capsule Take 40 mg by mouth 2 (Two) Times a Day.   • polyethylene glycol (MIRALAX) powder Take 17 g by mouth 2 (Two) Times a Day As Needed (constipation).   • primidone (MYSOLINE) 50 MG tablet Take 1/2 tablet by mouth at hs   • [DISCONTINUED] DULoxetine (CYMBALTA) 20 MG capsule Take 1 capsule by mouth Daily.     No current facility-administered medications on file prior to visit.      She is allergic to cefprozil and penicillins..    Review of Systems   Constitutional: Negative.  Negative for chills, diaphoresis, fatigue and fever.   Respiratory: Negative.    Cardiovascular: Negative.    Gastrointestinal: Negative.    Genitourinary: Negative.    Musculoskeletal: Positive for  "arthralgias and myalgias.   Skin: Negative.    Psychiatric/Behavioral: Negative.  Negative for confusion.       Objective    Visit Vitals  /76   Ht 160 cm (63\")   Wt 98.9 kg (218 lb 1.6 oz)   LMP 03/09/1991 (Approximate)   BMI 38.63 kg/m²       Physical Exam   Constitutional: She is oriented to person, place, and time. She appears well-developed and well-nourished.   Neck: Normal range of motion. Neck supple.   Cardiovascular: Normal rate, regular rhythm and normal heart sounds.   Pulmonary/Chest: Effort normal and breath sounds normal.   Musculoskeletal: Normal range of motion.   Neurological: She is alert and oriented to person, place, and time.   Skin: Skin is warm. Capillary refill takes less than 2 seconds.   Psychiatric: She has a normal mood and affect. Her behavior is normal.   Nursing note and vitals reviewed.      Assessment/Plan   Problems Addressed this Visit     None      Visit Diagnoses     Thoracic outlet syndrome    -  Primary    Relevant Medications    gabapentin (NEURONTIN) 300 MG capsule    High risk medication use        Relevant Orders    Urine Drug Screen - Urine, Clean Catch        1.  Thoracic outlet syndrome:  Discontinue Cymbalta as previously prescribed  Begin Neurontin as prescribed to be taken 1 p.o. twice daily  Educated on possible side effects of this medication including but not limited to increased risk for drowsiness, sedation and dependency  Instructed not to take this medication prior to driving or operating heavy machinery  Continue neurological follow-up with Dr. Dos Santos as scheduled on December 11, 2019    2.  High-risk medication use:  TEE reviewed by me and will be scanned into medical record  Controlled substance contract signed and dated and will be scanned into medical record  Complete urine drug screen as ordered     Continue on current medications as previously prescribed   I spent 16 minutes in direct face to face contact with patient.  Greater than 50% of this " time was spent counseling patient and discussing plan of care.  Return in about 4 weeks (around 11/25/2019) for Recheck.        This document has been electronically signed by MAYELA Flores on October 28, 2019 8:55 AM

## 2019-11-20 ENCOUNTER — OFFICE VISIT (OUTPATIENT)
Dept: ORTHOPEDIC SURGERY | Facility: CLINIC | Age: 59
End: 2019-11-20

## 2019-11-20 VITALS — BODY MASS INDEX: 39.16 KG/M2 | WEIGHT: 221 LBS | HEIGHT: 63 IN

## 2019-11-20 DIAGNOSIS — M25.531 RIGHT WRIST PAIN: Primary | ICD-10-CM

## 2019-11-20 PROCEDURE — 96372 THER/PROPH/DIAG INJ SC/IM: CPT | Performed by: NURSE PRACTITIONER

## 2019-11-20 PROCEDURE — 99214 OFFICE O/P EST MOD 30 MIN: CPT | Performed by: NURSE PRACTITIONER

## 2019-11-20 RX ORDER — KETOROLAC TROMETHAMINE 30 MG/ML
60 INJECTION, SOLUTION INTRAMUSCULAR; INTRAVENOUS ONCE
Status: COMPLETED | OUTPATIENT
Start: 2019-11-20 | End: 2019-11-20

## 2019-11-20 RX ORDER — TRIAMCINOLONE ACETONIDE 40 MG/ML
80 INJECTION, SUSPENSION INTRA-ARTICULAR; INTRAMUSCULAR ONCE
Status: COMPLETED | OUTPATIENT
Start: 2019-11-20 | End: 2019-11-20

## 2019-11-20 RX ADMIN — KETOROLAC TROMETHAMINE 60 MG: 30 INJECTION, SOLUTION INTRAMUSCULAR; INTRAVENOUS at 14:57

## 2019-11-20 RX ADMIN — TRIAMCINOLONE ACETONIDE 80 MG: 40 INJECTION, SUSPENSION INTRA-ARTICULAR; INTRAMUSCULAR at 14:59

## 2019-11-20 NOTE — PROGRESS NOTES
"Lashaun Bernal is a 58 y.o. female returns for     Chief Complaint   Patient presents with   • Right Wrist - Pain, Follow-up       HISTORY OF PRESENT ILLNESS:    58-year-old  female in the office today for follow-up evaluation of worsening right wrist pain without any traumatic injury.  The patient previously had underwent an ORIF of the distal radial fracture several years back and has subsequently developed wrist pain with swelling that is diffuse throughout the carpal and ulnar aspect of the wrist.  She denies any fever chills at this time.  She reports that she is been taking over-the-counter medication for the discomfort without any improvement.  She was evaluated by Dr. Clifton who placed shot in the wrist however this did not improve her symptoms either.       CONCURRENT MEDICAL HISTORY:    The following portions of the patient's history were reviewed and updated as appropriate: allergies, current medications, past family history, past medical history, past social history, past surgical history and problem list.     ROS  No fevers or chills.  No chest pain or shortness of air.  No GI or  disturbances.    PHYSICAL EXAMINATION:       Ht 160 cm (63\")   Wt 100 kg (221 lb)   LMP 03/09/1991 (Approximate)   BMI 39.15 kg/m²     Physical Exam   Constitutional: She is oriented to person, place, and time. Vital signs are normal. She appears well-developed and well-nourished. She is cooperative.   HENT:   Head: Normocephalic and atraumatic.   Neck: Trachea normal and phonation normal.   Pulmonary/Chest: Effort normal. No respiratory distress.   Abdominal: Soft. Normal appearance. She exhibits no distension.   Neurological: She is alert and oriented to person, place, and time. GCS eye subscore is 4. GCS verbal subscore is 5. GCS motor subscore is 6.   Skin: Skin is warm, dry and intact. Capillary refill takes less than 2 seconds.   Psychiatric: She has a normal mood and affect. Her speech is normal and " behavior is normal. Judgment and thought content normal. Cognition and memory are normal.   Vitals reviewed.      GAIT:     [x]  Normal  []  Antalgic    Assistive device: [x]  None  []  Walker     []  Crutches  []  Cane     []  Wheelchair  []  Stretcher    Right Hand Exam     Tenderness   The patient is experiencing tenderness in the ulnar area.      Left Hand Exam   Left hand exam is normal.              No results found.          ASSESSMENT:    Diagnoses and all orders for this visit:    Right wrist pain  -     MRI Wrist Right Without Contrast; Future  -     ketorolac (TORADOL) injection 60 mg  -     triamcinolone acetonide (KENALOG-40) injection 80 mg          PLAN  I reviewed today's x-rays compared him to previous films which revealed no change in position alignment of previous ORIF, the surgical hardware of the right radiological abnormality noted.  Will recommend an MRI of the right wrist for further evaluation of the pain over noted on exam over the lateral side of the carpal and ulnar radial joint.  In the meantime the patient was given a isolated dose of Kenalog and Toradol to see if this improves her discomfort and I encouraged her to continue to utilize the brace which has been previously provided by another provider.  No Follow-up on file.    MAYELA Rolon

## 2019-11-25 ENCOUNTER — OFFICE VISIT (OUTPATIENT)
Dept: CARDIAC SURGERY | Facility: CLINIC | Age: 59
End: 2019-11-25

## 2019-11-25 VITALS
DIASTOLIC BLOOD PRESSURE: 72 MMHG | WEIGHT: 218 LBS | SYSTOLIC BLOOD PRESSURE: 126 MMHG | BODY MASS INDEX: 38.62 KG/M2 | TEMPERATURE: 97.9 F | OXYGEN SATURATION: 98 % | HEIGHT: 63 IN | HEART RATE: 98 BPM

## 2019-11-25 DIAGNOSIS — N18.2 CHRONIC KIDNEY DISEASE, STAGE 2 (MILD): ICD-10-CM

## 2019-11-25 DIAGNOSIS — G60.9 IDIOPATHIC PERIPHERAL NEUROPATHY: Primary | ICD-10-CM

## 2019-11-25 DIAGNOSIS — E78.2 MIXED HYPERLIPIDEMIA: ICD-10-CM

## 2019-11-25 DIAGNOSIS — E66.01 CLASS 2 SEVERE OBESITY DUE TO EXCESS CALORIES WITH SERIOUS COMORBIDITY AND BODY MASS INDEX (BMI) OF 38.0 TO 38.9 IN ADULT (HCC): ICD-10-CM

## 2019-11-25 DIAGNOSIS — I10 BENIGN ESSENTIAL HYPERTENSION: Chronic | ICD-10-CM

## 2019-11-25 DIAGNOSIS — I65.23 BILATERAL CAROTID ARTERY STENOSIS: ICD-10-CM

## 2019-11-25 PROCEDURE — 99214 OFFICE O/P EST MOD 30 MIN: CPT | Performed by: THORACIC SURGERY (CARDIOTHORACIC VASCULAR SURGERY)

## 2019-11-27 ENCOUNTER — OFFICE VISIT (OUTPATIENT)
Dept: FAMILY MEDICINE CLINIC | Facility: CLINIC | Age: 59
End: 2019-11-27

## 2019-11-27 ENCOUNTER — LAB (OUTPATIENT)
Dept: LAB | Facility: HOSPITAL | Age: 59
End: 2019-11-27

## 2019-11-27 ENCOUNTER — TELEPHONE (OUTPATIENT)
Dept: FAMILY MEDICINE CLINIC | Facility: CLINIC | Age: 59
End: 2019-11-27

## 2019-11-27 VITALS — WEIGHT: 216.2 LBS | BODY MASS INDEX: 38.31 KG/M2 | HEIGHT: 63 IN

## 2019-11-27 DIAGNOSIS — G62.9 NEUROPATHY: Primary | ICD-10-CM

## 2019-11-27 DIAGNOSIS — Z79.899 HIGH RISK MEDICATION USE: ICD-10-CM

## 2019-11-27 DIAGNOSIS — E78.2 MIXED HYPERLIPIDEMIA: ICD-10-CM

## 2019-11-27 DIAGNOSIS — R00.2 PALPITATIONS: ICD-10-CM

## 2019-11-27 DIAGNOSIS — R79.89 ELEVATED TSH: ICD-10-CM

## 2019-11-27 DIAGNOSIS — I10 BENIGN ESSENTIAL HYPERTENSION: Primary | ICD-10-CM

## 2019-11-27 DIAGNOSIS — R07.9 CHEST PAIN, UNSPECIFIED TYPE: ICD-10-CM

## 2019-11-27 DIAGNOSIS — I10 BENIGN ESSENTIAL HYPERTENSION: ICD-10-CM

## 2019-11-27 LAB
ALBUMIN SERPL-MCNC: 4.1 G/DL (ref 3.5–5.2)
ALBUMIN/GLOB SERPL: 1.2 G/DL
ALP SERPL-CCNC: 110 U/L (ref 39–117)
ALT SERPL W P-5'-P-CCNC: 72 U/L (ref 1–33)
AMPHET+METHAMPHET UR QL: NEGATIVE
AMPHETAMINES UR QL: NEGATIVE
ANION GAP SERPL CALCULATED.3IONS-SCNC: 13.9 MMOL/L (ref 5–15)
AST SERPL-CCNC: 45 U/L (ref 1–32)
BARBITURATES UR QL SCN: NEGATIVE
BENZODIAZ UR QL SCN: NEGATIVE
BILIRUB SERPL-MCNC: 0.3 MG/DL (ref 0.2–1.2)
BUN BLD-MCNC: 16 MG/DL (ref 6–20)
BUN/CREAT SERPL: 16.2 (ref 7–25)
BUPRENORPHINE SERPL-MCNC: NEGATIVE NG/ML
CALCIUM SPEC-SCNC: 9 MG/DL (ref 8.6–10.5)
CANNABINOIDS SERPL QL: NEGATIVE
CHLORIDE SERPL-SCNC: 104 MMOL/L (ref 98–107)
CHOLEST SERPL-MCNC: 252 MG/DL (ref 0–200)
CK MB SERPL-CCNC: 1.26 NG/ML
CO2 SERPL-SCNC: 21.1 MMOL/L (ref 22–29)
COCAINE UR QL: NEGATIVE
CREAT BLD-MCNC: 0.99 MG/DL (ref 0.57–1)
GFR SERPL CREATININE-BSD FRML MDRD: 58 ML/MIN/1.73
GLOBULIN UR ELPH-MCNC: 3.4 GM/DL
GLUCOSE BLD-MCNC: 81 MG/DL (ref 65–99)
HDLC SERPL-MCNC: 50 MG/DL (ref 40–60)
LDLC SERPL CALC-MCNC: 170 MG/DL (ref 0–100)
LDLC/HDLC SERPL: 3.39 {RATIO}
METHADONE UR QL SCN: NEGATIVE
OPIATES UR QL: NEGATIVE
OXYCODONE UR QL SCN: NEGATIVE
PCP UR QL SCN: NEGATIVE
POTASSIUM BLD-SCNC: 4.4 MMOL/L (ref 3.5–5.2)
PROPOXYPH UR QL: NEGATIVE
PROT SERPL-MCNC: 7.5 G/DL (ref 6–8.5)
SODIUM BLD-SCNC: 139 MMOL/L (ref 136–145)
T3 SERPL-MCNC: 154 NG/DL (ref 80–200)
T4 FREE SERPL-MCNC: 1.18 NG/DL (ref 0.93–1.7)
TRICYCLICS UR QL SCN: POSITIVE
TRIGL SERPL-MCNC: 162 MG/DL (ref 0–150)
TROPONIN T SERPL-MCNC: <0.01 NG/ML (ref 0–0.03)
TSH SERPL DL<=0.05 MIU/L-ACNC: 4.73 UIU/ML (ref 0.27–4.2)
VLDLC SERPL-MCNC: 32.4 MG/DL (ref 5–40)

## 2019-11-27 PROCEDURE — 80307 DRUG TEST PRSMV CHEM ANLYZR: CPT

## 2019-11-27 PROCEDURE — 93010 ELECTROCARDIOGRAM REPORT: CPT | Performed by: INTERNAL MEDICINE

## 2019-11-27 PROCEDURE — 84484 ASSAY OF TROPONIN QUANT: CPT | Performed by: NURSE PRACTITIONER

## 2019-11-27 PROCEDURE — 84480 ASSAY TRIIODOTHYRONINE (T3): CPT

## 2019-11-27 PROCEDURE — 84439 ASSAY OF FREE THYROXINE: CPT

## 2019-11-27 PROCEDURE — 84443 ASSAY THYROID STIM HORMONE: CPT

## 2019-11-27 PROCEDURE — 93005 ELECTROCARDIOGRAM TRACING: CPT | Performed by: NURSE PRACTITIONER

## 2019-11-27 PROCEDURE — 99214 OFFICE O/P EST MOD 30 MIN: CPT | Performed by: NURSE PRACTITIONER

## 2019-11-27 PROCEDURE — 80061 LIPID PANEL: CPT

## 2019-11-27 PROCEDURE — 82553 CREATINE MB FRACTION: CPT | Performed by: NURSE PRACTITIONER

## 2019-11-27 PROCEDURE — 80053 COMPREHEN METABOLIC PANEL: CPT

## 2019-11-27 NOTE — TELEPHONE ENCOUNTER
Per MAYELA Terry, Ms. Bernal has been called with recent lab results & recommendations.  Continue current medications and follow-up as planned or sooner if any problems.      ----- Message from MAYELA Flores sent at 11/27/2019  4:46 PM CST -----  Labs normal, please call or send card!

## 2019-12-02 ENCOUNTER — TELEPHONE (OUTPATIENT)
Dept: FAMILY MEDICINE CLINIC | Facility: CLINIC | Age: 59
End: 2019-12-02

## 2019-12-02 DIAGNOSIS — E78.00 HYPERCHOLESTEROLEMIA: Primary | ICD-10-CM

## 2019-12-02 RX ORDER — ESTRADIOL 1 MG/1
1 TABLET ORAL DAILY
Qty: 90 TABLET | Refills: 0 | OUTPATIENT
Start: 2019-12-02

## 2019-12-02 RX ORDER — COLESEVELAM 180 1/1
1875 TABLET ORAL 2 TIMES DAILY WITH MEALS
Qty: 60 TABLET | Refills: 3 | Status: SHIPPED | OUTPATIENT
Start: 2019-12-02 | End: 2020-02-27

## 2019-12-03 PROBLEM — G60.9 IDIOPATHIC PERIPHERAL NEUROPATHY: Status: ACTIVE | Noted: 2019-12-03

## 2019-12-03 NOTE — TELEPHONE ENCOUNTER
Antonia,    Did you get something from [facility] to PA her WelChol?  She said her insurance is not going to pay?    Please Advise    Thank you

## 2019-12-03 NOTE — PROGRESS NOTES
11/25/2019    Lashaun Bernal  1960    Chief Complaint:    Chief Complaint   Patient presents with   • thoracic outlet syndrome       HPI:      PCP:  Gretchen Gauthier, MAYELA  Neurology:  Dr Dos Santos     58 y.o. female with HTN(stable, increased risk stroke, rupture), Hyperlipidemia(stable, increased risk cardiovascular events), Obesity(uncontrolled, increased risk cardiovascular events) and Chronic Kidney Disease(stable, increased risk renal failure) , neuropathy, radiculopathy(new, possible thoracic outlet).  never smoked.  Mild-moderate weakness both arms x 6 months.  Difficulty drying hair.  No pain, numbness arms, hands..  Dx treated RMSF 7/2019..numbness face resolved.  Neck pain cervical disk protrusion (L>R). Asymptomatic carotid stenosis.  Mild to moderate vertigo, tremor RIGHT hand x 6months.No TIA stroke amaurosis.  No MI claudication. No other associated signs, symptoms or modifying factors.     5/2019 Carotid Duplex:  MARIANO 0-49% (118cm/s, ratio 1.4).  LICA 50-69% (129cm/s, ratio 1.3).   Antegrade verts.    5/2019 Nerve conduction study:  C5/6 nerve root irritation without acute denervation.  7/2019 CXR:  No cervical rib noted  10/2019 Upper extremity arterial duplex with stress:  RBI:  RIGHT 1.2 triphasic, minimal dampening  position.  LEFT 1.2 triphasic, minimal dampening  position.  All other provokative positions show no dampening.    10/2014 Stress Test:  No ischemic changes.  Low risk study.  6/2019 ECG:  NSR 77, QTc 434    The following portions of the patient's history were reviewed and updated as appropriate: allergies, current medications, past family history, past medical history, past social history, past surgical history and problem list.  Recent images independently reviewed.  Available laboratory values reviewed.    PMH:  Past Medical History:   Diagnosis Date   • Allergic rhinitis    • Arthritis    • Asthma    • Breast lump    • Chest pain    • Chronic low back pain    •  "Constipation    • Depressive disorder    • Diarrhea    • Diverticular disease of colon    • Epigastric pain    • DIETER (generalized anxiety disorder)    • GERD (gastroesophageal reflux disease)    • Head ache    • Hematochezia    • Herpes zoster     mid back, near spine   • Periumbilical pain    • PONV (postoperative nausea and vomiting)    • [place] spotted fever     pt states \"currently has it\"     Past Surgical History:   Procedure Laterality Date   • ABDOMINOPLASTY  12/22/2004   • AUGMENTATION MAMMAPLASTY     • BREAST AUGMENTATION BILATERAL MASTOPEXY     • BUNIONECTOMY Left 3/20/2017    Procedure: EXCISION OF CALCANEAL SPURS LEFT FOOT AND REATTACHMENT OF ACHILLES TENDON ;  Surgeon: Jarrell Mar MD;  Location: [facility];  Service:    • COLONOSCOPY  01/23/2015   • CYST REMOVAL  08/18/1961    Excision of sebaceous cyst. Left ear   • FINGER/THUMB LESION/CYST EXCISION  10/29/2014   • HAND SURGERY  06/03/2013   • HYSTERECTOMY     • OOPHORECTOMY     • ORIF ULNA/RADIUS FRACTURES  07/22/2013   • TUBAL ABDOMINAL LIGATION  08/14/1985    Laparoscopic tubal sterilization; dilatation and curettage. Desires tubal ligation;       Family History   Problem Relation Age of Onset   • Diabetes Other    • Heart disease Other    • Hypertension Other      Social History     Tobacco Use   • Smoking status: Never Smoker   • Smokeless tobacco: Never Used   Substance Use Topics   • Alcohol use: No   • Drug use: No       ALLERGIES:  Allergies   Allergen Reactions   • Cefprozil Nausea And Vomiting   • Penicillins Rash         MEDICATIONS:    Current Outpatient Medications:   •  albuterol (PROVENTIL HFA;VENTOLIN HFA) 108 (90 Base) MCG/ACT inhaler, Inhale 2 puffs Every 4 (Four) Hours As Needed for Wheezing., Disp: 18 g, Rfl: 2  •  albuterol (PROVENTIL) (2.5 MG/3ML) 0.083% nebulizer solution, Take 2.5 mg by nebulization Every 6 (Six) Hours As Needed for Wheezing., Disp: 120 vial, Rfl: 2  •  cetirizine (zyrTEC) 10 MG tablet, Take 1 tablet " by mouth Daily., Disp: 30 tablet, Rfl: 0  •  clonazePAM (KlonoPIN) 0.5 MG tablet, Take 0.5 mg by mouth 2 (Two) Times a Day As Needed for Anxiety., Disp: , Rfl:   •  cyclobenzaprine (FLEXERIL) 10 MG tablet, Take 1 tablet by mouth 3 (Three) Times a Day As Needed for Muscle Spasms., Disp: 90 tablet, Rfl: 5  •  estradiol (ESTRACE) 1 MG tablet, TAKE 1 TABLET BY MOUTH DAILY., Disp: 90 tablet, Rfl: 0  •  FLUoxetine (PROzac) 20 MG capsule, Take 20 mg by mouth 2 (Two) Times a Day., Disp: , Rfl:   •  gabapentin (NEURONTIN) 300 MG capsule, Take 1 capsule by mouth 2 (Two) Times a Day., Disp: 60 capsule, Rfl: 2  •  hydroxychloroquine (PLAQUENIL) 200 MG tablet, Take 200 mg by mouth Daily., Disp: , Rfl:   •  omeprazole (priLOSEC) 40 MG capsule, Take 40 mg by mouth 2 (Two) Times a Day., Disp: , Rfl:   •  polyethylene glycol (MIRALAX) powder, Take 17 g by mouth 2 (Two) Times a Day As Needed (constipation)., Disp: 1 each, Rfl: 11  •  primidone (MYSOLINE) 50 MG tablet, Take 1/2 tablet by mouth at hs, Disp: 15 tablet, Rfl: 2  •  colesevelam (WELCHOL) 625 MG tablet, Take 3 tablets by mouth 2 (Two) Times a Day With Meals., Disp: 60 tablet, Rfl: 3    Review of Systems   Review of Systems   Constitution: Positive for weakness, malaise/fatigue and weight gain. Negative for weight loss.   Cardiovascular: Negative for chest pain, claudication and dyspnea on exertion.   Respiratory: Positive for shortness of breath, sleep disturbances due to breathing and wheezing. Negative for cough.    Skin: Negative for color change and poor wound healing.   Musculoskeletal: Positive for muscle weakness.   Neurological: Negative for dizziness and numbness.   Psychiatric/Behavioral: Positive for depression. The patient is nervous/anxious.        Physical Exam   Vitals:    11/25/19 0953 11/25/19 0956   BP: 128/78 126/72   BP Location: Left arm Right arm   Patient Position: Sitting Sitting   Cuff Size: Adult Adult   Pulse: 98    Temp: 97.9 °F (36.6 °C)   "  SpO2: 98%    Weight: 98.9 kg (218 lb)    Height: 160 cm (63\")      Physical Exam   Constitutional: She is oriented to person, place, and time. She is active and cooperative. She does not appear ill. No distress.   HENT:   Right Ear: Hearing normal.   Left Ear: Hearing normal.   Nose: No nasal deformity. No epistaxis.   Mouth/Throat: She does not have dentures. Normal dentition.   Cardiovascular: Normal rate and regular rhythm.   No murmur heard.  Pulses:       Carotid pulses are 2+ on the right side, and 2+ on the left side.       Radial pulses are 2+ on the right side, and 2+ on the left side.        Posterior tibial pulses are 2+ on the right side, and 2+ on the left side.   No significant dampening radial pulse with provakative positions.  90deg, , head turn, 180deg.   Pulmonary/Chest: Effort normal and breath sounds normal.   Abdominal: Soft. She exhibits no distension and no mass. There is no tenderness.   Musculoskeletal: She exhibits no deformity.   Gait normal.    Neurological: She is alert and oriented to person, place, and time. She has normal strength.   Skin: Skin is warm and dry. No cyanosis or erythema. No pallor.   No venous staining   Psychiatric: She has a normal mood and affect. Her speech is normal. Judgment and thought content normal.       ASSESSMENT:  Lashaun was seen today for thoracic outlet syndrome.    Diagnoses and all orders for this visit:    Idiopathic peripheral neuropathy    Mixed hyperlipidemia    Bilateral carotid artery stenosis    Benign essential hypertension    Class 2 severe obesity due to excess calories with serious comorbidity and body mass index (BMI) of 38.0 to 38.9 in adult (CMS/[facility])    Chronic kidney disease, stage 2 (mild)      PLAN:  Detailed discussion with Lashaun Bernal regarding situation and options.  Minimal clinical suspicion for thoracic outlet syndrome, do not believe first rib resection will be of benefit at this time.  Asymptomatic moderate carotid " stenosis.  Multiple risk factors with severe comorbidities.  No intervention indicated at this time.  Will follow with interval imaging.  Risks, benefits discussed.  Understands and wishes to proceed with plan.     Keep appt Jan 2020 with Carotid Duplex     Return after above studies complete  Obesity Class  2. Increased risk cardiovascular events, sleep and breathing disorders, joint issues, type 2 diabetes mellitus. Options for weight management, heart healthy diet, exercise programs, and associated health risks of obesity discussed.  Recommended regular physical activity, progressive walking program.  Continue current medications as directed.  Will Obtain relevant old records.    Thank you for the opportunity to participate in this patient's care.    Copy to primary care provider.    EMR Dragon/Transcription disclaimer:   Much of this encounter note is an electronic transcription/translation of spoken language to printed text. The electronic translation of spoken language may permit erroneous, or at times, nonsensical words or phrases to be inadvertently transcribed; Although I have reviewed the note for such errors, some may still exist.

## 2019-12-03 NOTE — TELEPHONE ENCOUNTER
Per MAYELA Terry, Ms. Bernal has been called with recent lab results & recommendations.  Continue current medications and follow-up as planned or sooner if any problems.      ----- Message from MAYELA Flores sent at 12/2/2019  6:34 AM CST -----  Sugar, sodium, potassium, kidney functions were within normal limits.  Liver functions continue to elevate slightly and ALT is now at 72 and AST at 45.  She has an upcoming scheduled appointment with MEGHAN Hidalgo for follow-up.  Make sure she keeps that appointment.  She also needs to avoid alcohol and Tylenol products which will increase her liver enzymes.  TSH still slightly elevated but free T4 remains within normal limits.  We will continue to monitor.  Total cholesterol is elevated at 252, triglycerides at 162 and bad cholesterol 170.  I have sent in a prescription for medication called WelChol.  She will take this twice a day with meals.  She also needs to adhere to a low-fat diet.

## 2019-12-04 ENCOUNTER — TELEPHONE (OUTPATIENT)
Dept: FAMILY MEDICINE CLINIC | Facility: CLINIC | Age: 59
End: 2019-12-04

## 2019-12-04 DIAGNOSIS — R30.0 DYSURIA: Primary | ICD-10-CM

## 2019-12-04 NOTE — TELEPHONE ENCOUNTER
Gretchen,    Ms. Lashaun Bernal called stating [place] is [place] and feels she has a UTI.   She is requesting you order a Urinalysis so she can come in tomorrow and have it done.  She states she thinks she got it from taking a bubble bath while she was gone.  She has been doing cranberry juice and cranberry tablets while she has been gone

## 2019-12-05 ENCOUNTER — LAB (OUTPATIENT)
Dept: LAB | Facility: HOSPITAL | Age: 59
End: 2019-12-05

## 2019-12-05 DIAGNOSIS — R30.0 DYSURIA: ICD-10-CM

## 2019-12-05 PROCEDURE — 81001 URINALYSIS AUTO W/SCOPE: CPT

## 2019-12-05 PROCEDURE — 87086 URINE CULTURE/COLONY COUNT: CPT

## 2019-12-05 PROCEDURE — 87186 SC STD MICRODIL/AGAR DIL: CPT

## 2019-12-05 PROCEDURE — 87088 URINE BACTERIA CULTURE: CPT

## 2019-12-06 ENCOUNTER — HOSPITAL ENCOUNTER (OUTPATIENT)
Dept: MRI IMAGING | Facility: HOSPITAL | Age: 59
Discharge: HOME | End: 2019-12-06
Admitting: NURSE PRACTITIONER

## 2019-12-06 DIAGNOSIS — M25.531 RIGHT WRIST PAIN: ICD-10-CM

## 2019-12-06 LAB
BACTERIA UR QL AUTO: ABNORMAL /HPF
BILIRUB UR QL STRIP: NEGATIVE
CLARITY UR: ABNORMAL
COLOR UR: YELLOW
GLUCOSE UR STRIP-MCNC: NEGATIVE MG/DL
HGB UR QL STRIP.AUTO: NEGATIVE
HYALINE CASTS UR QL AUTO: ABNORMAL /LPF
KETONES UR QL STRIP: NEGATIVE
LEUKOCYTE ESTERASE UR QL STRIP.AUTO: ABNORMAL
NITRITE UR QL STRIP: NEGATIVE
PH UR STRIP.AUTO: 5.5 [PH] (ref 5–8)
PROT UR QL STRIP: NEGATIVE
RBC # UR: ABNORMAL /HPF
REF LAB TEST METHOD: ABNORMAL
SP GR UR STRIP: 1.02 (ref 1–1.03)
SQUAMOUS #/AREA URNS HPF: ABNORMAL /HPF
UROBILINOGEN UR QL STRIP: ABNORMAL
WBC UR QL AUTO: ABNORMAL /HPF

## 2019-12-06 PROCEDURE — 73221 MRI JOINT UPR EXTREM W/O DYE: CPT

## 2019-12-06 RX ORDER — ESTRADIOL 1 MG/1
1 TABLET ORAL DAILY
Qty: 90 TABLET | Refills: 0 | Status: SHIPPED | OUTPATIENT
Start: 2019-12-06 | End: 2020-03-11

## 2019-12-07 PROBLEM — N39.0 URINARY TRACT INFECTION IN FEMALE: Status: ACTIVE | Noted: 2019-12-07

## 2019-12-08 LAB — BACTERIA SPEC AEROBE CULT: ABNORMAL

## 2019-12-11 ENCOUNTER — OFFICE VISIT (OUTPATIENT)
Dept: NEUROLOGY | Facility: CLINIC | Age: 59
End: 2019-12-11

## 2019-12-11 VITALS
HEART RATE: 78 BPM | SYSTOLIC BLOOD PRESSURE: 130 MMHG | RESPIRATION RATE: 18 BRPM | DIASTOLIC BLOOD PRESSURE: 90 MMHG | HEIGHT: 63 IN | WEIGHT: 216 LBS | BODY MASS INDEX: 38.27 KG/M2

## 2019-12-11 DIAGNOSIS — R25.1 TREMOR: Primary | ICD-10-CM

## 2019-12-11 DIAGNOSIS — R20.0 FACIAL NUMBNESS: ICD-10-CM

## 2019-12-11 DIAGNOSIS — M50.20 PROTRUSION OF CERVICAL INTERVERTEBRAL DISC: ICD-10-CM

## 2019-12-11 PROCEDURE — 99213 OFFICE O/P EST LOW 20 MIN: CPT | Performed by: PSYCHIATRY & NEUROLOGY

## 2019-12-11 RX ORDER — PRIMIDONE 50 MG/1
TABLET ORAL
Qty: 30 TABLET | Refills: 2 | Status: SHIPPED | OUTPATIENT
Start: 2019-12-11 | End: 2020-04-27 | Stop reason: SDUPTHER

## 2019-12-11 NOTE — PROGRESS NOTES
Subjective   Lashaun Bernal, 1960, is a female who is being seen today for   Chief Complaint   Patient presents with   • Tremors   • Neurologic Problem     left facial numbness, neck pain       HISTORY OF PRESENT ILLNESS: Patient seen for tremor.  Patient only had one episode of facial numbness lasted for a few days since last seen.  Patient seeing orthopedic [place] about her neck.  Patient had no side effects from the 25 mg primidone and is to increase the dose to 50 mg at night again watching for side effects which are discussed.  I discussed interaction with the estrogen and if she has problem with that let her PCP or prescribing doctor know regarding the estrogen situation.  She is using the estrogen for hot flashes.  Patient takes clonazepam at night as well    REVIEW OF SYSTEMS:   GENERAL: Blood pressure today 130/90 left arm seated / same standing pulse 78  PULMONARY: No acute respiratory difficulty  CVS: No acute chest pain or palpitation  GASTROINTESTINAL: No acute GI distress  GENITOURINARY: No acute  distress  GYN: As above  MUSCULOSKELETAL: As above  HEENT: No acute vision or hearing change  ENDOCRINE: No acute endocrine symptoms  PSYCHIATRIC: No acute psychiatric symptoms  HEMATOLOGY: No anemia  SKIN: No acute skin changes  Family history reviewed and otherwise noncontributory  Social history: Patient denies smoking or drug or alcohol use      PHYSICAL EXAMINATION:    GENERAL: Patient has intentional tremor upper extremities bilaterally.  No cogwheeling or rigidity and no rest tremor  CRANIUM: Normocephalic/atraumatic  HEENT:       EYES: No acute fundic abnormalities.  Pupils equal round reactive to light.  EOMs intact without nystagmus and fields full to confrontation       EARS: Tympanic membranes normal and hears tuning fork bilaterally       THROAT: No oropharynx abnormalities       NECK: No bruit/no lymphadenopathy  CHEST: No acute cardiopulmonary abnormalities by  auscultation  ABDOMEN: Nondistended  EXTREMITIES: Dorsalis pedis pulses symmetrical  NEURO: Patient alert and follows commands without difficulty  SPEECH: Normal    CRANIAL NERVES: Motor/sensory about the face normal symmetric    MOTOR STRENGTH: Motor strength upper and lower extremities normal  STATION AND GAIT: Gait normal/Romberg negative and no festination  CEREBELLAR: Finger-nose and heel-to-shin normal  SENSORY: Normal pin and vibration upper and lower extremities  REFLEXES: Reflexes present symmetric upper lower extremity without clonus or Babinski      ASSESSMENT AND PLAN: Patient with a history of tremor increasing the dose of primidone as above.  Patient is seeing the orthopedic [place] about her cervical symptoms.  Patient not to be driving for at least 3 days after increasing doses of the primidone.Patient's Body mass index is 38.26 kg/m². BMI is above normal parameters. Recommendations include: referral to primary care.      Lashaun was seen today for tremors and neurologic problem.    Diagnoses and all orders for this visit:    Tremor    Facial numbness    Protrusion of cervical intervertebral disc    Other orders  -     primidone (MYSOLINE) 50 MG tablet; Take 1 p.o. nightly        Dictated utilizing Dragon voice recognition software

## 2019-12-11 NOTE — PATIENT INSTRUCTIONS
Patient to not drive for 3 days after increasing the dose of the primidone.  Patient to get with PCP about weight and diet control

## 2019-12-13 ENCOUNTER — OFFICE VISIT (OUTPATIENT)
Dept: ORTHOPEDIC SURGERY | Facility: CLINIC | Age: 59
End: 2019-12-13

## 2019-12-13 VITALS — HEIGHT: 63 IN | WEIGHT: 216 LBS | BODY MASS INDEX: 38.27 KG/M2

## 2019-12-13 DIAGNOSIS — M25.531 RIGHT WRIST PAIN: Primary | ICD-10-CM

## 2019-12-13 PROCEDURE — 99212 OFFICE O/P EST SF 10 MIN: CPT | Performed by: ORTHOPAEDIC SURGERY

## 2019-12-13 NOTE — PROGRESS NOTES
"Lashaun Bernal is a 58 y.o. female returns for     Chief Complaint   Patient presents with   • Results     MRI results       HISTORY OF PRESENT ILLNESS: Patient is here today for MRI results of right wrist. Patient is not having any issue with pain today.  Elmer has given a shot of Kenalog and Toradol and she is significant better today is less pain or discomfort and is moving it.  She is been diagnosed with [place] spotted fever.  Now it is much better less pain and discomfort.  Had an MRI scan that shows a degenerative TFCC tear.  The remainder the MRI scan looks okay.  Most of the pain was ulnar.       CONCURRENT MEDICAL HISTORY:    Past Medical History:   Diagnosis Date   • Allergic rhinitis    • Arthritis    • Asthma    • Breast lump    • Chest pain    • Chronic low back pain    • Constipation    • Depressive disorder    • Diarrhea    • Diverticular disease of colon    • Epigastric pain    • DIETER (generalized anxiety disorder)    • GERD (gastroesophageal reflux disease)    • Head ache    • Hematochezia    • Herpes zoster     mid back, near spine   • Periumbilical pain    • PONV (postoperative nausea and vomiting)    • Maurice [place] spotted fever     pt states \"currently has it\"       Allergies   Allergen Reactions   • Cefprozil Nausea And Vomiting   • Penicillins Rash         Current Outpatient Medications:   •  albuterol (PROVENTIL HFA;VENTOLIN HFA) 108 (90 Base) MCG/ACT inhaler, Inhale 2 puffs Every 4 (Four) Hours As Needed for Wheezing., Disp: 18 g, Rfl: 2  •  albuterol (PROVENTIL) (2.5 MG/3ML) 0.083% nebulizer solution, Take 2.5 mg by nebulization Every 6 (Six) Hours As Needed for Wheezing., Disp: 120 vial, Rfl: 2  •  cetirizine (zyrTEC) 10 MG tablet, Take 1 tablet by mouth Daily., Disp: 30 tablet, Rfl: 0  •  clonazePAM (KlonoPIN) 0.5 MG tablet, Take 0.5 mg by mouth 2 (Two) Times a Day As Needed for Anxiety., Disp: , Rfl:   •  colesevelam (WELCHOL) 625 MG tablet, Take 3 tablets by mouth 2 (Two) " Times a Day With Meals., Disp: 60 tablet, Rfl: 3  •  cyclobenzaprine (FLEXERIL) 10 MG tablet, Take 1 tablet by mouth 3 (Three) Times a Day As Needed for Muscle Spasms., Disp: 90 tablet, Rfl: 5  •  estradiol (ESTRACE) 1 MG tablet, Take 1 tablet by mouth Daily., Disp: 90 tablet, Rfl: 0  •  fluconazole (DIFLUCAN) 200 MG tablet, Take one at the first sign of infection and may repeat in 3 days as needed., Disp: 2 tablet, Rfl: 0  •  FLUoxetine (PROzac) 20 MG capsule, Take 20 mg by mouth 2 (Two) Times a Day., Disp: , Rfl:   •  gabapentin (NEURONTIN) 300 MG capsule, Take 1 capsule by mouth 2 (Two) Times a Day., Disp: 60 capsule, Rfl: 2  •  hydroxychloroquine (PLAQUENIL) 200 MG tablet, Take 200 mg by mouth Daily., Disp: , Rfl:   •  omeprazole (priLOSEC) 40 MG capsule, Take 40 mg by mouth 2 (Two) Times a Day., Disp: , Rfl:   •  polyethylene glycol (MIRALAX) powder, Take 17 g by mouth 2 (Two) Times a Day As Needed (constipation)., Disp: 1 each, Rfl: 11  •  primidone (MYSOLINE) 50 MG tablet, Take 1 p.o. nightly, Disp: 30 tablet, Rfl: 2  •  sulfamethoxazole-trimethoprim (BACTRIM DS,SEPTRA DS) 800-160 MG per tablet, Take 1 tablet by mouth 2 (Two) Times a Day for 10 days., Disp: 20 tablet, Rfl: 0    Past Surgical History:   Procedure Laterality Date   • ABDOMINOPLASTY  12/22/2004   • AUGMENTATION MAMMAPLASTY     • BREAST AUGMENTATION BILATERAL MASTOPEXY     • BUNIONECTOMY Left 3/20/2017    Procedure: EXCISION OF CALCANEAL SPURS LEFT FOOT AND REATTACHMENT OF ACHILLES TENDON ;  Surgeon: Jarrell Mar MD;  Location: [facility];  Service:    • COLONOSCOPY  01/23/2015   • CYST REMOVAL  08/18/1961    Excision of sebaceous cyst. Left ear   • FINGER/THUMB LESION/CYST EXCISION  10/29/2014   • HAND SURGERY  06/03/2013   • HYSTERECTOMY     • OOPHORECTOMY     • ORIF ULNA/RADIUS FRACTURES  07/22/2013   • TUBAL ABDOMINAL LIGATION  08/14/1985    Laparoscopic tubal sterilization; dilatation and curettage. Desires tubal ligation;             ROS:  "Review of systems has been updated as of today's date.  All other systems are negative except as noted previously.    PHYSICAL EXAMINATION:       Ht 160 cm (63\")   Wt 98 kg (216 lb)   LMP 03/09/1991 (Approximate)   BMI 38.26 kg/m²     Physical Exam   Constitutional: She is oriented to person, place, and time. She appears well-developed.   HENT:   Head: Normocephalic and atraumatic.   Eyes: Pupils are equal, round, and reactive to light. EOM are normal.   Neck: Neck supple.   Pulmonary/Chest: Effort normal.   Musculoskeletal: Normal range of motion. She exhibits tenderness.   Neurological: She is alert and oriented to person, place, and time.   Skin: Skin is warm and dry.   Psychiatric: She has a normal mood and affect.       GAIT:     []  Normal  []  Antalgic    Assistive device: [x]  None  []  Walker     []  Crutches  []  Cane     []  Wheelchair  []  Stretcher    Ortho Exam  Minimal tenderness ulnarly.  She is neurovascular intact.  Good motion.  Not particular painful to palpation.  Neurovascular intact.    Xr Wrist 3+ View Right    Result Date: 11/21/2019  Narrative: Ordering Provider:  Elmer Alejandro APRN Ordering Diagnosis/Indication:  Right wrist pain Procedure:  XR WRIST 3+ VW RIGHT Exam Date:  11/20/19 COMPARISON:  Todays X-rays were compared to previous images dated 1/9//19.     Impression:  AP lateral and oblique view of the right wrist reveals retained surgical plate and screws noted on the distal radius no evidence of hardware failure or other acute radiological abnormality noted.  Plate remains in acceptable position and alignment when compared to previous films. MAYELA Rolon 11/20/19     Mri Wrist Right Without Contrast    Result Date: 12/7/2019  Narrative: MRI right wrist. HISTORY: Right wrist pain and swelling. Prior exam right wrist November 20, 2019. TECHNIQUE: Multiplanar multisequence noncontrast images right wrist. FINDINGS: Old healed fracture distal radius stabilized by " surgical hardware, volar compression plate and numerous transverse surgical screws. Surgical hardware causes susceptibility artifact and degradation of normal anatomic structures. No bone edema or signal abnormalities of the distal radius. Small joint effusion distal radioulnar joint and irregularity of the ulnar aspect of the triangular fibrocartilage indicating degenerative type tear. Normal-appearing flexor and extensor tendons. Normal-appearing carpal bones.     Impression: CONCLUSION: Old fracture distal radius stabilized by surgical hardware, volar compression plate and transverse surgical screws. Degradation of normal anatomic structures by surgical hardware, susceptibility artifact. Small joint effusion distal radioulnar joint and irregularity of the ulnar aspect of the triangular fibrocartilage, degenerative type tear. MRI right wrist is otherwise unremarkable. Electronically signed by:  Rahat Cash MD  12/7/2019 7:15 AM CST Workstation: [id number]      I reviewed the scan and agree with these findings  Body mass index is 38.26 kg/m².  ASSESSMENT:    Diagnoses and all orders for this visit:    Right wrist pain          PLAN for the most part her wrist pain is resolving.  I talked her about TFCC tear and explained this to her.  Certainly if her pain recurs she will let me know we will consider an injection in this area and likely some more immobilization.  She will call with any issues    No follow-ups on file.      This document has been electronically signed by Romain Clifton MD on December 13, 2019 4:44 PM

## 2019-12-30 ENCOUNTER — TRANSCRIBE ORDERS (OUTPATIENT)
Dept: PHYSICAL THERAPY | Facility: CLINIC | Age: 59
End: 2019-12-30

## 2019-12-30 DIAGNOSIS — M54.2 CERVICALGIA: Primary | ICD-10-CM

## 2020-01-02 ENCOUNTER — TELEPHONE (OUTPATIENT)
Dept: FAMILY MEDICINE CLINIC | Facility: CLINIC | Age: 60
End: 2020-01-02

## 2020-01-02 NOTE — TELEPHONE ENCOUNTER
Per MAYELA Terry, Ms. Bernal has been called with recent Holter Monitor results & recommendations.  Continue current medications and follow-up as planned or sooner if any problems.      ----- Message from MAYELA Flores sent at 1/1/2020  3:11 PM CST -----  Holter did show short bouts of SVT.  Make sure she keeps her appointment with Dr. Glover on 01/13/2020.  She needs to seek emergency medical treatment tfor any new or worsening chest pain or palpitations.

## 2020-01-06 ENCOUNTER — TELEPHONE (OUTPATIENT)
Dept: CARDIOLOGY | Facility: CLINIC | Age: 60
End: 2020-01-06

## 2020-01-06 ENCOUNTER — HOSPITAL ENCOUNTER (OUTPATIENT)
Dept: PHYSICAL THERAPY | Facility: HOSPITAL | Age: 60
Setting detail: THERAPIES SERIES
Discharge: HOME | End: 2020-01-06

## 2020-01-06 ENCOUNTER — OFFICE VISIT (OUTPATIENT)
Dept: CARDIAC SURGERY | Facility: CLINIC | Age: 60
End: 2020-01-06

## 2020-01-06 VITALS
BODY MASS INDEX: 39.19 KG/M2 | HEIGHT: 63 IN | WEIGHT: 221.2 LBS | OXYGEN SATURATION: 98 % | SYSTOLIC BLOOD PRESSURE: 136 MMHG | HEART RATE: 86 BPM | DIASTOLIC BLOOD PRESSURE: 82 MMHG | TEMPERATURE: 97.4 F

## 2020-01-06 DIAGNOSIS — E78.2 MIXED HYPERLIPIDEMIA: ICD-10-CM

## 2020-01-06 DIAGNOSIS — M79.7 FIBROMYALGIA: ICD-10-CM

## 2020-01-06 DIAGNOSIS — N18.2 CHRONIC KIDNEY DISEASE, STAGE 2 (MILD): ICD-10-CM

## 2020-01-06 DIAGNOSIS — M54.2 CERVICALGIA: ICD-10-CM

## 2020-01-06 DIAGNOSIS — I65.23 BILATERAL CAROTID ARTERY STENOSIS: Primary | ICD-10-CM

## 2020-01-06 DIAGNOSIS — I10 BENIGN ESSENTIAL HYPERTENSION: Chronic | ICD-10-CM

## 2020-01-06 DIAGNOSIS — E66.01 CLASS 2 SEVERE OBESITY DUE TO EXCESS CALORIES WITH SERIOUS COMORBIDITY AND BODY MASS INDEX (BMI) OF 38.0 TO 38.9 IN ADULT (HCC): ICD-10-CM

## 2020-01-06 PROCEDURE — 97162 PT EVAL MOD COMPLEX 30 MIN: CPT | Performed by: PHYSICAL THERAPIST

## 2020-01-06 PROCEDURE — 99214 OFFICE O/P EST MOD 30 MIN: CPT | Performed by: THORACIC SURGERY (CARDIOTHORACIC VASCULAR SURGERY)

## 2020-01-06 PROCEDURE — 97110 THERAPEUTIC EXERCISES: CPT | Performed by: PHYSICAL THERAPIST

## 2020-01-06 NOTE — TELEPHONE ENCOUNTER
Called patient to see if they had seen another cardiologists for medical record purposes, patient stated that she has not.

## 2020-01-06 NOTE — THERAPY EVALUATION
"    Outpatient Physical Therapy Ortho Initial Evaluation  [facility]     Patient Name: Lashaun Bernal  : 1960  MRN: [id number]  Today's Date: 2020      Visit Date: 2020  Visit   Return to MD: REI  Re-cert date: 20    Patient Active Problem List   Diagnosis   • Calcaneal spur of left foot   • Chloe's deformity of left heel   • Left hip pain   • Right hamstring muscle strain   • Benign essential hypertension   • Depressive disorder   • Postmenopausal HRT (hormone replacement therapy)   • Mixed hyperlipidemia   • Asthma   • History of abnormal mammogram   • Polyarticular arthritis   • BMI 37.0-37.9, adult   • GERD (gastroesophageal reflux disease)   • Right wrist pain   • Right hand pain   • Left hand pain   • De Quervain's disease (radial styloid tenosynovitis)   • Chronic kidney disease, stage 2 (mild)   • Class 2 severe obesity due to excess calories with serious comorbidity and body mass index (BMI) of 38.0 to 38.9 in adult (CMS/HCC)   • Bilateral carotid artery stenosis   • Tremor   • Fibromyalgia   • Idiopathic peripheral neuropathy   • Urinary tract infection in female        Past Medical History:   Diagnosis Date   • Allergic rhinitis    • Arthritis    • Asthma    • Breast lump    • Chest pain    • Chronic low back pain    • Constipation    • Depressive disorder    • Diarrhea    • Diverticular disease of colon    • Epigastric pain    • DIETER (generalized anxiety disorder)    • GERD (gastroesophageal reflux disease)    • Head ache    • Hematochezia    • Herpes zoster     mid back, near spine   • Periumbilical pain    • PONV (postoperative nausea and vomiting)    • Maurice Mountain spotted fever     pt states \"currently has it\"        Past Surgical History:   Procedure Laterality Date   • ABDOMINOPLASTY  2004   • AUGMENTATION MAMMAPLASTY     • BREAST AUGMENTATION BILATERAL MASTOPEXY     • BUNIONECTOMY Left 3/20/2017    Procedure: EXCISION OF CALCANEAL SPURS LEFT FOOT AND " REATTACHMENT OF ACHILLES TENDON ;  Surgeon: Jarrell Mar MD;  Location: [facility];  Service:    • COLONOSCOPY  01/23/2015   • CYST REMOVAL  08/18/1961    Excision of sebaceous cyst. Left ear   • FINGER/THUMB LESION/CYST EXCISION  10/29/2014   • HAND SURGERY  06/03/2013   • HYSTERECTOMY     • OOPHORECTOMY     • ORIF ULNA/RADIUS FRACTURES  07/22/2013   • TUBAL ABDOMINAL LIGATION  08/14/1985    Laparoscopic tubal sterilization; dilatation and curettage. Desires tubal ligation;         Visit Dx:     ICD-10-CM ICD-9-CM   1. Cervicalgia M54.2 723.1       Medications (Admitted on 1/6/2020)     albuterol (PROVENTIL HFA;VENTOLIN HFA) 108 (90 Base) MCG/ACT inhaler     albuterol (PROVENTIL) (2.5 MG/3ML) 0.083% nebulizer solution     cetirizine (zyrTEC) 10 MG tablet     clonazePAM (KlonoPIN) 0.5 MG tablet     colesevelam (WELCHOL) 625 MG tablet     cyclobenzaprine (FLEXERIL) 10 MG tablet     estradiol (ESTRACE) 1 MG tablet     fluconazole (DIFLUCAN) 200 MG tablet     FLUoxetine (PROzac) 20 MG capsule     gabapentin (NEURONTIN) 300 MG capsule     hydroxychloroquine (PLAQUENIL) 200 MG tablet     omeprazole (priLOSEC) 40 MG capsule     polyethylene glycol (MIRALAX) powder     primidone (MYSOLINE) 50 MG tablet      Allergies: Cefprozil       Penicillins      PT Ortho     Row Name 01/06/20 1500       Subjective Comments    Subjective Comments  60 yo female with chronic neck pain for the past year, started around Dec 2018. Insidious onset. Intermittent L hand numbness about 6 months ago, but attributed to [place] Fever. Told she had a bulging disc between C5-6. Aggravating factors: looking downward, prolonged sitting, lifting heavy weight, driving. Easing factors: muscle relaxers    -BS       Precautions and Contraindications    Precautions/Limitations  no known precautions/limitations  -BS       Subjective Pain    Able to rate subjective pain?  yes  -BS    Pre-Treatment Pain Level  5  -BS    Post-Treatment Pain Level  2   -BS       Quarter Clearing    Quarter Clearing  Upper Quarter Clearing  -BS       Neural Tension Signs- Upper Quarter Clearing    ULNTT 2  Right:;Postive;Left:;Negative  -BS       Sensory Screen for Light Touch- Upper Quarter Clearing    C4 (posterior shoulder)  Bilateral:;Intact  -BS    C5 (lateral upper arm)  Bilateral:;Intact  -BS    C6 (tip of thumb)  Bilateral:;Intact  -BS    C7 (tip of 3rd finger)  Bilateral:;Intact  -BS    C8 (tip of 5th finger)  Bilateral:;Intact  -BS    T1 (medial lower arm)  Bilateral:;Intact  -BS       Myotomal Screen- Upper Quarter Clearing    Shoulder flexion (C5)  Bilateral:;5 (Normal)  -BS    Elbow flexion/wrist extension (C6)  Bilateral:;5 (Normal)  -BS    Elbow extension/wrist flexion (C7)  Bilateral:;5 (Normal)  -BS    Finger flexion/ (C8)  Bilateral:;5 (Normal)  -BS    Finger abduction (T1)  Bilateral:;5 (Normal)  -BS      Bilateral:;5 (Normal)  -BS       Cervical/Shoulder ROM Screen    Cervical flexion  Impaired 5 deg  -BS    Cervical extension  Normal 48 deg  -BS    Cervical lateral flexion  Impaired R 15 deg, L 25 deg  -BS    Cervical rotation  Impaired R 60 deg, L 40 deg  -BS      User Key  (r) = Recorded By, (t) = Taken By, (c) = Cosigned By    Initials Name Provider Type    BS Jhonny Castellano, PT Physical Therapist                      Therapy Education  Education Details: HEP: supine cervical retractions, seated cervical extensions, pt ed sitting posture  Given: Posture/body mechanics, HEP  Program: New  How Provided: Verbal, Demonstration  Provided to: Patient  Level of Understanding: Verbalized, Demonstrated     PT OP Goals     Row Name 01/06/20 1519 01/06/20 1500       PT Short Term Goals    STG Date to Achieve  01/27/20  -BS  --    STG 1  Pt independent with HEP  -BS  --    STG 1 Progress  New  -BS  --    STG 2  Improve cervical spine AROM to WFL (all planes)  -BS  --    STG 2 Progress  New  -BS  --    STG 3  Reduce neck pain to 0/10 with prolonged sitting and  performing ADL's  -BS  --    STG 3 Progress  New  -BS  --       Time Calculation    PT Goal Re-Cert Due Date  --  -BS  01/27/20  -BS      User Key  (r) = Recorded By, (t) = Taken By, (c) = Cosigned By    Initials Name Provider Type    Jhonny Carr, PT Physical Therapist          PT Assessment/Plan     Row Name 01/06/20 1519          PT Assessment    Functional Limitations  Performance in self-care ADL;Performance in work activities;Performance in leisure activities;Limitation in home management  -BS     Impairments  Posture;Range of motion;Pain;Impaired flexibility  -BS     Assessment Comments  Chronic neck pain with evidence of R UE radiculopathy  -BS     Please refer to paper survey for additional self-reported information  Yes  -BS     Rehab Potential  Good  -BS     Patient/caregiver participated in establishment of treatment plan and goals  Yes  -BS     Patient would benefit from skilled therapy intervention  Yes  -BS        PT Plan    PT Frequency  2x/week  -BS     Predicted Duration of Therapy Intervention (Therapy Eval)  3 weeks  -BS     Planned CPT's?  PT EVAL MOD COMPLELITY: 93198;PT THER PROC EA 15 MIN: 47008;PT RE-EVAL: 00693;PT MANUAL THERAPY EA 15 MIN: 80650;PT ELECTRICAL STIM UNATTEND: ;PT ULTRASOUND EA 15 MIN: 28804;PT TRACTION CERVICAL: 93924;PT HOT/COLD PACK WC NONMCARE: 27211;PT THER SUPP EA 15 MIN  -BS     Physical Therapy Interventions (Optional Details)  home exercise program;joint mobilization;manual therapy techniques;modalities;patient/family education;postural re-education;strengthening;stretching;ROM (Range of Motion)  -BS     PT Plan Comments  f/u with supine unloaded cervical extensions, review cervical extensions in sitting and seated chin tucks.  -BS       User Key  (r) = Recorded By, (t) = Taken By, (c) = Cosigned By    Initials Name Provider Type    Jhonny Carr PT Physical Therapist            OP Exercises     Row Name 01/06/20 1500             Subjective Comments     Subjective Comments  60 yo female with chronic neck pain for the past year, started around Dec 2018. Insidious onset. Intermittent L hand numbness about 6 months ago, but attributed to [place] Fever. Told she had a bulging disc between C5-6. Aggravating factors: looking downward, prolonged sitting, lifting heavy weight, driving. Easing factors: muscle relaxers    -BS         Subjective Pain    Able to rate subjective pain?  yes  -BS      Pre-Treatment Pain Level  5  -BS      Post-Treatment Pain Level  2  -BS         Exercise 1    Exercise Name 1  seated cervical extension  -BS      Sets 1  1  -BS      Reps 1  10  -BS         Exercise 2    Exercise Name 2  supine cervical retractions  -BS      Sets 2  2  -BS      Reps 2  10  -BS      Additional Comments  2nd set with head/neck off pillow   -BS         Exercise 3    Exercise Name 3  pt ed sitting posture  -BS      Time 3  5'  -BS        User Key  (r) = Recorded By, (t) = Taken By, (c) = Cosigned By    Initials Name Provider Type    Jhonny Carr, PT Physical Therapist                        Outcome Measure Options: Neck Disability Index (NDI)  Neck Disability Index  Section 1 - Pain Intensity: The pain is moderate at the moment.  Section 2 - Personal Care: I can look after myself normally without causing extra pain.  Section 3 - Lifting: Pain prevents me from lifting heavy weights, but I can manage light weights if they are conveniently positioned.  Section 4 - Work: I can't do my usual work.  Section 5 - Headaches: I have moderate headaches that come infrequently.  Section 6 - Concentration: I can concentrate fully with slight difficulty.  Section 7 - Sleeping: I have no trouble sleeping.  Section 8 - Driving: I can drive as long as I want with slight neck pain.  Section 9 - Reading: I can read as much as I want with slight neck pain.  Section 10 - Recreation: I have neck pain with most recreational activities.  Neck Disability Index Score: 16      Time  Calculation:     Start Time: 1519  Stop Time: 1605  Time Calculation (min): 46 min  Total Timed Code Minutes- PT: 46 minute(s)     Therapy Charges for Today     Code Description Service Date Service Provider Modifiers Qty    79771609669 HC PT EVAL MOD COMPLEXITY 2 1/6/2020 Jhonny Castellano, PT GP 1    10164590489 HC PT THER PROC EA 15 MIN 1/6/2020 Jhonny Castellano, PT GP 1          PT G-Codes  Outcome Measure Options: Neck Disability Index (NDI)  Neck Disability Index Score: 16         Jhonny Castellano, PT  1/6/2020

## 2020-01-08 ENCOUNTER — HOSPITAL ENCOUNTER (OUTPATIENT)
Dept: PHYSICAL THERAPY | Facility: HOSPITAL | Age: 60
Setting detail: THERAPIES SERIES
Discharge: HOME | End: 2020-01-08

## 2020-01-08 DIAGNOSIS — M54.2 CERVICALGIA: Primary | ICD-10-CM

## 2020-01-08 PROCEDURE — 97110 THERAPEUTIC EXERCISES: CPT

## 2020-01-08 PROCEDURE — 97035 APP MDLTY 1+ULTRASOUND EA 15: CPT

## 2020-01-08 PROCEDURE — 97140 MANUAL THERAPY 1/> REGIONS: CPT

## 2020-01-08 NOTE — THERAPY TREATMENT NOTE
"    Outpatient Physical Therapy Ortho Treatment Note  [facility]     Patient Name: Lashaun Bernal  : 1960  MRN: [id number]  Today's Date: 2020      Visit Date: 2020  Pt has attended 2/2 visits  MD REI John 2020  Visit Dx:    ICD-10-CM ICD-9-CM   1. Cervicalgia M54.2 723.1       Patient Active Problem List   Diagnosis   • Calcaneal spur of left foot   • Chloe's deformity of left heel   • Left hip pain   • Right hamstring muscle strain   • Benign essential hypertension   • Depressive disorder   • Postmenopausal HRT (hormone replacement therapy)   • Mixed hyperlipidemia   • Asthma   • History of abnormal mammogram   • Polyarticular arthritis   • BMI 37.0-37.9, adult   • GERD (gastroesophageal reflux disease)   • Right wrist pain   • Right hand pain   • Left hand pain   • De Quervain's disease (radial styloid tenosynovitis)   • Chronic kidney disease, stage 2 (mild)   • Class 2 severe obesity due to excess calories with serious comorbidity and body mass index (BMI) of 38.0 to 38.9 in adult (CMS/HCC)   • Bilateral carotid artery stenosis   • Tremor   • Fibromyalgia   • Idiopathic peripheral neuropathy   • Urinary tract infection in female        Past Medical History:   Diagnosis Date   • Allergic rhinitis    • Arthritis    • Asthma    • Breast lump    • Chest pain    • Chronic low back pain    • Constipation    • Depressive disorder    • Diarrhea    • Diverticular disease of colon    • Epigastric pain    • DIETER (generalized anxiety disorder)    • GERD (gastroesophageal reflux disease)    • Head ache    • Hematochezia    • Herpes zoster     mid back, near spine   • Periumbilical pain    • PONV (postoperative nausea and vomiting)    • Maurice Mountain spotted fever     pt states \"currently has it\"        Past Surgical History:   Procedure Laterality Date   • ABDOMINOPLASTY  2004   • AUGMENTATION MAMMAPLASTY     • BREAST AUGMENTATION BILATERAL MASTOPEXY     • BUNIONECTOMY Left " 3/20/2017    Procedure: EXCISION OF CALCANEAL SPURS LEFT FOOT AND REATTACHMENT OF ACHILLES TENDON ;  Surgeon: Jarrell Mar MD;  Location: [facility];  Service:    • COLONOSCOPY  01/23/2015   • CYST REMOVAL  08/18/1961    Excision of sebaceous cyst. Left ear   • FINGER/THUMB LESION/CYST EXCISION  10/29/2014   • HAND SURGERY  06/03/2013   • HYSTERECTOMY     • OOPHORECTOMY     • ORIF ULNA/RADIUS FRACTURES  07/22/2013   • TUBAL ABDOMINAL LIGATION  08/14/1985    Laparoscopic tubal sterilization; dilatation and curettage. Desires tubal ligation;                         PT Assessment/Plan     Row Name 01/08/20 1616          PT Assessment    Assessment Comments  Pt responds well to Mh and CUS--Headache decreased after modalities and tolerates new addition of UE S well--most problematic S is doorway S.  Cautioined not to over do S if discomfort increases amd use MH as needed for pain relief  -SP        PT Plan    PT Frequency  2x/week  -SP     Predicted Duration of Therapy Intervention (Therapy Eval)  3 weeks  -SP     PT Plan Comments  Monitor response with MH US and manual work--slow progression with scap strength--also suggest that pt try a large width bra strap if at all possible  -SP       User Key  (r) = Recorded By, (t) = Taken By, (c) = Cosigned By    Initials Name Provider Type    Ny Mccullough PTA Physical Therapy Assistant          Modalities     Row Name 01/08/20 1500             Moist Heat    MH Applied  Yes  -SP      Location  c spine  -SP      Rx Minutes  12 mins  -SP      MH Prior to Rx  Yes  -SP         Ultrasound 76199    Location  CUS  -SP      Duty Cycle  100  -SP      Frequency  3.0 MHz  -SP      Intensity - Wts/cm  1.5  -SP      44499 - PT Ultrasound Minutes  10  -SP        User Key  (r) = Recorded By, (t) = Taken By, (c) = Cosigned By    Initials Name Provider Type    Ny Mccullough PTA Physical Therapy Assistant        OP Exercises     Row Name 01/08/20 1500             Subjective  Comments    Subjective Comments  Notes she is having headache today--does not have them frequently but woke up with one  -SP         Subjective Pain    Able to rate subjective pain?  yes  -SP      Pre-Treatment Pain Level  5  -SP      Post-Treatment Pain Level  3  -SP         Exercise 1    Exercise Name 1  MH to c spine  -SP      Time 1  15 min  -SP         Exercise 2    Exercise Name 2  see modalities US  -SP         Exercise 3    Exercise Name 3  UT S B  -SP      Reps 3  3  -SP      Time 3  30 sec  -SP         Exercise 4    Exercise Name 4  LS S  -SP      Reps 4  3  -SP      Time 4  30 sec  -SP         Exercise 5    Exercise Name 5  elephant S  -SP      Reps 5  10  -SP         Exercise 6    Exercise Name 6  doorway S  -SP      Reps 6  3  -SP      Time 6  30 sec  -SP         Exercise 7    Exercise Name 7  supine chin tucks  -SP      Reps 7  15  -SP         Exercise 8    Exercise Name 8  see manual  -SP        User Key  (r) = Recorded By, (t) = Taken By, (c) = Cosigned By    Initials Name Provider Type    Ny Mccullough PTA Physical Therapy Assistant                      Manual Rx (last 36 hours)      Manual Treatments     Row Name 01/08/20 1500             Manual Rx 1    Manual Rx 1 Location  Light MFR to B UT  -SP      Manual Rx 1 Duration  3 min  -SP         Manual Rx 2    Manual Rx 2 Location  supine cervicl distraction with light UT S B   -SP      Manual Rx 2 Duration  10 min  -SP        User Key  (r) = Recorded By, (t) = Taken By, (c) = Cosigned By    Initials Name Provider Type    Ny Mccullough PTA Physical Therapy Assistant          PT OP Goals     Row Name 01/08/20 1623 01/08/20 1500       PT Short Term Goals    STG Date to Achieve  --  01/27/20  -SP    STG 1  --  Pt independent with HEP  -SP    STG 1 Progress  --  Ongoing  -SP    STG 2  --  Improve cervical spine AROM to WFL (all planes)  -SP    STG 2 Progress  --  Ongoing  -SP    STG 3  --  Reduce neck pain to 0/10 with prolonged sitting and  performing ADL's  -SP    STG 3 Progress  --  Ongoing  -SP       Time Calculation    PT Goal Re-Cert Due Date  01/27/20  -SP  01/27/20  -SP      User Key  (r) = Recorded By, (t) = Taken By, (c) = Cosigned By    Initials Name Provider Type    Ny Mccullough PTA Physical Therapy Assistant                         Time Calculation:   Start Time: 1515  Stop Time: 1615  Time Calculation (min): 60 min  PT Non-Billable Time (min): 15 min  Total Timed Code Minutes- PT: 45 minute(s)  Therapy Charges for Today     Code Description Service Date Service Provider Modifiers Qty    08295049315 HC PT THER SUPP EA 15 MIN 1/8/2020 Ny Fox, PEDRO GP 1    47329166932 HC PT ULTRASOUND EA 15 MIN 1/8/2020 Ny Fox, PEDRO GP 1    53841908927 HC PT MANUAL THERAPY EA 15 MIN 1/8/2020 Ny Fox, PEDRO GP 1    04831091233 HC PT THER PROC EA 15 MIN 1/8/2020 Ny Fox PTA GP 1                    Ny Fox PTA  1/8/2020

## 2020-01-12 NOTE — PROGRESS NOTES
1/6/2020    Lashaun Bernal  1960    Chief Complaint:  Carotid Stenosis    HPI:      PCP:  Gretchen Gauthier, APRN  Neurology:  Dr Dos Santos      59y.o. female with HTN(stable, increased risk stroke, rupture), Hyperlipidemia(stable, increased risk cardiovascular events), Obesity(uncontrolled, increased risk cardiovascular events) and Chronic Kidney Disease(stable, increased risk renal failure) , neuropathy, radiculopathy(new, possible thoracic outlet).  never smoked.  Mild-moderate weakness both arms x 6 months.  Difficulty drying hair.  No pain, numbness arms, hands..  Dx treated RMSF 7/2019..numbness face resolved.  Neck pain cervical disk protrusion (L>R). Asymptomatic carotid stenosis.  Mild to moderate vertigo, tremor RIGHT hand x 6months.No TIA stroke amaurosis.  No MI claudication. No other associated signs, symptoms or modifying factors.     5/2019 Carotid Duplex:  MARIANO 0-49% (118cm/s, ratio 1.4).  LICA 50-69% (129cm/s, ratio 1.3).   Antegrade verts.  1/2020 Carotid Duplex:  MARIANO 50-69% (136/46cm/s, ratio 1.8.  LICA 50-69% (1157/40cm/s, ratio 2.1).   Antegrade verts.     5/2019 Nerve conduction study:  C5/6 nerve root irritation without acute denervation.  7/2019 CXR:  No cervical rib noted  10/2019 Upper extremity arterial duplex with stress:  RBI:  RIGHT 1.2 triphasic, minimal dampening  position.  LEFT 1.2 triphasic, minimal dampening  position.  All other provokative positions show no dampening.     10/2014 Stress Test:  No ischemic changes.  Low risk study.  6/2019 ECG:  NSR 77, QTc 434    The following portions of the patient's history were reviewed and updated as appropriate: allergies, current medications, past family history, past medical history, past social history, past surgical history and problem list.  Recent images independently reviewed.  Available laboratory values reviewed.    PMH:  Past Medical History:   Diagnosis Date   • Allergic rhinitis    • Arthritis    • Asthma    •  "Breast lump    • Chest pain    • Chronic low back pain    • Constipation    • Depressive disorder    • Diarrhea    • Diverticular disease of colon    • Epigastric pain    • DIETER (generalized anxiety disorder)    • GERD (gastroesophageal reflux disease)    • Head ache    • Hematochezia    • Herpes zoster     mid back, near spine   • Periumbilical pain    • PONV (postoperative nausea and vomiting)    • Maurice Mountain spotted fever     pt states \"currently has it\"     Past Surgical History:   Procedure Laterality Date   • ABDOMINOPLASTY  12/22/2004   • AUGMENTATION MAMMAPLASTY     • BREAST AUGMENTATION BILATERAL MASTOPEXY     • BUNIONECTOMY Left 3/20/2017    Procedure: EXCISION OF CALCANEAL SPURS LEFT FOOT AND REATTACHMENT OF ACHILLES TENDON ;  Surgeon: Jarrell Mar MD;  Location: [facility];  Service:    • COLONOSCOPY  01/23/2015   • CYST REMOVAL  08/18/1961    Excision of sebaceous cyst. Left ear   • FINGER/THUMB LESION/CYST EXCISION  10/29/2014   • HAND SURGERY  06/03/2013   • HYSTERECTOMY     • OOPHORECTOMY     • ORIF ULNA/RADIUS FRACTURES  07/22/2013   • TUBAL ABDOMINAL LIGATION  08/14/1985    Laparoscopic tubal sterilization; dilatation and curettage. Desires tubal ligation;       Family History   Problem Relation Age of Onset   • Diabetes Other    • Heart disease Other    • Hypertension Other      Social History     Tobacco Use   • Smoking status: Never Smoker   • Smokeless tobacco: Never Used   Substance Use Topics   • Alcohol use: No   • Drug use: No       ALLERGIES:  Allergies   Allergen Reactions   • Cefprozil Nausea And Vomiting   • Penicillins Rash         MEDICATIONS:    Current Outpatient Medications:   •  albuterol (PROVENTIL HFA;VENTOLIN HFA) 108 (90 Base) MCG/ACT inhaler, Inhale 2 puffs Every 4 (Four) Hours As Needed for Wheezing., Disp: 18 g, Rfl: 2  •  albuterol (PROVENTIL) (2.5 MG/3ML) 0.083% nebulizer solution, Take 2.5 mg by nebulization Every 6 (Six) Hours As Needed for Wheezing., Disp: 120 " vial, Rfl: 2  •  cetirizine (zyrTEC) 10 MG tablet, Take 1 tablet by mouth Daily., Disp: 30 tablet, Rfl: 0  •  clonazePAM (KlonoPIN) 0.5 MG tablet, Take 0.5 mg by mouth 2 (Two) Times a Day As Needed for Anxiety., Disp: , Rfl:   •  colesevelam (WELCHOL) 625 MG tablet, Take 3 tablets by mouth 2 (Two) Times a Day With Meals., Disp: 60 tablet, Rfl: 3  •  cyclobenzaprine (FLEXERIL) 10 MG tablet, Take 1 tablet by mouth 3 (Three) Times a Day As Needed for Muscle Spasms., Disp: 90 tablet, Rfl: 5  •  estradiol (ESTRACE) 1 MG tablet, Take 1 tablet by mouth Daily., Disp: 90 tablet, Rfl: 0  •  fluconazole (DIFLUCAN) 200 MG tablet, Take one at the first sign of infection and may repeat in 3 days as needed., Disp: 2 tablet, Rfl: 0  •  FLUoxetine (PROzac) 20 MG capsule, Take 20 mg by mouth 2 (Two) Times a Day., Disp: , Rfl:   •  gabapentin (NEURONTIN) 300 MG capsule, Take 1 capsule by mouth 2 (Two) Times a Day., Disp: 60 capsule, Rfl: 2  •  hydroxychloroquine (PLAQUENIL) 200 MG tablet, Take 200 mg by mouth Daily., Disp: , Rfl:   •  omeprazole (priLOSEC) 40 MG capsule, Take 40 mg by mouth 2 (Two) Times a Day., Disp: , Rfl:   •  primidone (MYSOLINE) 50 MG tablet, Take 1 p.o. nightly, Disp: 30 tablet, Rfl: 2  •  polyethylene glycol (MIRALAX) powder, Take 17 g by mouth 2 (Two) Times a Day As Needed (constipation)., Disp: 1 each, Rfl: 11    Review of Systems   Review of Systems   Constitution: Positive for malaise/fatigue and weight gain. Negative for weight loss.   Cardiovascular: Negative for chest pain, claudication and dyspnea on exertion.   Respiratory: Positive for shortness of breath, sleep disturbances due to breathing and wheezing. Negative for cough.    Skin: Negative for color change and poor wound healing.   Musculoskeletal: Positive for muscle weakness.   Neurological: Negative for dizziness, numbness and weakness.   Psychiatric/Behavioral: Positive for depression. The patient is nervous/anxious.        Physical Exam  "  Vitals:    01/06/20 0947   BP: 136/82   BP Location: Left arm   Pulse: 86   Temp: 97.4 °F (36.3 °C)   TempSrc: Temporal   SpO2: 98%   Weight: 100 kg (221 lb 3.2 oz)   Height: 160 cm (63\")     Physical Exam   Constitutional: She is oriented to person, place, and time. She is active and cooperative. She does not appear ill. No distress.   HENT:   Right Ear: Hearing normal.   Left Ear: Hearing normal.   Nose: No nasal deformity. No epistaxis.   Mouth/Throat: She does not have dentures. Normal dentition.   Cardiovascular: Normal rate and regular rhythm.   No murmur heard.  Pulses:       Carotid pulses are 2+ on the right side, and 2+ on the left side.       Radial pulses are 2+ on the right side, and 2+ on the left side.        Posterior tibial pulses are 2+ on the right side, and 2+ on the left side.   Pulmonary/Chest: Effort normal and breath sounds normal.   Abdominal: Soft. She exhibits no distension and no mass. There is no tenderness.   Musculoskeletal: She exhibits no deformity.   Gait normal.    Neurological: She is alert and oriented to person, place, and time. She has normal strength.   Skin: Skin is warm and dry. No cyanosis or erythema. No pallor.   No venous staining   Psychiatric: She has a normal mood and affect. Her speech is normal. Judgment and thought content normal.     Results for JENNIE SMITH (MRN [id number]) as of 1/12/2020 16:20  GFR 58 Ref. Range 11/27/2019 11:57   Creatinine Latest Ref Range: 0.57 - 1.00 mg/dL 0.99   BUN Latest Ref Range: 6 - 20 mg/dL 16     ASSESSMENT:  Jennie was seen today for carotid artery disease.    Diagnoses and all orders for this visit:    Bilateral carotid artery stenosis  -     Duplex Carotid Ultrasound CAR; Future    Benign essential hypertension    Mixed hyperlipidemia    Class 2 severe obesity due to excess calories with serious comorbidity and body mass index (BMI) of 38.0 to 38.9 in adult (CMS/[facility])    Fibromyalgia    Chronic kidney disease, stage 2 " (mild)    PLAN:  Detailed discussion with Lashaun Bernal regarding situation and options.  Stable moderate carotid stenosis. Multiple risk factors with severe comorbidities.  No intervention indicated at this time.  Will follow with interval imaging.  Risks, benefits discussed.  Understands and wishes to proceed with plan.    Return in 1 year with Carotid Duplex    Return after above studies complete  Obesity Class  0. Increased risk cardiovascular events, sleep and breathing disorders, joint issues, type 2 diabetes mellitus. Options for weight management, heart healthy diet, exercise programs, and associated health risks of obesity discussed.  Recommended regular physical activity, progressive walking program.  Continue current medications as directed.    Thank you for the opportunity to participate in this patient's care.    Copy to primary care provider.    EMR Dragon/Transcription disclaimer:   Much of this encounter note is an electronic transcription/translation of spoken language to printed text. The electronic translation of spoken language may permit erroneous, or at times, nonsensical words or phrases to be inadvertently transcribed; Although I have reviewed the note for such errors, some may still exist.

## 2020-01-13 ENCOUNTER — LAB (OUTPATIENT)
Dept: LAB | Facility: HOSPITAL | Age: 60
End: 2020-01-13

## 2020-01-13 ENCOUNTER — OFFICE VISIT (OUTPATIENT)
Dept: CARDIOLOGY | Facility: CLINIC | Age: 60
End: 2020-01-13

## 2020-01-13 ENCOUNTER — APPOINTMENT (OUTPATIENT)
Dept: CT IMAGING | Facility: HOSPITAL | Age: 60
End: 2020-01-13

## 2020-01-13 ENCOUNTER — HOSPITAL ENCOUNTER (OUTPATIENT)
Facility: HOSPITAL | Age: 60
Setting detail: OBSERVATION
Discharge: HOME | End: 2020-01-16
Attending: FAMILY MEDICINE | Admitting: HOSPITALIST

## 2020-01-13 ENCOUNTER — APPOINTMENT (OUTPATIENT)
Dept: PHYSICAL THERAPY | Facility: HOSPITAL | Age: 60
End: 2020-01-13

## 2020-01-13 ENCOUNTER — APPOINTMENT (OUTPATIENT)
Dept: GENERAL RADIOLOGY | Facility: HOSPITAL | Age: 60
End: 2020-01-13

## 2020-01-13 ENCOUNTER — DOCUMENTATION (OUTPATIENT)
Dept: CARDIOLOGY | Facility: CLINIC | Age: 60
End: 2020-01-13

## 2020-01-13 VITALS
HEIGHT: 63 IN | WEIGHT: 225 LBS | HEART RATE: 80 BPM | BODY MASS INDEX: 39.87 KG/M2 | OXYGEN SATURATION: 98 % | SYSTOLIC BLOOD PRESSURE: 140 MMHG | DIASTOLIC BLOOD PRESSURE: 82 MMHG

## 2020-01-13 DIAGNOSIS — R07.9 CHEST PAIN, UNSPECIFIED TYPE: Primary | ICD-10-CM

## 2020-01-13 DIAGNOSIS — E78.2 MIXED HYPERLIPIDEMIA: ICD-10-CM

## 2020-01-13 DIAGNOSIS — R07.9 CHEST PAIN, UNSPECIFIED TYPE: ICD-10-CM

## 2020-01-13 DIAGNOSIS — I65.23 BILATERAL CAROTID ARTERY STENOSIS: Primary | ICD-10-CM

## 2020-01-13 DIAGNOSIS — I65.23 BILATERAL CAROTID ARTERY STENOSIS: ICD-10-CM

## 2020-01-13 DIAGNOSIS — I10 BENIGN ESSENTIAL HYPERTENSION: Primary | Chronic | ICD-10-CM

## 2020-01-13 DIAGNOSIS — E66.01 CLASS 2 SEVERE OBESITY DUE TO EXCESS CALORIES WITH SERIOUS COMORBIDITY AND BODY MASS INDEX (BMI) OF 38.0 TO 38.9 IN ADULT (HCC): ICD-10-CM

## 2020-01-13 DIAGNOSIS — I47.1 PAROXYSMAL SVT (SUPRAVENTRICULAR TACHYCARDIA) (HCC): ICD-10-CM

## 2020-01-13 LAB
ALBUMIN SERPL-MCNC: 3.6 G/DL (ref 3.5–5.2)
ALBUMIN/GLOB SERPL: 1.2 G/DL
ALP SERPL-CCNC: 111 U/L (ref 39–117)
ALT SERPL W P-5'-P-CCNC: 49 U/L (ref 1–33)
ANION GAP SERPL CALCULATED.3IONS-SCNC: 12 MMOL/L (ref 5–15)
ANION GAP SERPL CALCULATED.3IONS-SCNC: 12 MMOL/L (ref 5–15)
AST SERPL-CCNC: 29 U/L (ref 1–32)
BASOPHILS # BLD AUTO: 0.04 10*3/MM3 (ref 0–0.2)
BASOPHILS NFR BLD AUTO: 0.4 % (ref 0–1.5)
BILIRUB SERPL-MCNC: 0.3 MG/DL (ref 0.2–1.2)
BUN BLD-MCNC: 15 MG/DL (ref 6–20)
BUN BLD-MCNC: 15 MG/DL (ref 6–20)
BUN/CREAT SERPL: 14.4 (ref 7–25)
BUN/CREAT SERPL: 14.6 (ref 7–25)
CALCIUM SPEC-SCNC: 8.4 MG/DL (ref 8.6–10.5)
CALCIUM SPEC-SCNC: 8.4 MG/DL (ref 8.6–10.5)
CHLORIDE SERPL-SCNC: 104 MMOL/L (ref 98–107)
CHLORIDE SERPL-SCNC: 105 MMOL/L (ref 98–107)
CO2 SERPL-SCNC: 20 MMOL/L (ref 22–29)
CO2 SERPL-SCNC: 22 MMOL/L (ref 22–29)
CREAT BLD-MCNC: 1.03 MG/DL (ref 0.57–1)
CREAT BLD-MCNC: 1.04 MG/DL (ref 0.57–1)
D-DIMER, QUANTITATIVE (MAD,POW, STR): 634 NG/ML (FEU) (ref 0–470)
DEPRECATED RDW RBC AUTO: 40.4 FL (ref 37–54)
EOSINOPHIL # BLD AUTO: 0.15 10*3/MM3 (ref 0–0.4)
EOSINOPHIL NFR BLD AUTO: 1.6 % (ref 0.3–6.2)
ERYTHROCYTE [DISTWIDTH] IN BLOOD BY AUTOMATED COUNT: 12.3 % (ref 12.3–15.4)
GFR SERPL CREATININE-BSD FRML MDRD: 54 ML/MIN/1.73
GFR SERPL CREATININE-BSD FRML MDRD: 55 ML/MIN/1.73
GLOBULIN UR ELPH-MCNC: 3.1 GM/DL
GLUCOSE BLD-MCNC: 101 MG/DL (ref 65–99)
GLUCOSE BLD-MCNC: 82 MG/DL (ref 65–99)
HCT VFR BLD AUTO: 38.7 % (ref 34–46.6)
HGB BLD-MCNC: 12.5 G/DL (ref 12–15.9)
HOLD SPECIMEN: NORMAL
HOLD SPECIMEN: NORMAL
IMM GRANULOCYTES # BLD AUTO: 0.06 10*3/MM3 (ref 0–0.05)
IMM GRANULOCYTES NFR BLD AUTO: 0.6 % (ref 0–0.5)
INR PPP: 0.99 (ref 0.8–1.2)
LYMPHOCYTES # BLD AUTO: 2.23 10*3/MM3 (ref 0.7–3.1)
LYMPHOCYTES NFR BLD AUTO: 23.7 % (ref 19.6–45.3)
MAGNESIUM SERPL-MCNC: 1.8 MG/DL (ref 1.6–2.6)
MCH RBC QN AUTO: 28.5 PG (ref 26.6–33)
MCHC RBC AUTO-ENTMCNC: 32.3 G/DL (ref 31.5–35.7)
MCV RBC AUTO: 88.4 FL (ref 79–97)
MONOCYTES # BLD AUTO: 0.57 10*3/MM3 (ref 0.1–0.9)
MONOCYTES NFR BLD AUTO: 6.1 % (ref 5–12)
NEUTROPHILS # BLD AUTO: 6.34 10*3/MM3 (ref 1.7–7)
NEUTROPHILS NFR BLD AUTO: 67.6 % (ref 42.7–76)
NRBC BLD AUTO-RTO: 0 /100 WBC (ref 0–0.2)
PLATELET # BLD AUTO: 289 10*3/MM3 (ref 140–450)
PMV BLD AUTO: 9.3 FL (ref 6–12)
POTASSIUM BLD-SCNC: 4 MMOL/L (ref 3.5–5.2)
POTASSIUM BLD-SCNC: 4.2 MMOL/L (ref 3.5–5.2)
PROT SERPL-MCNC: 6.7 G/DL (ref 6–8.5)
PROTHROMBIN TIME: 12.9 SECONDS (ref 11.1–15.3)
RBC # BLD AUTO: 4.38 10*6/MM3 (ref 3.77–5.28)
SODIUM BLD-SCNC: 136 MMOL/L (ref 136–145)
SODIUM BLD-SCNC: 139 MMOL/L (ref 136–145)
TROPONIN T SERPL-MCNC: <0.01 NG/ML (ref 0–0.03)
WBC NRBC COR # BLD: 9.39 10*3/MM3 (ref 3.4–10.8)
WHOLE BLOOD HOLD SPECIMEN: NORMAL

## 2020-01-13 PROCEDURE — 71045 X-RAY EXAM CHEST 1 VIEW: CPT

## 2020-01-13 PROCEDURE — G0378 HOSPITAL OBSERVATION PER HR: HCPCS

## 2020-01-13 PROCEDURE — 36415 COLL VENOUS BLD VENIPUNCTURE: CPT

## 2020-01-13 PROCEDURE — 85025 COMPLETE CBC W/AUTO DIFF WBC: CPT | Performed by: FAMILY MEDICINE

## 2020-01-13 PROCEDURE — 96374 THER/PROPH/DIAG INJ IV PUSH: CPT

## 2020-01-13 PROCEDURE — 80053 COMPREHEN METABOLIC PANEL: CPT

## 2020-01-13 PROCEDURE — 84484 ASSAY OF TROPONIN QUANT: CPT | Performed by: INTERNAL MEDICINE

## 2020-01-13 PROCEDURE — 93010 ELECTROCARDIOGRAM REPORT: CPT | Performed by: INTERNAL MEDICINE

## 2020-01-13 PROCEDURE — 83735 ASSAY OF MAGNESIUM: CPT | Performed by: INTERNAL MEDICINE

## 2020-01-13 PROCEDURE — 84484 ASSAY OF TROPONIN QUANT: CPT | Performed by: FAMILY MEDICINE

## 2020-01-13 PROCEDURE — 93005 ELECTROCARDIOGRAM TRACING: CPT | Performed by: FAMILY MEDICINE

## 2020-01-13 PROCEDURE — 85379 FIBRIN DEGRADATION QUANT: CPT | Performed by: INTERNAL MEDICINE

## 2020-01-13 PROCEDURE — 85610 PROTHROMBIN TIME: CPT | Performed by: INTERNAL MEDICINE

## 2020-01-13 PROCEDURE — 99284 EMERGENCY DEPT VISIT MOD MDM: CPT

## 2020-01-13 PROCEDURE — 93005 ELECTROCARDIOGRAM TRACING: CPT

## 2020-01-13 PROCEDURE — 99204 OFFICE O/P NEW MOD 45 MIN: CPT | Performed by: INTERNAL MEDICINE

## 2020-01-13 PROCEDURE — 93000 ELECTROCARDIOGRAM COMPLETE: CPT | Performed by: INTERNAL MEDICINE

## 2020-01-13 RX ORDER — IPRATROPIUM BROMIDE AND ALBUTEROL SULFATE 2.5; .5 MG/3ML; MG/3ML
3 SOLUTION RESPIRATORY (INHALATION) EVERY 4 HOURS PRN
Status: DISCONTINUED | OUTPATIENT
Start: 2020-01-13 | End: 2020-01-16 | Stop reason: HOSPADM

## 2020-01-13 RX ORDER — SODIUM CHLORIDE 0.9 % (FLUSH) 0.9 %
10 SYRINGE (ML) INJECTION EVERY 12 HOURS SCHEDULED
Status: DISCONTINUED | OUTPATIENT
Start: 2020-01-13 | End: 2020-01-16 | Stop reason: HOSPADM

## 2020-01-13 RX ORDER — PANTOPRAZOLE SODIUM 40 MG/1
40 TABLET, DELAYED RELEASE ORAL
Status: DISCONTINUED | OUTPATIENT
Start: 2020-01-13 | End: 2020-01-16 | Stop reason: HOSPADM

## 2020-01-13 RX ORDER — ACETAMINOPHEN 325 MG/1
650 TABLET ORAL EVERY 4 HOURS PRN
Status: DISCONTINUED | OUTPATIENT
Start: 2020-01-13 | End: 2020-01-15

## 2020-01-13 RX ORDER — METOPROLOL TARTRATE 50 MG/1
50 TABLET, FILM COATED ORAL EVERY 12 HOURS SCHEDULED
Status: DISCONTINUED | OUTPATIENT
Start: 2020-01-14 | End: 2020-01-15

## 2020-01-13 RX ORDER — SODIUM CHLORIDE 0.9 % (FLUSH) 0.9 %
10 SYRINGE (ML) INJECTION AS NEEDED
Status: DISCONTINUED | OUTPATIENT
Start: 2020-01-13 | End: 2020-01-16 | Stop reason: HOSPADM

## 2020-01-13 RX ORDER — ACETAMINOPHEN 650 MG/1
650 SUPPOSITORY RECTAL EVERY 4 HOURS PRN
Status: DISCONTINUED | OUTPATIENT
Start: 2020-01-13 | End: 2020-01-15

## 2020-01-13 RX ORDER — ACETAMINOPHEN 160 MG/5ML
650 SOLUTION ORAL EVERY 4 HOURS PRN
Status: DISCONTINUED | OUTPATIENT
Start: 2020-01-13 | End: 2020-01-15

## 2020-01-13 RX ORDER — CLONAZEPAM 0.5 MG/1
0.5 TABLET ORAL 2 TIMES DAILY PRN
Status: DISCONTINUED | OUTPATIENT
Start: 2020-01-13 | End: 2020-01-16 | Stop reason: HOSPADM

## 2020-01-13 RX ORDER — NALOXONE HCL 0.4 MG/ML
0.4 VIAL (ML) INJECTION
Status: DISCONTINUED | OUTPATIENT
Start: 2020-01-13 | End: 2020-01-16 | Stop reason: HOSPADM

## 2020-01-13 RX ORDER — ONDANSETRON 2 MG/ML
4 INJECTION INTRAMUSCULAR; INTRAVENOUS EVERY 6 HOURS PRN
Status: DISCONTINUED | OUTPATIENT
Start: 2020-01-13 | End: 2020-01-16 | Stop reason: HOSPADM

## 2020-01-13 RX ORDER — FLUOXETINE HYDROCHLORIDE 20 MG/1
20 CAPSULE ORAL 2 TIMES DAILY
Status: DISCONTINUED | OUTPATIENT
Start: 2020-01-13 | End: 2020-01-16 | Stop reason: HOSPADM

## 2020-01-13 RX ORDER — MORPHINE SULFATE 2 MG/ML
2 INJECTION, SOLUTION INTRAMUSCULAR; INTRAVENOUS EVERY 4 HOURS PRN
Status: DISCONTINUED | OUTPATIENT
Start: 2020-01-13 | End: 2020-01-16 | Stop reason: HOSPADM

## 2020-01-13 RX ORDER — CYCLOBENZAPRINE HCL 10 MG
10 TABLET ORAL 3 TIMES DAILY PRN
Status: DISCONTINUED | OUTPATIENT
Start: 2020-01-13 | End: 2020-01-16 | Stop reason: HOSPADM

## 2020-01-13 RX ORDER — GABAPENTIN 300 MG/1
300 CAPSULE ORAL 2 TIMES DAILY
Status: DISCONTINUED | OUTPATIENT
Start: 2020-01-13 | End: 2020-01-16 | Stop reason: HOSPADM

## 2020-01-13 RX ORDER — PRIMIDONE 50 MG/1
50 TABLET ORAL NIGHTLY
Status: DISCONTINUED | OUTPATIENT
Start: 2020-01-13 | End: 2020-01-16 | Stop reason: HOSPADM

## 2020-01-13 RX ORDER — CETIRIZINE HYDROCHLORIDE 10 MG/1
10 TABLET ORAL DAILY
Status: DISCONTINUED | OUTPATIENT
Start: 2020-01-13 | End: 2020-01-16 | Stop reason: HOSPADM

## 2020-01-13 RX ORDER — HYDROXYCHLOROQUINE SULFATE 200 MG/1
200 TABLET, FILM COATED ORAL DAILY
Status: DISCONTINUED | OUTPATIENT
Start: 2020-01-14 | End: 2020-01-16 | Stop reason: HOSPADM

## 2020-01-13 RX ADMIN — GABAPENTIN 300 MG: 300 CAPSULE ORAL at 20:43

## 2020-01-13 RX ADMIN — FLUOXETINE 20 MG: 20 CAPSULE ORAL at 20:43

## 2020-01-13 RX ADMIN — SODIUM CHLORIDE, PRESERVATIVE FREE 10 ML: 5 INJECTION INTRAVENOUS at 20:44

## 2020-01-13 RX ADMIN — METOPROLOL TARTRATE 25 MG: 25 TABLET ORAL at 15:35

## 2020-01-13 RX ADMIN — ACETAMINOPHEN 650 MG: 325 TABLET, FILM COATED ORAL at 20:43

## 2020-01-13 RX ADMIN — PRIMIDONE 50 MG: 50 TABLET ORAL at 20:43

## 2020-01-13 RX ADMIN — METOPROLOL TARTRATE 5 MG: 5 INJECTION INTRAVENOUS at 16:26

## 2020-01-13 RX ADMIN — METOPROLOL TARTRATE 25 MG: 25 TABLET ORAL at 23:29

## 2020-01-13 RX ADMIN — PANTOPRAZOLE SODIUM 40 MG: 40 TABLET, DELAYED RELEASE ORAL at 18:30

## 2020-01-13 NOTE — PROGRESS NOTES
[facility]  OFFICE NOTE    Cardiovascular Medicine  Pollo Glover M.D., Gretchen Ponce, APRN  [address]  [address]  [place] [address]    Thank you for asking me to see Lashaun Bernal for chest pain.    History of Present Illness  This is a 59 y.o. female with:    1.  Hyperlipidemia  2.  Elevated blood pressure  3.  Peripheral vascular disease  4.  PACs and SVT  5.  Chest pain    Lashaun Bernal is a 59 y.o. female who presents for consultation today.  Patient is here for further evaluation for chest pain.  Patient reported she has been having intermittent chest pain for last several months, she is having 4-5 episodes per day, pain is soreness in character, localized to the substernal region, last for about a minute, nonradiating, nonexertional, not associated with shortness of breath nausea vomiting diaphoresis, episodes of self resolving, she has intermittent palpitations as well and the pain is more prominent when she has the palpitations.  She has not tried any medicines for it.  She had similar symptoms several years ago at which point she had a stress test which was normal.  She had a Holter monitor for palpitations which showed PACs and SVT.  No prior history of myocardial infarction.  No family history premature coronary arteries.  Patient is not tobacco user.  No other acute complaints.    Review of Systems - ROS  Constitution: Negative for weakness, weight gain and weight loss.   HENT: Negative for congestion.    Eyes: Negative for blurred vision.   Cardiovascular: As mentioned above  Respiratory: Negative for cough and hemoptysis.    Endocrine: Negative for polydipsia and polyuria.   Hematologic/Lymphatic: Negative for bleeding problem. Does not bruise/bleed easily.   Skin: Negative for flushing.   Musculoskeletal: Negative for neck pain and stiffness.   Gastrointestinal: Negative for abdominal pain, diarrhea, jaundice, melena, nausea and  vomiting.   Genitourinary: Negative for dysuria and hematuria.   Neurological: Negative for dizziness, focal weakness and numbness.   Psychiatric/Behavioral: Negative for altered mental status and depression.          All other systems were reviewed and were negative.    family history includes Diabetes in an other family member; Heart disease in an other family member; Hypertension in an other family member.     reports that she has never smoked. She has never used smokeless tobacco. She reports that she does not drink alcohol or use drugs.    Allergies   Allergen Reactions   • Cefprozil Nausea And Vomiting   • Penicillins Rash         Current Outpatient Medications:   •  albuterol (PROVENTIL HFA;VENTOLIN HFA) 108 (90 Base) MCG/ACT inhaler, Inhale 2 puffs Every 4 (Four) Hours As Needed for Wheezing., Disp: 18 g, Rfl: 2  •  albuterol (PROVENTIL) (2.5 MG/3ML) 0.083% nebulizer solution, Take 2.5 mg by nebulization Every 6 (Six) Hours As Needed for Wheezing., Disp: 120 vial, Rfl: 2  •  cetirizine (zyrTEC) 10 MG tablet, Take 1 tablet by mouth Daily. (Patient taking differently: Take 10 mg by mouth As Needed.), Disp: 30 tablet, Rfl: 0  •  clonazePAM (KlonoPIN) 0.5 MG tablet, Take 0.5 mg by mouth 2 (Two) Times a Day As Needed for Anxiety., Disp: , Rfl:   •  colesevelam (WELCHOL) 625 MG tablet, Take 3 tablets by mouth 2 (Two) Times a Day With Meals., Disp: 60 tablet, Rfl: 3  •  cyclobenzaprine (FLEXERIL) 10 MG tablet, Take 1 tablet by mouth 3 (Three) Times a Day As Needed for Muscle Spasms., Disp: 90 tablet, Rfl: 5  •  estradiol (ESTRACE) 1 MG tablet, Take 1 tablet by mouth Daily., Disp: 90 tablet, Rfl: 0  •  fluconazole (DIFLUCAN) 200 MG tablet, Take one at the first sign of infection and may repeat in 3 days as needed., Disp: 2 tablet, Rfl: 0  •  FLUoxetine (PROzac) 20 MG capsule, Take 20 mg by mouth 2 (Two) Times a Day., Disp: , Rfl:   •  gabapentin (NEURONTIN) 300 MG capsule, Take 1 capsule by mouth 2 (Two) Times a  "Day., Disp: 60 capsule, Rfl: 2  •  hydroxychloroquine (PLAQUENIL) 200 MG tablet, Take 200 mg by mouth Daily., Disp: , Rfl:   •  omeprazole (priLOSEC) 40 MG capsule, Take 40 mg by mouth 2 (Two) Times a Day., Disp: , Rfl:   •  polyethylene glycol (MIRALAX) powder, Take 17 g by mouth 2 (Two) Times a Day As Needed (constipation)., Disp: 1 each, Rfl: 11  •  primidone (MYSOLINE) 50 MG tablet, Take 1 p.o. nightly, Disp: 30 tablet, Rfl: 2  •  metoprolol tartrate (LOPRESSOR) 25 MG tablet, Take 1 tablet by mouth 2 (Two) Times a Day., Disp: 180 tablet, Rfl: 3    Physical Exam:  Vitals:    01/13/20 0840   BP: 140/82   BP Location: Left arm   Patient Position: Sitting   Cuff Size: Adult   Pulse: 80   SpO2: 98%   Weight: 102 kg (225 lb)   Height: 160 cm (62.99\")   PainSc: 0-No pain     Current Pain Level: none  Pulse Ox: Normal  on room air  General: alert, appears stated age and cooperative     Body Habitus: well-nourished    HEENT: Head: Normocephalic, no lesions, without obvious abnormality. No arcus senilis, xanthelasma or xanthomas.    Neuro: alert, oriented x3  Pulses: 2+ and symmetric  JVP: Volume/Pulsation: Normal.  Normal waveforms.   Appropriate inspiratory decrease.  No Kussmaul's. No Deborah's.   Carotid Exam: no bruit normal pulsation bilaterally   Carotid Volume: normal.     Respirations: no increased work of breathing   Chest:  Normal    Pulmonary:Normal   Precordium: Normal impulses. P2 is not palpable.  RV Heave: absent  LV Heave: absent  [place]:  normal size and placement  Palpable S4: absent.  Heart rate: normal    Heart Rhythm: regular     Heart Sounds: S1: normal  S2: normal  S3: absent   S4: absent  Opening Snap: absent    Pericardial Rub:  Absent: .    Abdomen:   Appearance: normal .  Palpation: Soft, non-tender to palpation, bowel sounds positive in all four quadrants; no guarding or rebound tenderness  Extremity: no edema.   LE Skin: no rashes  LE Hair:  normal  LE Pulses: well perfused with normal pulses " in the distal extremities  Pallor on elevation: Absent. Rubor on dependency: None      DATA REVIEWED:     EKG. I personally reviewed and interpreted the EKG.  Normal sinus rhythm, left axis deviation, poor R wave progression.    ECG/EMG Results (all)     None            --------------------------------------------------------------------------------------------------  LABS:     The 10-year CVD risk score (Leonidas et al., 2008) is: 15.1%    Values used to calculate the score:      Age: 59 years      Sex: Female      Diabetic: No      Tobacco smoker: No      Systolic Blood Pressure: 140 mmHg      Is BP treated: Yes      HDL Cholesterol: 50 mg/dL      Total Cholesterol: 252 mg/dL         Lab Results   Component Value Date    GLUCOSE 81 11/27/2019    BUN 16 11/27/2019    CREATININE 0.99 11/27/2019    EGFRIFNONA 58 (L) 11/27/2019    BCR 16.2 11/27/2019    K 4.4 11/27/2019    CO2 21.1 (L) 11/27/2019    CALCIUM 9.0 11/27/2019    PROTENTOTREF 6.5 05/01/2019    ALBUMIN 4.10 11/27/2019    LABIL2 1.0 05/01/2019    AST 45 (H) 11/27/2019    ALT 72 (H) 11/27/2019     Lab Results   Component Value Date    WBC 7.12 09/12/2019    HGB 13.1 09/12/2019    HCT 39.0 09/12/2019    MCV 87.6 09/12/2019     09/12/2019     Lab Results   Component Value Date    CHOL 252 (H) 11/27/2019    TRIG 162 (H) 11/27/2019    HDL 50 11/27/2019     (H) 11/27/2019     Lab Results   Component Value Date    TSH 4.730 (H) 11/27/2019    P3QXXAL 154.0 11/27/2019    Z7IXRXE 13.6 (H) 01/19/2015     Lab Results   Component Value Date    CKTOTAL 131 05/01/2019    CKMB 1.26 11/27/2019    TROPONINT <0.010 11/27/2019     No results found for: HGBA1C  No results found for: DDIMER  Lab Results   Component Value Date    ALT 72 (H) 11/27/2019     No results found for: HGBA1C  Lab Results   Component Value Date    CREATININE 0.99 11/27/2019     Lab Results   Component Value Date    IRON 70 01/25/2019    TIBC 326 01/25/2019    FERRITIN 30.20 01/25/2019          Lab Results   Component Value Date    INR 0.94 07/26/2019    INR 0.93 01/25/2019    PROTIME 12.4 07/26/2019    PROTIME 12.3 01/25/2019       Assessment/Plan     1.  Elevated blood pressure without diagnosis of hypertension:  Blood pressure elevated 140 in office today.  I am planning on starting her metoprolol for PACs which will help with her blood pressure as well.    2. Mixed hyperlipidemia  Being managed with primary care physician on Welchol    3. Bilateral carotid artery stenosis  Follows with vascular surgery conservative management for now.    4. Chest pain:  Chest pain is rather atypical in character, however she has multiple risk factors we will plan performing a CTA cardiogram for further evaluation.  We will also get an echocardiogram to assess for any structural normalities.  Advised her to seek medical attention should have significant chest pain resolved  She is already on his cholesterol medicine and starting her beta-blocker.  - CT Angiogram Coronary  - Adult Transthoracic Echo Complete W/ Cont if Necessary Per Protocol    6. Paroxysmal SVT (supraventricular tachycardia) (CMS/HCC)  Start metoprolol.  Echocardiogram to transfer extremities.  Recent TSH slightly elevated electrolytes are normal.  - Adult Transthoracic Echo Complete W/ Cont if Necessary Per Protocol      Prevention:  Patient's Body mass index is 39.87 kg/m². BMI is above normal parameters. Recommendations include: exercise counseling and nutrition counseling.      Lashaun Bernal  reports that she has never smoked. She has never used smokeless tobacco..       AAA Screening:   Not needed    Return in about 3 months (around 4/13/2020).          This document has been electronically signed by Pollo Glover MD on January 13, 2020 9:09 AM

## 2020-01-13 NOTE — ED TRIAGE NOTES
Pt c/o intermittent chest pain x 1 month. Pt states the pain intensified around 10-15 minutes ago. Pt is in tears at triage and is hyperventilating. Audible wheeze noted on expiration.

## 2020-01-13 NOTE — ED PROVIDER NOTES
Subjective   Patient came to the ED with new onset sharp left-sided chest pain.  She has a several month history of dull pressure-like chest pain.  The pain that brought her into the emergency department is different in quality and severity.  It is now a left-sided sharp stabbing chest pain that radiates up her neck to the left jaw.  It was associated with nausea no vomiting anxiety and she felt like her heart was racing and may be skipped a beat.  Patient is seen by Dr. Glover outpatient and was supposed to have CTA coronaries and ultrasound done on Thursday.      History provided by:  Patient, medical records and relative   used: No    Chest Pain   Pain location:  L chest  Pain quality: sharp and stabbing    Pain radiates to:  Neck and L jaw  Pain severity:  Moderate  Onset quality:  Sudden  Duration:  30 minutes  Progression:  Partially resolved  Chronicity:  New  Relieved by:  None tried  Worsened by:  Nothing  Associated symptoms: anxiety, cough, nausea, palpitations and shortness of breath    Associated symptoms: no abdominal pain, no diaphoresis, no fatigue, no fever, no headache, no syncope and no vomiting        Review of Systems   Constitutional: Negative for chills, diaphoresis, fatigue and fever.   HENT: Negative for congestion, postnasal drip and sore throat.    Eyes: Negative for redness and visual disturbance.   Respiratory: Positive for cough, shortness of breath and wheezing.    Cardiovascular: Positive for chest pain and palpitations. Negative for leg swelling and syncope.   Gastrointestinal: Positive for nausea. Negative for abdominal pain and vomiting.   Endocrine: Negative for polydipsia and polyuria.   Genitourinary: Negative for difficulty urinating and dysuria.   Musculoskeletal: Negative for arthralgias and myalgias.   Skin: Negative for pallor and rash.   Neurological: Negative for seizures, syncope, light-headedness and headaches.       Past Medical History:   Diagnosis  "Date   • Allergic rhinitis    • Arthritis    • Asthma    • Breast lump    • Chest pain    • Chronic low back pain    • Constipation    • Depressive disorder    • Diarrhea    • Diverticular disease of colon    • Epigastric pain    • DIETER (generalized anxiety disorder)    • GERD (gastroesophageal reflux disease)    • Head ache    • Hematochezia    • Herpes zoster     mid back, near spine   • Periumbilical pain    • PONV (postoperative nausea and vomiting)    • Maurice Mountain spotted fever     pt states \"currently has it\"       Allergies   Allergen Reactions   • Cefprozil Nausea And Vomiting   • Penicillins Rash       Past Surgical History:   Procedure Laterality Date   • ABDOMINOPLASTY  12/22/2004   • AUGMENTATION MAMMAPLASTY     • BREAST AUGMENTATION BILATERAL MASTOPEXY     • BUNIONECTOMY Left 3/20/2017    Procedure: EXCISION OF CALCANEAL SPURS LEFT FOOT AND REATTACHMENT OF ACHILLES TENDON ;  Surgeon: Jarrell Mar MD;  Location: [facility];  Service:    • COLONOSCOPY  01/23/2015   • CYST REMOVAL  08/18/1961    Excision of sebaceous cyst. Left ear   • FINGER/THUMB LESION/CYST EXCISION  10/29/2014   • HAND SURGERY  06/03/2013   • HYSTERECTOMY     • OOPHORECTOMY     • ORIF ULNA/RADIUS FRACTURES  07/22/2013   • TUBAL ABDOMINAL LIGATION  08/14/1985    Laparoscopic tubal sterilization; dilatation and curettage. Desires tubal ligation;         Family History   Problem Relation Age of Onset   • Diabetes Other    • Heart disease Other    • Hypertension Other        Social History     Socioeconomic History   • Marital status:      Spouse name: Not on file   • Number of children: Not on file   • Years of education: Not on file   • Highest education level: Not on file   Tobacco Use   • Smoking status: Never Smoker   • Smokeless tobacco: Never Used   Substance and Sexual Activity   • Alcohol use: No   • Drug use: No   • Sexual activity: Defer           Objective   Physical Exam   Constitutional: She is oriented to person, " place, and time. She appears well-developed.   HENT:   Head: Normocephalic and atraumatic.   Mouth/Throat: No oropharyngeal exudate.   Eyes: Pupils are equal, round, and reactive to light. Conjunctivae are normal. No scleral icterus.   Cardiovascular: Normal rate and regular rhythm.   No murmur heard.  Pulmonary/Chest: Effort normal and breath sounds normal. No respiratory distress.   Abdominal: Soft. Bowel sounds are normal. She exhibits no distension. There is no hepatosplenomegaly. There is no tenderness. There is negative Rudd's sign.   Musculoskeletal: She exhibits no edema or deformity.        Right lower leg: She exhibits no edema.        Left lower leg: She exhibits no edema.   Neurological: She is alert and oriented to person, place, and time.   Skin: Skin is warm and dry. She is not diaphoretic.   Psychiatric: Her behavior is normal. Her mood appears anxious. She is not agitated.   Vitals reviewed.      Procedures           ED Course      XR Chest 1 View    (Results Pending)     Labs Reviewed   COMPREHENSIVE METABOLIC PANEL - Abnormal; Notable for the following components:       Result Value    Creatinine 1.03 (*)     CO2 20.0 (*)     Calcium 8.4 (*)     ALT (SGPT) 49 (*)     eGFR Non  Amer 55 (*)     All other components within normal limits    Narrative:     GFR Normal >60  Chronic Kidney Disease <60  Kidney Failure <15     CBC WITH AUTO DIFFERENTIAL - Abnormal; Notable for the following components:    Immature Grans % 0.6 (*)     Immature Grans, Absolute 0.06 (*)     All other components within normal limits   TROPONIN (IN-HOUSE) - Normal    Narrative:     Troponin T Reference Range:  <= 0.03 ng/mL-   Negative for AMI  >0.03 ng/mL-     Abnormal for myocardial necrosis.  Clinicians would have to utilize clinical acumen, EKG, Troponin and serial changes to determine if it is an Acute Myocardial Infarction or myocardial injury due to an underlying chronic condition.       Results may be falsely  decreased if patient taking Biotin.     RAINBOW DRAW    Narrative:     The following orders were created for panel order [place] Draw.  Procedure                               Abnormality         Status                     ---------                               -----------         ------                     Light Blue Top[[id number]]                                   Final result               Green Top (Gel)[[id number]]                                  Final result               Lavender Top[[id number]]                                     Final result               Gold Top - SST[[id number]]                                   Final result                 Please view results for these tests on the individual orders.   CBC AND DIFFERENTIAL    Narrative:     The following orders were created for panel order CBC & Differential.  Procedure                               Abnormality         Status                     ---------                               -----------         ------                     CBC Auto Differential[[id number]]        Abnormal            Final result                 Please view results for these tests on the individual orders.   LIGHT BLUE TOP   GREEN TOP   LAVENDER TOP   GOLD TOP - SST   EXTRA TUBES    Narrative:     The following orders were created for panel order Extra Tubes.  Procedure                               Abnormality         Status                     ---------                               -----------         ------                     Light Blue Top[[id number]]                                   Final result               Lavender Top[[id number]]                                     Final result                 Please view results for these tests on the individual orders.   LIGHT BLUE TOP   LAVENDER TOP     XR Chest 1 View    (Results Pending)                                              MDM  Number of Diagnoses or Management Options  Chest pain, unspecified type:   Diagnosis management  comments: 59-year-old female came to the emergency department for new onset left-sided sharp chest pain.  In the ED her EKG showed possible anterior lateral MI of unknown age, possible left axis deviation and tachycardia.  First troponin was negative.  Heart score 4.  Decision was made to admit the patient.       Amount and/or Complexity of Data Reviewed  Clinical lab tests: reviewed  Tests in the radiology section of CPT®: ordered  Tests in the medicine section of CPT®: reviewed  Decide to obtain previous medical records or to obtain history from someone other than the patient: yes  Review and summarize past medical records: yes  Discuss the patient with other providers: yes  Independent visualization of images, tracings, or specimens: yes    Risk of Complications, Morbidity, and/or Mortality  Presenting problems: moderate  Diagnostic procedures: minimal  Management options: minimal    Critical Care  Total time providing critical care: 30-74 minutes    Patient Progress  Patient progress: stable      Final diagnoses:   Chest pain, unspecified type       This document has been electronically signed by Toni Grant MD on January 13, 2020 2:39 PM           Tnoi Grant MD  Resident  01/13/20 [id number]

## 2020-01-13 NOTE — H&P
[facility]  INPATIENT HISTORY AND PHYSICAL       Patient Care Team:  Gretchen Gauthier APRN as PCP - General (Nurse Practitioner)  Marcial Walker PA-C as Physician Assistant (Gastroenterology)  Santhosh Dos Santos MD as Consulting Physician (Neurology)  Santhosh Dos Santos MD as Referring Physician (Neurology)  Jeffrey Hooks MD as Consulting Physician (Otolaryngology)  Omayra Toscano APRN as Nurse Practitioner (Neurology)    Chief complaint   Chief Complaint   Patient presents with   • Chest Pain       Subjective     Patient is a 59 y.o. female with a past medical history of asthma, fatty liver disease, chronic muscle cramps and CKD.  She presents with sudden onset of chest pain that started this afternoon.      Patient was sitting in a car after doing some shopping at [employer] when she developed left-sided chest pain that was dull and felt like indigestion.  The pain was 9/10 intensity and radiated to her jaw and left shoulder and lasted for about 15 minutes before gradually improving on the way to the emergency room.  She complained of some nausea but denied lightheadedness or shortness of breath.  Of note patient has been having intermittent episodes of retrosternal chest pain over the past 2 to 3 months and had just seen her cardiologist Dr. Glover this morning.  He had planned to do an outpatient CT angiogram of her coronary arteries and echocardiogram.  At the time of my evaluation she denies chest pain or shortness of breath    Review of Systems   Constitutional: Negative for activity change, appetite change, chills, fatigue and fever.   HENT: Negative for congestion, ear pain, rhinorrhea, sore throat and trouble swallowing.    Respiratory: Negative for cough, chest tightness, shortness of breath and wheezing.    Cardiovascular: Negative for chest pain, palpitations and leg swelling.   Gastrointestinal: Negative for abdominal distention,  "abdominal pain, diarrhea, nausea and vomiting.   Genitourinary: Negative for difficulty urinating, dysuria and hematuria.   Musculoskeletal: Negative for arthralgias, back pain and myalgias.   Skin: Negative for pallor and rash.   Neurological: Negative for dizziness, syncope, weakness, light-headedness and headaches.   Hematological: Negative for adenopathy. Does not bruise/bleed easily.   Psychiatric/Behavioral: Negative for agitation and confusion. The patient is not nervous/anxious.        History  Past Medical History:   Diagnosis Date   • Allergic rhinitis    • Arthritis    • Asthma    • Breast lump    • Chest pain    • Chronic low back pain    • Constipation    • Depressive disorder    • Diarrhea    • Diverticular disease of colon    • Epigastric pain    • DIETER (generalized anxiety disorder)    • GERD (gastroesophageal reflux disease)    • Head ache    • Hematochezia    • Herpes zoster     mid back, near spine   • Periumbilical pain    • PONV (postoperative nausea and vomiting)    • Maurice Mountain spotted fever     pt states \"currently has it\"     Past Surgical History:   Procedure Laterality Date   • ABDOMINOPLASTY  12/22/2004   • AUGMENTATION MAMMAPLASTY     • BREAST AUGMENTATION BILATERAL MASTOPEXY     • BUNIONECTOMY Left 3/20/2017    Procedure: EXCISION OF CALCANEAL SPURS LEFT FOOT AND REATTACHMENT OF ACHILLES TENDON ;  Surgeon: Jarrell Mar MD;  Location: [facility];  Service:    • COLONOSCOPY  01/23/2015   • CYST REMOVAL  08/18/1961    Excision of sebaceous cyst. Left ear   • FINGER/THUMB LESION/CYST EXCISION  10/29/2014   • HAND SURGERY  06/03/2013   • HYSTERECTOMY     • OOPHORECTOMY     • ORIF ULNA/RADIUS FRACTURES  07/22/2013   • TUBAL ABDOMINAL LIGATION  08/14/1985    Laparoscopic tubal sterilization; dilatation and curettage. Desires tubal ligation;       Family History   Problem Relation Age of Onset   • Diabetes Other    • Heart disease Other    • Hypertension Other    • Heart disease Mother      "    Myocardial infarct   • Hypertension Mother    • Diabetes Mother    • Heart disease Father         CHF     Social History     Tobacco Use   • Smoking status: Never Smoker   • Smokeless tobacco: Never Used   Substance Use Topics   • Alcohol use: No   • Drug use: No     Medications Prior to Admission   Medication Sig Dispense Refill Last Dose   • cetirizine (zyrTEC) 10 MG tablet Take 1 tablet by mouth Daily. (Patient taking differently: Take 10 mg by mouth As Needed.) 30 tablet 0 Past Month at Unknown time   • clonazePAM (KlonoPIN) 0.5 MG tablet Take 0.5 mg by mouth 2 (Two) Times a Day As Needed for Anxiety.   1/12/2020 at Unknown time   • colesevelam (WELCHOL) 625 MG tablet Take 3 tablets by mouth 2 (Two) Times a Day With Meals. 60 tablet 3 1/13/2020 at Unknown time   • cyclobenzaprine (FLEXERIL) 10 MG tablet Take 1 tablet by mouth 3 (Three) Times a Day As Needed for Muscle Spasms. 90 tablet 5 1/12/2020 at Unknown time   • estradiol (ESTRACE) 1 MG tablet Take 1 tablet by mouth Daily. 90 tablet 0 1/13/2020 at Unknown time   • FLUoxetine (PROzac) 20 MG capsule Take 20 mg by mouth 2 (Two) Times a Day.   1/13/2020 at Unknown time   • gabapentin (NEURONTIN) 300 MG capsule Take 1 capsule by mouth 2 (Two) Times a Day. 60 capsule 2 1/12/2020 at Unknown time   • hydroxychloroquine (PLAQUENIL) 200 MG tablet Take 200 mg by mouth Daily.   1/13/2020 at Unknown time   • metoprolol tartrate (LOPRESSOR) 25 MG tablet Take 1 tablet by mouth 2 (Two) Times a Day. 180 tablet 3 1/13/2020 at Unknown time   • omeprazole (priLOSEC) 40 MG capsule Take 40 mg by mouth 2 (Two) Times a Day.   1/13/2020 at Unknown time   • primidone (MYSOLINE) 50 MG tablet Take 1 p.o. nightly 30 tablet 2 1/13/2020 at Unknown time   • albuterol (PROVENTIL HFA;VENTOLIN HFA) 108 (90 Base) MCG/ACT inhaler Inhale 2 puffs Every 4 (Four) Hours As Needed for Wheezing. 18 g 2 More than a month at Unknown time   • albuterol (PROVENTIL) (2.5 MG/3ML) 0.083% nebulizer  solution Take 2.5 mg by nebulization Every 6 (Six) Hours As Needed for Wheezing. 120 vial 2 More than a month at Unknown time   • fluconazole (DIFLUCAN) 200 MG tablet Take one at the first sign of infection and may repeat in 3 days as needed. 2 tablet 0 More than a month at Unknown time   • polyethylene glycol (MIRALAX) powder Take 17 g by mouth 2 (Two) Times a Day As Needed (constipation). 1 each 11 Unknown at Unknown time     Allergies:  Cefprozil and Penicillins  Prior to Admission medications    Medication Sig Start Date End Date Taking? Authorizing Provider   cetirizine (zyrTEC) 10 MG tablet Take 1 tablet by mouth Daily.  Patient taking differently: Take 10 mg by mouth As Needed. 10/3/19 10/2/20 Yes Cesar Fisher,    clonazePAM (KlonoPIN) 0.5 MG tablet Take 0.5 mg by mouth 2 (Two) Times a Day As Needed for Anxiety. 6/11/19  Yes Maryam Mustafa MD   colesevelam (WELCHOL) 625 MG tablet Take 3 tablets by mouth 2 (Two) Times a Day With Meals. 12/2/19  Yes Gretchen Gauthier APRN   cyclobenzaprine (FLEXERIL) 10 MG tablet Take 1 tablet by mouth 3 (Three) Times a Day As Needed for Muscle Spasms. 6/12/19  Yes Gretchen Gauthier APRN   estradiol (ESTRACE) 1 MG tablet Take 1 tablet by mouth Daily. 12/6/19  Yes Gretchen Gauthier APRN   FLUoxetine (PROzac) 20 MG capsule Take 20 mg by mouth 2 (Two) Times a Day.   Yes Maryam Mustafa MD   gabapentin (NEURONTIN) 300 MG capsule Take 1 capsule by mouth 2 (Two) Times a Day. 10/28/19  Yes Gretchen Gauthier APRN   hydroxychloroquine (PLAQUENIL) 200 MG tablet Take 200 mg by mouth Daily.   Yes Maryam Mustafa MD   metoprolol tartrate (LOPRESSOR) 25 MG tablet Take 1 tablet by mouth 2 (Two) Times a Day. 1/13/20  Yes Pollo Glover MD   omeprazole (priLOSEC) 40 MG capsule Take 40 mg by mouth 2 (Two) Times a Day.   Yes Maryam Mustafa MD   primidone (MYSOLINE) 50 MG tablet Take 1 p.o. nightly 12/11/19  Yes Santhosh Dos Santos MD   albuterol (PROVENTIL HFA;VENTOLIN HFA)  108 (90 Base) MCG/ACT inhaler Inhale 2 puffs Every 4 (Four) Hours As Needed for Wheezing. 11/27/18   Gretchen Gauthier APRN   albuterol (PROVENTIL) (2.5 MG/3ML) 0.083% nebulizer solution Take 2.5 mg by nebulization Every 6 (Six) Hours As Needed for Wheezing. 11/27/18   Gretchen Gauthier APRN   fluconazole (DIFLUCAN) 200 MG tablet Take one at the first sign of infection and may repeat in 3 days as needed. 12/7/19   Laura Patterson APRN   polyethylene glycol (MIRALAX) powder Take 17 g by mouth 2 (Two) Times a Day As Needed (constipation). 8/23/18   Eric Rowell MD       Objective        Vital Signs  Temp:  [97.8 °F (36.6 °C)-98.2 °F (36.8 °C)] 98.2 °F (36.8 °C)  Heart Rate:  [] 81  Resp:  [18-24] 18  BP: (133-214)/(68-84) 135/72      Physical Exam   Constitutional: She is oriented to person, place, and time. She appears well-developed and well-nourished. No distress.   HENT:   Head: Normocephalic and atraumatic.   Mouth/Throat: Oropharynx is clear and moist. No oropharyngeal exudate.   Eyes: Pupils are equal, round, and reactive to light. Conjunctivae and EOM are normal. No scleral icterus.   Neck: Normal range of motion. Neck supple. No JVD present. No tracheal deviation present. No thyromegaly present.   Cardiovascular: Normal rate, regular rhythm, normal heart sounds and intact distal pulses. Exam reveals no gallop and no friction rub.   No murmur heard.  Pulmonary/Chest: Effort normal and breath sounds normal. No stridor. No respiratory distress. She has no wheezes. She has no rales. She exhibits no tenderness.   Abdominal: Soft. Bowel sounds are normal. She exhibits no distension and no mass. There is no tenderness. There is no rebound and no guarding. No hernia.   Musculoskeletal: Normal range of motion. She exhibits no edema, tenderness or deformity.   Lymphadenopathy:     She has no cervical adenopathy.   Neurological: She is alert and oriented to person, place, and time. No cranial nerve deficit. She  exhibits normal muscle tone.   Skin: Skin is warm and dry. No rash noted. She is not diaphoretic. No erythema. No pallor.   Psychiatric: She has a normal mood and affect. Her behavior is normal. Judgment and thought content normal.     Results Review:     Results from last 7 days   Lab Units 01/13/20  1307 01/13/20  0932   SODIUM mmol/L 136 139   POTASSIUM mmol/L 4.2 4.0   CHLORIDE mmol/L 104 105   CO2 mmol/L 20.0* 22.0   BUN mg/dL 15 15   CREATININE mg/dL 1.03* 1.04*   GLUCOSE mg/dL 82 101*   CALCIUM mg/dL 8.4* 8.4*   BILIRUBIN mg/dL 0.3  --    ALK PHOS U/L 111  --    ALT (SGPT) U/L 49*  --    AST (SGOT) U/L 29  --        Results from last 7 days   Lab Units 01/13/20  1537   MAGNESIUM mg/dL 1.8       Results from last 7 days   Lab Units 01/13/20  1224   WBC 10*3/mm3 9.39   HEMOGLOBIN g/dL 12.5   HEMATOCRIT % 38.7   PLATELETS 10*3/mm3 289       Results from last 7 days   Lab Units 01/13/20  1836   INR  0.99     Imaging Results (Last 7 Days)     Procedure Component Value Units Date/Time    XR Chest 1 View [[id number]] Collected:  01/13/20 1421     Updated:  01/13/20 1445    Narrative:         PROCEDURE: Single chest view AP    REASON FOR EXAM:cough and chest pain, R07.9 Chest pain,  unspecified    FINDINGS: Comparison exam dated July 26, 2019. Cardiac and  pulmonary vasculature are normal. Lungs are clear. Pleural spaces  are normal. No acute osseous abnormality.      Impression:       No acute cardiopulmonary abnormality.    Electronically signed by:  Jhonny Varela MD  1/13/2020 2:44 PM CST  Workstation: [id number]          Assessment / Plan       Hospital Problem List:  Principal Problem:    Chest pain  Asthma  History of anxiety    Plan  -Patient's chest pain is atypical.  Serial troponin negative  - We will have CT angiogram of the coronary arteries  - Monitor on telemetry.  Echocardiogram  -Continue p.o. metoprolol  -D-dimer  - Pain control with IV morphine PRN  - DVT prophylaxis with SCDs  -CODE STATUS is full  code    I discussed the patient's findings and my recommendations with patient.    Nelly Worthington MD  01/13/20  10:45 PM        Part of this note may be an electronic transcription/translation of spoken language to printed text using the Dragon Dictation System.

## 2020-01-14 ENCOUNTER — APPOINTMENT (OUTPATIENT)
Dept: CT IMAGING | Facility: HOSPITAL | Age: 60
End: 2020-01-14

## 2020-01-14 ENCOUNTER — APPOINTMENT (OUTPATIENT)
Dept: CARDIOLOGY | Facility: HOSPITAL | Age: 60
End: 2020-01-14

## 2020-01-14 LAB
ANION GAP SERPL CALCULATED.3IONS-SCNC: 13 MMOL/L (ref 5–15)
B PERT DNA SPEC QL NAA+PROBE: NOT DETECTED
BASOPHILS # BLD AUTO: 0.05 10*3/MM3 (ref 0–0.2)
BASOPHILS NFR BLD AUTO: 0.7 % (ref 0–1.5)
BH CV ECHO MEAS - ACS: 1.8 CM
BH CV ECHO MEAS - AO ISTHMUS: 2.2 CM
BH CV ECHO MEAS - AO MAX PG (FULL): -0.27 MMHG
BH CV ECHO MEAS - AO MAX PG: 5 MMHG
BH CV ECHO MEAS - AO MEAN PG (FULL): 1 MMHG
BH CV ECHO MEAS - AO MEAN PG: 3 MMHG
BH CV ECHO MEAS - AO ROOT AREA (BSA CORRECTED): 1.5
BH CV ECHO MEAS - AO ROOT AREA: 7.5 CM^2
BH CV ECHO MEAS - AO ROOT DIAM: 3.1 CM
BH CV ECHO MEAS - AO V2 MAX: 112 CM/SEC
BH CV ECHO MEAS - AO V2 MEAN: 78.3 CM/SEC
BH CV ECHO MEAS - AO V2 VTI: 22.3 CM
BH CV ECHO MEAS - ASC AORTA: 2.8 CM
BH CV ECHO MEAS - AVA(I,A): 2.5 CM^2
BH CV ECHO MEAS - AVA(I,D): 2.5 CM^2
BH CV ECHO MEAS - AVA(V,A): 3.2 CM^2
BH CV ECHO MEAS - AVA(V,D): 3.2 CM^2
BH CV ECHO MEAS - BSA(HAYCOCK): 2.2 M^2
BH CV ECHO MEAS - BSA: 2 M^2
BH CV ECHO MEAS - BZI_BMI: 39 KILOGRAMS/M^2
BH CV ECHO MEAS - BZI_METRIC_HEIGHT: 160 CM
BH CV ECHO MEAS - BZI_METRIC_WEIGHT: 99.8 KG
BH CV ECHO MEAS - EDV(CUBED): 145.5 ML
BH CV ECHO MEAS - EDV(TEICH): 133 ML
BH CV ECHO MEAS - EF(CUBED): 73.7 %
BH CV ECHO MEAS - EF(TEICH): 65.1 %
BH CV ECHO MEAS - ESV(CUBED): 38.3 ML
BH CV ECHO MEAS - ESV(TEICH): 46.4 ML
BH CV ECHO MEAS - FS: 35.9 %
BH CV ECHO MEAS - IVS/LVPW: 0.91
BH CV ECHO MEAS - IVSD: 1 CM
BH CV ECHO MEAS - LA DIMENSION: 3.8 CM
BH CV ECHO MEAS - LA/AO: 1.2
BH CV ECHO MEAS - LV MASS(C)D: 216.7 GRAMS
BH CV ECHO MEAS - LV MASS(C)DI: 107.6 GRAMS/M^2
BH CV ECHO MEAS - LV MAX PG: 5.3 MMHG
BH CV ECHO MEAS - LV MEAN PG: 2 MMHG
BH CV ECHO MEAS - LV V1 MAX: 115 CM/SEC
BH CV ECHO MEAS - LV V1 MEAN: 61.8 CM/SEC
BH CV ECHO MEAS - LV V1 VTI: 17.4 CM
BH CV ECHO MEAS - LVIDD: 5.3 CM
BH CV ECHO MEAS - LVIDS: 3.4 CM
BH CV ECHO MEAS - LVOT AREA (M): 3.1 CM^2
BH CV ECHO MEAS - LVOT AREA: 3.1 CM^2
BH CV ECHO MEAS - LVOT DIAM: 2 CM
BH CV ECHO MEAS - LVPWD: 1.1 CM
BH CV ECHO MEAS - MV A MAX VEL: 111 CM/SEC
BH CV ECHO MEAS - MV DEC SLOPE: 551 CM/SEC^2
BH CV ECHO MEAS - MV E MAX VEL: 79.3 CM/SEC
BH CV ECHO MEAS - MV E/A: 0.71
BH CV ECHO MEAS - MV MAX PG: 5.7 MMHG
BH CV ECHO MEAS - MV MEAN PG: 3 MMHG
BH CV ECHO MEAS - MV P1/2T MAX VEL: 109 CM/SEC
BH CV ECHO MEAS - MV P1/2T: 57.9 MSEC
BH CV ECHO MEAS - MV V2 MAX: 119 CM/SEC
BH CV ECHO MEAS - MV V2 MEAN: 76 CM/SEC
BH CV ECHO MEAS - MV V2 VTI: 27.8 CM
BH CV ECHO MEAS - MVA P1/2T LCG: 2 CM^2
BH CV ECHO MEAS - MVA(P1/2T): 3.8 CM^2
BH CV ECHO MEAS - MVA(VTI): 2 CM^2
BH CV ECHO MEAS - PA MAX PG: 4 MMHG
BH CV ECHO MEAS - PA V2 MAX: 99.9 CM/SEC
BH CV ECHO MEAS - RVDD: 2.3 CM
BH CV ECHO MEAS - SI(AO): 83.6 ML/M^2
BH CV ECHO MEAS - SI(CUBED): 53.3 ML/M^2
BH CV ECHO MEAS - SI(LVOT): 27.1 ML/M^2
BH CV ECHO MEAS - SI(TEICH): 43 ML/M^2
BH CV ECHO MEAS - SV(AO): 168.3 ML
BH CV ECHO MEAS - SV(CUBED): 107.3 ML
BH CV ECHO MEAS - SV(LVOT): 54.7 ML
BH CV ECHO MEAS - SV(TEICH): 86.6 ML
BH CV ECHO MEAS - TR MAX VEL: 252 CM/SEC
BUN BLD-MCNC: 12 MG/DL (ref 6–20)
BUN/CREAT SERPL: 12.5 (ref 7–25)
C PNEUM DNA NPH QL NAA+NON-PROBE: NOT DETECTED
CALCIUM SPEC-SCNC: 8.5 MG/DL (ref 8.6–10.5)
CHLORIDE SERPL-SCNC: 100 MMOL/L (ref 98–107)
CO2 SERPL-SCNC: 21 MMOL/L (ref 22–29)
CREAT BLD-MCNC: 0.96 MG/DL (ref 0.57–1)
DEPRECATED RDW RBC AUTO: 39.2 FL (ref 37–54)
EOSINOPHIL # BLD AUTO: 0.04 10*3/MM3 (ref 0–0.4)
EOSINOPHIL NFR BLD AUTO: 0.5 % (ref 0.3–6.2)
ERYTHROCYTE [DISTWIDTH] IN BLOOD BY AUTOMATED COUNT: 12.5 % (ref 12.3–15.4)
FLUAV H1 2009 PAND RNA NPH QL NAA+PROBE: NOT DETECTED
FLUAV H1 HA GENE NPH QL NAA+PROBE: NOT DETECTED
FLUAV H3 RNA NPH QL NAA+PROBE: DETECTED
FLUAV SUBTYP SPEC NAA+PROBE: NOT DETECTED
FLUBV RNA ISLT QL NAA+PROBE: NOT DETECTED
GFR SERPL CREATININE-BSD FRML MDRD: 59 ML/MIN/1.73
GLUCOSE BLD-MCNC: 101 MG/DL (ref 65–99)
HADV DNA SPEC NAA+PROBE: NOT DETECTED
HCOV 229E RNA SPEC QL NAA+PROBE: NOT DETECTED
HCOV HKU1 RNA SPEC QL NAA+PROBE: NOT DETECTED
HCOV NL63 RNA SPEC QL NAA+PROBE: NOT DETECTED
HCOV OC43 RNA SPEC QL NAA+PROBE: NOT DETECTED
HCT VFR BLD AUTO: 35.1 % (ref 34–46.6)
HGB BLD-MCNC: 12.1 G/DL (ref 12–15.9)
HMPV RNA NPH QL NAA+NON-PROBE: NOT DETECTED
HPIV1 RNA SPEC QL NAA+PROBE: NOT DETECTED
HPIV2 RNA SPEC QL NAA+PROBE: NOT DETECTED
HPIV3 RNA NPH QL NAA+PROBE: NOT DETECTED
HPIV4 P GENE NPH QL NAA+PROBE: NOT DETECTED
IMM GRANULOCYTES # BLD AUTO: 0.06 10*3/MM3 (ref 0–0.05)
IMM GRANULOCYTES NFR BLD AUTO: 0.8 % (ref 0–0.5)
LYMPHOCYTES # BLD AUTO: 0.89 10*3/MM3 (ref 0.7–3.1)
LYMPHOCYTES NFR BLD AUTO: 12.2 % (ref 19.6–45.3)
M PNEUMO IGG SER IA-ACNC: NOT DETECTED
MAXIMAL PREDICTED HEART RATE: 161 BPM
MCH RBC QN AUTO: 29.7 PG (ref 26.6–33)
MCHC RBC AUTO-ENTMCNC: 34.5 G/DL (ref 31.5–35.7)
MCV RBC AUTO: 86 FL (ref 79–97)
MONOCYTES # BLD AUTO: 0.78 10*3/MM3 (ref 0.1–0.9)
MONOCYTES NFR BLD AUTO: 10.7 % (ref 5–12)
NEUTROPHILS # BLD AUTO: 5.48 10*3/MM3 (ref 1.7–7)
NEUTROPHILS NFR BLD AUTO: 75.1 % (ref 42.7–76)
NRBC BLD AUTO-RTO: 0 /100 WBC (ref 0–0.2)
PLATELET # BLD AUTO: 230 10*3/MM3 (ref 140–450)
PMV BLD AUTO: 9.1 FL (ref 6–12)
POTASSIUM BLD-SCNC: 4.1 MMOL/L (ref 3.5–5.2)
PROCALCITONIN SERPL-MCNC: 0.13 NG/ML (ref 0.1–0.25)
RBC # BLD AUTO: 4.08 10*6/MM3 (ref 3.77–5.28)
RHINOVIRUS RNA SPEC NAA+PROBE: NOT DETECTED
RSV RNA NPH QL NAA+NON-PROBE: NOT DETECTED
SODIUM BLD-SCNC: 134 MMOL/L (ref 136–145)
STRESS TARGET HR: 137 BPM
WBC NRBC COR # BLD: 7.3 10*3/MM3 (ref 3.4–10.8)

## 2020-01-14 PROCEDURE — G0378 HOSPITAL OBSERVATION PER HR: HCPCS

## 2020-01-14 PROCEDURE — 93306 TTE W/DOPPLER COMPLETE: CPT | Performed by: INTERNAL MEDICINE

## 2020-01-14 PROCEDURE — 85025 COMPLETE CBC W/AUTO DIFF WBC: CPT | Performed by: INTERNAL MEDICINE

## 2020-01-14 PROCEDURE — 84145 PROCALCITONIN (PCT): CPT | Performed by: HOSPITALIST

## 2020-01-14 PROCEDURE — 87040 BLOOD CULTURE FOR BACTERIA: CPT | Performed by: HOSPITALIST

## 2020-01-14 PROCEDURE — 75574 CT ANGIO HRT W/3D IMAGE: CPT

## 2020-01-14 PROCEDURE — 0099U HC BIOFIRE FILMARRAY RESP PANEL 1: CPT | Performed by: HOSPITALIST

## 2020-01-14 PROCEDURE — 93306 TTE W/DOPPLER COMPLETE: CPT

## 2020-01-14 PROCEDURE — 96374 THER/PROPH/DIAG INJ IV PUSH: CPT

## 2020-01-14 PROCEDURE — 80048 BASIC METABOLIC PNL TOTAL CA: CPT | Performed by: INTERNAL MEDICINE

## 2020-01-14 PROCEDURE — 0 IOPAMIDOL PER 1 ML: Performed by: HOSPITALIST

## 2020-01-14 PROCEDURE — 25010000002 ONDANSETRON PER 1 MG: Performed by: INTERNAL MEDICINE

## 2020-01-14 RX ORDER — SODIUM CHLORIDE 9 MG/ML
100 INJECTION, SOLUTION INTRAVENOUS
Status: DISCONTINUED | OUTPATIENT
Start: 2020-01-14 | End: 2020-01-16 | Stop reason: HOSPADM

## 2020-01-14 RX ORDER — OSELTAMIVIR PHOSPHATE 75 MG/1
75 CAPSULE ORAL EVERY 12 HOURS SCHEDULED
Status: DISCONTINUED | OUTPATIENT
Start: 2020-01-14 | End: 2020-01-16 | Stop reason: HOSPADM

## 2020-01-14 RX ADMIN — ACETAMINOPHEN 650 MG: 325 TABLET, FILM COATED ORAL at 08:18

## 2020-01-14 RX ADMIN — PANTOPRAZOLE SODIUM 40 MG: 40 TABLET, DELAYED RELEASE ORAL at 17:39

## 2020-01-14 RX ADMIN — IOPAMIDOL 90 ML: 755 INJECTION, SOLUTION INTRAVENOUS at 11:03

## 2020-01-14 RX ADMIN — PRIMIDONE 50 MG: 50 TABLET ORAL at 21:40

## 2020-01-14 RX ADMIN — FLUOXETINE 20 MG: 20 CAPSULE ORAL at 21:40

## 2020-01-14 RX ADMIN — SODIUM CHLORIDE, PRESERVATIVE FREE 10 ML: 5 INJECTION INTRAVENOUS at 08:21

## 2020-01-14 RX ADMIN — METOPROLOL TARTRATE 50 MG: 50 TABLET, FILM COATED ORAL at 21:40

## 2020-01-14 RX ADMIN — SODIUM CHLORIDE, PRESERVATIVE FREE 10 ML: 5 INJECTION INTRAVENOUS at 21:40

## 2020-01-14 RX ADMIN — HYDROXYCHLOROQUINE SULFATE 200 MG: 200 TABLET, FILM COATED ORAL at 13:38

## 2020-01-14 RX ADMIN — PANTOPRAZOLE SODIUM 40 MG: 40 TABLET, DELAYED RELEASE ORAL at 08:18

## 2020-01-14 RX ADMIN — ACETAMINOPHEN 650 MG: 325 TABLET, FILM COATED ORAL at 21:40

## 2020-01-14 RX ADMIN — ACETAMINOPHEN 650 MG: 325 TABLET, FILM COATED ORAL at 17:39

## 2020-01-14 RX ADMIN — METOPROLOL TARTRATE 50 MG: 50 TABLET, FILM COATED ORAL at 08:18

## 2020-01-14 RX ADMIN — OSELTAMIVIR PHOSPHATE 75 MG: 75 CAPSULE ORAL at 21:40

## 2020-01-14 RX ADMIN — GABAPENTIN 300 MG: 300 CAPSULE ORAL at 21:40

## 2020-01-14 RX ADMIN — ACETAMINOPHEN 650 MG: 325 TABLET, FILM COATED ORAL at 13:40

## 2020-01-14 RX ADMIN — FLUOXETINE 20 MG: 20 CAPSULE ORAL at 08:18

## 2020-01-14 RX ADMIN — GABAPENTIN 300 MG: 300 CAPSULE ORAL at 08:18

## 2020-01-14 RX ADMIN — ONDANSETRON 4 MG: 2 INJECTION INTRAMUSCULAR; INTRAVENOUS at 08:20

## 2020-01-14 NOTE — PLAN OF CARE
Problem: Pain, Chronic (Adult)  Goal: Identify Related Risk Factors and Signs and Symptoms  Outcome: Ongoing (interventions implemented as appropriate)     Problem: Pain, Chronic (Adult)  Goal: Acceptable Pain/Comfort Level and Functional Ability  Outcome: Ongoing (interventions implemented as appropriate)     Problem: Patient Care Overview  Goal: Plan of Care Review  Outcome: Ongoing (interventions implemented as appropriate)

## 2020-01-14 NOTE — PROGRESS NOTES
[facility] Medicine Services  INPATIENT PROGRESS NOTE    Length of Stay: 0  Date of Admission: 1/13/2020  Primary Care Physician: Gretchen Gauthier APRN    Subjective   Chief Complaint: Chest pain  HPI: 59-year-old  female admitted for chest pain    Patient denies any chest pain today.  CTA of coronaries negative.  Fever overnight.  Denies any other symptoms    Review of Systems   Constitutional: Positive for fever.   Respiratory: Positive for shortness of breath.    Cardiovascular: Negative for chest pain.   Gastrointestinal: Negative for abdominal pain.        All pertinent negatives and positives are as above. All other systems have been reviewed and are negative unless otherwise stated.     Objective    Temp:  [97.9 °F (36.6 °C)-101.4 °F (38.6 °C)] 99.2 °F (37.3 °C)  Heart Rate:  [] 65  Resp:  [18] 18  BP: (100-153)/(55-74) 122/58    Physical Exam   Constitutional: She appears well-developed.   HENT:   Head: Normocephalic and atraumatic.   Eyes: Pupils are equal, round, and reactive to light.   Neck: Normal range of motion.   Cardiovascular: Normal rate.   Pulmonary/Chest: She has decreased breath sounds. She has no wheezes.   Abdominal: Soft. There is no tenderness.   Musculoskeletal: She exhibits no edema.   Neurological: She is alert.   Skin: Skin is warm and dry.   Psychiatric: She has a normal mood and affect.           Results Review:  I have reviewed the labs, radiology results, and diagnostic studies.    Laboratory Data:   Results from last 7 days   Lab Units 01/14/20  0701 01/13/20  1307 01/13/20  0932   SODIUM mmol/L 134* 136 139   POTASSIUM mmol/L 4.1 4.2 4.0   CHLORIDE mmol/L 100 104 105   CO2 mmol/L 21.0* 20.0* 22.0   BUN mg/dL 12 15 15   CREATININE mg/dL 0.96 1.03* 1.04*   GLUCOSE mg/dL 101* 82 101*   CALCIUM mg/dL 8.5* 8.4* 8.4*   BILIRUBIN mg/dL  --  0.3  --    ALK PHOS U/L  --  111  --    ALT (SGPT) U/L  --  49*  --    AST (SGOT) U/L  --  29   --    ANION GAP mmol/L 13.0 12.0 12.0     Estimated Creatinine Clearance: 71.1 mL/min (by C-G formula based on SCr of 0.96 mg/dL).  Results from last 7 days   Lab Units 01/13/20  1537   MAGNESIUM mg/dL 1.8         Results from last 7 days   Lab Units 01/14/20  0701 01/13/20  1224   WBC 10*3/mm3 7.30 9.39   HEMOGLOBIN g/dL 12.1 12.5   HEMATOCRIT % 35.1 38.7   PLATELETS 10*3/mm3 230 289     Results from last 7 days   Lab Units 01/13/20  1836   INR  0.99       Culture Data:   No results found for: BLOODCX  No results found for: URINECX  No results found for: RESPCX  No results found for: WOUNDCX  No results found for: STOOLCX  No components found for: BODYFLD    Radiology Data:   Imaging Results (Last 24 Hours)     Procedure Component Value Units Date/Time    CT Angiogram Coronary [[id number]] Collected:  01/14/20 1048     Updated:  01/14/20 1245    Narrative:       PROCEDURE: CT HEART CORONARY ANGIOGRAM WITH IV CONTRAST    CLINICAL HISTORY: Chest pain, acute coronary syndrome suspect,  R07.9 Chest pain, unspecified    COMPARISON: None.    TECHNIQUE:  Images were obtained after premedication with 50 mg oral  metoprolol.  Serial axial CT images were obtained through the heart at 3 mm  thickness without contrast for calcium scoring.   Subsequently, following the intravenous administration of 90 ml  of Isovue-370, serial axial CT images were obtained through the  heart at 0.6 mm thickness utilizing retrospective  gating.   Post processing was performed by the radiologist at the [facility]  workstation.   3D images including vessel probing technique were also obtained.   Full field of view reconstructed images were used for evaluation  of the extracardiac tissues.    This exam was performed using radiation doses that are as low as  reasonably achievable (ALARA).  This exam was performed according to our departmental dose  optimization program, which includes automated exposure control,  adjustment of the mA and/or KV according  to patient size and/or  use of iterative reconstruction technique.    FINDINGS:  CALCIUM PLAQUE BURDEN:    REGION                                         CALCIUM SCORE  (Agatston)  Left Main                                                      0   Left Anterior Descending                           0   Circumflex                                                   0   Right Coronary Artery                                 0     Total calcium score is 0    Implication: No identifiable plaque  Risk of coronary artery disease: Very low, generally less than 5%    CT FUNCTIONAL ANALYSIS:  Ejection Fraction     60 %  Diastolic Volume     118 ml  Systolic Volume      47 ml  Stroke Volume        71 ml  Cardiac Output        4.7 L/minute    CTA OF THE CORONARY ARTERIES:   There is a type A LAD.   Coronary anatomy is left dominant    Left Main: No calcified or soft itssue density plaque and no  stenosis.  LAD: No calcified or soft tissue density plaque and no stenosis.  There is a long segment of myocardial bridging in the mid to  distal LAD.  Circumflex: No calcified or soft tissue density plaque and no  stenosis.  RCA: Motion artifact which limits evaluation. No calcified or  soft tisue density plaque and no stenosis.    ADDITIONAL FINDINGS:  The aortic valve is tricuspid.   On short axis views, the myocardium is homogeneous in thickness.  Small hiatal hernia.  Marked hepatic steatosis.      Impression:       CONCLUSION:  1.  No significant coronary artery stenosis is visualized.  2.  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  3.  Left dominant coronary anatomy.  4.  RCA is difficult to visualize due to motion artifact.  5.  Small hiatal hernia.  6.  Marked hepatic steatosis.    Electronically signed by:  Abdulkadir Majano MD  1/14/2020 12:44 PM  [facility] Workstation: [id number]    XR Chest 1 View [[id number]] Collected:  01/13/20 1421     Updated:  01/13/20 1445    Narrative:         PROCEDURE: Single chest view  AP    REASON FOR EXAM:cough and chest pain, R07.9 Chest pain,  unspecified    FINDINGS: Comparison exam dated July 26, 2019. Cardiac and  pulmonary vasculature are normal. Lungs are clear. Pleural spaces  are normal. No acute osseous abnormality.      Impression:       No acute cardiopulmonary abnormality.    Electronically signed by:  Jhonny Varela MD  1/13/2020 2:44 PM CST  Workstation: [id number]          I have reviewed the patient's current medications.     Assessment/Plan     Active Hospital Problems    Diagnosis   • **Chest pain       Plan:    1.  Precordial pain: Resolved.  MI was ruled out with negative troponins.  CT angiogram of coronaries with calcium score of 0 and normal ejection fraction.  2.  Elevated d-dimer dimer: New.  Will need CTA to rule out PE which cannot be done until tomorrow.  3.  Fever: New, once, resolved.  upper respiratory panel with Flu. Blood culture, procal  4.  Influenza A: New.  Start the patient on Tamiflu.  Monitor    Signed     Dr Serafin Hernandez,    1/14/2020  2:16 PM

## 2020-01-14 NOTE — PLAN OF CARE
Problem: Patient Care Overview  Goal: Plan of Care Review  Outcome: Ongoing (interventions implemented as appropriate)  Flowsheets (Taken 1/14/2020 5854)  Progress: no change  Plan of Care Reviewed With: patient  Outcome Summary: pt c/o some chest discomfort through the night, NPO since mn for CTA of coronary in the AM, HR sustaining in the 70s-80s, IV metoprolol to be given prior to test

## 2020-01-15 ENCOUNTER — APPOINTMENT (OUTPATIENT)
Dept: CT IMAGING | Facility: HOSPITAL | Age: 60
End: 2020-01-15

## 2020-01-15 ENCOUNTER — APPOINTMENT (OUTPATIENT)
Dept: PHYSICAL THERAPY | Facility: HOSPITAL | Age: 60
End: 2020-01-15

## 2020-01-15 LAB
ANION GAP SERPL CALCULATED.3IONS-SCNC: 9 MMOL/L (ref 5–15)
BASOPHILS # BLD AUTO: 0.01 10*3/MM3 (ref 0–0.2)
BASOPHILS NFR BLD AUTO: 0.2 % (ref 0–1.5)
BUN BLD-MCNC: 16 MG/DL (ref 6–20)
BUN/CREAT SERPL: 14.7 (ref 7–25)
CALCIUM SPEC-SCNC: 8.3 MG/DL (ref 8.6–10.5)
CHLORIDE SERPL-SCNC: 103 MMOL/L (ref 98–107)
CO2 SERPL-SCNC: 22 MMOL/L (ref 22–29)
CREAT BLD-MCNC: 1.09 MG/DL (ref 0.57–1)
DEPRECATED RDW RBC AUTO: 40.9 FL (ref 37–54)
EOSINOPHIL # BLD AUTO: 0.02 10*3/MM3 (ref 0–0.4)
EOSINOPHIL NFR BLD AUTO: 0.5 % (ref 0.3–6.2)
ERYTHROCYTE [DISTWIDTH] IN BLOOD BY AUTOMATED COUNT: 12.6 % (ref 12.3–15.4)
GFR SERPL CREATININE-BSD FRML MDRD: 51 ML/MIN/1.73
GLUCOSE BLD-MCNC: 112 MG/DL (ref 65–99)
HCT VFR BLD AUTO: 37 % (ref 34–46.6)
HGB BLD-MCNC: 12.3 G/DL (ref 12–15.9)
IMM GRANULOCYTES # BLD AUTO: 0.04 10*3/MM3 (ref 0–0.05)
IMM GRANULOCYTES NFR BLD AUTO: 0.9 % (ref 0–0.5)
LYMPHOCYTES # BLD AUTO: 1.13 10*3/MM3 (ref 0.7–3.1)
LYMPHOCYTES NFR BLD AUTO: 26.5 % (ref 19.6–45.3)
MCH RBC QN AUTO: 29.4 PG (ref 26.6–33)
MCHC RBC AUTO-ENTMCNC: 33.2 G/DL (ref 31.5–35.7)
MCV RBC AUTO: 88.3 FL (ref 79–97)
MONOCYTES # BLD AUTO: 0.56 10*3/MM3 (ref 0.1–0.9)
MONOCYTES NFR BLD AUTO: 13.1 % (ref 5–12)
NEUTROPHILS # BLD AUTO: 2.5 10*3/MM3 (ref 1.7–7)
NEUTROPHILS NFR BLD AUTO: 58.8 % (ref 42.7–76)
NRBC BLD AUTO-RTO: 0 /100 WBC (ref 0–0.2)
PLATELET # BLD AUTO: 206 10*3/MM3 (ref 140–450)
PMV BLD AUTO: 9.5 FL (ref 6–12)
POTASSIUM BLD-SCNC: 4.4 MMOL/L (ref 3.5–5.2)
RBC # BLD AUTO: 4.19 10*6/MM3 (ref 3.77–5.28)
SODIUM BLD-SCNC: 134 MMOL/L (ref 136–145)
WBC NRBC COR # BLD: 4.26 10*3/MM3 (ref 3.4–10.8)

## 2020-01-15 PROCEDURE — 85025 COMPLETE CBC W/AUTO DIFF WBC: CPT | Performed by: INTERNAL MEDICINE

## 2020-01-15 PROCEDURE — 71275 CT ANGIOGRAPHY CHEST: CPT

## 2020-01-15 PROCEDURE — 96361 HYDRATE IV INFUSION ADD-ON: CPT

## 2020-01-15 PROCEDURE — G0378 HOSPITAL OBSERVATION PER HR: HCPCS

## 2020-01-15 PROCEDURE — 80048 BASIC METABOLIC PNL TOTAL CA: CPT | Performed by: INTERNAL MEDICINE

## 2020-01-15 PROCEDURE — 0 IOPAMIDOL PER 1 ML: Performed by: HOSPITALIST

## 2020-01-15 RX ORDER — HYDRALAZINE HYDROCHLORIDE 20 MG/ML
10 INJECTION INTRAMUSCULAR; INTRAVENOUS EVERY 6 HOURS PRN
Status: DISCONTINUED | OUTPATIENT
Start: 2020-01-15 | End: 2020-01-16 | Stop reason: HOSPADM

## 2020-01-15 RX ORDER — SODIUM CHLORIDE 9 MG/ML
75 INJECTION, SOLUTION INTRAVENOUS CONTINUOUS
Status: DISCONTINUED | OUTPATIENT
Start: 2020-01-15 | End: 2020-01-16 | Stop reason: HOSPADM

## 2020-01-15 RX ORDER — ACETAMINOPHEN 500 MG
1000 TABLET ORAL 3 TIMES DAILY PRN
Status: DISCONTINUED | OUTPATIENT
Start: 2020-01-15 | End: 2020-01-16 | Stop reason: HOSPADM

## 2020-01-15 RX ADMIN — PANTOPRAZOLE SODIUM 40 MG: 40 TABLET, DELAYED RELEASE ORAL at 17:13

## 2020-01-15 RX ADMIN — CYCLOBENZAPRINE HYDROCHLORIDE 10 MG: 10 TABLET, FILM COATED ORAL at 08:50

## 2020-01-15 RX ADMIN — HYDROXYCHLOROQUINE SULFATE 200 MG: 200 TABLET, FILM COATED ORAL at 08:43

## 2020-01-15 RX ADMIN — ACETAMINOPHEN 1000 MG: 500 TABLET ORAL at 12:03

## 2020-01-15 RX ADMIN — GABAPENTIN 300 MG: 300 CAPSULE ORAL at 08:43

## 2020-01-15 RX ADMIN — OSELTAMIVIR PHOSPHATE 75 MG: 75 CAPSULE ORAL at 08:43

## 2020-01-15 RX ADMIN — PANTOPRAZOLE SODIUM 40 MG: 40 TABLET, DELAYED RELEASE ORAL at 08:43

## 2020-01-15 RX ADMIN — FLUOXETINE 20 MG: 20 CAPSULE ORAL at 08:43

## 2020-01-15 RX ADMIN — PRIMIDONE 50 MG: 50 TABLET ORAL at 21:50

## 2020-01-15 RX ADMIN — IOPAMIDOL 72 ML: 755 INJECTION, SOLUTION INTRAVENOUS at 11:47

## 2020-01-15 RX ADMIN — METOPROLOL TARTRATE 50 MG: 50 TABLET, FILM COATED ORAL at 08:43

## 2020-01-15 RX ADMIN — SODIUM CHLORIDE 75 ML/HR: 9 INJECTION, SOLUTION INTRAVENOUS at 06:40

## 2020-01-15 RX ADMIN — SODIUM CHLORIDE, PRESERVATIVE FREE 10 ML: 5 INJECTION INTRAVENOUS at 21:50

## 2020-01-15 RX ADMIN — OSELTAMIVIR PHOSPHATE 75 MG: 75 CAPSULE ORAL at 21:50

## 2020-01-15 RX ADMIN — SODIUM CHLORIDE 75 ML/HR: 9 INJECTION, SOLUTION INTRAVENOUS at 13:02

## 2020-01-15 RX ADMIN — GABAPENTIN 300 MG: 300 CAPSULE ORAL at 21:50

## 2020-01-15 RX ADMIN — FLUOXETINE 20 MG: 20 CAPSULE ORAL at 21:50

## 2020-01-15 RX ADMIN — ACETAMINOPHEN 650 MG: 325 TABLET, FILM COATED ORAL at 06:16

## 2020-01-15 NOTE — PLAN OF CARE
Problem: Patient Care Overview  Goal: Plan of Care Review  Outcome: Ongoing (interventions implemented as appropriate)  Flowsheets (Taken 1/15/2020 1412)  Progress: no change  Plan of Care Reviewed With: patient; daughter  Outcome Summary: b/p and HR dropped while in Ct 500cc bolus given pt went up once laid down

## 2020-01-15 NOTE — PROGRESS NOTES
[facility] Medicine Services  INPATIENT PROGRESS NOTE    Length of Stay: 0  Date of Admission: 1/13/2020  Primary Care Physician: Gretchen Gauthier APRN    Subjective   Chief Complaint: Chest pain  HPI: 59-year-old  female admitted for chest pain    Patient complains of general malaise, severe headache today.  Denies any chest pain, shortness of breath or any other concerning symptoms.  No myalgias.    Review of Systems   Respiratory: Negative for shortness of breath.    Cardiovascular: Negative for chest pain.   Gastrointestinal: Negative for abdominal pain.   Neurological: Positive for headaches.        All pertinent negatives and positives are as above. All other systems have been reviewed and are negative unless otherwise stated.     Objective    Temp:  [98.6 °F (37 °C)-99.5 °F (37.5 °C)] 98.6 °F (37 °C)  Heart Rate:  [66-81] 66  Resp:  [18-20] 20  BP: (103-133)/(52-65) 120/58    Physical Exam   Constitutional: She appears well-developed.   HENT:   Head: Normocephalic and atraumatic.   Eyes: Pupils are equal, round, and reactive to light.   Neck: Normal range of motion.   Cardiovascular: Normal rate.   Pulmonary/Chest: She has decreased breath sounds. She has no wheezes.   Abdominal: Soft. There is no tenderness.   Musculoskeletal: She exhibits no edema.   Neurological: She is alert.   Skin: Skin is warm and dry.   Psychiatric: She has a normal mood and affect.           Results Review:  I have reviewed the labs, radiology results, and diagnostic studies.    Laboratory Data:   Results from last 7 days   Lab Units 01/15/20  0529 01/14/20  0701 01/13/20  1307   SODIUM mmol/L 134* 134* 136   POTASSIUM mmol/L 4.4 4.1 4.2   CHLORIDE mmol/L 103 100 104   CO2 mmol/L 22.0 21.0* 20.0*   BUN mg/dL 16 12 15   CREATININE mg/dL 1.09* 0.96 1.03*   GLUCOSE mg/dL 112* 101* 82   CALCIUM mg/dL 8.3* 8.5* 8.4*   BILIRUBIN mg/dL  --   --  0.3   ALK PHOS U/L  --   --  111   ALT (SGPT)  U/L  --   --  49*   AST (SGOT) U/L  --   --  29   ANION GAP mmol/L 9.0 13.0 12.0     Estimated Creatinine Clearance: 62.6 mL/min (A) (by C-G formula based on SCr of 1.09 mg/dL (H)).  Results from last 7 days   Lab Units 01/13/20  1537   MAGNESIUM mg/dL 1.8         Results from last 7 days   Lab Units 01/15/20  0529 01/14/20  0701 01/13/20  1224   WBC 10*3/mm3 4.26 7.30 9.39   HEMOGLOBIN g/dL 12.3 12.1 12.5   HEMATOCRIT % 37.0 35.1 38.7   PLATELETS 10*3/mm3 206 230 289     Results from last 7 days   Lab Units 01/13/20  1836   INR  0.99       Culture Data:   Blood Culture   Date Value Ref Range Status   01/14/2020 No growth at less than 24 hours  Preliminary   01/14/2020 No growth at less than 24 hours  Preliminary     No results found for: URINECX  No results found for: RESPCX  No results found for: WOUNDCX  No results found for: STOOLCX  No components found for: BODYFLD    Radiology Data:   Imaging Results (Last 24 Hours)     Procedure Component Value Units Date/Time    CT Angiogram Chest [[id number]] Collected:  01/15/20 1139     Updated:  01/15/20 1216    Narrative:       PROCEDURE: CT/CTA THORAX/PULMONARY WITH CONTRAST    CLINICAL INFORMATION:  sob, elevated d-dimer - rule out PE, R07.9  Chest pain, unspecified       TECHNIQUE: Axial images were obtained and multiplanar  reconstructions were made.    This exam was performed using radiation doses that are as low as  reasonably achievable (ALARA).  This exam was performed according to our departmental dose  optimization program, which includes automated exposure control,  adjustment of the mA and/or KV according to patient size and/or  use of iterative reconstruction technique.  CONTRAST:   72 cc intravenous Isovue 370  COMPARISON: None available.    Note: Reconstructed MIP images were obtained in multiple  obliquities. No 3-D surface rendered images were obtained for  this exam.    FINDINGS:  PULMONARY CTA: The main pulmonary arteries and their major  branches are  opacified with contrast without evidence of abnormal  filling defects.  OTHER VASCULAR: Unremarkable    LUNGS/PLEURA: The lungs are normal.  TRACHEA AND BRONCHI:  The trachea and bronchi are patent.  MEDIASTINUM, ANAM AND LYMPH NODES: Small to moderate-sized hiatal  hernia.  HEART AND PERICARDIUM: The heart and pericardium are normal.  UPPER ABDOMEN: Cholelithiasis. Fatty liver.  OSSEOUS STRUCTURES: Unremarkable.      Impression:       CONCLUSION:  No evidence of pulmonary embolism.  Fatty liver.  Cholelithiasis.  Small to moderate sized hiatal hernia.    Electronically signed by:  Abdulkadir Majano MD  1/15/2020 12:14 PM  [facility] Workstation: [id number]    CT Angiogram Coronary [[id number]] Collected:  01/14/20 1048     Updated:  01/14/20 1245    Narrative:       PROCEDURE: CT HEART CORONARY ANGIOGRAM WITH IV CONTRAST    CLINICAL HISTORY: Chest pain, acute coronary syndrome suspect,  R07.9 Chest pain, unspecified    COMPARISON: None.    TECHNIQUE:  Images were obtained after premedication with 50 mg oral  metoprolol.  Serial axial CT images were obtained through the heart at 3 mm  thickness without contrast for calcium scoring.   Subsequently, following the intravenous administration of 90 ml  of Isovue-370, serial axial CT images were obtained through the  heart at 0.6 mm thickness utilizing retrospective  gating.   Post processing was performed by the radiologist at the [email or web address]  workstation.   3D images including vessel probing technique were also obtained.   Full field of view reconstructed images were used for evaluation  of the extracardiac tissues.    This exam was performed using radiation doses that are as low as  reasonably achievable (ALARA).  This exam was performed according to our departmental dose  optimization program, which includes automated exposure control,  adjustment of the mA and/or KV according to patient size and/or  use of iterative reconstruction technique.    FINDINGS:  CALCIUM PLAQUE BURDEN:    REGION                                          CALCIUM SCORE  (Agatston)  Left Main                                                      0   Left Anterior Descending                           0   Circumflex                                                   0   Right Coronary Artery                                 0     Total calcium score is 0    Implication: No identifiable plaque  Risk of coronary artery disease: Very low, generally less than 5%    CT FUNCTIONAL ANALYSIS:  Ejection Fraction     60 %  Diastolic Volume     118 ml  Systolic Volume      47 ml  Stroke Volume        71 ml  Cardiac Output        4.7 L/minute    CTA OF THE CORONARY ARTERIES:   There is a type A LAD.   Coronary anatomy is left dominant    Left Main: No calcified or soft itssue density plaque and no  stenosis.  LAD: No calcified or soft tissue density plaque and no stenosis.  There is a long segment of myocardial bridging in the mid to  distal LAD.  Circumflex: No calcified or soft tissue density plaque and no  stenosis.  RCA: Motion artifact which limits evaluation. No calcified or  soft tisue density plaque and no stenosis.    ADDITIONAL FINDINGS:  The aortic valve is tricuspid.   On short axis views, the myocardium is homogeneous in thickness.  Small hiatal hernia.  Marked hepatic steatosis.      Impression:       CONCLUSION:  1.  No significant coronary artery stenosis is visualized.  2.  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  3.  Left dominant coronary anatomy.  4.  RCA is difficult to visualize due to motion artifact.  5.  Small hiatal hernia.  6.  Marked hepatic steatosis.    Electronically signed by:  Abdulkadir Majano MD  1/14/2020 12:44 PM  [facility] Workstation: [id number]          I have reviewed the patient's current medications.     Assessment/Plan     Active Hospital Problems    Diagnosis   • **Chest pain       Plan:    1.  Precordial pain: Resolved.  MI was ruled out with negative troponins.  CT angiogram of coronaries  with calcium score of 0 and normal ejection fraction.  2.  Elevated d-dimer dimer: New.    CTA negative for any PE  3.  Fever: New, once, resolved.  upper respiratory panel with Flu. Blood culture pending, procal normal  4.  Influenza A: New.  Cont Tamiflu.  Monitor  5.  Hypertension: Patient has had episodes of hypotension with bradycardia.  Will stop metoprolol for now and monitor.  6.  Acute renal failure: new.  Likely prerenal.  Continue IV fluids and monitor.    Signed     Dr Serafin Hernandez, DO   1/15/2020  12:21 PM

## 2020-01-15 NOTE — PLAN OF CARE
Problem: Patient Care Overview  Goal: Plan of Care Review  Outcome: Ongoing (interventions implemented as appropriate)  Flowsheets (Taken 1/15/2020 6803)  Progress: no change  Plan of Care Reviewed With: patient

## 2020-01-16 ENCOUNTER — APPOINTMENT (OUTPATIENT)
Dept: CT IMAGING | Facility: HOSPITAL | Age: 60
End: 2020-01-16

## 2020-01-16 VITALS
HEART RATE: 81 BPM | HEIGHT: 63 IN | BODY MASS INDEX: 38.5 KG/M2 | DIASTOLIC BLOOD PRESSURE: 70 MMHG | RESPIRATION RATE: 18 BRPM | OXYGEN SATURATION: 96 % | WEIGHT: 217.3 LBS | SYSTOLIC BLOOD PRESSURE: 122 MMHG | TEMPERATURE: 98 F

## 2020-01-16 LAB
ANION GAP SERPL CALCULATED.3IONS-SCNC: 12 MMOL/L (ref 5–15)
BASOPHILS # BLD MANUAL: 0.04 10*3/MM3 (ref 0–0.2)
BASOPHILS NFR BLD AUTO: 1 % (ref 0–1.5)
BUN BLD-MCNC: 15 MG/DL (ref 6–20)
BUN/CREAT SERPL: 15.2 (ref 7–25)
CALCIUM SPEC-SCNC: 8 MG/DL (ref 8.6–10.5)
CHLORIDE SERPL-SCNC: 104 MMOL/L (ref 98–107)
CO2 SERPL-SCNC: 23 MMOL/L (ref 22–29)
CREAT BLD-MCNC: 0.99 MG/DL (ref 0.57–1)
DEPRECATED RDW RBC AUTO: 41.7 FL (ref 37–54)
EOSINOPHIL # BLD MANUAL: 0.07 10*3/MM3 (ref 0–0.4)
EOSINOPHIL NFR BLD MANUAL: 2 % (ref 0.3–6.2)
ERYTHROCYTE [DISTWIDTH] IN BLOOD BY AUTOMATED COUNT: 12.6 % (ref 12.3–15.4)
GFR SERPL CREATININE-BSD FRML MDRD: 57 ML/MIN/1.73
GLUCOSE BLD-MCNC: 99 MG/DL (ref 65–99)
HCT VFR BLD AUTO: 35.7 % (ref 34–46.6)
HGB BLD-MCNC: 11.9 G/DL (ref 12–15.9)
LYMPHOCYTES # BLD MANUAL: 1.95 10*3/MM3 (ref 0.7–3.1)
LYMPHOCYTES NFR BLD MANUAL: 54 % (ref 19.6–45.3)
LYMPHOCYTES NFR BLD MANUAL: 8 % (ref 5–12)
MCH RBC QN AUTO: 29.7 PG (ref 26.6–33)
MCHC RBC AUTO-ENTMCNC: 33.3 G/DL (ref 31.5–35.7)
MCV RBC AUTO: 89 FL (ref 79–97)
MONOCYTES # BLD AUTO: 0.29 10*3/MM3 (ref 0.1–0.9)
NEUTROPHILS # BLD AUTO: 1.12 10*3/MM3 (ref 1.7–7)
NEUTROPHILS NFR BLD MANUAL: 31 % (ref 42.7–76)
PLATELET # BLD AUTO: 220 10*3/MM3 (ref 140–450)
PMV BLD AUTO: 9.5 FL (ref 6–12)
POTASSIUM BLD-SCNC: 4.3 MMOL/L (ref 3.5–5.2)
RBC # BLD AUTO: 4.01 10*6/MM3 (ref 3.77–5.28)
RBC MORPH BLD: NORMAL
SMALL PLATELETS BLD QL SMEAR: ADEQUATE
SODIUM BLD-SCNC: 139 MMOL/L (ref 136–145)
VARIANT LYMPHS NFR BLD MANUAL: 4 % (ref 0–5)
WBC MORPH BLD: NORMAL
WBC NRBC COR # BLD: 3.62 10*3/MM3 (ref 3.4–10.8)

## 2020-01-16 PROCEDURE — 94799 UNLISTED PULMONARY SVC/PX: CPT

## 2020-01-16 PROCEDURE — 80048 BASIC METABOLIC PNL TOTAL CA: CPT | Performed by: INTERNAL MEDICINE

## 2020-01-16 PROCEDURE — 85007 BL SMEAR W/DIFF WBC COUNT: CPT | Performed by: INTERNAL MEDICINE

## 2020-01-16 PROCEDURE — 85025 COMPLETE CBC W/AUTO DIFF WBC: CPT | Performed by: INTERNAL MEDICINE

## 2020-01-16 PROCEDURE — G0378 HOSPITAL OBSERVATION PER HR: HCPCS

## 2020-01-16 PROCEDURE — 96361 HYDRATE IV INFUSION ADD-ON: CPT

## 2020-01-16 PROCEDURE — 94760 N-INVAS EAR/PLS OXIMETRY 1: CPT

## 2020-01-16 RX ORDER — OSELTAMIVIR PHOSPHATE 75 MG/1
75 CAPSULE ORAL EVERY 12 HOURS SCHEDULED
Qty: 6 CAPSULE | Refills: 0 | Status: SHIPPED | OUTPATIENT
Start: 2020-01-16 | End: 2020-01-19

## 2020-01-16 RX ORDER — PREDNISONE 10 MG/1
TABLET ORAL
Qty: 20 TABLET | Refills: 0 | Status: SHIPPED | OUTPATIENT
Start: 2020-01-16 | End: 2020-02-17

## 2020-01-16 RX ADMIN — SODIUM CHLORIDE, PRESERVATIVE FREE 10 ML: 5 INJECTION INTRAVENOUS at 10:48

## 2020-01-16 RX ADMIN — FLUOXETINE 20 MG: 20 CAPSULE ORAL at 10:47

## 2020-01-16 RX ADMIN — OSELTAMIVIR PHOSPHATE 75 MG: 75 CAPSULE ORAL at 10:47

## 2020-01-16 RX ADMIN — HYDROXYCHLOROQUINE SULFATE 200 MG: 200 TABLET, FILM COATED ORAL at 10:48

## 2020-01-16 RX ADMIN — GABAPENTIN 300 MG: 300 CAPSULE ORAL at 10:47

## 2020-01-16 RX ADMIN — PANTOPRAZOLE SODIUM 40 MG: 40 TABLET, DELAYED RELEASE ORAL at 10:48

## 2020-01-16 RX ADMIN — SODIUM CHLORIDE 75 ML/HR: 9 INJECTION, SOLUTION INTRAVENOUS at 02:12

## 2020-01-16 RX ADMIN — IPRATROPIUM BROMIDE AND ALBUTEROL SULFATE 3 ML: 2.5; .5 SOLUTION RESPIRATORY (INHALATION) at 11:29

## 2020-01-16 NOTE — PLAN OF CARE
Problem: Pain, Chronic (Adult)  Goal: Acceptable Pain/Comfort Level and Functional Ability  Outcome: Ongoing (interventions implemented as appropriate)  Flowsheets (Taken 1/15/2020 1447 by Miriam Ambrocio RN)  Acceptable Pain/Comfort Level and Functional Ability: making progress toward outcome

## 2020-01-16 NOTE — DISCHARGE SUMMARY
[facility] Medicine Services  DISCHARGE SUMMARY       Date of Admission: 1/13/2020  Date of Discharge:  1/16/2020  Primary Care Physician: Gretchen Gauthier APRN    Presenting Problem/History of Present Illness:  Chest pain, unspecified type [R07.9]       Final Discharge Diagnoses:  Active Hospital Problems    Diagnosis   • **Chest pain   Hypertension  Acute renal failure  Influenza A  Elevated d-dimer    Consults:   Consults     Date and Time Order Name Status Description    1/13/2020 1430 Hospitalist (on-call MD unless specified)            Procedures Performed:                 Pertinent Test Results:   Lab Results (most recent)     Procedure Component Value Units Date/Time    Manual Differential [[id number]]  (Abnormal) Collected:  01/16/20 0618    Specimen:  Blood Updated:  01/16/20 1022     Neutrophil % 31.0 %      Lymphocyte % 54.0 %      Monocyte % 8.0 %      Eosinophil % 2.0 %      Basophil % 1.0 %      Atypical Lymphocyte % 4.0 %      Neutrophils Absolute 1.12 10*3/mm3      Lymphocytes Absolute 1.95 10*3/mm3      Monocytes Absolute 0.29 10*3/mm3      Eosinophils Absolute 0.07 10*3/mm3      Basophils Absolute 0.04 10*3/mm3      RBC Morphology Normal     WBC Morphology Normal     Platelet Estimate Adequate    Basic Metabolic Panel [[id number]]  (Abnormal) Collected:  01/16/20 0618    Specimen:  Blood Updated:  01/16/20 0720     Glucose 99 mg/dL      BUN 15 mg/dL      Creatinine 0.99 mg/dL      Sodium 139 mmol/L      Potassium 4.3 mmol/L      Chloride 104 mmol/L      CO2 23.0 mmol/L      Calcium 8.0 mg/dL      eGFR Non African Amer 57 mL/min/1.73      BUN/Creatinine Ratio 15.2     Anion Gap 12.0 mmol/L     Narrative:       GFR Normal >60  Chronic Kidney Disease <60  Kidney Failure <15      CBC & Differential [[id number]] Collected:  01/16/20 0618    Specimen:  Blood Updated:  01/16/20 0643    Narrative:       The following orders were created for panel order CBC &  Differential.  Procedure                               Abnormality         Status                     ---------                               -----------         ------                     CBC Auto Differential[[id number]]        Abnormal            Final result                 Please view results for these tests on the individual orders.    CBC Auto Differential [[id number]]  (Abnormal) Collected:  01/16/20 0618    Specimen:  Blood Updated:  01/16/20 0643     WBC 3.62 10*3/mm3      RBC 4.01 10*6/mm3      Hemoglobin 11.9 g/dL      Hematocrit 35.7 %      MCV 89.0 fL      MCH 29.7 pg      MCHC 33.3 g/dL      RDW 12.6 %      RDW-SD 41.7 fl      MPV 9.5 fL      Platelets 220 10*3/mm3     Blood Culture - Blood, Hand, Right [[id number]] Collected:  01/14/20 2117    Specimen:  Blood from Hand, Right Updated:  01/15/20 2131     Blood Culture No growth at 24 hours    Blood Culture - Blood, Arm, Left [[id number]] Collected:  01/14/20 1846    Specimen:  Blood from Arm, Left Updated:  01/15/20 1900     Blood Culture No growth at 24 hours    Basic Metabolic Panel [[id number]]  (Abnormal) Collected:  01/15/20 0529    Specimen:  Blood Updated:  01/15/20 0606     Glucose 112 mg/dL      BUN 16 mg/dL      Creatinine 1.09 mg/dL      Sodium 134 mmol/L      Potassium 4.4 mmol/L      Chloride 103 mmol/L      CO2 22.0 mmol/L      Calcium 8.3 mg/dL      eGFR Non African Amer 51 mL/min/1.73      BUN/Creatinine Ratio 14.7     Anion Gap 9.0 mmol/L     Narrative:       GFR Normal >60  Chronic Kidney Disease <60  Kidney Failure <15      CBC & Differential [[id number]] Collected:  01/15/20 0529    Specimen:  Blood Updated:  01/15/20 0549    Narrative:       The following orders were created for panel order CBC & Differential.  Procedure                               Abnormality         Status                     ---------                               -----------         ------                     CBC Auto Differential[[id number]]        Abnormal        "     Final result                 Please view results for these tests on the individual orders.    CBC Auto Differential [[id number]]  (Abnormal) Collected:  01/15/20 0529    Specimen:  Blood Updated:  01/15/20 0549     WBC 4.26 10*3/mm3      RBC 4.19 10*6/mm3      Hemoglobin 12.3 g/dL      Hematocrit 37.0 %      MCV 88.3 fL      MCH 29.4 pg      MCHC 33.2 g/dL      RDW 12.6 %      RDW-SD 40.9 fl      MPV 9.5 fL      Platelets 206 10*3/mm3      Neutrophil % 58.8 %      Lymphocyte % 26.5 %      Monocyte % 13.1 %      Eosinophil % 0.5 %      Basophil % 0.2 %      Immature Grans % 0.9 %      Neutrophils, Absolute 2.50 10*3/mm3      Lymphocytes, Absolute 1.13 10*3/mm3      Monocytes, Absolute 0.56 10*3/mm3      Eosinophils, Absolute 0.02 10*3/mm3      Basophils, Absolute 0.01 10*3/mm3      Immature Grans, Absolute 0.04 10*3/mm3      nRBC 0.0 /100 WBC     Procalcitonin [[id number]]  (Normal) Collected:  01/14/20 0701    Specimen:  Blood Updated:  01/14/20 1823     Procalcitonin 0.13 ng/mL     Narrative:       As a Marker for Sepsis (Non-Neonates):   1. <0.5 ng/mL represents a low risk of severe sepsis and/or septic shock.  1. >2 ng/mL represents a high risk of severe sepsis and/or septic shock.    As a Marker for Lower Respiratory Tract Infections that require antibiotic therapy:  PCT on Admission     Antibiotic Therapy             6-12 Hrs later  > 0.5                Strongly Recommended            >0.25 - <0.5         Recommended  0.1 - 0.25           Discouraged                   Remeasure/reassess PCT  <0.1                 Strongly Discouraged          Remeasure/reassess PCT      As 28 day mortality risk marker: \"Change in Procalcitonin Result\" (> 80 % or <=80 %) if Day 0 (or Day 1) and Day 4 values are available. Refer to http://www.[employer]-pct-calculator.com/   Change in PCT <=80 %   A decrease of PCT levels below or equal to 80 % defines a positive change in PCT test result representing a higher risk for 28-day " all-cause mortality of patients diagnosed with severe sepsis or septic shock.  Change in PCT > 80 %   A decrease of PCT levels of more than 80 % defines a negative change in PCT result representing a lower risk for 28-day all-cause mortality of patients diagnosed with severe sepsis or septic shock.                Results may be falsely decreased if patient taking Biotin.     Respiratory Panel, PCR - Swab, Nasopharynx [[id number]]  (Abnormal) Collected:  01/14/20 1434    Specimen:  Swab from Nasopharynx Updated:  01/14/20 1604     ADENOVIRUS, PCR Not Detected     Coronavirus 229E Not Detected     Coronavirus HKU1 Not Detected     Coronavirus NL63 Not Detected     Coronavirus OC43 Not Detected     Human Metapneumovirus Not Detected     Human Rhinovirus/Enterovirus Not Detected     Influenza B PCR Not Detected     Parainfluenza Virus 1 Not Detected     Parainfluenza Virus 2 Not Detected     Parainfluenza Virus 3 Not Detected     Parainfluenza Virus 4 Not Detected     Bordetella pertussis pcr Not Detected     Influenza A H1 2009 PCR Not Detected     Chlamydophila pneumoniae PCR Not Detected     Mycoplasma pneumo by PCR Not Detected     Influenza A PCR Not Detected     Influenza A H3 Detected     Influenza A H1 Not Detected     RSV, PCR Not Detected    D-dimer, Quantitative [[id number]]  (Abnormal) Collected:  01/13/20 1836    Specimen:  Blood Updated:  01/13/20 1927     D-Dimer, Quantitative 634 ng/mL (FEU)     Narrative:       Dimer values <500 ng/ml FEU are FDA approved as aid in diagnosis of deep venous thrombosis and pulmonary embolism.  This test should not be used in an exclusion strategy with pretest probability alone.    A recent guideline regarding diagnosis for pulmonary thromboembolism recommends an adjusted exclusion criterion of age x 10 ng/ml FEU for patients >50 years of age ([employer] Intern Med 2015; 163: 701-711).      Protime-INR [[id number]]  (Normal) Collected:  01/13/20 1836    Specimen:  Blood Updated:   01/13/20 1927     Protime 12.9 Seconds      INR 0.99    Narrative:       Therapeutic range for most indications is 2.0-3.0 INR,  or 2.5-3.5 for mechanical heart valves.    Magnesium [[id number]]  (Normal) Collected:  01/13/20 1537    Specimen:  Blood Updated:  01/13/20 1917     Magnesium 1.8 mg/dL     Troponin [[id number]]  (Normal) Collected:  01/13/20 1836    Specimen:  Blood Updated:  01/13/20 1916     Troponin T <0.010 ng/mL     Narrative:       Troponin T Reference Range:  <= 0.03 ng/mL-   Negative for AMI  >0.03 ng/mL-     Abnormal for myocardial necrosis.  Clinicians would have to utilize clinical acumen, EKG, Troponin and serial changes to determine if it is an Acute Myocardial Infarction or myocardial injury due to an underlying chronic condition.       Results may be falsely decreased if patient taking Biotin.      Troponin [[id number]]  (Normal) Collected:  01/13/20 1537    Specimen:  Blood Updated:  01/13/20 1601     Troponin T <0.010 ng/mL     Narrative:       Troponin T Reference Range:  <= 0.03 ng/mL-   Negative for AMI  >0.03 ng/mL-     Abnormal for myocardial necrosis.  Clinicians would have to utilize clinical acumen, EKG, Troponin and serial changes to determine if it is an Acute Myocardial Infarction or myocardial injury due to an underlying chronic condition.       Results may be falsely decreased if patient taking Biotin.      Extra Tubes [[id number]] Collected:  01/13/20 1308    Specimen:  Blood, Venous Line Updated:  01/13/20 3425    Narrative:       The following orders were created for panel order Extra Tubes.  Procedure                               Abnormality         Status                     ---------                               -----------         ------                     Light Blue Top[[id number]]                                   Final result               Lavender Top[[id number]]                                     Final result                 Please view results for these tests on  the individual orders.    Light Blue Top [[id number]] Collected:  01/13/20 1308    Specimen:  Blood Updated:  01/13/20 1415     Extra Tube hold for add-on     Comment: Auto resulted       Lavender Top [[id number]] Collected:  01/13/20 1308    Specimen:  Blood Updated:  01/13/20 1415     Extra Tube hold for add-on     Comment: Auto resulted       [place] Draw [[id number]] Collected:  01/13/20 1224    Specimen:  Blood Updated:  01/13/20 1415    Narrative:       The following orders were created for panel order [place] Draw.  Procedure                               Abnormality         Status                     ---------                               -----------         ------                     Light Blue Top[[id number]]                                   Final result               Green Top (Gel)[[id number]]                                  Final result               Lavender Top[[id number]]                                     Final result               Gold Top - SST[[id number]]                                   Final result                 Please view results for these tests on the individual orders.    Green Top (Gel) [[id number]] Collected:  01/13/20 1307    Specimen:  Blood Updated:  01/13/20 1415     Extra Tube Hold for add-ons.     Comment: Auto resulted.       Gold Top - SST [[id number]] Collected:  01/13/20 1307    Specimen:  Blood Updated:  01/13/20 1415     Extra Tube Hold for add-ons.     Comment: Auto resulted.       Light Blue Top [[id number]] Collected:  01/13/20 1224    Specimen:  Blood Updated:  01/13/20 1330     Extra Tube hold for add-on     Comment: Auto resulted       Lavender Top [[id number]] Collected:  01/13/20 1224    Specimen:  Blood Updated:  01/13/20 1330     Extra Tube hold for add-on     Comment: Auto resulted       Comprehensive Metabolic Panel [[id number]]  (Abnormal) Collected:  01/13/20 1307    Specimen:  Blood Updated:  01/13/20 1329     Glucose 82 mg/dL      BUN 15 mg/dL      Creatinine 1.03  mg/dL      Sodium 136 mmol/L      Potassium 4.2 mmol/L      Chloride 104 mmol/L      CO2 20.0 mmol/L      Calcium 8.4 mg/dL      Total Protein 6.7 g/dL      Albumin 3.60 g/dL      ALT (SGPT) 49 U/L      AST (SGOT) 29 U/L      Alkaline Phosphatase 111 U/L      Total Bilirubin 0.3 mg/dL      eGFR Non African Amer 55 mL/min/1.73      Globulin 3.1 gm/dL      A/G Ratio 1.2 g/dL      BUN/Creatinine Ratio 14.6     Anion Gap 12.0 mmol/L     Narrative:       GFR Normal >60  Chronic Kidney Disease <60  Kidney Failure <15          Imaging Results (Most Recent)     Procedure Component Value Units Date/Time    CT Angiogram Chest [[id number]] Collected:  01/15/20 1139     Updated:  01/15/20 1216    Narrative:       PROCEDURE: CT/CTA THORAX/PULMONARY WITH CONTRAST    CLINICAL INFORMATION:  sob, elevated d-dimer - rule out PE, R07.9  Chest pain, unspecified       TECHNIQUE: Axial images were obtained and multiplanar  reconstructions were made.    This exam was performed using radiation doses that are as low as  reasonably achievable (ALARA).  This exam was performed according to our departmental dose  optimization program, which includes automated exposure control,  adjustment of the mA and/or KV according to patient size and/or  use of iterative reconstruction technique.  CONTRAST:   72 cc intravenous Isovue 370  COMPARISON: None available.    Note: Reconstructed MIP images were obtained in multiple  obliquities. No 3-D surface rendered images were obtained for  this exam.    FINDINGS:  PULMONARY CTA: The main pulmonary arteries and their major  branches are opacified with contrast without evidence of abnormal  filling defects.  OTHER VASCULAR: Unremarkable    LUNGS/PLEURA: The lungs are normal.  TRACHEA AND BRONCHI:  The trachea and bronchi are patent.  MEDIASTINUM, ANAM AND LYMPH NODES: Small to moderate-sized hiatal  hernia.  HEART AND PERICARDIUM: The heart and pericardium are normal.  UPPER ABDOMEN: Cholelithiasis. Fatty  liver.  OSSEOUS STRUCTURES: Unremarkable.      Impression:       CONCLUSION:  No evidence of pulmonary embolism.  Fatty liver.  Cholelithiasis.  Small to moderate sized hiatal hernia.    Electronically signed by:  Abdulkadir Majano MD  1/15/2020 12:14 PM  [facility] Workstation: [id number]    CT Angiogram Coronary [[id number]] Collected:  01/14/20 1048     Updated:  01/14/20 1245    Narrative:       PROCEDURE: CT HEART CORONARY ANGIOGRAM WITH IV CONTRAST    CLINICAL HISTORY: Chest pain, acute coronary syndrome suspect,  R07.9 Chest pain, unspecified    COMPARISON: None.    TECHNIQUE:  Images were obtained after premedication with 50 mg oral  metoprolol.  Serial axial CT images were obtained through the heart at 3 mm  thickness without contrast for calcium scoring.   Subsequently, following the intravenous administration of 90 ml  of Isovue-370, serial axial CT images were obtained through the  heart at 0.6 mm thickness utilizing retrospective  gating.   Post processing was performed by the radiologist at the [facility]  workstation.   3D images including vessel probing technique were also obtained.   Full field of view reconstructed images were used for evaluation  of the extracardiac tissues.    This exam was performed using radiation doses that are as low as  reasonably achievable (ALARA).  This exam was performed according to our departmental dose  optimization program, which includes automated exposure control,  adjustment of the mA and/or KV according to patient size and/or  use of iterative reconstruction technique.    FINDINGS:  CALCIUM PLAQUE BURDEN:    REGION                                         CALCIUM SCORE  ([place])  Left Main                                                      0   Left Anterior Descending                           0   Circumflex                                                   0   Right Coronary Artery                                 0     Total calcium score is 0    Implication: No identifiable  plaque  Risk of coronary artery disease: Very low, generally less than 5%    CT FUNCTIONAL ANALYSIS:  Ejection Fraction     60 %  Diastolic Volume     118 ml  Systolic Volume      47 ml  Stroke Volume        71 ml  Cardiac Output        4.7 L/minute    CTA OF THE CORONARY ARTERIES:   There is a type A LAD.   Coronary anatomy is left dominant    Left Main: No calcified or soft itssue density plaque and no  stenosis.  LAD: No calcified or soft tissue density plaque and no stenosis.  There is a long segment of myocardial bridging in the mid to  distal LAD.  Circumflex: No calcified or soft tissue density plaque and no  stenosis.  RCA: Motion artifact which limits evaluation. No calcified or  soft tisue density plaque and no stenosis.    ADDITIONAL FINDINGS:  The aortic valve is tricuspid.   On short axis views, the myocardium is homogeneous in thickness.  Small hiatal hernia.  Marked hepatic steatosis.      Impression:       CONCLUSION:  1.  No significant coronary artery stenosis is visualized.  2.  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  3.  Left dominant coronary anatomy.  4.  RCA is difficult to visualize due to motion artifact.  5.  Small hiatal hernia.  6.  Marked hepatic steatosis.    Electronically signed by:  Abdulkadir Majano MD  1/14/2020 12:44 PM  [facility] Workstation: [id number]    XR Chest 1 View [[id number]] Collected:  01/13/20 1421     Updated:  01/13/20 1445    Narrative:         PROCEDURE: Single chest view AP    REASON FOR EXAM:cough and chest pain, R07.9 Chest pain,  unspecified    FINDINGS: Comparison exam dated July 26, 2019. Cardiac and  pulmonary vasculature are normal. Lungs are clear. Pleural spaces  are normal. No acute osseous abnormality.      Impression:       No acute cardiopulmonary abnormality.    Electronically signed by:  Jhonny Varela MD  1/13/2020 2:44 PM CST  Workstation: [id number]          Chief Complaint on Day of Discharge: Generalized fatigue that is getting better.   "Patient denies any specific complaints including shortness of breath, chest pain, abdominal pain.  Patient wants to go home    Hospital Course:  The patient is a 59 y.o. female who presented to [facility] with the following:    From H&P:\"Patient is a 59 y.o. female with a past medical history of asthma, fatty liver disease, chronic muscle cramps and CKD.  She presents with sudden onset of chest pain that started this afternoon.       Patient was sitting in a car after doing some shopping at [facility] when she developed left-sided chest pain that was dull and felt like indigestion.  The pain was 9/10 intensity and radiated to her jaw and left shoulder and lasted for about 15 minutes before gradually improving on the way to the emergency room.  She complained of some nausea but denied lightheadedness or shortness of breath.  Of note patient has been having intermittent episodes of retrosternal chest pain over the past 2 to 3 months and had just seen her cardiologist Dr. Glover this morning.  He had planned to do an outpatient CT angiogram of her coronary arteries and echocardiogram.  At the time of my evaluation she denies chest pain or shortness of breath\".      Hospital course: Following problems were addressed while in the hospital:  1.  Precordial pain: Resolved.  MI was ruled out with negative troponins.  CT angiogram of coronaries with calcium score of 0 and normal ejection fraction.  2.  Elevated d-dimer dimer: New.    CTA negative for any PE, but showed fatty liver - recommended weight loss, hiatal hernia  3.  Fever: New, once, resolved.  upper respiratory panel with Flu. Blood culture negative, procal normal.  Likely secondary to flu  4.  Influenza A: New.  Cont Tamiflu.  Monitor  5.  Hypertension: Patient has had episodes of hypotension with bradycardia.    Patient was recommended to monitor blood pressure and follow-up with PCP in 1 week  6.  Acute renal failure: new.  Likely prerenal.    " "Resolved with IV fluids    Condition on Discharge: Fair    Physical Exam on Discharge:  /68 (BP Location: Left arm, Patient Position: Lying)   Pulse 71   Temp 97.2 °F (36.2 °C) (Temporal)   Resp 18   Ht 160 cm (63\")   Wt 98.6 kg (217 lb 4.8 oz)   LMP 03/09/1991 (Approximate)   SpO2 97%   BMI 38.49 kg/m²   Physical Exam   Constitutional: She appears well-developed.   HENT:   Head: Normocephalic and atraumatic.   Eyes: Pupils are equal, round, and reactive to light.   Neck: Normal range of motion.   Cardiovascular: Normal rate.   Pulmonary/Chest: She has decreased breath sounds. She has wheezes.   Abdominal: Soft. There is no tenderness.   Musculoskeletal: She exhibits no edema.   Neurological: She is alert.   Skin: Skin is warm and dry.   Psychiatric: She has a normal mood and affect.         Discharge Disposition:  Home or Self Care    Discharge Medications:     Discharge Medications      New Medications      Instructions Start Date   oseltamivir 75 MG capsule  Commonly known as:  TAMIFLU   75 mg, Oral, Every 12 Hours Scheduled      predniSONE 10 MG tablet  Commonly known as:  DELTASONE   Take 4 tabs for 2 days then 3 tabs for 2 days then 2 tabs for 2 days then 1 tab for 2 days         Changes to Medications      Instructions Start Date   cetirizine 10 MG tablet  Commonly known as:  zyrTEC  What changed:    · when to take this  · reasons to take this   10 mg, Oral, Daily         Continue These Medications      Instructions Start Date   albuterol sulfate  (90 Base) MCG/ACT inhaler  Commonly known as:  PROVENTIL HFA;VENTOLIN HFA;PROAIR HFA   2 puffs, Inhalation, Every 4 Hours PRN      albuterol (2.5 MG/3ML) 0.083% nebulizer solution  Commonly known as:  PROVENTIL   2.5 mg, Nebulization, Every 6 Hours PRN      clonazePAM 0.5 MG tablet  Commonly known as:  KlonoPIN   0.5 mg, Oral, 2 Times Daily PRN      colesevelam 625 MG tablet  Commonly known as:  WELCHOL   1,875 mg, Oral, 2 Times Daily With " Meals      cyclobenzaprine 10 MG tablet  Commonly known as:  FLEXERIL   10 mg, Oral, 3 Times Daily PRN      estradiol 1 MG tablet  Commonly known as:  ESTRACE   1 mg, Oral, Daily      fluconazole 200 MG tablet  Commonly known as:  DIFLUCAN   Take one at the first sign of infection and may repeat in 3 days as needed.      FLUoxetine 20 MG capsule  Commonly known as:  PROzac   20 mg, Oral, 2 Times Daily      gabapentin 300 MG capsule  Commonly known as:  NEURONTIN   300 mg, Oral, 2 Times Daily      omeprazole 40 MG capsule  Commonly known as:  priLOSEC   40 mg, Oral, 2 Times Daily      PLAQUENIL 200 MG tablet  Generic drug:  hydroxychloroquine   200 mg, Oral, Daily      primidone 50 MG tablet  Commonly known as:  MYSOLINE   Take 1 p.o. nightly         Stop These Medications    metoprolol tartrate 25 MG tablet  Commonly known as:  LOPRESSOR     polyethylene glycol powder  Commonly known as:  MIRALAX            Discharge Diet: Diabetic    Activity at Discharge: Ad susana.    Discharge Care Plan/Instructions: Take medications as prescribed.  Follow-up with PCP with blood pressure readings.  Follow-up with providers as recommended.  Return to ER for any worsening symptoms    Follow-up Appointments:   Future Appointments   Date Time Provider Department Center   2/17/2020 10:45 AM Marcial Walker PA-C [facility] MAD None   2/27/2020  2:15 PM Gretchen Gauthier APRN [facility] MAD3 None   3/27/2020 10:30 AM Santhosh Dos Santos MD [facility] N PAD None   4/20/2020  9:00 AM Pollo Glover MD [facility] CD MAD None   9/10/2020  2:00 PM Emmett Mendoza MD [facility] SM MAD None   1/11/2021  1:00 PM [facility] ROOM [facility] VAS [place]   1/11/2021  1:30 PM Troy Mike MD [facility] CTV MAD None       Test Results Pending at Discharge:    Order Current Status    Blood Culture - Blood, Arm, Left Preliminary result    Blood Culture - Blood, Hand, Right Preliminary result          Signed     Dr Searfin Hernandez,    1/16/2020  11:00 AM        Time: More than  30 minutes spent on discharge

## 2020-01-17 ENCOUNTER — READMISSION MANAGEMENT (OUTPATIENT)
Dept: CALL CENTER | Facility: HOSPITAL | Age: 60
End: 2020-01-17

## 2020-01-17 NOTE — OUTREACH NOTE
Prep Survey      Responses   Facility patient discharged from?  [place]   Is patient eligible?  Yes   Discharge diagnosis  CP   Does the patient have one of the following disease processes/diagnoses(primary or secondary)?  Other   Does the patient have Home health ordered?  No   Is there a DME ordered?  No   Comments regarding appointments  see AVS   Prep survey completed?  Yes          Yary Gauthier RN

## 2020-01-19 LAB
BACTERIA SPEC AEROBE CULT: NORMAL
BACTERIA SPEC AEROBE CULT: NORMAL

## 2020-01-20 ENCOUNTER — READMISSION MANAGEMENT (OUTPATIENT)
Dept: CALL CENTER | Facility: HOSPITAL | Age: 60
End: 2020-01-20

## 2020-01-20 NOTE — OUTREACH NOTE
Medical Week 1 Survey      Responses   Facility patient discharged from?  [place]   Does the patient have one of the following disease processes/diagnoses(primary or secondary)?  Other   Is there a successful TCM telephone encounter documented?  No   Week 1 attempt successful?  Yes   Call start time  1641   Call end time  1645   Discharge diagnosis  CP   Meds reviewed with patient/caregiver?  Yes   Is the patient having any side effects they believe may be caused by any medication additions or changes?  No   Does the patient have all medications ordered at discharge?  Yes   Is the patient taking all medications as directed (includes completed medication regime)?  Yes   Does the patient have a primary care provider?   Yes   Does the patient have an appointment with their PCP within 7 days of discharge?  Yes   Has the patient kept scheduled appointments due by today?  N/A   Has home health visited the patient within 72 hours of discharge?  N/A   Psychosocial issues?  No   Comments  still has cough since hospital adm   Did the patient receive a copy of their discharge instructions?  Yes   Nursing interventions  Reviewed instructions with patient   What is the patient's perception of their health status since discharge?  Improving   Is the patient/caregiver able to teach back signs and symptoms related to disease process for when to call PCP?  Yes   Is the patient/caregiver able to teach back signs and symptoms related to disease process for when to call 911?  Yes   Is the patient/caregiver able to teach back the hierarchy of who to call/visit for symptoms/problems? PCP, Specialist, Home health nurse, Urgent Care, ED, 911  Yes   Additional teach back comments  advised pt to try warmed liquids, tea, apple juice, to relieve discomfort from dry cough, pt agreed with plan   Week 1 call completed?  Yes          Yissel Gomez RN

## 2020-01-23 ENCOUNTER — TELEPHONE (OUTPATIENT)
Dept: FAMILY MEDICINE CLINIC | Facility: CLINIC | Age: 60
End: 2020-01-23

## 2020-01-24 ENCOUNTER — LAB (OUTPATIENT)
Dept: LAB | Facility: HOSPITAL | Age: 60
End: 2020-01-24

## 2020-01-24 ENCOUNTER — OFFICE VISIT (OUTPATIENT)
Dept: FAMILY MEDICINE CLINIC | Facility: CLINIC | Age: 60
End: 2020-01-24

## 2020-01-24 VITALS
WEIGHT: 216.2 LBS | HEIGHT: 63 IN | SYSTOLIC BLOOD PRESSURE: 124 MMHG | BODY MASS INDEX: 38.31 KG/M2 | DIASTOLIC BLOOD PRESSURE: 78 MMHG

## 2020-01-24 DIAGNOSIS — Z09 HOSPITAL DISCHARGE FOLLOW-UP: ICD-10-CM

## 2020-01-24 DIAGNOSIS — R07.9 CHEST PAIN, UNSPECIFIED TYPE: Primary | ICD-10-CM

## 2020-01-24 DIAGNOSIS — R25.2 MUSCLE CRAMPING: ICD-10-CM

## 2020-01-24 LAB — MAGNESIUM SERPL-MCNC: 2.2 MG/DL (ref 1.6–2.6)

## 2020-01-24 PROCEDURE — 99213 OFFICE O/P EST LOW 20 MIN: CPT | Performed by: NURSE PRACTITIONER

## 2020-01-24 PROCEDURE — 83735 ASSAY OF MAGNESIUM: CPT

## 2020-01-24 RX ORDER — TIZANIDINE 4 MG/1
TABLET ORAL
COMMUNITY
Start: 2020-01-23 | End: 2020-01-24

## 2020-01-24 NOTE — PROGRESS NOTES
"Subjective   Lashaun Bernal is a 59 y.o. female.  Hospital  discharge follow-up.  Continues to complain of muscle cramping \"especially at night.\"  Has had her potassium and magnesium levels checked multiple times.  Has taken multiple medications including muscle relaxers, Requip and antihistamines with no relief.  Has tried mustard as well.    Date of Admission: 1/13/2020  Date of Discharge:  1/16/2020  Primary Care Physician: Gretchen Gauhtier APRN     Presenting Problem/History of Present Illness:  Chest pain, unspecified type [R07.9]          Final Discharge Diagnoses:      Active Hospital Problems     Diagnosis   • **Chest pain   Hypertension  Acute renal failure  Influenza A  Elevated d-dimer         Chest Pain    This is a recurrent problem. The current episode started in the past 7 days. The onset quality is gradual. The problem occurs constantly. The problem has been gradually improving. The pain is present in the lateral region. The pain is moderate. The quality of the pain is described as sharp and pressure. The pain radiates to the left shoulder. Pertinent negatives include no diaphoresis or fever. The pain is aggravated by walking, movement, exertion, emotional upset, breathing and coughing. She has tried NSAIDs, nitroglycerin, acetaminophen and analgesics for the symptoms. The treatment provided no relief.   Muscle Pain   This is a chronic problem. The current episode started more than 1 month ago. The problem occurs constantly. The problem has been waxing and waning since onset. The context of the pain is unknown. The pain is present in the right lower leg and left lower leg. The pain is medium. The symptoms are aggravated by any movement and inactivity. Associated symptoms include chest pain. Pertinent negatives include no fatigue or fever. Past treatments include heat pack and rest. The treatment provided no relief. There is no swelling present.        The following portions of the patient's history " were reviewed and updated as appropriate:     Current Outpatient Medications   Medication Sig Dispense Refill   • albuterol (PROVENTIL HFA;VENTOLIN HFA) 108 (90 Base) MCG/ACT inhaler Inhale 2 puffs Every 4 (Four) Hours As Needed for Wheezing. 18 g 2   • albuterol (PROVENTIL) (2.5 MG/3ML) 0.083% nebulizer solution Take 2.5 mg by nebulization Every 6 (Six) Hours As Needed for Wheezing. 120 vial 2   • cetirizine (zyrTEC) 10 MG tablet Take 1 tablet by mouth Daily. (Patient taking differently: Take 10 mg by mouth As Needed.) 30 tablet 0   • clonazePAM (KlonoPIN) 0.5 MG tablet Take 0.5 mg by mouth 2 (Two) Times a Day As Needed for Anxiety.     • colesevelam (WELCHOL) 625 MG tablet Take 3 tablets by mouth 2 (Two) Times a Day With Meals. 60 tablet 3   • cyclobenzaprine (FLEXERIL) 10 MG tablet Take 1 tablet by mouth 3 (Three) Times a Day As Needed for Muscle Spasms. 90 tablet 5   • estradiol (ESTRACE) 1 MG tablet Take 1 tablet by mouth Daily. 90 tablet 0   • fluconazole (DIFLUCAN) 200 MG tablet Take one at the first sign of infection and may repeat in 3 days as needed. 2 tablet 0   • FLUoxetine (PROzac) 20 MG capsule Take 20 mg by mouth 2 (Two) Times a Day.     • gabapentin (NEURONTIN) 300 MG capsule Take 1 capsule by mouth 2 (Two) Times a Day. 60 capsule 2   • hydroxychloroquine (PLAQUENIL) 200 MG tablet Take 200 mg by mouth Daily.     • omeprazole (priLOSEC) 40 MG capsule Take 40 mg by mouth 2 (Two) Times a Day.     • predniSONE (DELTASONE) 10 MG tablet Take 4 tabs for 2 days then 3 tabs for 2 days then 2 tabs for 2 days then 1 tab for 2 days 20 tablet 0   • primidone (MYSOLINE) 50 MG tablet Take 1 p.o. nightly 30 tablet 2     No current facility-administered medications for this visit.      Current Outpatient Medications on File Prior to Visit   Medication Sig   • albuterol (PROVENTIL HFA;VENTOLIN HFA) 108 (90 Base) MCG/ACT inhaler Inhale 2 puffs Every 4 (Four) Hours As Needed for Wheezing.   • albuterol (PROVENTIL) (2.5  "MG/3ML) 0.083% nebulizer solution Take 2.5 mg by nebulization Every 6 (Six) Hours As Needed for Wheezing.   • cetirizine (zyrTEC) 10 MG tablet Take 1 tablet by mouth Daily. (Patient taking differently: Take 10 mg by mouth As Needed.)   • clonazePAM (KlonoPIN) 0.5 MG tablet Take 0.5 mg by mouth 2 (Two) Times a Day As Needed for Anxiety.   • colesevelam (WELCHOL) 625 MG tablet Take 3 tablets by mouth 2 (Two) Times a Day With Meals.   • cyclobenzaprine (FLEXERIL) 10 MG tablet Take 1 tablet by mouth 3 (Three) Times a Day As Needed for Muscle Spasms.   • estradiol (ESTRACE) 1 MG tablet Take 1 tablet by mouth Daily.   • fluconazole (DIFLUCAN) 200 MG tablet Take one at the first sign of infection and may repeat in 3 days as needed.   • FLUoxetine (PROzac) 20 MG capsule Take 20 mg by mouth 2 (Two) Times a Day.   • gabapentin (NEURONTIN) 300 MG capsule Take 1 capsule by mouth 2 (Two) Times a Day.   • hydroxychloroquine (PLAQUENIL) 200 MG tablet Take 200 mg by mouth Daily.   • omeprazole (priLOSEC) 40 MG capsule Take 40 mg by mouth 2 (Two) Times a Day.   • predniSONE (DELTASONE) 10 MG tablet Take 4 tabs for 2 days then 3 tabs for 2 days then 2 tabs for 2 days then 1 tab for 2 days   • primidone (MYSOLINE) 50 MG tablet Take 1 p.o. nightly     No current facility-administered medications on file prior to visit.      She is allergic to cefprozil and penicillins..    Review of Systems   Constitutional: Negative for chills, diaphoresis, fatigue and fever.   HENT: Negative.    Respiratory: Negative.    Cardiovascular: Positive for chest pain.   Gastrointestinal: Negative.    Genitourinary: Negative.    Skin: Negative.    Psychiatric/Behavioral: Negative for confusion.       Objective    Visit Vitals  /78   Ht 160 cm (63\")   Wt 98.1 kg (216 lb 3.2 oz)   LMP 03/09/1991 (Approximate)   BMI 38.30 kg/m²       Physical Exam   Constitutional: She is oriented to person, place, and time. She appears well-developed and well-nourished. "   HENT:   Head: Normocephalic.   Right Ear: External ear normal.   Left Ear: External ear normal.   Cardiovascular: Normal rate, regular rhythm and normal heart sounds.   Pulmonary/Chest: Effort normal and breath sounds normal.   Abdominal: Soft. Bowel sounds are normal.   Musculoskeletal: Normal range of motion.   Neurological: She is alert and oriented to person, place, and time.   Skin: Skin is warm. Capillary refill takes less than 2 seconds.   Psychiatric: She has a normal mood and affect. Her behavior is normal.   Nursing note and vitals reviewed.      Assessment/Plan   Problems Addressed this Visit        Nervous and Auditory    Chest pain - Primary      Other Visit Diagnoses     Hospital discharge follow-up        Muscle cramping        Relevant Orders    Magnesium (Completed)      No orders of the defined types were placed in this encounter.      1.  Chest pain:  Symptoms gradually improving.  Strongly encouraged to continue cardiology follow-up as scheduled with Dr. Kaufman on April 20, 2020  Encouraged to seek emergency medical treatment for any new or worsening chest pain, shortness of breath or palpitations    2.  Hospital discharge follow-up:  Current outpatient and discharge medications have been reconciled for the patient.  Reviewed by: MAYELA Flores    3.  Muscle cramping:  Complete magnesium level as ordered and will notify results when available  Encouraged to consume 1 tablespoon of pickle juice approximately 30 minutes prior to bed every night  Educated that pickle juice does contain salt and vinegar which may help to replace electrolytes  Encouraged to increase fluids, especially water, to help promote hydration  Encourage regular exercise regimen     Continue on current medications as previously prescribed   I spent 24 minutes in direct face to face contact with patient.  Greater than 50% of this time was spent counseling patient and discussing plan of care.  Return if symptoms worsen or  fail to improve, for Next scheduled follow up.        This document has been electronically signed by MAYELA Flores on January 29, 2020 12:51 PM

## 2020-01-27 ENCOUNTER — READMISSION MANAGEMENT (OUTPATIENT)
Dept: CALL CENTER | Facility: HOSPITAL | Age: 60
End: 2020-01-27

## 2020-01-27 NOTE — OUTREACH NOTE
Medical Week 2 Survey      Responses   Facility patient discharged from?  [place] [facility]   Does the patient have one of the following disease processes/diagnoses(primary or secondary)?  Other   Week 2 attempt successful?  Yes   Call start time  1546   Discharge diagnosis  CP   Call end time  1547   Meds reviewed with patient/caregiver?  Yes   Is the patient having any side effects they believe may be caused by any medication additions or changes?  No   Does the patient have all medications ordered at discharge?  Yes   Is the patient taking all medications as directed (includes completed medication regime)?  Yes   Does the patient have a primary care provider?   Yes   Does the patient have an appointment with their PCP within 7 days of discharge?  Yes   Has the patient kept scheduled appointments due by today?  Yes   Psychosocial issues?  No   Did the patient receive a copy of their discharge instructions?  Yes   Nursing interventions  Reviewed instructions with patient, Educated on MyChart   What is the patient's perception of their health status since discharge?  Improving   Is the patient/caregiver able to teach back signs and symptoms related to disease process for when to call PCP?  Yes   Is the patient/caregiver able to teach back signs and symptoms related to disease process for when to call 911?  Yes   Is the patient/caregiver able to teach back the hierarchy of who to call/visit for symptoms/problems? PCP, Specialist, Home health nurse, Urgent Care, ED, 911  Yes   Week 2 Call Completed?  Yes          Radha Gamboa RN

## 2020-01-28 ENCOUNTER — TELEPHONE (OUTPATIENT)
Dept: FAMILY MEDICINE CLINIC | Facility: CLINIC | Age: 60
End: 2020-01-28

## 2020-01-28 NOTE — TELEPHONE ENCOUNTER
Per MAYELA Terry, Ms. Bernal has been called with recent lab results & recommendations.  Continue current medications and follow-up as planned or sooner if any problems.      ----- Message from MAYELA Flores sent at 1/26/2020  3:03 PM CST -----  Mag normal.  Have her try the pickle juice as prescribed and see if that helps her cramps

## 2020-02-01 DIAGNOSIS — M79.7 FIBROMYALGIA: ICD-10-CM

## 2020-02-01 DIAGNOSIS — M79.10 MUSCLE PAIN: ICD-10-CM

## 2020-02-01 DIAGNOSIS — G54.0 THORACIC OUTLET SYNDROME: ICD-10-CM

## 2020-02-03 ENCOUNTER — READMISSION MANAGEMENT (OUTPATIENT)
Dept: CALL CENTER | Facility: HOSPITAL | Age: 60
End: 2020-02-03

## 2020-02-03 DIAGNOSIS — M79.7 FIBROMYALGIA: ICD-10-CM

## 2020-02-03 DIAGNOSIS — M79.10 MUSCLE PAIN: ICD-10-CM

## 2020-02-03 RX ORDER — CYCLOBENZAPRINE HCL 10 MG
10 TABLET ORAL 3 TIMES DAILY PRN
Qty: 90 TABLET | Refills: 5 | Status: SHIPPED | OUTPATIENT
Start: 2020-02-03 | End: 2020-06-23 | Stop reason: SDUPTHER

## 2020-02-03 RX ORDER — CYCLOBENZAPRINE HCL 10 MG
TABLET ORAL
Qty: 90 TABLET | Refills: 4 | OUTPATIENT
Start: 2020-02-03

## 2020-02-03 RX ORDER — GABAPENTIN 300 MG/1
CAPSULE ORAL
Qty: 60 CAPSULE | Refills: 1 | OUTPATIENT
Start: 2020-02-03

## 2020-02-03 NOTE — OUTREACH NOTE
Medical Week 3 Survey      Responses   Facility patient discharged from?  [place]   Does the patient have one of the following disease processes/diagnoses(primary or secondary)?  Other   Week 3 attempt successful?  Yes   Call start time  1724   Call end time  1725   Discharge diagnosis  CP   Meds reviewed with patient/caregiver?  Yes   Is the patient having any side effects they believe may be caused by any medication additions or changes?  No   Does the patient have all medications ordered at discharge?  Yes   Is the patient taking all medications as directed (includes completed medication regime)?  Yes   Does the patient have a primary care provider?   Yes   Does the patient have an appointment with their PCP within 7 days of discharge?  Yes   Has the patient kept scheduled appointments due by today?  Yes   Psychosocial issues?  No   Did the patient receive a copy of their discharge instructions?  Yes   Nursing interventions  Reviewed instructions with patient   What is the patient's perception of their health status since discharge?  Improving   Is the patient/caregiver able to teach back signs and symptoms related to disease process for when to call PCP?  Yes   Is the patient/caregiver able to teach back signs and symptoms related to disease process for when to call 911?  Yes   Is the patient/caregiver able to teach back the hierarchy of who to call/visit for symptoms/problems? PCP, Specialist, Home health nurse, Urgent Care, ED, 911  Yes   Week 3 Call Completed?  Yes          Radha Gamboa RN

## 2020-02-04 ENCOUNTER — TELEPHONE (OUTPATIENT)
Dept: FAMILY MEDICINE CLINIC | Facility: CLINIC | Age: 60
End: 2020-02-04

## 2020-02-04 DIAGNOSIS — G54.0 THORACIC OUTLET SYNDROME: ICD-10-CM

## 2020-02-04 RX ORDER — GABAPENTIN 300 MG/1
300 CAPSULE ORAL 2 TIMES DAILY
Qty: 60 CAPSULE | Refills: 2 | Status: CANCELLED | OUTPATIENT
Start: 2020-02-04

## 2020-02-04 NOTE — TELEPHONE ENCOUNTER
Gretchen     Ms. Lashaun Bernal has called requesting a refill on her Gabapentin 300 mg #60  Take 1 BID.    Last OV    01/24/2020    Next [facility] OV    2/27/2020    Last Script Written  10/28/2019  #60 with 2 refills      Last [facility]     12/11/2019    Please advise on refill    Thank you

## 2020-02-05 DIAGNOSIS — G54.0 THORACIC OUTLET SYNDROME: ICD-10-CM

## 2020-02-05 RX ORDER — GABAPENTIN 300 MG/1
300 CAPSULE ORAL 2 TIMES DAILY
Qty: 60 CAPSULE | Refills: 2 | Status: SHIPPED | OUTPATIENT
Start: 2020-02-05 | End: 2020-05-27 | Stop reason: SDUPTHER

## 2020-02-11 ENCOUNTER — READMISSION MANAGEMENT (OUTPATIENT)
Dept: CALL CENTER | Facility: HOSPITAL | Age: 60
End: 2020-02-11

## 2020-02-11 NOTE — OUTREACH NOTE
Medical Week 4 Survey      Responses   Facility patient discharged from?  [place]   Does the patient have one of the following disease processes/diagnoses(primary or secondary)?  Other   Week 4 attempt successful?  Yes   Call start time  1508   Call end time  1510   Discharge diagnosis  CP   Meds reviewed with patient/caregiver?  Yes   Is the patient having any side effects they believe may be caused by any medication additions or changes?  No   Is the patient taking all medications as directed (includes completed medication regime)?  Yes   Has the patient kept scheduled appointments due by today?  Yes   Is the patient still receiving Home Health Services?  N/A   Psychosocial issues?  No   What is the patient's perception of their health status since discharge?  Improving   Is the patient/caregiver able to teach back signs and symptoms related to disease process for when to call PCP?  Yes   Is the patient/caregiver able to teach back signs and symptoms related to disease process for when to call 911?  Yes   Is the patient/caregiver able to teach back the hierarchy of who to call/visit for symptoms/problems? PCP, Specialist, Home health nurse, Urgent Care, ED, 911  Yes   Additional teach back comments  States hse is having fluttering but is looking for new Cardiologist.  Encouraged notifying MD and going to ER if no improvement.   Week 4 Call Completed?  Yes   Would the patient like one additional call?  No   Graduated  Yes   Did the patient feel the follow up calls were helpful during their recovery period?  Yes   Was the number of calls appropriate?  Yes          Amaya Antonio RN

## 2020-02-17 ENCOUNTER — OFFICE VISIT (OUTPATIENT)
Dept: GASTROENTEROLOGY | Facility: CLINIC | Age: 60
End: 2020-02-17

## 2020-02-17 VITALS
SYSTOLIC BLOOD PRESSURE: 118 MMHG | DIASTOLIC BLOOD PRESSURE: 80 MMHG | BODY MASS INDEX: 38.2 KG/M2 | HEART RATE: 105 BPM | WEIGHT: 215.6 LBS | HEIGHT: 63 IN

## 2020-02-17 DIAGNOSIS — K21.00 GASTROESOPHAGEAL REFLUX DISEASE WITH ESOPHAGITIS: Primary | ICD-10-CM

## 2020-02-17 DIAGNOSIS — K76.0 FATTY LIVER: ICD-10-CM

## 2020-02-17 DIAGNOSIS — R74.8 ELEVATED LIVER ENZYMES: ICD-10-CM

## 2020-02-17 DIAGNOSIS — Z86.010 HISTORY OF COLON POLYPS: ICD-10-CM

## 2020-02-17 PROCEDURE — 99213 OFFICE O/P EST LOW 20 MIN: CPT | Performed by: PHYSICIAN ASSISTANT

## 2020-02-17 RX ORDER — DEXTROSE AND SODIUM CHLORIDE 5; .45 G/100ML; G/100ML
30 INJECTION, SOLUTION INTRAVENOUS CONTINUOUS PRN
Status: CANCELLED | OUTPATIENT
Start: 2020-02-24

## 2020-02-17 RX ORDER — METOPROLOL SUCCINATE 50 MG/1
25 TABLET, EXTENDED RELEASE ORAL 2 TIMES DAILY
COMMUNITY
End: 2020-02-17

## 2020-02-17 NOTE — PATIENT INSTRUCTIONS

## 2020-02-17 NOTE — PROGRESS NOTES
Chief Complaint   Patient presents with   • Heartburn   • Fatty Liver   • Elevated Hepatic Enzymes       ENDO PROCEDURE ORDERED: COLON hx polyp    Subjective    Lashaun Bernal is a 59 y.o. female. she is here today for follow-up.    History of Present Illness    Patient is seen on a recheck of her GERD, fatty liver, elevated liver enzymes, F0/S1/N1. Last seen on 10/14/2019. Patient's GERD has been doing well on Prilosec 40 mg b.i.d. She denied nausea, vomiting or dysphagia. Bowel movements are regular without blood or mucus. Weight is down 5.4 pounds since last visit. Last colonoscopy 01/23/2015 showed diverticulosis, hemorrhoids. She has a family history of colon cancer.     Patient was hospitalized with chest pain on 01/13/2020. Chest x-ray was negative. CT angiogram coronary showed no significant stenosis. CT angiogram chest showed a small-to-moderate hiatal hernia, fatty liver with gallstones. She states she was told she had SVT. Respiratory panel was negative. She had a positive influenza A. D-dimer 634; normal INR, magnesium, troponin. CMP showed a creatinine of 1.03, CO2 of 20, calcium 8.4, ALT 49, GFR 55, otherwise normal. Laboratories close to discharge, BMP showed calcium of 8.0, GFR 57, otherwise normal. CBC showed hemoglobin 11.9, hematocrit 35.7, platelets 220,000.     ASSESSMENT/PLAN:  Patient with chronic GERD appears stable on current regimen. She does have fatty liver, asymptomatic gallstones, mild anemia. Encouraged to continue dietary modification and significant weight loss for fatty liver. She did not repeat the laboratories I had requested prior to this visit. She is due for screening colonoscopy for family history of colon cancer. We will plan followup after the above. Further pending clinical course and the results of the above.       The following portions of the patient's history were reviewed and updated as appropriate:   Past Medical History:   Diagnosis Date   • Allergic rhinitis    •  "Arthritis    • Asthma    • Breast lump    • C. difficile diarrhea    • Carotid artery disease (CMS/HCC)    • Chest pain    • Chronic low back pain    • Constipation    • Depressive disorder    • Diarrhea    • Diverticular disease of colon    • Epigastric pain    • DIETER (generalized anxiety disorder)    • GERD (gastroesophageal reflux disease)    • Head ache    • Hematochezia    • Herpes zoster     mid back, near spine   • Hypertension    • Periumbilical pain    • PONV (postoperative nausea and vomiting)    • [place] spotted fever     pt states \"currently has it\"   • Sleep apnea     using c-pap   • SVT (supraventricular tachycardia) (CMS/HCC)      Past Surgical History:   Procedure Laterality Date   • ABDOMINOPLASTY  2004   • AUGMENTATION MAMMAPLASTY     • BREAST AUGMENTATION BILATERAL MASTOPEXY     • BUNIONECTOMY Left 3/20/2017    Procedure: EXCISION OF CALCANEAL SPURS LEFT FOOT AND REATTACHMENT OF ACHILLES TENDON ;  Surgeon: Jarrell Mar MD;  Location: [facility];  Service:    • COLONOSCOPY  2015   • CYST REMOVAL  1961    Excision of sebaceous cyst. Left ear   • FINGER/THUMB LESION/CYST EXCISION  10/29/2014   • HAND SURGERY  2013   • HYSTERECTOMY     • OOPHORECTOMY     • ORIF ULNA/RADIUS FRACTURES  2013   • TUBAL ABDOMINAL LIGATION  1985    Laparoscopic tubal sterilization; dilatation and curettage. Desires tubal ligation;       Family History   Problem Relation Age of Onset   • Diabetes Other    • Heart disease Other    • Hypertension Other    • Heart disease Mother         Myocardial infarct   • Hypertension Mother    • Diabetes Mother    • Heart disease Father         CHF     OB History        2    Para   2    Term   2            AB        Living           SAB        TAB        Ectopic        Molar        Multiple        Live Births                  Allergies   Allergen Reactions   • Cefprozil Nausea And Vomiting   • Penicillins Rash     Social History "     Socioeconomic History   • Marital status:      Spouse name: Not on file   • Number of children: Not on file   • Years of education: Not on file   • Highest education level: Not on file   Tobacco Use   • Smoking status: Never Smoker   • Smokeless tobacco: Never Used   Substance and Sexual Activity   • Alcohol use: No   • Drug use: No   • Sexual activity: Defer     Current Medications:  Prior to Admission medications    Medication Sig Start Date End Date Taking? Authorizing Provider   albuterol (PROVENTIL HFA;VENTOLIN HFA) 108 (90 Base) MCG/ACT inhaler Inhale 2 puffs Every 4 (Four) Hours As Needed for Wheezing. 11/27/18  Yes Gretchen Gauthier APRN   albuterol (PROVENTIL) (2.5 MG/3ML) 0.083% nebulizer solution Take 2.5 mg by nebulization Every 6 (Six) Hours As Needed for Wheezing. 11/27/18  Yes Gretchen Gauthier APRN   cetirizine (zyrTEC) 10 MG tablet Take 1 tablet by mouth Daily.  Patient taking differently: Take 10 mg by mouth As Needed. 10/3/19 10/2/20 Yes Cesar Fisher,    clonazePAM (KlonoPIN) 0.5 MG tablet Take 0.5 mg by mouth 2 (Two) Times a Day As Needed for Anxiety. 6/11/19  Yes ProviderMaryam MD   colesevelam (WELCHOL) 625 MG tablet Take 3 tablets by mouth 2 (Two) Times a Day With Meals. 12/2/19  Yes Gretchen Gauthier APRN   cyclobenzaprine (FLEXERIL) 10 MG tablet Take 1 tablet by mouth 3 (Three) Times a Day As Needed for Muscle Spasms. 2/3/20  Yes Gretchen Gauthier APRN   estradiol (ESTRACE) 1 MG tablet Take 1 tablet by mouth Daily. 12/6/19  Yes Gretchen Gauthier APRN   fluconazole (DIFLUCAN) 200 MG tablet Take one at the first sign of infection and may repeat in 3 days as needed. 12/7/19  Yes Laura Patterson APRN   FLUoxetine (PROzac) 20 MG capsule Take 20 mg by mouth 2 (Two) Times a Day.   Yes ProviderMaryam MD   gabapentin (NEURONTIN) 300 MG capsule Take 1 capsule by mouth 2 (Two) Times a Day. 2/5/20  Yes Gretchen Gauthier APRN   hydroxychloroquine (PLAQUENIL) 200 MG tablet Take 200 mg by  "mouth Daily.   Yes Maryam Mustafa MD   metoprolol succinate XL (TOPROL-XL) 50 MG 24 hr tablet Take 25 mg by mouth 2 (Two) Times a Day.   Yes Maryam Mustafa MD   omeprazole (priLOSEC) 40 MG capsule Take 40 mg by mouth 2 (Two) Times a Day.   Yes Maryam Mustafa MD   primidone (MYSOLINE) 50 MG tablet Take 1 p.o. nightly 12/11/19  Yes Santhosh Dos Santos MD   predniSONE (DELTASONE) 10 MG tablet Take 4 tabs for 2 days then 3 tabs for 2 days then 2 tabs for 2 days then 1 tab for 2 days 1/16/20 2/17/20  Serafin Hernandez,      Review of Systems  Review of Systems       Objective    /80 (BP Location: Left arm)   Pulse 105   Ht 160 cm (63\")   Wt 97.8 kg (215 lb 9.6 oz)   LMP 03/09/1991 (Approximate)   BMI 38.19 kg/m²   Physical Exam   Constitutional: She is oriented to person, place, and time. She appears well-developed and well-nourished. No distress.   HENT:   Head: Normocephalic and atraumatic.   Eyes: Pupils are equal, round, and reactive to light. EOM are normal.   Neck: Normal range of motion.   Cardiovascular: Normal rate, regular rhythm and normal heart sounds.   Pulmonary/Chest: Effort normal and breath sounds normal.   Abdominal: Soft. Bowel sounds are normal. She exhibits no shifting dullness, no distension, no abdominal bruit, no ascites and no mass. There is no hepatosplenomegaly. There is tenderness. There is no rigidity, no rebound, no guarding and no CVA tenderness. No hernia. Hernia confirmed negative in the ventral area.   RUQ, obese   Musculoskeletal: Normal range of motion.   Neurological: She is alert and oriented to person, place, and time.   Skin: Skin is warm and dry.   Psychiatric: She has a normal mood and affect. Her behavior is normal. Judgment and thought content normal.   Nursing note and vitals reviewed.    Assessment/Plan      1. Gastroesophageal reflux disease with esophagitis    2. Elevated liver enzymes    3. Fatty liver    4. History of colon polyps    . "   Lashaun was seen today for heartburn, fatty liver and elevated hepatic enzymes.    Diagnoses and all orders for this visit:    Gastroesophageal reflux disease with esophagitis    Elevated liver enzymes    Fatty liver    History of colon polyps  -     Case Request; Standing  -     Case Request    Other orders  -     Follow Anesthesia Guidelines / Standing Orders; Future  -     Obtain Informed Consent; Future  -     polyethylene glycol (GoLYTELY) 236 g solution; AS DIRECTED PER INSTRUCTION SHEET FOR COLONOSCOPY        Orders placed during this encounter include:  Orders Placed This Encounter   Procedures   • Follow Anesthesia Guidelines / Standing Orders     Standing Status:   Future   • Obtain Informed Consent     Standing Status:   Future     Order Specific Question:   Informed Consent Given For     Answer:   COLONOSCOPY       Medications prescribed:  New Medications Ordered This Visit   Medications   • polyethylene glycol (GoLYTELY) 236 g solution     Sig: AS DIRECTED PER INSTRUCTION SHEET FOR COLONOSCOPY     Dispense:  4000 mL     Refill:  0     Discontinued Medications       Reason for Discontinue     predniSONE (DELTASONE) 10 MG tablet    *Therapy completed        Requested Prescriptions     Signed Prescriptions Disp Refills   • polyethylene glycol (GoLYTELY) 236 g solution 4000 mL 0     Sig: AS DIRECTED PER INSTRUCTION SHEET FOR COLONOSCOPY       Review and/or summary of lab tests, radiology, procedures, medications. Review and summary of old records and obtaining of history. The risks and benefits of my recommendations, as well as other treatment options were discussed with the patient today. Questions were answered.    Follow-up: Return if symptoms worsen or fail to improve, for After the above.     COLONOSCOPY (N/A)      This document has been electronically signed by Marcial Walker PA-C on February 19, 2020 6:07 PM      Results for orders placed or performed in visit on 01/24/20   Magnesium   Result  Value Ref Range    Magnesium 2.2 1.6 - 2.6 mg/dL   Results for orders placed or performed during the hospital encounter of 01/13/20   Gold Top - SST   Result Value Ref Range    Extra Tube Hold for add-ons.    Green Top (Gel)   Result Value Ref Range    Extra Tube Hold for add-ons.    Respiratory Panel, PCR - Swab, Nasopharynx   Result Value Ref Range    ADENOVIRUS, PCR Not Detected Not Detected    Coronavirus 229E Not Detected Not Detected    Coronavirus HKU1 Not Detected Not Detected    Coronavirus NL63 Not Detected Not Detected    Coronavirus OC43 Not Detected Not Detected    Human Metapneumovirus Not Detected Not Detected    Human Rhinovirus/Enterovirus Not Detected Not Detected    Influenza B PCR Not Detected Not Detected    Parainfluenza Virus 1 Not Detected Not Detected    Parainfluenza Virus 2 Not Detected Not Detected    Parainfluenza Virus 3 Not Detected Not Detected    Parainfluenza Virus 4 Not Detected Not Detected    Bordetella pertussis pcr Not Detected Not Detected    Influenza A H1 2009 PCR Not Detected Not Detected    Chlamydophila pneumoniae PCR Not Detected Not Detected    Mycoplasma pneumo by PCR Not Detected Not Detected    Influenza A PCR Not Detected Not Detected    Influenza A H3 Detected (A) Not Detected    Influenza A H1 Not Detected Not Detected    RSV, PCR Not Detected Not Detected   Procalcitonin   Result Value Ref Range    Procalcitonin 0.13 0.10 - 0.25 ng/mL   CBC Auto Differential   Result Value Ref Range    WBC 3.62 3.40 - 10.80 10*3/mm3    RBC 4.01 3.77 - 5.28 10*6/mm3    Hemoglobin 11.9 (L) 12.0 - 15.9 g/dL    Hematocrit 35.7 34.0 - 46.6 %    MCV 89.0 79.0 - 97.0 fL    MCH 29.7 26.6 - 33.0 pg    MCHC 33.3 31.5 - 35.7 g/dL    RDW 12.6 12.3 - 15.4 %    RDW-SD 41.7 37.0 - 54.0 fl    MPV 9.5 6.0 - 12.0 fL    Platelets 220 140 - 450 10*3/mm3   CBC Auto Differential   Result Value Ref Range    WBC 4.26 3.40 - 10.80 10*3/mm3    RBC 4.19 3.77 - 5.28 10*6/mm3    Hemoglobin 12.3 12.0 - 15.9  g/dL    Hematocrit 37.0 34.0 - 46.6 %    MCV 88.3 79.0 - 97.0 fL    MCH 29.4 26.6 - 33.0 pg    MCHC 33.2 31.5 - 35.7 g/dL    RDW 12.6 12.3 - 15.4 %    RDW-SD 40.9 37.0 - 54.0 fl    MPV 9.5 6.0 - 12.0 fL    Platelets 206 140 - 450 10*3/mm3    Neutrophil % 58.8 42.7 - 76.0 %    Lymphocyte % 26.5 19.6 - 45.3 %    Monocyte % 13.1 (H) 5.0 - 12.0 %    Eosinophil % 0.5 0.3 - 6.2 %    Basophil % 0.2 0.0 - 1.5 %    Immature Grans % 0.9 (H) 0.0 - 0.5 %    Neutrophils, Absolute 2.50 1.70 - 7.00 10*3/mm3    Lymphocytes, Absolute 1.13 0.70 - 3.10 10*3/mm3    Monocytes, Absolute 0.56 0.10 - 0.90 10*3/mm3    Eosinophils, Absolute 0.02 0.00 - 0.40 10*3/mm3    Basophils, Absolute 0.01 0.00 - 0.20 10*3/mm3    Immature Grans, Absolute 0.04 0.00 - 0.05 10*3/mm3    nRBC 0.0 0.0 - 0.2 /100 WBC   CBC Auto Differential   Result Value Ref Range    WBC 7.30 3.40 - 10.80 10*3/mm3    RBC 4.08 3.77 - 5.28 10*6/mm3    Hemoglobin 12.1 12.0 - 15.9 g/dL    Hematocrit 35.1 34.0 - 46.6 %    MCV 86.0 79.0 - 97.0 fL    MCH 29.7 26.6 - 33.0 pg    MCHC 34.5 31.5 - 35.7 g/dL    RDW 12.5 12.3 - 15.4 %    RDW-SD 39.2 37.0 - 54.0 fl    MPV 9.1 6.0 - 12.0 fL    Platelets 230 140 - 450 10*3/mm3    Neutrophil % 75.1 42.7 - 76.0 %    Lymphocyte % 12.2 (L) 19.6 - 45.3 %    Monocyte % 10.7 5.0 - 12.0 %    Eosinophil % 0.5 0.3 - 6.2 %    Basophil % 0.7 0.0 - 1.5 %    Immature Grans % 0.8 (H) 0.0 - 0.5 %    Neutrophils, Absolute 5.48 1.70 - 7.00 10*3/mm3    Lymphocytes, Absolute 0.89 0.70 - 3.10 10*3/mm3    Monocytes, Absolute 0.78 0.10 - 0.90 10*3/mm3    Eosinophils, Absolute 0.04 0.00 - 0.40 10*3/mm3    Basophils, Absolute 0.05 0.00 - 0.20 10*3/mm3    Immature Grans, Absolute 0.06 (H) 0.00 - 0.05 10*3/mm3    nRBC 0.0 0.0 - 0.2 /100 WBC   CBC Auto Differential   Result Value Ref Range    WBC 9.39 3.40 - 10.80 10*3/mm3    RBC 4.38 3.77 - 5.28 10*6/mm3    Hemoglobin 12.5 12.0 - 15.9 g/dL    Hematocrit 38.7 34.0 - 46.6 %    MCV 88.4 79.0 - 97.0 fL    MCH 28.5 26.6 -  33.0 pg    MCHC 32.3 31.5 - 35.7 g/dL    RDW 12.3 12.3 - 15.4 %    RDW-SD 40.4 37.0 - 54.0 fl    MPV 9.3 6.0 - 12.0 fL    Platelets 289 140 - 450 10*3/mm3    Neutrophil % 67.6 42.7 - 76.0 %    Lymphocyte % 23.7 19.6 - 45.3 %    Monocyte % 6.1 5.0 - 12.0 %    Eosinophil % 1.6 0.3 - 6.2 %    Basophil % 0.4 0.0 - 1.5 %    Immature Grans % 0.6 (H) 0.0 - 0.5 %    Neutrophils, Absolute 6.34 1.70 - 7.00 10*3/mm3    Lymphocytes, Absolute 2.23 0.70 - 3.10 10*3/mm3    Monocytes, Absolute 0.57 0.10 - 0.90 10*3/mm3    Eosinophils, Absolute 0.15 0.00 - 0.40 10*3/mm3    Basophils, Absolute 0.04 0.00 - 0.20 10*3/mm3    Immature Grans, Absolute 0.06 (H) 0.00 - 0.05 10*3/mm3    nRBC 0.0 0.0 - 0.2 /100 WBC     *Note: Due to a large number of results and/or encounters for the requested time period, some results have not been displayed. A complete set of results can be found in Results Review.       Some portions of this note have been dictated using voice recognition software and may contain errors and/or omissions.

## 2020-02-17 NOTE — H&P (VIEW-ONLY)
Chief Complaint   Patient presents with   • Heartburn   • Fatty Liver   • Elevated Hepatic Enzymes       ENDO PROCEDURE ORDERED: COLON hx polyp    Subjective    Lashaun Bernal is a 59 y.o. female. she is here today for follow-up.    History of Present Illness    Patient is seen on a recheck of her GERD, fatty liver, elevated liver enzymes, F0/S1/N1. Last seen on 10/14/2019. Patient's GERD has been doing well on Prilosec 40 mg b.i.d. She denied nausea, vomiting or dysphagia. Bowel movements are regular without blood or mucus. Weight is down 5.4 pounds since last visit. Last colonoscopy 01/23/2015 showed diverticulosis, hemorrhoids. She has a family history of colon cancer.     Patient was hospitalized with chest pain on 01/13/2020. Chest x-ray was negative. CT angiogram coronary showed no significant stenosis. CT angiogram chest showed a small-to-moderate hiatal hernia, fatty liver with gallstones. She states she was told she had SVT. Respiratory panel was negative. She had a positive influenza A. D-dimer 634; normal INR, magnesium, troponin. CMP showed a creatinine of 1.03, CO2 of 20, calcium 8.4, ALT 49, GFR 55, otherwise normal. Laboratories close to discharge, BMP showed calcium of 8.0, GFR 57, otherwise normal. CBC showed hemoglobin 11.9, hematocrit 35.7, platelets 220,000.     ASSESSMENT/PLAN:  Patient with chronic GERD appears stable on current regimen. She does have fatty liver, asymptomatic gallstones, mild anemia. Encouraged to continue dietary modification and significant weight loss for fatty liver. She did not repeat the laboratories I had requested prior to this visit. She is due for screening colonoscopy for family history of colon cancer. We will plan followup after the above. Further pending clinical course and the results of the above.       The following portions of the patient's history were reviewed and updated as appropriate:   Past Medical History:   Diagnosis Date   • Allergic rhinitis    •  "Arthritis    • Asthma    • Breast lump    • C. difficile diarrhea    • Carotid artery disease (CMS/HCC)    • Chest pain    • Chronic low back pain    • Constipation    • Depressive disorder    • Diarrhea    • Diverticular disease of colon    • Epigastric pain    • DIETER (generalized anxiety disorder)    • GERD (gastroesophageal reflux disease)    • Head ache    • Hematochezia    • Herpes zoster     mid back, near spine   • Hypertension    • Periumbilical pain    • PONV (postoperative nausea and vomiting)    • [place] spotted fever     pt states \"currently has it\"   • Sleep apnea     using c-pap   • SVT (supraventricular tachycardia) (CMS/HCC)      Past Surgical History:   Procedure Laterality Date   • ABDOMINOPLASTY  2004   • AUGMENTATION MAMMAPLASTY     • BREAST AUGMENTATION BILATERAL MASTOPEXY     • BUNIONECTOMY Left 3/20/2017    Procedure: EXCISION OF CALCANEAL SPURS LEFT FOOT AND REATTACHMENT OF ACHILLES TENDON ;  Surgeon: Jarrell Mar MD;  Location: [facility];  Service:    • COLONOSCOPY  2015   • CYST REMOVAL  1961    Excision of sebaceous cyst. Left ear   • FINGER/THUMB LESION/CYST EXCISION  10/29/2014   • HAND SURGERY  2013   • HYSTERECTOMY     • OOPHORECTOMY     • ORIF ULNA/RADIUS FRACTURES  2013   • TUBAL ABDOMINAL LIGATION  1985    Laparoscopic tubal sterilization; dilatation and curettage. Desires tubal ligation;       Family History   Problem Relation Age of Onset   • Diabetes Other    • Heart disease Other    • Hypertension Other    • Heart disease Mother         Myocardial infarct   • Hypertension Mother    • Diabetes Mother    • Heart disease Father         CHF     OB History        2    Para   2    Term   2            AB        Living           SAB        TAB        Ectopic        Molar        Multiple        Live Births                  Allergies   Allergen Reactions   • Cefprozil Nausea And Vomiting   • Penicillins Rash     Social History "     Socioeconomic History   • Marital status:      Spouse name: Not on file   • Number of children: Not on file   • Years of education: Not on file   • Highest education level: Not on file   Tobacco Use   • Smoking status: Never Smoker   • Smokeless tobacco: Never Used   Substance and Sexual Activity   • Alcohol use: No   • Drug use: No   • Sexual activity: Defer     Current Medications:  Prior to Admission medications    Medication Sig Start Date End Date Taking? Authorizing Provider   albuterol (PROVENTIL HFA;VENTOLIN HFA) 108 (90 Base) MCG/ACT inhaler Inhale 2 puffs Every 4 (Four) Hours As Needed for Wheezing. 11/27/18  Yes Grtechen Gauthier APRN   albuterol (PROVENTIL) (2.5 MG/3ML) 0.083% nebulizer solution Take 2.5 mg by nebulization Every 6 (Six) Hours As Needed for Wheezing. 11/27/18  Yes Gretchen Gauthier APRN   cetirizine (zyrTEC) 10 MG tablet Take 1 tablet by mouth Daily.  Patient taking differently: Take 10 mg by mouth As Needed. 10/3/19 10/2/20 Yes Cesar Fisher,    clonazePAM (KlonoPIN) 0.5 MG tablet Take 0.5 mg by mouth 2 (Two) Times a Day As Needed for Anxiety. 6/11/19  Yes ProviderMaryam MD   colesevelam (WELCHOL) 625 MG tablet Take 3 tablets by mouth 2 (Two) Times a Day With Meals. 12/2/19  Yes Gretchen Gauthier APRN   cyclobenzaprine (FLEXERIL) 10 MG tablet Take 1 tablet by mouth 3 (Three) Times a Day As Needed for Muscle Spasms. 2/3/20  Yes Gretchen Gauthier APRN   estradiol (ESTRACE) 1 MG tablet Take 1 tablet by mouth Daily. 12/6/19  Yes Gretchen Gauthier APRN   fluconazole (DIFLUCAN) 200 MG tablet Take one at the first sign of infection and may repeat in 3 days as needed. 12/7/19  Yes Laura Patterson APRN   FLUoxetine (PROzac) 20 MG capsule Take 20 mg by mouth 2 (Two) Times a Day.   Yes ProviderMaryam MD   gabapentin (NEURONTIN) 300 MG capsule Take 1 capsule by mouth 2 (Two) Times a Day. 2/5/20  Yes Gretchen Gauthier APRN   hydroxychloroquine (PLAQUENIL) 200 MG tablet Take 200 mg by  "mouth Daily.   Yes Maryam Mustafa MD   metoprolol succinate XL (TOPROL-XL) 50 MG 24 hr tablet Take 25 mg by mouth 2 (Two) Times a Day.   Yes Maryam Mustafa MD   omeprazole (priLOSEC) 40 MG capsule Take 40 mg by mouth 2 (Two) Times a Day.   Yes Maryam Mustafa MD   primidone (MYSOLINE) 50 MG tablet Take 1 p.o. nightly 12/11/19  Yes Santhosh Dos Santos MD   predniSONE (DELTASONE) 10 MG tablet Take 4 tabs for 2 days then 3 tabs for 2 days then 2 tabs for 2 days then 1 tab for 2 days 1/16/20 2/17/20  Serafin Hernandez,      Review of Systems  Review of Systems       Objective    /80 (BP Location: Left arm)   Pulse 105   Ht 160 cm (63\")   Wt 97.8 kg (215 lb 9.6 oz)   LMP 03/09/1991 (Approximate)   BMI 38.19 kg/m²   Physical Exam   Constitutional: She is oriented to person, place, and time. She appears well-developed and well-nourished. No distress.   HENT:   Head: Normocephalic and atraumatic.   Eyes: Pupils are equal, round, and reactive to light. EOM are normal.   Neck: Normal range of motion.   Cardiovascular: Normal rate, regular rhythm and normal heart sounds.   Pulmonary/Chest: Effort normal and breath sounds normal.   Abdominal: Soft. Bowel sounds are normal. She exhibits no shifting dullness, no distension, no abdominal bruit, no ascites and no mass. There is no hepatosplenomegaly. There is tenderness. There is no rigidity, no rebound, no guarding and no CVA tenderness. No hernia. Hernia confirmed negative in the ventral area.   RUQ, obese   Musculoskeletal: Normal range of motion.   Neurological: She is alert and oriented to person, place, and time.   Skin: Skin is warm and dry.   Psychiatric: She has a normal mood and affect. Her behavior is normal. Judgment and thought content normal.   Nursing note and vitals reviewed.    Assessment/Plan      1. Gastroesophageal reflux disease with esophagitis    2. Elevated liver enzymes    3. Fatty liver    4. History of colon polyps    .   "   Lashaun was seen today for heartburn, fatty liver and elevated hepatic enzymes.    Diagnoses and all orders for this visit:    Gastroesophageal reflux disease with esophagitis    Elevated liver enzymes    Fatty liver    History of colon polyps  -     Case Request; Standing  -     Case Request    Other orders  -     Follow Anesthesia Guidelines / Standing Orders; Future  -     Obtain Informed Consent; Future  -     polyethylene glycol (GoLYTELY) 236 g solution; AS DIRECTED PER INSTRUCTION SHEET FOR COLONOSCOPY        Orders placed during this encounter include:  Orders Placed This Encounter   Procedures   • Follow Anesthesia Guidelines / Standing Orders     Standing Status:   Future   • Obtain Informed Consent     Standing Status:   Future     Order Specific Question:   Informed Consent Given For     Answer:   COLONOSCOPY       Medications prescribed:  New Medications Ordered This Visit   Medications   • polyethylene glycol (GoLYTELY) 236 g solution     Sig: AS DIRECTED PER INSTRUCTION SHEET FOR COLONOSCOPY     Dispense:  4000 mL     Refill:  0     Discontinued Medications       Reason for Discontinue     predniSONE (DELTASONE) 10 MG tablet    *Therapy completed        Requested Prescriptions     Signed Prescriptions Disp Refills   • polyethylene glycol (GoLYTELY) 236 g solution 4000 mL 0     Sig: AS DIRECTED PER INSTRUCTION SHEET FOR COLONOSCOPY       Review and/or summary of lab tests, radiology, procedures, medications. Review and summary of old records and obtaining of history. The risks and benefits of my recommendations, as well as other treatment options were discussed with the patient today. Questions were answered.    Follow-up: Return if symptoms worsen or fail to improve, for After the above.     COLONOSCOPY (N/A)      This document has been electronically signed by Marcial Walker PA-C on February 19, 2020 6:07 PM      Results for orders placed or performed in visit on 01/24/20   Magnesium   Result  Value Ref Range    Magnesium 2.2 1.6 - 2.6 mg/dL   Results for orders placed or performed during the hospital encounter of 01/13/20   Gold Top - SST   Result Value Ref Range    Extra Tube Hold for add-ons.    Green Top (Gel)   Result Value Ref Range    Extra Tube Hold for add-ons.    Respiratory Panel, PCR - Swab, Nasopharynx   Result Value Ref Range    ADENOVIRUS, PCR Not Detected Not Detected    Coronavirus 229E Not Detected Not Detected    Coronavirus HKU1 Not Detected Not Detected    Coronavirus NL63 Not Detected Not Detected    Coronavirus OC43 Not Detected Not Detected    Human Metapneumovirus Not Detected Not Detected    Human Rhinovirus/Enterovirus Not Detected Not Detected    Influenza B PCR Not Detected Not Detected    Parainfluenza Virus 1 Not Detected Not Detected    Parainfluenza Virus 2 Not Detected Not Detected    Parainfluenza Virus 3 Not Detected Not Detected    Parainfluenza Virus 4 Not Detected Not Detected    Bordetella pertussis pcr Not Detected Not Detected    Influenza A H1 2009 PCR Not Detected Not Detected    Chlamydophila pneumoniae PCR Not Detected Not Detected    Mycoplasma pneumo by PCR Not Detected Not Detected    Influenza A PCR Not Detected Not Detected    Influenza A H3 Detected (A) Not Detected    Influenza A H1 Not Detected Not Detected    RSV, PCR Not Detected Not Detected   Procalcitonin   Result Value Ref Range    Procalcitonin 0.13 0.10 - 0.25 ng/mL   CBC Auto Differential   Result Value Ref Range    WBC 3.62 3.40 - 10.80 10*3/mm3    RBC 4.01 3.77 - 5.28 10*6/mm3    Hemoglobin 11.9 (L) 12.0 - 15.9 g/dL    Hematocrit 35.7 34.0 - 46.6 %    MCV 89.0 79.0 - 97.0 fL    MCH 29.7 26.6 - 33.0 pg    MCHC 33.3 31.5 - 35.7 g/dL    RDW 12.6 12.3 - 15.4 %    RDW-SD 41.7 37.0 - 54.0 fl    MPV 9.5 6.0 - 12.0 fL    Platelets 220 140 - 450 10*3/mm3   CBC Auto Differential   Result Value Ref Range    WBC 4.26 3.40 - 10.80 10*3/mm3    RBC 4.19 3.77 - 5.28 10*6/mm3    Hemoglobin 12.3 12.0 - 15.9  g/dL    Hematocrit 37.0 34.0 - 46.6 %    MCV 88.3 79.0 - 97.0 fL    MCH 29.4 26.6 - 33.0 pg    MCHC 33.2 31.5 - 35.7 g/dL    RDW 12.6 12.3 - 15.4 %    RDW-SD 40.9 37.0 - 54.0 fl    MPV 9.5 6.0 - 12.0 fL    Platelets 206 140 - 450 10*3/mm3    Neutrophil % 58.8 42.7 - 76.0 %    Lymphocyte % 26.5 19.6 - 45.3 %    Monocyte % 13.1 (H) 5.0 - 12.0 %    Eosinophil % 0.5 0.3 - 6.2 %    Basophil % 0.2 0.0 - 1.5 %    Immature Grans % 0.9 (H) 0.0 - 0.5 %    Neutrophils, Absolute 2.50 1.70 - 7.00 10*3/mm3    Lymphocytes, Absolute 1.13 0.70 - 3.10 10*3/mm3    Monocytes, Absolute 0.56 0.10 - 0.90 10*3/mm3    Eosinophils, Absolute 0.02 0.00 - 0.40 10*3/mm3    Basophils, Absolute 0.01 0.00 - 0.20 10*3/mm3    Immature Grans, Absolute 0.04 0.00 - 0.05 10*3/mm3    nRBC 0.0 0.0 - 0.2 /100 WBC   CBC Auto Differential   Result Value Ref Range    WBC 7.30 3.40 - 10.80 10*3/mm3    RBC 4.08 3.77 - 5.28 10*6/mm3    Hemoglobin 12.1 12.0 - 15.9 g/dL    Hematocrit 35.1 34.0 - 46.6 %    MCV 86.0 79.0 - 97.0 fL    MCH 29.7 26.6 - 33.0 pg    MCHC 34.5 31.5 - 35.7 g/dL    RDW 12.5 12.3 - 15.4 %    RDW-SD 39.2 37.0 - 54.0 fl    MPV 9.1 6.0 - 12.0 fL    Platelets 230 140 - 450 10*3/mm3    Neutrophil % 75.1 42.7 - 76.0 %    Lymphocyte % 12.2 (L) 19.6 - 45.3 %    Monocyte % 10.7 5.0 - 12.0 %    Eosinophil % 0.5 0.3 - 6.2 %    Basophil % 0.7 0.0 - 1.5 %    Immature Grans % 0.8 (H) 0.0 - 0.5 %    Neutrophils, Absolute 5.48 1.70 - 7.00 10*3/mm3    Lymphocytes, Absolute 0.89 0.70 - 3.10 10*3/mm3    Monocytes, Absolute 0.78 0.10 - 0.90 10*3/mm3    Eosinophils, Absolute 0.04 0.00 - 0.40 10*3/mm3    Basophils, Absolute 0.05 0.00 - 0.20 10*3/mm3    Immature Grans, Absolute 0.06 (H) 0.00 - 0.05 10*3/mm3    nRBC 0.0 0.0 - 0.2 /100 WBC   CBC Auto Differential   Result Value Ref Range    WBC 9.39 3.40 - 10.80 10*3/mm3    RBC 4.38 3.77 - 5.28 10*6/mm3    Hemoglobin 12.5 12.0 - 15.9 g/dL    Hematocrit 38.7 34.0 - 46.6 %    MCV 88.4 79.0 - 97.0 fL    MCH 28.5 26.6 -  33.0 pg    MCHC 32.3 31.5 - 35.7 g/dL    RDW 12.3 12.3 - 15.4 %    RDW-SD 40.4 37.0 - 54.0 fl    MPV 9.3 6.0 - 12.0 fL    Platelets 289 140 - 450 10*3/mm3    Neutrophil % 67.6 42.7 - 76.0 %    Lymphocyte % 23.7 19.6 - 45.3 %    Monocyte % 6.1 5.0 - 12.0 %    Eosinophil % 1.6 0.3 - 6.2 %    Basophil % 0.4 0.0 - 1.5 %    Immature Grans % 0.6 (H) 0.0 - 0.5 %    Neutrophils, Absolute 6.34 1.70 - 7.00 10*3/mm3    Lymphocytes, Absolute 2.23 0.70 - 3.10 10*3/mm3    Monocytes, Absolute 0.57 0.10 - 0.90 10*3/mm3    Eosinophils, Absolute 0.15 0.00 - 0.40 10*3/mm3    Basophils, Absolute 0.04 0.00 - 0.20 10*3/mm3    Immature Grans, Absolute 0.06 (H) 0.00 - 0.05 10*3/mm3    nRBC 0.0 0.0 - 0.2 /100 WBC     *Note: Due to a large number of results and/or encounters for the requested time period, some results have not been displayed. A complete set of results can be found in Results Review.       Some portions of this note have been dictated using voice recognition software and may contain errors and/or omissions.

## 2020-02-21 ENCOUNTER — LAB (OUTPATIENT)
Dept: LAB | Facility: HOSPITAL | Age: 60
End: 2020-02-21

## 2020-02-21 ENCOUNTER — TRANSCRIBE ORDERS (OUTPATIENT)
Dept: LAB | Facility: HOSPITAL | Age: 60
End: 2020-02-21

## 2020-02-21 DIAGNOSIS — R74.8 ELEVATED LIVER ENZYMES: ICD-10-CM

## 2020-02-21 DIAGNOSIS — K76.0 FATTY LIVER: ICD-10-CM

## 2020-02-21 DIAGNOSIS — R94.4 ABNORMAL RESULTS OF KIDNEY FUNCTION STUDIES: Primary | ICD-10-CM

## 2020-02-21 LAB
ALBUMIN SERPL-MCNC: 4.1 G/DL (ref 3.5–5.2)
ALBUMIN UR-MCNC: <1.2 MG/DL
ANION GAP SERPL CALCULATED.3IONS-SCNC: 15.2 MMOL/L (ref 5–15)
BUN BLD-MCNC: 20 MG/DL (ref 6–20)
BUN/CREAT SERPL: 19.6 (ref 7–25)
CALCIUM SPEC-SCNC: 9.7 MG/DL (ref 8.6–10.5)
CHLORIDE SERPL-SCNC: 104 MMOL/L (ref 98–107)
CO2 SERPL-SCNC: 16.8 MMOL/L (ref 22–29)
CREAT BLD-MCNC: 1.02 MG/DL (ref 0.57–1)
CREAT UR-MCNC: 93.3 MG/DL
GFR SERPL CREATININE-BSD FRML MDRD: 55 ML/MIN/1.73
GLUCOSE BLD-MCNC: 92 MG/DL (ref 65–99)
INR PPP: 1 (ref 0.8–1.2)
MICROALBUMIN/CREAT UR: NORMAL MG/G{CREAT}
PHOSPHATE SERPL-MCNC: 3.7 MG/DL (ref 2.5–4.5)
POTASSIUM BLD-SCNC: 4.6 MMOL/L (ref 3.5–5.2)
PROTHROMBIN TIME: 13 SECONDS (ref 11.1–15.3)
SODIUM BLD-SCNC: 136 MMOL/L (ref 136–145)

## 2020-02-21 PROCEDURE — 80069 RENAL FUNCTION PANEL: CPT | Performed by: INTERNAL MEDICINE

## 2020-02-21 PROCEDURE — 82465 ASSAY BLD/SERUM CHOLESTEROL: CPT

## 2020-02-21 PROCEDURE — 82043 UR ALBUMIN QUANTITATIVE: CPT | Performed by: INTERNAL MEDICINE

## 2020-02-21 PROCEDURE — 83883 ASSAY NEPHELOMETRY NOT SPEC: CPT

## 2020-02-21 PROCEDURE — 82977 ASSAY OF GGT: CPT

## 2020-02-21 PROCEDURE — 84460 ALANINE AMINO (ALT) (SGPT): CPT

## 2020-02-21 PROCEDURE — 82570 ASSAY OF URINE CREATININE: CPT | Performed by: INTERNAL MEDICINE

## 2020-02-21 PROCEDURE — 82172 ASSAY OF APOLIPOPROTEIN: CPT

## 2020-02-21 PROCEDURE — 85610 PROTHROMBIN TIME: CPT

## 2020-02-21 PROCEDURE — 84450 TRANSFERASE (AST) (SGOT): CPT

## 2020-02-21 PROCEDURE — 36415 COLL VENOUS BLD VENIPUNCTURE: CPT

## 2020-02-21 PROCEDURE — 84478 ASSAY OF TRIGLYCERIDES: CPT

## 2020-02-21 PROCEDURE — 82247 BILIRUBIN TOTAL: CPT

## 2020-02-21 PROCEDURE — 82947 ASSAY GLUCOSE BLOOD QUANT: CPT

## 2020-02-24 ENCOUNTER — HOSPITAL ENCOUNTER (OUTPATIENT)
Facility: HOSPITAL | Age: 60
Setting detail: HOSPITAL OUTPATIENT SURGERY
Discharge: HOME | End: 2020-02-24
Attending: INTERNAL MEDICINE | Admitting: INTERNAL MEDICINE

## 2020-02-24 ENCOUNTER — ANESTHESIA EVENT (OUTPATIENT)
Dept: GASTROENTEROLOGY | Facility: HOSPITAL | Age: 60
End: 2020-02-24

## 2020-02-24 ENCOUNTER — ANESTHESIA (OUTPATIENT)
Dept: GASTROENTEROLOGY | Facility: HOSPITAL | Age: 60
End: 2020-02-24

## 2020-02-24 VITALS
RESPIRATION RATE: 18 BRPM | HEART RATE: 79 BPM | HEIGHT: 63 IN | BODY MASS INDEX: 37.1 KG/M2 | WEIGHT: 209.38 LBS | TEMPERATURE: 97 F | OXYGEN SATURATION: 95 % | DIASTOLIC BLOOD PRESSURE: 64 MMHG | SYSTOLIC BLOOD PRESSURE: 120 MMHG

## 2020-02-24 DIAGNOSIS — Z86.010 HISTORY OF COLON POLYPS: ICD-10-CM

## 2020-02-24 PROCEDURE — 45378 DIAGNOSTIC COLONOSCOPY: CPT | Performed by: INTERNAL MEDICINE

## 2020-02-24 PROCEDURE — 25010000002 PROPOFOL 10 MG/ML EMULSION: Performed by: NURSE ANESTHETIST, CERTIFIED REGISTERED

## 2020-02-24 RX ORDER — LIDOCAINE HYDROCHLORIDE 20 MG/ML
INJECTION, SOLUTION EPIDURAL; INFILTRATION; INTRACAUDAL; PERINEURAL AS NEEDED
Status: DISCONTINUED | OUTPATIENT
Start: 2020-02-24 | End: 2020-02-24 | Stop reason: SURG

## 2020-02-24 RX ORDER — DEXTROSE AND SODIUM CHLORIDE 5; .45 G/100ML; G/100ML
30 INJECTION, SOLUTION INTRAVENOUS CONTINUOUS PRN
Status: DISCONTINUED | OUTPATIENT
Start: 2020-02-24 | End: 2020-02-24 | Stop reason: HOSPADM

## 2020-02-24 RX ORDER — PROPOFOL 10 MG/ML
VIAL (ML) INTRAVENOUS AS NEEDED
Status: DISCONTINUED | OUTPATIENT
Start: 2020-02-24 | End: 2020-02-24 | Stop reason: SURG

## 2020-02-24 RX ADMIN — PROPOFOL 40 MG: 10 INJECTION, EMULSION INTRAVENOUS at 15:08

## 2020-02-24 RX ADMIN — DEXTROSE AND SODIUM CHLORIDE 30 ML/HR: 5; 450 INJECTION, SOLUTION INTRAVENOUS at 14:39

## 2020-02-24 RX ADMIN — PROPOFOL 40 MG: 10 INJECTION, EMULSION INTRAVENOUS at 15:05

## 2020-02-24 RX ADMIN — PROPOFOL 30 MG: 10 INJECTION, EMULSION INTRAVENOUS at 15:11

## 2020-02-24 RX ADMIN — PROPOFOL 40 MG: 10 INJECTION, EMULSION INTRAVENOUS at 15:13

## 2020-02-24 RX ADMIN — PROPOFOL 30 MG: 10 INJECTION, EMULSION INTRAVENOUS at 15:15

## 2020-02-24 RX ADMIN — PROPOFOL 70 MG: 10 INJECTION, EMULSION INTRAVENOUS at 15:03

## 2020-02-24 RX ADMIN — LIDOCAINE HYDROCHLORIDE 80 MG: 20 INJECTION, SOLUTION EPIDURAL; INFILTRATION; INTRACAUDAL; PERINEURAL at 15:03

## 2020-02-24 NOTE — ANESTHESIA POSTPROCEDURE EVALUATION
Patient: Lashaun Bernal    Procedure Summary     Date:  02/24/20 Room / Location:  [facility] ENDOSCOPY 1 / [facility] ENDOSCOPY    Anesthesia Start:  1502 Anesthesia Stop:  1517    Procedure:  COLONOSCOPY (N/A ) Diagnosis:       History of colon polyps      (History of colon polyps [Z86.010])    Surgeon:  Santhosh Patrick MD Provider:  Alice Israel CRNA    Anesthesia Type:  MAC ASA Status:  3          Anesthesia Type: MAC    Vitals  No vitals data found for the desired time range.          Post Anesthesia Care and Evaluation    Patient location during evaluation: bedside  Patient participation: complete - patient participated  Level of consciousness: awake and awake and alert  Pain score: 0  Pain management: satisfactory to patient  Airway patency: patent  Anesthetic complications: No anesthetic complications  PONV Status: none  Cardiovascular status: acceptable and stable  Respiratory status: acceptable, room air and spontaneous ventilation  Hydration status: acceptable

## 2020-02-24 NOTE — ANESTHESIA PREPROCEDURE EVALUATION
Anesthesia Evaluation     Patient summary reviewed and Nursing notes reviewed   history of anesthetic complications: PONV  NPO Solid Status: > 8 hours  NPO Liquid Status: > 2 hours           Airway   Mallampati: II  TM distance: >3 FB  possible difficult intubation  Comment: She has a thick/fatty neck  Dental - normal exam     Comment: All her upper teeth are crowned    Pulmonary - normal exam   (+) asthma ( doing well),sleep apnea ( has MAX and is waiting for her C-PAP machine),   Cardiovascular - normal exam    (+) hypertension, hyperlipidemia,  carotid artery disease carotid bilateral      Neuro/Psych  (+) headaches, tremors, numbness, psychiatric history Depression,     GI/Hepatic/Renal/Endo    (+) obesity,  GERD ( she took her omeprazole today and has no GERD symptoms today) well controlled,  renal disease (stage 2) CRI,     Musculoskeletal     (+) back pain, chronic pain, myalgias,   Arthralgias:  has spurs on her left heel, for surgery today.  Abdominal   (+) obese,    Substance History - negative use     OB/GYN negative ob/gyn ROS   (-)  Pregnant        Other   arthritis,                        Anesthesia Plan    ASA 3     MAC     intravenous induction     Anesthetic plan, all risks, benefits, and alternatives have been provided, discussed and informed consent has been obtained with: patient.    Plan discussed with CRNA.

## 2020-02-25 LAB
A2 MACROGLOB SERPL-MCNC: 163 MG/DL (ref 110–276)
ALT SERPL W P-5'-P-CCNC: 54 IU/L (ref 0–40)
APO A-I SERPL-MCNC: 149 MG/DL (ref 116–209)
AST SERPL W P-5'-P-CCNC: 33 IU/L (ref 0–40)
BILIRUB SERPL-MCNC: 0.3 MG/DL (ref 0–1.2)
CHOLEST SERPL-MCNC: 232 MG/DL (ref 100–199)
FIBROSIS SCORING:: ABNORMAL
FIBROSIS STAGE SERPL QL: ABNORMAL
GGT SERPL-CCNC: 16 IU/L (ref 0–60)
GLUCOSE SERPL-MCNC: 91 MG/DL (ref 65–99)
HAPTOGLOB SERPL-MCNC: 138 MG/DL (ref 33–346)
INTERPRETATIONS: (REFERENCE): ABNORMAL
LABORATORY COMMENT REPORT: ABNORMAL
LIMITATIONS: (REFERENCE): ABNORMAL
LIVER FIBR SCORE SERPL CALC.FIBROSURE: 0.08 (ref 0–0.21)
NASH GRADE (REFERENCE): ABNORMAL
NASH SCORE (REFERENCE): 0.5
NASH SCORING (REFERENCE): ABNORMAL
STEATOSIS GRADE (REFERENCE): ABNORMAL
STEATOSIS GRADING (REFERENCE): ABNORMAL
STEATOSIS SCORE (REFERENCE): 0.66 (ref 0–0.3)
TRIGL SERPL-MCNC: 177 MG/DL (ref 0–149)
WEIGHT: (REFERENCE): 212 LBS

## 2020-02-26 ENCOUNTER — TRANSCRIBE ORDERS (OUTPATIENT)
Dept: LAB | Facility: HOSPITAL | Age: 60
End: 2020-02-26

## 2020-02-26 DIAGNOSIS — R94.4 ABNORMAL RESULTS OF KIDNEY FUNCTION STUDIES: Primary | ICD-10-CM

## 2020-02-27 ENCOUNTER — OFFICE VISIT (OUTPATIENT)
Dept: FAMILY MEDICINE CLINIC | Facility: CLINIC | Age: 60
End: 2020-02-27

## 2020-02-27 VITALS
DIASTOLIC BLOOD PRESSURE: 84 MMHG | WEIGHT: 211.4 LBS | BODY MASS INDEX: 37.46 KG/M2 | SYSTOLIC BLOOD PRESSURE: 146 MMHG | HEIGHT: 63 IN

## 2020-02-27 DIAGNOSIS — R79.89 ELEVATED TSH: ICD-10-CM

## 2020-02-27 DIAGNOSIS — E78.00 HYPERCHOLESTEROLEMIA: ICD-10-CM

## 2020-02-27 DIAGNOSIS — R07.9 CHEST PAIN, UNSPECIFIED TYPE: ICD-10-CM

## 2020-02-27 DIAGNOSIS — M79.7 FIBROMYALGIA: ICD-10-CM

## 2020-02-27 DIAGNOSIS — Z00.00 ANNUAL PHYSICAL EXAM: Primary | ICD-10-CM

## 2020-02-27 DIAGNOSIS — I10 BENIGN ESSENTIAL HYPERTENSION: ICD-10-CM

## 2020-02-27 PROCEDURE — 99396 PREV VISIT EST AGE 40-64: CPT | Performed by: NURSE PRACTITIONER

## 2020-02-27 RX ORDER — TIZANIDINE 4 MG/1
TABLET ORAL
COMMUNITY
End: 2020-02-27

## 2020-03-02 ENCOUNTER — OFFICE VISIT (OUTPATIENT)
Dept: GASTROENTEROLOGY | Facility: CLINIC | Age: 60
End: 2020-03-02

## 2020-03-02 VITALS
WEIGHT: 208.8 LBS | SYSTOLIC BLOOD PRESSURE: 144 MMHG | HEART RATE: 82 BPM | BODY MASS INDEX: 35.65 KG/M2 | HEIGHT: 64 IN | DIASTOLIC BLOOD PRESSURE: 82 MMHG

## 2020-03-02 DIAGNOSIS — R74.8 ELEVATED LIVER ENZYMES: ICD-10-CM

## 2020-03-02 DIAGNOSIS — K76.0 FATTY LIVER: Primary | ICD-10-CM

## 2020-03-02 DIAGNOSIS — K21.00 GASTROESOPHAGEAL REFLUX DISEASE WITH ESOPHAGITIS: ICD-10-CM

## 2020-03-02 DIAGNOSIS — K57.90 DIVERTICULOSIS: ICD-10-CM

## 2020-03-02 PROCEDURE — 99214 OFFICE O/P EST MOD 30 MIN: CPT | Performed by: PHYSICIAN ASSISTANT

## 2020-03-02 NOTE — PATIENT INSTRUCTIONS

## 2020-03-02 NOTE — PROGRESS NOTES
Chief Complaint   Patient presents with   • Heartburn   • Fatty Liver   • Elevated Hepatic Enzymes       ENDO PROCEDURE ORDERED:    Subjective    Lashaun Bernal is a 59 y.o. female. she is here today for follow-up.    History of Present Illness    The patient is seen on a recheck of her GERD, fatty liver, elevated liver enzymes, F0/S1/N1, gallstones. Last seen 02/17/2020. The patient has had laboratories on 02/21/2020. Renal function panel showed creatinine of 1.02, CO2 of 16.8, GFR 55, otherwise normal. CADENA FibroSure 0.08/F0, steatosis 0.66/S2, 0.5/N1. ALT 54, cholesterol 232, triglycerides 177.    The patient underwent colonoscopy on 02/24/2020. It showed multiple diverticula in the sigmoid colon, internal and external hemorrhoids. Recommended a repeat colonoscopy at 5 years. She does have a family history of colon cancer.    The patient currently denies abdominal pain, GERD is doing well on the Prilosec. She denies nausea, vomiting or dysphagia. Bowels are moving without blood or mucus. Weight is down 7 pounds since last visit.    ASSESSMENT/PLAN: Patient with diverticulosis, encouraged high-fiber/diverticular diet. GERD is doing well on the Prilosec. She appears asymptomatic of her gallstones currently. Steatohepatitis, she is again encouraged dietary modification and continued weight loss. I think she needs to follow up with her primary care for more treatment of her cholesterol and triglycerides. We will plan follow up in 4 months with CMP and INR prior. Further pending clinical course and the results of the above.      The following portions of the patient's history were reviewed and updated as appropriate:   Past Medical History:   Diagnosis Date   • Allergic rhinitis    • Arthritis    • Asthma    • Breast lump    • C. difficile diarrhea 2013   • Carotid artery disease (CMS/HCC)    • Chest pain    • Chronic low back pain    • Constipation    • Depressive disorder    • Diarrhea    • Diverticular disease of  "colon    • Epigastric pain    • DIETER (generalized anxiety disorder)    • GERD (gastroesophageal reflux disease)    • Head ache    • Hematochezia    • Herpes zoster     mid back, near spine   • Hypertension    • Periumbilical pain    • PONV (postoperative nausea and vomiting)    • Maurice Mountain spotted fever     pt states \"currently has it\"   • Sleep apnea     using c-pap   • SVT (supraventricular tachycardia) (CMS/HCC)      Past Surgical History:   Procedure Laterality Date   • ABDOMINOPLASTY  2004   • AUGMENTATION MAMMAPLASTY     • BREAST AUGMENTATION BILATERAL MASTOPEXY     • BUNIONECTOMY Left 3/20/2017    Procedure: EXCISION OF CALCANEAL SPURS LEFT FOOT AND REATTACHMENT OF ACHILLES TENDON ;  Surgeon: Jarrell Mar MD;  Location: [facility] OR;  Service:    • COLONOSCOPY  2015   • COLONOSCOPY N/A 2020    Procedure: COLONOSCOPY;  Surgeon: Santhosh Patrick MD;  Location: [facility] ENDOSCOPY;  Service: Gastroenterology;  Laterality: N/A;   • CYST REMOVAL  1961    Excision of sebaceous cyst. Left ear   • FINGER/THUMB LESION/CYST EXCISION  10/29/2014   • HAND SURGERY  2013   • HYSTERECTOMY     • OOPHORECTOMY     • ORIF ULNA/RADIUS FRACTURES  2013   • TUBAL ABDOMINAL LIGATION  1985    Laparoscopic tubal sterilization; dilatation and curettage. Desires tubal ligation;       Family History   Problem Relation Age of Onset   • Diabetes Other    • Heart disease Other    • Hypertension Other    • Heart disease Mother         Myocardial infarct   • Hypertension Mother    • Diabetes Mother    • Heart disease Father         CHF     OB History        2    Para   2    Term   2            AB        Living           SAB        TAB        Ectopic        Molar        Multiple        Live Births                  Allergies   Allergen Reactions   • Cefprozil Nausea And Vomiting   • Penicillins Rash     Social History     Socioeconomic History   • Marital status:      Spouse name: " Not on file   • Number of children: Not on file   • Years of education: Not on file   • Highest education level: Not on file   Tobacco Use   • Smoking status: Never Smoker   • Smokeless tobacco: Never Used   Substance and Sexual Activity   • Alcohol use: No   • Drug use: No   • Sexual activity: Defer     Current Medications:  Prior to Admission medications    Medication Sig Start Date End Date Taking? Authorizing Provider   albuterol (PROVENTIL HFA;VENTOLIN HFA) 108 (90 Base) MCG/ACT inhaler Inhale 2 puffs Every 4 (Four) Hours As Needed for Wheezing. 11/27/18  Yes Gretchen Gauthier APRN   albuterol (PROVENTIL) (2.5 MG/3ML) 0.083% nebulizer solution Take 2.5 mg by nebulization Every 6 (Six) Hours As Needed for Wheezing. 11/27/18  Yes Gretchen Gauthier APRN   cetirizine (zyrTEC) 10 MG tablet Take 1 tablet by mouth Daily.  Patient taking differently: Take 10 mg by mouth As Needed. 10/3/19 10/2/20 Yes Cesar Fisher,    clonazePAM (KlonoPIN) 0.5 MG tablet Take 0.5 mg by mouth 2 (Two) Times a Day As Needed for Anxiety. 6/11/19  Yes Maryam Mustafa MD   cyclobenzaprine (FLEXERIL) 10 MG tablet Take 1 tablet by mouth 3 (Three) Times a Day As Needed for Muscle Spasms. 2/3/20  Yes Gretchen Gauthier APRN   estradiol (ESTRACE) 1 MG tablet Take 1 tablet by mouth Daily. 12/6/19  Yes Gretchen Gauthier APRN   fluconazole (DIFLUCAN) 200 MG tablet Take one at the first sign of infection and may repeat in 3 days as needed. 12/7/19  Yes Laura Patterson APRN   FLUoxetine (PROzac) 20 MG capsule Take 20 mg by mouth 2 (Two) Times a Day.   Yes Maryam Mustafa MD   gabapentin (NEURONTIN) 300 MG capsule Take 1 capsule by mouth 2 (Two) Times a Day. 2/5/20  Yes Gretchen Gauthier APRN   hydroxychloroquine (PLAQUENIL) 200 MG tablet Take 200 mg by mouth Daily.   Yes Maryam Mustafa MD   omeprazole (priLOSEC) 40 MG capsule Take 40 mg by mouth 2 (Two) Times a Day.   Yes Provider, MD Maryam   primidone (MYSOLINE) 50 MG tablet Take 1  "p.o. nightly 12/11/19  Yes Santhosh Dos Santos MD     Review of Systems  Review of Systems       Objective    /82 (BP Location: Left arm)   Pulse 82   Ht 162.6 cm (64\")   Wt 94.7 kg (208 lb 12.8 oz)   LMP 03/09/1991 (Approximate)   BMI 35.84 kg/m²   Physical Exam   Constitutional: She is oriented to person, place, and time. She appears well-developed and well-nourished. No distress.   HENT:   Head: Normocephalic and atraumatic.   Eyes: Pupils are equal, round, and reactive to light. EOM are normal.   Neck: Normal range of motion.   Cardiovascular: Normal rate, regular rhythm and normal heart sounds.   Pulmonary/Chest: Effort normal and breath sounds normal.   Abdominal: Soft. Bowel sounds are normal. She exhibits no shifting dullness, no distension, no abdominal bruit, no ascites and no mass. There is no hepatosplenomegaly. There is tenderness. There is no rigidity, no rebound, no guarding and no CVA tenderness. No hernia. Hernia confirmed negative in the ventral area.   RUQ, obese   Musculoskeletal: Normal range of motion.   Neurological: She is alert and oriented to person, place, and time.   Skin: Skin is warm and dry.   Psychiatric: She has a normal mood and affect. Her behavior is normal. Judgment and thought content normal.   Nursing note and vitals reviewed.    Assessment/Plan      1. Fatty liver    2. Elevated liver enzymes    3. Gastroesophageal reflux disease with esophagitis    4. Diverticulosis    .   Lashaun was seen today for heartburn, fatty liver and elevated hepatic enzymes.    Diagnoses and all orders for this visit:    Fatty liver  -     Comprehensive Metabolic Panel; Future  -     Protime-INR; Future    Elevated liver enzymes  -     Comprehensive Metabolic Panel; Future  -     Protime-INR; Future    Gastroesophageal reflux disease with esophagitis    Diverticulosis        Orders placed during this encounter include:  Orders Placed This Encounter   Procedures   • Comprehensive Metabolic " Panel     Standing Status:   Future     Standing Expiration Date:   8/29/2020   • Protime-INR     Standing Status:   Future     Standing Expiration Date:   8/29/2020       Medications prescribed:  No orders of the defined types were placed in this encounter.      Requested Prescriptions      No prescriptions requested or ordered in this encounter       Review and/or summary of lab tests, radiology, procedures, medications. Review and summary of old records and obtaining of history. The risks and benefits of my recommendations, as well as other treatment options were discussed with the patient today. Questions were answered.    Follow-up: Return in about 4 months (around 7/2/2020), or if symptoms worsen or fail to improve, for lab prior.     * Surgery not found *      This document has been electronically signed by Marcial Walker PA-C on March 4, 2020 7:07 PM      Results for orders placed or performed in visit on 02/21/20   Microalbumin / Creatinine Urine Ratio - Urine, Clean Catch   Result Value Ref Range    Microalbumin/Creatinine Ratio      Creatinine, Urine 93.3 mg/dL    Microalbumin, Urine <1.2 mg/dL   Renal Function Panel   Result Value Ref Range    Glucose 92 65 - 99 mg/dL    BUN 20 6 - 20 mg/dL    Creatinine 1.02 (H) 0.57 - 1.00 mg/dL    Sodium 136 136 - 145 mmol/L    Potassium 4.6 3.5 - 5.2 mmol/L    Chloride 104 98 - 107 mmol/L    CO2 16.8 (L) 22.0 - 29.0 mmol/L    Calcium 9.7 8.6 - 10.5 mg/dL    Albumin 4.10 3.50 - 5.20 g/dL    Phosphorus 3.7 2.5 - 4.5 mg/dL    Anion Gap 15.2 (H) 5.0 - 15.0 mmol/L    BUN/Creatinine Ratio 19.6 7.0 - 25.0    eGFR Non African Amer 55 (L) >60 mL/min/1.73   Results for orders placed or performed in visit on 02/21/20   CADENA Fibrosure   Result Value Ref Range    Fibrosis Score (References) 0.08 0.00 - 0.21    Fibrosis Stage (Reference) Comment     Steatosis Score (Reference) 0.66 (H) 0.00 - 0.30    Steatosis Grade (Reference) Comment     CADENA Score (Reference) 0.50 (H) 0.25     Estes Grade (Reference) Comment     Height: (Reference) 63 in    Weight: (Reference) 212 LBS    Alpha 2-Macroglobulins, Qn 163 110 - 276 mg/dL    Haptoglobin 138 33 - 346 mg/dL    Apolipoprotein A-1 149 116 - 209 mg/dL    Total Bilirubin 0.3 0.0 - 1.2 mg/dL    GGT 16 0 - 60 IU/L    ALT (SGPT) 54 (H) 0 - 40 IU/L    AST (SGOT) P5P (Reference) 33 0 - 40 IU/L    Cholesterol, Total (Reference) 232 (H) 100 - 199 mg/dL    Glucose, Serum (Reference) 91 65 - 99 mg/dL    Triglycerides 177 (H) 0 - 149 mg/dL    Interpretations: (Reference) Comment     Fibrosis Scoring: Comment     Steatosis Grading (Reference) Comment     Estes Scoring (Reference) Comment     Limitations: (Reference) Comment     Comment (Reference) Comment    Protime-INR   Result Value Ref Range    Protime 13.0 11.1 - 15.3 Seconds    INR 1.00 0.80 - 1.20   Results for orders placed or performed in visit on 01/24/20   Magnesium   Result Value Ref Range    Magnesium 2.2 1.6 - 2.6 mg/dL   Results for orders placed or performed during the hospital encounter of 01/13/20   Gold Top - SST   Result Value Ref Range    Extra Tube Hold for add-ons.    Green Top (Gel)   Result Value Ref Range    Extra Tube Hold for add-ons.    Respiratory Panel, PCR - Swab, Nasopharynx   Result Value Ref Range    ADENOVIRUS, PCR Not Detected Not Detected    Coronavirus 229E Not Detected Not Detected    Coronavirus HKU1 Not Detected Not Detected    Coronavirus NL63 Not Detected Not Detected    Coronavirus OC43 Not Detected Not Detected    Human Metapneumovirus Not Detected Not Detected    Human Rhinovirus/Enterovirus Not Detected Not Detected    Influenza B PCR Not Detected Not Detected    Parainfluenza Virus 1 Not Detected Not Detected    Parainfluenza Virus 2 Not Detected Not Detected    Parainfluenza Virus 3 Not Detected Not Detected    Parainfluenza Virus 4 Not Detected Not Detected    Bordetella pertussis pcr Not Detected Not Detected    Influenza A H1 2009 PCR Not Detected Not  Detected    Chlamydophila pneumoniae PCR Not Detected Not Detected    Mycoplasma pneumo by PCR Not Detected Not Detected    Influenza A PCR Not Detected Not Detected    Influenza A H3 Detected (A) Not Detected    Influenza A H1 Not Detected Not Detected    RSV, PCR Not Detected Not Detected   Procalcitonin   Result Value Ref Range    Procalcitonin 0.13 0.10 - 0.25 ng/mL   CBC Auto Differential   Result Value Ref Range    WBC 3.62 3.40 - 10.80 10*3/mm3    RBC 4.01 3.77 - 5.28 10*6/mm3    Hemoglobin 11.9 (L) 12.0 - 15.9 g/dL    Hematocrit 35.7 34.0 - 46.6 %    MCV 89.0 79.0 - 97.0 fL    MCH 29.7 26.6 - 33.0 pg    MCHC 33.3 31.5 - 35.7 g/dL    RDW 12.6 12.3 - 15.4 %    RDW-SD 41.7 37.0 - 54.0 fl    MPV 9.5 6.0 - 12.0 fL    Platelets 220 140 - 450 10*3/mm3   CBC Auto Differential   Result Value Ref Range    WBC 4.26 3.40 - 10.80 10*3/mm3    RBC 4.19 3.77 - 5.28 10*6/mm3    Hemoglobin 12.3 12.0 - 15.9 g/dL    Hematocrit 37.0 34.0 - 46.6 %    MCV 88.3 79.0 - 97.0 fL    MCH 29.4 26.6 - 33.0 pg    MCHC 33.2 31.5 - 35.7 g/dL    RDW 12.6 12.3 - 15.4 %    RDW-SD 40.9 37.0 - 54.0 fl    MPV 9.5 6.0 - 12.0 fL    Platelets 206 140 - 450 10*3/mm3    Neutrophil % 58.8 42.7 - 76.0 %    Lymphocyte % 26.5 19.6 - 45.3 %    Monocyte % 13.1 (H) 5.0 - 12.0 %    Eosinophil % 0.5 0.3 - 6.2 %    Basophil % 0.2 0.0 - 1.5 %    Immature Grans % 0.9 (H) 0.0 - 0.5 %    Neutrophils, Absolute 2.50 1.70 - 7.00 10*3/mm3    Lymphocytes, Absolute 1.13 0.70 - 3.10 10*3/mm3    Monocytes, Absolute 0.56 0.10 - 0.90 10*3/mm3    Eosinophils, Absolute 0.02 0.00 - 0.40 10*3/mm3    Basophils, Absolute 0.01 0.00 - 0.20 10*3/mm3    Immature Grans, Absolute 0.04 0.00 - 0.05 10*3/mm3    nRBC 0.0 0.0 - 0.2 /100 WBC     *Note: Due to a large number of results and/or encounters for the requested time period, some results have not been displayed. A complete set of results can be found in Results Review.       Some portions of this note have been dictated using voice  recognition software and may contain errors and/or omissions.

## 2020-03-06 ENCOUNTER — TELEPHONE (OUTPATIENT)
Dept: FAMILY MEDICINE CLINIC | Facility: CLINIC | Age: 60
End: 2020-03-06

## 2020-03-06 NOTE — TELEPHONE ENCOUNTER
Can you please call and apologize but let them know that information was faxed to them on February 25 of 2020.  Please resend thank you

## 2020-03-06 NOTE — TELEPHONE ENCOUNTER
Ulysses from [facility] called and said they have never received an invoice from Gretchen for phone consult from 01-.    Ulysses [phone]

## 2020-03-06 NOTE — TELEPHONE ENCOUNTER
Call and clarify.  If they are expecting me to bill for my services I am not going to do that.  It was a very simple and short phone call.

## 2020-03-11 ENCOUNTER — OFFICE VISIT (OUTPATIENT)
Dept: FAMILY MEDICINE CLINIC | Facility: CLINIC | Age: 60
End: 2020-03-11

## 2020-03-11 ENCOUNTER — LAB (OUTPATIENT)
Dept: LAB | Facility: HOSPITAL | Age: 60
End: 2020-03-11

## 2020-03-11 VITALS
SYSTOLIC BLOOD PRESSURE: 130 MMHG | BODY MASS INDEX: 35.67 KG/M2 | DIASTOLIC BLOOD PRESSURE: 82 MMHG | WEIGHT: 208.9 LBS | HEIGHT: 64 IN

## 2020-03-11 DIAGNOSIS — L03.311 CELLULITIS OF ABDOMINAL WALL: Primary | ICD-10-CM

## 2020-03-11 DIAGNOSIS — E78.00 HYPERCHOLESTEROLEMIA: ICD-10-CM

## 2020-03-11 DIAGNOSIS — R79.89 ELEVATED TSH: ICD-10-CM

## 2020-03-11 LAB
CHOLEST SERPL-MCNC: 191 MG/DL (ref 0–200)
HDLC SERPL-MCNC: 49 MG/DL (ref 40–60)
LDLC SERPL CALC-MCNC: 124 MG/DL (ref 0–100)
LDLC/HDLC SERPL: 2.53 {RATIO}
T3 SERPL-MCNC: 150 NG/DL (ref 80–200)
T4 FREE SERPL-MCNC: 1.05 NG/DL (ref 0.93–1.7)
TRIGL SERPL-MCNC: 91 MG/DL (ref 0–150)
TSH SERPL DL<=0.05 MIU/L-ACNC: 3.09 UIU/ML (ref 0.27–4.2)
VLDLC SERPL-MCNC: 18.2 MG/DL (ref 5–40)

## 2020-03-11 PROCEDURE — 80061 LIPID PANEL: CPT

## 2020-03-11 PROCEDURE — 99213 OFFICE O/P EST LOW 20 MIN: CPT | Performed by: NURSE PRACTITIONER

## 2020-03-11 PROCEDURE — 84443 ASSAY THYROID STIM HORMONE: CPT

## 2020-03-11 PROCEDURE — 84480 ASSAY TRIIODOTHYRONINE (T3): CPT

## 2020-03-11 PROCEDURE — 84439 ASSAY OF FREE THYROXINE: CPT

## 2020-03-11 RX ORDER — SULFAMETHOXAZOLE AND TRIMETHOPRIM 800; 160 MG/1; MG/1
1 TABLET ORAL 2 TIMES DAILY
Qty: 14 TABLET | Refills: 0 | Status: SHIPPED | OUTPATIENT
Start: 2020-03-11 | End: 2020-03-18

## 2020-03-11 RX ORDER — FLUOXETINE HYDROCHLORIDE 40 MG/1
CAPSULE ORAL
COMMUNITY
Start: 2020-03-04 | End: 2020-03-11 | Stop reason: DRUGHIGH

## 2020-03-11 RX ORDER — ESTRADIOL 1 MG/1
1 TABLET ORAL DAILY
Qty: 90 TABLET | Refills: 0 | Status: SHIPPED | OUTPATIENT
Start: 2020-03-11 | End: 2020-06-23 | Stop reason: SDUPTHER

## 2020-03-11 NOTE — PROGRESS NOTES
Subjective   Lashaun Bernal is a 59 y.o. female.  Had a tummy tuck 10 years ago and had complications and ended up having to see wound care.  2 days ago began to notice a red area on the right side of the scar.    Wound Infection   This is a new problem. The current episode started in the past 7 days. The problem occurs constantly. The problem has been unchanged. Pertinent negatives include no chills, diaphoresis or fatigue. Exacerbated by: Waistline of her panties and blue jeans. She has tried nothing for the symptoms. The treatment provided no relief.        The following portions of the patient's history were reviewed and updated as appropriate:     Current Outpatient Medications   Medication Sig Dispense Refill   • albuterol (PROVENTIL HFA;VENTOLIN HFA) 108 (90 Base) MCG/ACT inhaler Inhale 2 puffs Every 4 (Four) Hours As Needed for Wheezing. 18 g 2   • albuterol (PROVENTIL) (2.5 MG/3ML) 0.083% nebulizer solution Take 2.5 mg by nebulization Every 6 (Six) Hours As Needed for Wheezing. 120 vial 2   • cetirizine (zyrTEC) 10 MG tablet Take 1 tablet by mouth Daily. (Patient taking differently: Take 10 mg by mouth As Needed.) 30 tablet 0   • clonazePAM (KlonoPIN) 0.5 MG tablet Take 0.5 mg by mouth 2 (Two) Times a Day As Needed for Anxiety.     • cyclobenzaprine (FLEXERIL) 10 MG tablet Take 1 tablet by mouth 3 (Three) Times a Day As Needed for Muscle Spasms. 90 tablet 5   • estradiol (ESTRACE) 1 MG tablet Take 1 tablet by mouth Daily. 90 tablet 0   • fluconazole (DIFLUCAN) 200 MG tablet Take one at the first sign of infection and may repeat in 3 days as needed. 2 tablet 0   • FLUoxetine (PROzac) 20 MG capsule Take 20 mg by mouth 2 (Two) Times a Day.     • gabapentin (NEURONTIN) 300 MG capsule Take 1 capsule by mouth 2 (Two) Times a Day. 60 capsule 2   • hydroxychloroquine (PLAQUENIL) 200 MG tablet Take 200 mg by mouth Daily.     • omeprazole (priLOSEC) 40 MG capsule Take 40 mg by mouth 2 (Two) Times a Day.     •  primidone (MYSOLINE) 50 MG tablet Take 1 p.o. nightly 30 tablet 2   • sulfamethoxazole-trimethoprim (BACTRIM DS,SEPTRA DS) 800-160 MG per tablet Take 1 tablet by mouth 2 (Two) Times a Day for 7 days. 14 tablet 0     No current facility-administered medications for this visit.      Current Outpatient Medications on File Prior to Visit   Medication Sig   • albuterol (PROVENTIL HFA;VENTOLIN HFA) 108 (90 Base) MCG/ACT inhaler Inhale 2 puffs Every 4 (Four) Hours As Needed for Wheezing.   • albuterol (PROVENTIL) (2.5 MG/3ML) 0.083% nebulizer solution Take 2.5 mg by nebulization Every 6 (Six) Hours As Needed for Wheezing.   • cetirizine (zyrTEC) 10 MG tablet Take 1 tablet by mouth Daily. (Patient taking differently: Take 10 mg by mouth As Needed.)   • clonazePAM (KlonoPIN) 0.5 MG tablet Take 0.5 mg by mouth 2 (Two) Times a Day As Needed for Anxiety.   • cyclobenzaprine (FLEXERIL) 10 MG tablet Take 1 tablet by mouth 3 (Three) Times a Day As Needed for Muscle Spasms.   • estradiol (ESTRACE) 1 MG tablet Take 1 tablet by mouth Daily.   • fluconazole (DIFLUCAN) 200 MG tablet Take one at the first sign of infection and may repeat in 3 days as needed.   • FLUoxetine (PROzac) 20 MG capsule Take 20 mg by mouth 2 (Two) Times a Day.   • gabapentin (NEURONTIN) 300 MG capsule Take 1 capsule by mouth 2 (Two) Times a Day.   • hydroxychloroquine (PLAQUENIL) 200 MG tablet Take 200 mg by mouth Daily.   • omeprazole (priLOSEC) 40 MG capsule Take 40 mg by mouth 2 (Two) Times a Day.   • primidone (MYSOLINE) 50 MG tablet Take 1 p.o. nightly   • [DISCONTINUED] FLUoxetine (PROzac) 40 MG capsule      No current facility-administered medications on file prior to visit.      She is allergic to cefprozil and penicillins..    Review of Systems   Constitutional: Negative.  Negative for chills, diaphoresis and fatigue.   Respiratory: Negative.    Cardiovascular: Negative.    Genitourinary: Negative.    Skin: Positive for wound.   Neurological: Negative.  "       Objective    Visit Vitals  /82   Ht 162.6 cm (64\")   Wt 94.8 kg (208 lb 14.4 oz)   LMP 03/09/1991 (Approximate)   BMI 35.86 kg/m²       Physical Exam   Constitutional: She is oriented to person, place, and time. She appears well-developed and well-nourished.   Cardiovascular: Normal rate, regular rhythm and normal heart sounds.   Pulmonary/Chest: Effort normal and breath sounds normal.   Abdominal: Soft. Bowel sounds are normal.   Approximately an inch horizontal well-healed surgical incision with scant amount of erythema to the far right cheek.  No drainage, warmth or other signs of infection noted   Musculoskeletal: Normal range of motion.   Neurological: She is alert and oriented to person, place, and time.   Skin: Skin is warm. Capillary refill takes less than 2 seconds.   Psychiatric: She has a normal mood and affect. Her behavior is normal.   Nursing note and vitals reviewed.      Assessment/Plan   Problems Addressed this Visit     None      Visit Diagnoses     Cellulitis of abdominal wall    -  Primary    Relevant Medications    sulfamethoxazole-trimethoprim (BACTRIM DS,SEPTRA DS) 800-160 MG per tablet        New Medications Ordered This Visit   Medications   • sulfamethoxazole-trimethoprim (BACTRIM DS,SEPTRA DS) 800-160 MG per tablet     Sig: Take 1 tablet by mouth 2 (Two) Times a Day for 7 days.     Dispense:  14 tablet     Refill:  0     1.  Cellulitis of abdominal wall:  Begin Bactrim DS as prescribed  Educated on importance of completing full dose of antibiotic therapy  Cleanse area thoroughly daily with antibacterial soap and water and make sure area is thoroughly dried  Encouraged to leave area open to air as much as possible  Encouraged to wear loose fitting pants that set either well above or well below previous surgical incision as this does appear to be from abrading process  Encouraged to check site several times daily and if any new redness, swelling, pain, drainage, warmth or other " signs of infections occur seek emergency medical treatment    Continue on current medications as previously prescribed   I spent 10 minutes in direct face to face contact with patient.  Greater than 50% of this time was spent counseling patient and discussing plan of care.  Return if symptoms worsen or fail to improve, for Next scheduled follow up.        This document has been electronically signed by MAYELA Flores on March 11, 2020 10:20

## 2020-03-12 ENCOUNTER — TELEPHONE (OUTPATIENT)
Dept: FAMILY MEDICINE CLINIC | Facility: CLINIC | Age: 60
End: 2020-03-12

## 2020-03-12 NOTE — TELEPHONE ENCOUNTER
Per MAYELA Terry, Ms. Bernal has been called with recent lab results & recommendations.  Continue current medications and follow-up as planned or sooner if any problems.      ----- Message from MAYELA Flores sent at 3/12/2020  7:30 AM CDT -----  Labs normal, please call or send card!

## 2020-04-27 ENCOUNTER — TELEMEDICINE (OUTPATIENT)
Dept: NEUROLOGY | Facility: CLINIC | Age: 60
End: 2020-04-27

## 2020-04-27 VITALS — BODY MASS INDEX: 35.51 KG/M2 | WEIGHT: 208 LBS | HEIGHT: 64 IN

## 2020-04-27 DIAGNOSIS — R20.0 FACIAL NUMBNESS: ICD-10-CM

## 2020-04-27 DIAGNOSIS — R25.1 TREMOR: Primary | ICD-10-CM

## 2020-04-27 PROCEDURE — 99442 PR PHYS/QHP TELEPHONE EVALUATION 11-20 MIN: CPT | Performed by: PSYCHIATRY & NEUROLOGY

## 2020-04-27 RX ORDER — PRIMIDONE 50 MG/1
TABLET ORAL
Qty: 30 TABLET | Refills: 2 | Status: SHIPPED | OUTPATIENT
Start: 2020-04-27 | End: 2020-10-01

## 2020-04-27 RX ORDER — CLOPIDOGREL BISULFATE 75 MG/1
75 TABLET ORAL DAILY
Status: ON HOLD | COMMUNITY
End: 2022-12-23

## 2020-04-27 RX ORDER — ASPIRIN 81 MG/1
81 TABLET ORAL DAILY
Status: ON HOLD | COMMUNITY
End: 2022-12-23 | Stop reason: SDUPTHER

## 2020-04-27 NOTE — PROGRESS NOTES
I did 15-minute telemedicine video visit with face time.  Patient's tremor is improved with the primidone 50 mg nightly and she is tolerating it without significant side effects.  She does not drive much but what driving she does has not been more problematic.  Again driving precautions and safety precautions reviewed in detail.  Patient not to be climbing or using sharp cutting tools or working over hot fire/stove/water/grill.  Patient has had no episodes of facial numbness.  Patient's carotids are being followed by Dr. Mike in [place].  Patient has had some SVT and chest pain and now on Plavix and further evaluation treatment of that has been done by cardiology.  Patient has had a few hot flashes but not problematic.  I reviewed her tremor on the video and that is stable.  Slight intentional tremor seen.  Final diagnosis is tremor.  Patient also on clonazepam per another physician.  Facial numbness is not significant at this time.  Patient cervical spondylosis stable

## 2020-05-27 ENCOUNTER — OFFICE VISIT (OUTPATIENT)
Dept: FAMILY MEDICINE CLINIC | Facility: CLINIC | Age: 60
End: 2020-05-27

## 2020-05-27 VITALS
BODY MASS INDEX: 37.49 KG/M2 | HEIGHT: 64 IN | SYSTOLIC BLOOD PRESSURE: 128 MMHG | WEIGHT: 219.6 LBS | DIASTOLIC BLOOD PRESSURE: 72 MMHG

## 2020-05-27 DIAGNOSIS — G54.0 THORACIC OUTLET SYNDROME: ICD-10-CM

## 2020-05-27 DIAGNOSIS — I10 BENIGN ESSENTIAL HYPERTENSION: Primary | Chronic | ICD-10-CM

## 2020-05-27 DIAGNOSIS — M79.7 FIBROMYALGIA: ICD-10-CM

## 2020-05-27 DIAGNOSIS — E78.5 HYPERLIPIDEMIA, UNSPECIFIED HYPERLIPIDEMIA TYPE: ICD-10-CM

## 2020-05-27 DIAGNOSIS — L98.9 SKIN LESION: ICD-10-CM

## 2020-05-27 PROCEDURE — 99214 OFFICE O/P EST MOD 30 MIN: CPT | Performed by: NURSE PRACTITIONER

## 2020-05-27 RX ORDER — FLUOXETINE HYDROCHLORIDE 40 MG/1
CAPSULE ORAL
COMMUNITY
Start: 2020-05-14 | End: 2020-05-27 | Stop reason: DRUGHIGH

## 2020-05-27 RX ORDER — GABAPENTIN 300 MG/1
300 CAPSULE ORAL 2 TIMES DAILY
Qty: 60 CAPSULE | Refills: 2 | Status: SHIPPED | OUTPATIENT
Start: 2020-05-27 | End: 2020-08-27 | Stop reason: SDUPTHER

## 2020-05-27 NOTE — PROGRESS NOTES
"Kae Bernal is a 59 y.o. female.  3-month follow-up for hypertension, high cholesterol, fibromyalgia.    Was recently granted permanent disability due to chronic pain from fibromyalgia and thoracic outlet syndrome..  On Neurontin \"and it does help.\"  Has 2 skin lesions that she would like to have evaluated by dermatology.  One is on my left cheek and the other ones on my left leg.  I think they need to be frozen off.\"    Fibromyalgia   This is a chronic problem. The current episode started more than 1 year ago. The problem occurs constantly. The problem has been gradually improving. Associated symptoms include arthralgias and myalgias. Pertinent negatives include no chills, diaphoresis, fatigue, fever or sore throat. Nothing aggravates the symptoms. Treatments tried: Cymbalta. The treatment provided mild relief.   Hypertension   This is a chronic problem. The current episode started more than 1 year ago. The problem is unchanged. The problem is controlled. Associated symptoms include peripheral edema. Risk factors for coronary artery disease include dyslipidemia, family history, obesity, post-menopausal state, sedentary lifestyle and stress. Current antihypertension treatment includes lifestyle changes. Compliance problems include exercise and diet.    Hyperlipidemia   This is a chronic problem. The current episode started more than 1 year ago. Recent lipid tests were reviewed and are normal. Factors aggravating her hyperlipidemia include fatty foods. Associated symptoms include myalgias. Current antihyperlipidemic treatment includes diet change. The current treatment provides mild improvement of lipids. Compliance problems include adherence to exercise and adherence to diet.         The following portions of the patient's history were reviewed and updated as appropriate:   Current Outpatient Medications   Medication Sig Dispense Refill   • albuterol (PROVENTIL HFA;VENTOLIN HFA) 108 (90 Base) " MCG/ACT inhaler Inhale 2 puffs Every 4 (Four) Hours As Needed for Wheezing. 18 g 2   • albuterol (PROVENTIL) (2.5 MG/3ML) 0.083% nebulizer solution Take 2.5 mg by nebulization Every 6 (Six) Hours As Needed for Wheezing. 120 vial 2   • aspirin 81 MG EC tablet Take 81 mg by mouth Daily.     • cetirizine (zyrTEC) 10 MG tablet Take 1 tablet by mouth Daily. (Patient taking differently: Take 10 mg by mouth As Needed.) 30 tablet 0   • clonazePAM (KlonoPIN) 0.5 MG tablet Take 0.5 mg by mouth 2 (Two) Times a Day As Needed for Anxiety.     • clopidogrel (PLAVIX) 75 MG tablet Take 75 mg by mouth Daily.     • cyclobenzaprine (FLEXERIL) 10 MG tablet Take 1 tablet by mouth 3 (Three) Times a Day As Needed for Muscle Spasms. 90 tablet 5   • dilTIAZem (CARDIZEM) 30 MG tablet Take 30 mg by mouth 2 (Two) Times a Day.     • estradiol (ESTRACE) 1 MG tablet TAKE 1 TABLET BY MOUTH DAILY. 90 tablet 0   • fluconazole (DIFLUCAN) 200 MG tablet Take one at the first sign of infection and may repeat in 3 days as needed. 2 tablet 0   • FLUoxetine (PROzac) 20 MG capsule Take 20 mg by mouth 2 (Two) Times a Day.     • gabapentin (NEURONTIN) 300 MG capsule Take 1 capsule by mouth 2 (Two) Times a Day. 60 capsule 2   • hydroxychloroquine (PLAQUENIL) 200 MG tablet Take 200 mg by mouth Daily.     • magnesium oxide (MAGOX) 400 (241.3 Mg) MG tablet tablet Take 400 mg by mouth 2 (Two) Times a Day.     • metoprolol tartrate (LOPRESSOR) 25 MG tablet Take 12.5 mg by mouth 2 (Two) Times a Day.     • omeprazole (priLOSEC) 40 MG capsule Take 40 mg by mouth 2 (Two) Times a Day.     • primidone (MYSOLINE) 50 MG tablet Take 1 p.o. nightly 30 tablet 2     No current facility-administered medications for this visit.      Current Outpatient Medications on File Prior to Visit   Medication Sig   • albuterol (PROVENTIL HFA;VENTOLIN HFA) 108 (90 Base) MCG/ACT inhaler Inhale 2 puffs Every 4 (Four) Hours As Needed for Wheezing.   • albuterol (PROVENTIL) (2.5 MG/3ML) 0.083%  nebulizer solution Take 2.5 mg by nebulization Every 6 (Six) Hours As Needed for Wheezing.   • aspirin 81 MG EC tablet Take 81 mg by mouth Daily.   • cetirizine (zyrTEC) 10 MG tablet Take 1 tablet by mouth Daily. (Patient taking differently: Take 10 mg by mouth As Needed.)   • clonazePAM (KlonoPIN) 0.5 MG tablet Take 0.5 mg by mouth 2 (Two) Times a Day As Needed for Anxiety.   • clopidogrel (PLAVIX) 75 MG tablet Take 75 mg by mouth Daily.   • cyclobenzaprine (FLEXERIL) 10 MG tablet Take 1 tablet by mouth 3 (Three) Times a Day As Needed for Muscle Spasms.   • dilTIAZem (CARDIZEM) 30 MG tablet Take 30 mg by mouth 2 (Two) Times a Day.   • estradiol (ESTRACE) 1 MG tablet TAKE 1 TABLET BY MOUTH DAILY.   • fluconazole (DIFLUCAN) 200 MG tablet Take one at the first sign of infection and may repeat in 3 days as needed.   • FLUoxetine (PROzac) 20 MG capsule Take 20 mg by mouth 2 (Two) Times a Day.   • hydroxychloroquine (PLAQUENIL) 200 MG tablet Take 200 mg by mouth Daily.   • magnesium oxide (MAGOX) 400 (241.3 Mg) MG tablet tablet Take 400 mg by mouth 2 (Two) Times a Day.   • metoprolol tartrate (LOPRESSOR) 25 MG tablet Take 12.5 mg by mouth 2 (Two) Times a Day.   • omeprazole (priLOSEC) 40 MG capsule Take 40 mg by mouth 2 (Two) Times a Day.   • primidone (MYSOLINE) 50 MG tablet Take 1 p.o. nightly   • [DISCONTINUED] gabapentin (NEURONTIN) 300 MG capsule Take 1 capsule by mouth 2 (Two) Times a Day.   • [DISCONTINUED] FLUoxetine (PROzac) 40 MG capsule TAKE 1 CAPSULE BY MOUTH ONCE A DAY AS DIRECTED     No current facility-administered medications on file prior to visit.      She is allergic to cefprozil and penicillins..    Review of Systems   Constitutional: Negative.  Negative for chills, diaphoresis, fatigue and fever.   HENT: Negative.  Negative for sore throat.    Respiratory: Negative.    Cardiovascular: Negative.    Gastrointestinal: Negative.    Genitourinary: Negative.    Musculoskeletal: Positive for arthralgias and  "myalgias.   Skin: Negative.    Neurological: Negative.    Psychiatric/Behavioral: Negative.  Negative for confusion.       Objective    Visit Vitals  /72   Ht 162.6 cm (64\")   Wt 99.6 kg (219 lb 9.6 oz)   LMP 03/09/1991 (Approximate)   BMI 37.69 kg/m²       Physical Exam   Constitutional: She is oriented to person, place, and time. She appears well-developed and well-nourished.   HENT:   Head: Normocephalic.       Right Ear: External ear normal.   Left Ear: External ear normal.   Eyes: Pupils are equal, round, and reactive to light. EOM are normal.   Neck: Normal range of motion. Neck supple.   Cardiovascular: Normal rate, regular rhythm and normal heart sounds.   Pulmonary/Chest: Effort normal and breath sounds normal.   Abdominal: Soft. Bowel sounds are normal.   Musculoskeletal: Normal range of motion.   Neurological: She is alert and oriented to person, place, and time.   Skin: Skin is warm. Capillary refill takes less than 2 seconds.        Psychiatric: She has a normal mood and affect. Her behavior is normal.   Nursing note and vitals reviewed.      Assessment/Plan   Problems Addressed this Visit        Cardiovascular and Mediastinum    Benign essential hypertension - Primary (Chronic)    Mixed hyperlipidemia       Nervous and Auditory    Fibromyalgia      Other Visit Diagnoses     Thoracic outlet syndrome        Relevant Medications    gabapentin (NEURONTIN) 300 MG capsule    Skin lesion        Relevant Orders    Ambulatory Referral to Dermatology (Completed)        New Medications Ordered This Visit   Medications   • gabapentin (NEURONTIN) 300 MG capsule     Sig: Take 1 capsule by mouth 2 (Two) Times a Day.     Dispense:  60 capsule     Refill:  2     1.  Benign essential hypertension:  Continue on Cardizem and Lopressor as previously prescribed  Continue on reduce sodium intake and with in 2000 mg/day  Encourage use of salt substitute such as Mrs. Singh  Discussed ways to avoid high sodium content " foods such as processed meats, canned soups and vegetables    2.  Hyperlipidemia, unspecified type:  Encouraged to begin low-fat diet  Discussed ways to reduce fat diet such as choosing lean meats like chicken and fish as opposed to fatty red meat  Encouraged air frying and baking foods as opposed to pan frying or deep frying them    3.  Fibromyalgia:  Encouraged warm water soaks for muscle relaxation  Continue on Flexeril as previously prescribed    4.  Thoracic outlet syndrome:  Continue on Neurontin as previously prescribed and refill prescription sent to pharmacy  Reeducated on possible side effects of this medication including not limited to increased risk for drowsiness, sedation, peripheral edema and nystagmus  Encouraged to continue with lifting directions  Continue follow-up with neurosurgeon as scheduled    5.  Skin lesion:  Referral placed to Dr. flowers with dermatology will call to schedule appointment  Educated on importance of wearing sunscreen when outdoors as well as longsleeve clothing and pants and widebrimmed hats    Continue on current medications as previously prescribed   I spent 28minutes in direct face to face contact with patient.  Greater than 50% of this time was spent counseling patient and discussing plan of care.  Return in about 3 months (around 8/27/2020).        This document has been electronically signed by MAYELA Flores on May 27, 2020 15:03

## 2020-06-02 DIAGNOSIS — Z12.31 SCREENING MAMMOGRAM FOR HIGH-RISK PATIENT: Primary | ICD-10-CM

## 2020-06-22 NOTE — PATIENT INSTRUCTIONS
Patient not to drive for 3 days after starting the primidone and go to the emergency room or contact us if any significant side effects.  Patient to get with PCP about weight and diet control.  Patient has safety precautions of being careful about climbing or working over hot fire/stove/water or working with sharp cutting tools   none

## 2020-06-23 DIAGNOSIS — M79.7 FIBROMYALGIA: ICD-10-CM

## 2020-06-23 DIAGNOSIS — M79.10 MUSCLE PAIN: ICD-10-CM

## 2020-06-23 RX ORDER — CYCLOBENZAPRINE HCL 10 MG
10 TABLET ORAL 3 TIMES DAILY PRN
Qty: 270 TABLET | Refills: 1 | Status: SHIPPED | OUTPATIENT
Start: 2020-06-23 | End: 2020-11-02

## 2020-06-23 RX ORDER — ESTRADIOL 1 MG/1
1 TABLET ORAL DAILY
Qty: 90 TABLET | Refills: 2 | Status: SHIPPED | OUTPATIENT
Start: 2020-06-23 | End: 2021-01-06

## 2020-06-26 ENCOUNTER — OFFICE VISIT (OUTPATIENT)
Dept: SURGERY | Facility: CLINIC | Age: 60
End: 2020-06-26

## 2020-06-26 VITALS
HEART RATE: 87 BPM | SYSTOLIC BLOOD PRESSURE: 151 MMHG | DIASTOLIC BLOOD PRESSURE: 80 MMHG | BODY MASS INDEX: 37.49 KG/M2 | HEIGHT: 64 IN | WEIGHT: 219.6 LBS

## 2020-06-26 DIAGNOSIS — Z87.898 HISTORY OF ABNORMAL MAMMOGRAM: Primary | Chronic | ICD-10-CM

## 2020-06-26 PROCEDURE — 99212 OFFICE O/P EST SF 10 MIN: CPT | Performed by: SURGERY

## 2020-06-26 NOTE — PATIENT INSTRUCTIONS

## 2020-06-29 ENCOUNTER — LAB (OUTPATIENT)
Dept: LAB | Facility: HOSPITAL | Age: 60
End: 2020-06-29

## 2020-06-29 DIAGNOSIS — R94.4 ABNORMAL RESULTS OF KIDNEY FUNCTION STUDIES: ICD-10-CM

## 2020-06-29 DIAGNOSIS — K76.0 FATTY LIVER: ICD-10-CM

## 2020-06-29 DIAGNOSIS — R74.8 ELEVATED LIVER ENZYMES: ICD-10-CM

## 2020-06-29 LAB
ALBUMIN SERPL-MCNC: 4 G/DL (ref 3.5–5.2)
ALBUMIN UR-MCNC: <1.2 MG/DL
ALBUMIN/GLOB SERPL: 1.4 G/DL
ALP SERPL-CCNC: 73 U/L (ref 39–117)
ALT SERPL W P-5'-P-CCNC: 42 U/L (ref 1–33)
ANION GAP SERPL CALCULATED.3IONS-SCNC: 14 MMOL/L (ref 5–15)
AST SERPL-CCNC: 31 U/L (ref 1–32)
BILIRUB SERPL-MCNC: 0.3 MG/DL (ref 0.2–1.2)
BUN SERPL-MCNC: 23 MG/DL (ref 6–20)
BUN/CREAT SERPL: 22.3 (ref 7–25)
CALCIUM SPEC-SCNC: 9.6 MG/DL (ref 8.6–10.5)
CHLORIDE SERPL-SCNC: 103 MMOL/L (ref 98–107)
CO2 SERPL-SCNC: 20 MMOL/L (ref 22–29)
CREAT SERPL-MCNC: 1.03 MG/DL (ref 0.57–1)
CREAT UR-MCNC: 168.5 MG/DL
GFR SERPL CREATININE-BSD FRML MDRD: 55 ML/MIN/1.73
GLOBULIN UR ELPH-MCNC: 2.9 GM/DL
GLUCOSE SERPL-MCNC: 110 MG/DL (ref 65–99)
INR PPP: 0.91 (ref 0.8–1.2)
MICROALBUMIN/CREAT UR: NORMAL MG/G{CREAT}
PHOSPHATE SERPL-MCNC: 3.6 MG/DL (ref 2.5–4.5)
POTASSIUM SERPL-SCNC: 4.3 MMOL/L (ref 3.5–5.2)
PROT SERPL-MCNC: 6.9 G/DL (ref 6–8.5)
PROTHROMBIN TIME: 12.1 SECONDS (ref 11.1–15.3)
SODIUM SERPL-SCNC: 137 MMOL/L (ref 136–145)

## 2020-06-29 PROCEDURE — 36415 COLL VENOUS BLD VENIPUNCTURE: CPT

## 2020-06-29 PROCEDURE — 80053 COMPREHEN METABOLIC PANEL: CPT

## 2020-06-29 PROCEDURE — 82043 UR ALBUMIN QUANTITATIVE: CPT

## 2020-06-29 PROCEDURE — 82570 ASSAY OF URINE CREATININE: CPT

## 2020-06-29 PROCEDURE — 84100 ASSAY OF PHOSPHORUS: CPT

## 2020-06-29 PROCEDURE — 85610 PROTHROMBIN TIME: CPT

## 2020-07-02 ENCOUNTER — OFFICE VISIT (OUTPATIENT)
Dept: GASTROENTEROLOGY | Facility: CLINIC | Age: 60
End: 2020-07-02

## 2020-07-02 VITALS
SYSTOLIC BLOOD PRESSURE: 132 MMHG | DIASTOLIC BLOOD PRESSURE: 70 MMHG | BODY MASS INDEX: 39.05 KG/M2 | HEIGHT: 63 IN | WEIGHT: 220.4 LBS | HEART RATE: 80 BPM

## 2020-07-02 DIAGNOSIS — Z12.31 SCREENING MAMMOGRAM FOR HIGH-RISK PATIENT: Primary | ICD-10-CM

## 2020-07-02 DIAGNOSIS — K57.90 DIVERTICULOSIS: ICD-10-CM

## 2020-07-02 DIAGNOSIS — K76.0 FATTY LIVER: Primary | ICD-10-CM

## 2020-07-02 DIAGNOSIS — K21.00 GASTROESOPHAGEAL REFLUX DISEASE WITH ESOPHAGITIS: ICD-10-CM

## 2020-07-02 DIAGNOSIS — R74.8 ELEVATED LIVER ENZYMES: ICD-10-CM

## 2020-07-02 PROCEDURE — 99213 OFFICE O/P EST LOW 20 MIN: CPT | Performed by: PHYSICIAN ASSISTANT

## 2020-07-02 NOTE — PROGRESS NOTES
Chief Complaint   Patient presents with   • Fatty Liver   • Heartburn   • Elevated Hepatic Enzymes       ENDO PROCEDURE ORDERED:    Subjective    Lashaun Bernal is a 59 y.o. female. she is here today for follow-up.    History of Present Illness    The patient was seen on a recheck of her GERD, fatty liver, elevated liver enzymes, gallstones, F0/S2/N1.  Last seen 03/02/2020.  Patient states her GERD does fairly well on the Prilosec 40 mg daily unless she eats late.  She denied nausea, vomiting, dysphagia.  Bowels are moving without blood or mucus.  Weight is up 11.5 pounds since last visit.  Last colonoscopy 02/24/2020 showed diverticulosis.  She does have family history of colon cancer.    Laboratories on 03/11/2020 showed normal TSH, T3, T4, cholesterol panel showed an LDL of 124.    Laboratories 06/29/2020 showed normal phosphorus, INR 0.91.  CMP showed glucose 110, BUN 23, creatinine 1.03, CO2 of 20, ALT 42, GFR 55, otherwise normal.    ASSESSMENT/PLAN:  Patient with significant steatohepatitis with mild elevation increase in enzymes.  Encouraged significant dietary modification and weight loss.  We will continue on the Prilosec.  We will plan follow up in 4 months with CADENA Fibrosure, INR prior.  Further pending clinical course and the results of the above.       The following portions of the patient's history were reviewed and updated as appropriate:   Past Medical History:   Diagnosis Date   • Allergic rhinitis    • Arthritis    • Asthma    • Breast lump    • C. difficile diarrhea 2013   • Carotid artery disease (CMS/HCC)    • Chest pain    • Chronic low back pain    • Constipation    • Depressive disorder    • Diarrhea    • Diverticular disease of colon    • Epigastric pain    • DIETER (generalized anxiety disorder)    • GERD (gastroesophageal reflux disease)    • Head ache    • Hematochezia    • Herpes zoster     mid back, near spine   • Hypertension    • Periumbilical pain    • PONV (postoperative nausea and  "vomiting)    • [place] Mountain spotted fever     pt states \"currently has it\"   • Sleep apnea     using c-pap   • SVT (supraventricular tachycardia) (CMS/[facility])    • Tachycardia     spent 4 days in [facility] in January for this.      Past Surgical History:   Procedure Laterality Date   • ABDOMINOPLASTY  2004   • AUGMENTATION MAMMAPLASTY     • BREAST AUGMENTATION BILATERAL MASTOPEXY     • BUNIONECTOMY Left 3/20/2017    Procedure: EXCISION OF CALCANEAL SPURS LEFT FOOT AND REATTACHMENT OF ACHILLES TENDON ;  Surgeon: Jarrell Mar MD;  Location: [facility] OR;  Service:    • COLONOSCOPY  2015   • COLONOSCOPY N/A 2020    Procedure: COLONOSCOPY;  Surgeon: Santhosh Patrick MD;  Location: [facility] ENDOSCOPY;  Service: Gastroenterology;  Laterality: N/A;   • CYST REMOVAL  1961    Excision of sebaceous cyst. Left ear   • FINGER/THUMB LESION/CYST EXCISION  10/29/2014   • HAND SURGERY  2013   • HYSTERECTOMY     • OOPHORECTOMY     • ORIF ULNA/RADIUS FRACTURES  2013   • TUBAL ABDOMINAL LIGATION  1985    Laparoscopic tubal sterilization; dilatation and curettage. Desires tubal ligation;       Family History   Problem Relation Age of Onset   • Diabetes Other    • Heart disease Other    • Hypertension Other    • Heart disease Mother         Myocardial infarct   • Hypertension Mother    • Diabetes Mother    • Heart disease Father         CHF     OB History        2    Para   2    Term   2            AB        Living           SAB        TAB        Ectopic        Molar        Multiple        Live Births                  Allergies   Allergen Reactions   • Cefprozil Nausea And Vomiting   • Penicillins Rash     Social History     Socioeconomic History   • Marital status:      Spouse name: Not on file   • Number of children: Not on file   • Years of education: Not on file   • Highest education level: Not on file   Tobacco Use   • Smoking status: Never Smoker   • " Smokeless tobacco: Never Used   Substance and Sexual Activity   • Alcohol use: No   • Drug use: No   • Sexual activity: Defer     Current Medications:  Prior to Admission medications    Medication Sig Start Date End Date Taking? Authorizing Provider   albuterol (PROVENTIL HFA;VENTOLIN HFA) 108 (90 Base) MCG/ACT inhaler Inhale 2 puffs Every 4 (Four) Hours As Needed for Wheezing. 11/27/18  Yes Gretchen Gauthier APRN   albuterol (PROVENTIL) (2.5 MG/3ML) 0.083% nebulizer solution Take 2.5 mg by nebulization Every 6 (Six) Hours As Needed for Wheezing. 11/27/18  Yes Gretchen Gauthier APRN   aspirin 81 MG EC tablet Take 81 mg by mouth Daily.   Yes ProviderMaryam MD   cetirizine (zyrTEC) 10 MG tablet Take 1 tablet by mouth Daily.  Patient taking differently: Take 10 mg by mouth As Needed. 10/3/19 10/2/20 Yes Cesar Fisher DO   clonazePAM (KlonoPIN) 0.5 MG tablet Take 0.5 mg by mouth 2 (Two) Times a Day As Needed for Anxiety. 6/11/19  Yes ProviderMaryam MD   clopidogrel (PLAVIX) 75 MG tablet Take 75 mg by mouth Daily.   Yes Provider, MD Maryam   cyclobenzaprine (FLEXERIL) 10 MG tablet Take 1 tablet by mouth 3 (Three) Times a Day As Needed for Muscle Spasms. 6/23/20  Yes Gretchen Gauthier APRN   dilTIAZem (CARDIZEM) 30 MG tablet Take 30 mg by mouth 2 (Two) Times a Day.   Yes ProviderMaryam MD   estradiol (ESTRACE) 1 MG tablet Take 1 tablet by mouth Daily. 6/23/20  Yes Gretchen Gauthier APRN   fluconazole (DIFLUCAN) 200 MG tablet Take one at the first sign of infection and may repeat in 3 days as needed. 12/7/19  Yes Laura Patterson APRN   FLUoxetine (PROzac) 20 MG capsule Take 20 mg by mouth 2 (Two) Times a Day.   Yes ProviderMaryam MD   gabapentin (NEURONTIN) 300 MG capsule Take 1 capsule by mouth 2 (Two) Times a Day. 5/27/20  Yes Gretchen Gauthier APRN   hydroxychloroquine (PLAQUENIL) 200 MG tablet Take 200 mg by mouth Daily.   Yes Provider, MD Maryam   magnesium oxide (MAGOX) 400 (241.3 Mg)  "MG tablet tablet Take 400 mg by mouth 2 (Two) Times a Day. 5/13/20  Yes Provider, MD Maryam   metoprolol tartrate (LOPRESSOR) 25 MG tablet Take 12.5 mg by mouth 2 (Two) Times a Day.   Yes Provider, MD Maryam   omeprazole (priLOSEC) 40 MG capsule Take 40 mg by mouth 2 (Two) Times a Day.   Yes ProviderMaryam MD   primidone (MYSOLINE) 50 MG tablet Take 1 p.o. nightly 4/27/20  Yes Santhosh Dos Santos MD     Review of Systems  Review of Systems       Objective    /70 (BP Location: Left arm)   Pulse 80   Ht 160 cm (63\")   Wt 100 kg (220 lb 6.4 oz)   LMP 03/09/1991 (Approximate)   BMI 39.04 kg/m²   Physical Exam   Constitutional: She is oriented to person, place, and time. She appears well-developed and well-nourished. No distress.   HENT:   Head: Normocephalic and atraumatic.   Eyes: Pupils are equal, round, and reactive to light. EOM are normal.   Neck: Normal range of motion.   Cardiovascular: Normal rate, regular rhythm and normal heart sounds.   Pulmonary/Chest: Effort normal and breath sounds normal.   Abdominal: Soft. Bowel sounds are normal. She exhibits no shifting dullness, no distension, no abdominal bruit, no ascites and no mass. There is no hepatosplenomegaly. There is tenderness. There is no rigidity, no rebound, no guarding and no CVA tenderness. No hernia. Hernia confirmed negative in the ventral area.   RUQ, obese   Musculoskeletal: Normal range of motion.   Neurological: She is alert and oriented to person, place, and time.   Skin: Skin is warm and dry.   Psychiatric: She has a normal mood and affect. Her behavior is normal. Judgment and thought content normal.   Nursing note and vitals reviewed.    Assessment/Plan      1. Fatty liver    2. Elevated liver enzymes    3. Gastroesophageal reflux disease with esophagitis    4. Diverticulosis    .   Lashaun was seen today for fatty liver, heartburn and elevated hepatic enzymes.    Diagnoses and all orders for this visit:    Fatty " liver  -     CADENA Fibrosure; Future  -     Protime-INR; Future    Elevated liver enzymes  -     CADENA Fibrosure; Future  -     Protime-INR; Future    Gastroesophageal reflux disease with esophagitis    Diverticulosis        Orders placed during this encounter include:  Orders Placed This Encounter   Procedures   • CADENA Fibrosure     Standing Status:   Future     Standing Expiration Date:   12/29/2020   • Protime-INR     Standing Status:   Future     Standing Expiration Date:   12/29/2020       Medications prescribed:  No orders of the defined types were placed in this encounter.      Requested Prescriptions      No prescriptions requested or ordered in this encounter       Review and/or summary of lab tests, radiology, procedures, medications. Review and summary of old records and obtaining of history. The risks and benefits of my recommendations, as well as other treatment options were discussed with the patient today. Questions were answered.    Follow-up: Return in about 4 months (around 11/2/2020), or if symptoms worsen or fail to improve, for lab prior.     * Surgery not found *      This document has been electronically signed by Marcial Walker PA-C on July 8, 2020 18:13      Results for orders placed or performed in visit on 06/29/20   Microalbumin / Creatinine Urine Ratio - Urine, Clean Catch   Result Value Ref Range    Microalbumin/Creatinine Ratio      Creatinine, Urine 168.5 mg/dL    Microalbumin, Urine <1.2 mg/dL   Protime-INR   Result Value Ref Range    Protime 12.1 11.1 - 15.3 Seconds    INR 0.91 0.80 - 1.20   Phosphorus   Result Value Ref Range    Phosphorus 3.6 2.5 - 4.5 mg/dL   Comprehensive Metabolic Panel   Result Value Ref Range    Glucose 110 (H) 65 - 99 mg/dL    BUN 23 (H) 6 - 20 mg/dL    Creatinine 1.03 (H) 0.57 - 1.00 mg/dL    Sodium 137 136 - 145 mmol/L    Potassium 4.3 3.5 - 5.2 mmol/L    Chloride 103 98 - 107 mmol/L    CO2 20.0 (L) 22.0 - 29.0 mmol/L    Calcium 9.6 8.6 - 10.5 mg/dL     Total Protein 6.9 6.0 - 8.5 g/dL    Albumin 4.00 3.50 - 5.20 g/dL    ALT (SGPT) 42 (H) 1 - 33 U/L    AST (SGOT) 31 1 - 32 U/L    Alkaline Phosphatase 73 39 - 117 U/L    Total Bilirubin 0.3 0.2 - 1.2 mg/dL    eGFR Non African Amer 55 (L) >60 mL/min/1.73    Globulin 2.9 gm/dL    A/G Ratio 1.4 g/dL    BUN/Creatinine Ratio 22.3 7.0 - 25.0    Anion Gap 14.0 5.0 - 15.0 mmol/L   Results for orders placed or performed in visit on 03/11/20   T3   Result Value Ref Range    T3, Total 150.0 80.0 - 200.0 ng/dl   TSH   Result Value Ref Range    TSH 3.090 0.270 - 4.200 uIU/mL   T4, free   Result Value Ref Range    Free T4 1.05 0.93 - 1.70 ng/dL   Lipid panel   Result Value Ref Range    Total Cholesterol 191 0 - 200 mg/dL    Triglycerides 91 0 - 150 mg/dL    HDL Cholesterol 49 40 - 60 mg/dL    LDL Cholesterol  124 (H) 0 - 100 mg/dL    VLDL Cholesterol 18.2 5 - 40 mg/dL    LDL/HDL Ratio 2.53    Results for orders placed or performed in visit on 02/21/20   Microalbumin / Creatinine Urine Ratio - Urine, Clean Catch   Result Value Ref Range    Microalbumin/Creatinine Ratio      Creatinine, Urine 93.3 mg/dL    Microalbumin, Urine <1.2 mg/dL   Renal Function Panel   Result Value Ref Range    Glucose 92 65 - 99 mg/dL    BUN 20 6 - 20 mg/dL    Creatinine 1.02 (H) 0.57 - 1.00 mg/dL    Sodium 136 136 - 145 mmol/L    Potassium 4.6 3.5 - 5.2 mmol/L    Chloride 104 98 - 107 mmol/L    CO2 16.8 (L) 22.0 - 29.0 mmol/L    Calcium 9.7 8.6 - 10.5 mg/dL    Albumin 4.10 3.50 - 5.20 g/dL    Phosphorus 3.7 2.5 - 4.5 mg/dL    Anion Gap 15.2 (H) 5.0 - 15.0 mmol/L    BUN/Creatinine Ratio 19.6 7.0 - 25.0    eGFR Non African Amer 55 (L) >60 mL/min/1.73   Results for orders placed or performed in visit on 02/21/20   CADENA Fibrosure   Result Value Ref Range    Fibrosis Score (References) 0.08 0.00 - 0.21    Fibrosis Stage (Reference) Comment     Steatosis Score (Reference) 0.66 (H) 0.00 - 0.30    Steatosis Grade (Reference) Comment     CADENA Score (Reference)  0.50 (H) 0.25    Estes Grade (Reference) Comment     Height: (Reference) 63 in    Weight: (Reference) 212 LBS    Alpha 2-Macroglobulins, Qn 163 110 - 276 mg/dL    Haptoglobin 138 33 - 346 mg/dL    Apolipoprotein A-1 149 116 - 209 mg/dL    Total Bilirubin 0.3 0.0 - 1.2 mg/dL    GGT 16 0 - 60 IU/L    ALT (SGPT) 54 (H) 0 - 40 IU/L    AST (SGOT) P5P (Reference) 33 0 - 40 IU/L    Cholesterol, Total (Reference) 232 (H) 100 - 199 mg/dL    Glucose, Serum (Reference) 91 65 - 99 mg/dL    Triglycerides 177 (H) 0 - 149 mg/dL    Interpretations: (Reference) Comment     Fibrosis Scoring: Comment     Steatosis Grading (Reference) Comment     Estes Scoring (Reference) Comment     Limitations: (Reference) Comment     Comment (Reference) Comment    Protime-INR   Result Value Ref Range    Protime 13.0 11.1 - 15.3 Seconds    INR 1.00 0.80 - 1.20   Results for orders placed or performed in visit on 01/24/20   Magnesium   Result Value Ref Range    Magnesium 2.2 1.6 - 2.6 mg/dL   Results for orders placed or performed during the hospital encounter of 01/13/20   Gold Top - SST   Result Value Ref Range    Extra Tube Hold for add-ons.    Green Top (Gel)   Result Value Ref Range    Extra Tube Hold for add-ons.    Respiratory Panel, PCR - Swab, Nasopharynx   Result Value Ref Range    ADENOVIRUS, PCR Not Detected Not Detected    Coronavirus 229E Not Detected Not Detected    Coronavirus HKU1 Not Detected Not Detected    Coronavirus NL63 Not Detected Not Detected    Coronavirus OC43 Not Detected Not Detected    Human Metapneumovirus Not Detected Not Detected    Human Rhinovirus/Enterovirus Not Detected Not Detected    Influenza B PCR Not Detected Not Detected    Parainfluenza Virus 1 Not Detected Not Detected    Parainfluenza Virus 2 Not Detected Not Detected    Parainfluenza Virus 3 Not Detected Not Detected    Parainfluenza Virus 4 Not Detected Not Detected    Bordetella pertussis pcr Not Detected Not Detected    Influenza A H1 2009 PCR Not  Detected Not Detected    Chlamydophila pneumoniae PCR Not Detected Not Detected    Mycoplasma pneumo by PCR Not Detected Not Detected    Influenza A PCR Not Detected Not Detected    Influenza A H3 Detected (A) Not Detected    Influenza A H1 Not Detected Not Detected    RSV, PCR Not Detected Not Detected   Procalcitonin   Result Value Ref Range    Procalcitonin 0.13 0.10 - 0.25 ng/mL     *Note: Due to a large number of results and/or encounters for the requested time period, some results have not been displayed. A complete set of results can be found in Results Review.       Some portions of this note have been dictated using voice recognition software and may contain errors and/or omissions.

## 2020-07-02 NOTE — PATIENT INSTRUCTIONS

## 2020-07-17 ENCOUNTER — OFFICE VISIT (OUTPATIENT)
Dept: ORTHOPEDIC SURGERY | Facility: CLINIC | Age: 60
End: 2020-07-17

## 2020-07-17 VITALS — WEIGHT: 221 LBS | BODY MASS INDEX: 39.16 KG/M2 | HEIGHT: 63 IN

## 2020-07-17 DIAGNOSIS — M25.562 LEFT KNEE PAIN, UNSPECIFIED CHRONICITY: Primary | ICD-10-CM

## 2020-07-17 DIAGNOSIS — M17.12 PRIMARY OSTEOARTHRITIS OF LEFT KNEE: ICD-10-CM

## 2020-07-17 PROCEDURE — 99213 OFFICE O/P EST LOW 20 MIN: CPT | Performed by: ORTHOPAEDIC SURGERY

## 2020-07-17 PROCEDURE — 96372 THER/PROPH/DIAG INJ SC/IM: CPT | Performed by: ORTHOPAEDIC SURGERY

## 2020-07-17 RX ORDER — METHYLPREDNISOLONE ACETATE 80 MG/ML
80 INJECTION, SUSPENSION INTRA-ARTICULAR; INTRALESIONAL; INTRAMUSCULAR; SOFT TISSUE ONCE
Status: COMPLETED | OUTPATIENT
Start: 2020-07-17 | End: 2020-07-17

## 2020-07-17 RX ADMIN — METHYLPREDNISOLONE ACETATE 80 MG: 80 INJECTION, SUSPENSION INTRA-ARTICULAR; INTRALESIONAL; INTRAMUSCULAR; SOFT TISSUE at 08:33

## 2020-07-17 NOTE — PROGRESS NOTES
"Lashaun Bernal is a 59 y.o. female returns for     Chief Complaint   Patient presents with   • Left Knee - Pain       HISTORY OF PRESENT ILLNESS: Patient is here today for new c/o left knee pain. She was sent to [facility] upon arrival. She states that her pain is 5/10.  It is only been on for a couple of days.  She has a history of [place] spotted fever and some is yet to be diagnosed connective tissue disease for which her rheumatologist put her on Plaquenil.  She had pain and swelling behind the knee no particular injury hurts with activity better with rest.       CONCURRENT MEDICAL HISTORY:    Past Medical History:   Diagnosis Date   • Allergic rhinitis    • Arthritis    • Asthma    • Breast lump    • C. difficile diarrhea 2013   • Carotid artery disease (CMS/[facility])    • Chest pain    • Chronic low back pain    • Constipation    • Depressive disorder    • Diarrhea    • Diverticular disease of colon    • Epigastric pain    • DIETER (generalized anxiety disorder)    • GERD (gastroesophageal reflux disease)    • Head ache    • Hematochezia    • Herpes zoster     mid back, near spine   • Hypertension    • Periumbilical pain    • PONV (postoperative nausea and vomiting)    • [place] spotted fever     pt states \"currently has it\"   • Sleep apnea     using c-pap   • SVT (supraventricular tachycardia) (CMS/[facility])    • Tachycardia 12/012019    spent 4 days in [facility] in January for this.        Allergies   Allergen Reactions   • Cefprozil Nausea And Vomiting   • Penicillins Rash         Current Outpatient Medications:   •  albuterol (PROVENTIL HFA;VENTOLIN HFA) 108 (90 Base) MCG/ACT inhaler, Inhale 2 puffs Every 4 (Four) Hours As Needed for Wheezing., Disp: 18 g, Rfl: 2  •  albuterol (PROVENTIL) (2.5 MG/3ML) 0.083% nebulizer solution, Take 2.5 mg by nebulization Every 6 (Six) Hours As Needed for Wheezing., Disp: 120 vial, Rfl: 2  •  aspirin 81 MG EC tablet, Take 81 mg by mouth Daily., Disp: , Rfl:   •  " cetirizine (zyrTEC) 10 MG tablet, Take 1 tablet by mouth Daily. (Patient taking differently: Take 10 mg by mouth As Needed.), Disp: 30 tablet, Rfl: 0  •  clonazePAM (KlonoPIN) 0.5 MG tablet, Take 0.5 mg by mouth 2 (Two) Times a Day As Needed for Anxiety., Disp: , Rfl:   •  clopidogrel (PLAVIX) 75 MG tablet, Take 75 mg by mouth Daily., Disp: , Rfl:   •  cyclobenzaprine (FLEXERIL) 10 MG tablet, Take 1 tablet by mouth 3 (Three) Times a Day As Needed for Muscle Spasms., Disp: 270 tablet, Rfl: 1  •  dilTIAZem (CARDIZEM) 30 MG tablet, Take 30 mg by mouth 2 (Two) Times a Day., Disp: , Rfl:   •  estradiol (ESTRACE) 1 MG tablet, Take 1 tablet by mouth Daily., Disp: 90 tablet, Rfl: 2  •  fluconazole (DIFLUCAN) 200 MG tablet, Take one at the first sign of infection and may repeat in 3 days as needed., Disp: 2 tablet, Rfl: 0  •  FLUoxetine (PROzac) 20 MG capsule, Take 20 mg by mouth 2 (Two) Times a Day., Disp: , Rfl:   •  gabapentin (NEURONTIN) 300 MG capsule, Take 1 capsule by mouth 2 (Two) Times a Day., Disp: 60 capsule, Rfl: 2  •  hydroxychloroquine (PLAQUENIL) 200 MG tablet, Take 200 mg by mouth Daily., Disp: , Rfl:   •  magnesium oxide (MAGOX) 400 (241.3 Mg) MG tablet tablet, Take 400 mg by mouth 2 (Two) Times a Day., Disp: , Rfl:   •  metoprolol tartrate (LOPRESSOR) 25 MG tablet, Take 12.5 mg by mouth 2 (Two) Times a Day., Disp: , Rfl:   •  omeprazole (priLOSEC) 40 MG capsule, Take 40 mg by mouth 2 (Two) Times a Day., Disp: , Rfl:   •  primidone (MYSOLINE) 50 MG tablet, Take 1 p.o. nightly, Disp: 30 tablet, Rfl: 2  No current facility-administered medications for this visit.     Past Surgical History:   Procedure Laterality Date   • ABDOMINOPLASTY  12/22/2004   • AUGMENTATION MAMMAPLASTY     • BREAST AUGMENTATION BILATERAL MASTOPEXY     • BUNIONECTOMY Left 3/20/2017    Procedure: EXCISION OF CALCANEAL SPURS LEFT FOOT AND REATTACHMENT OF ACHILLES TENDON ;  Surgeon: Jarrell Mar MD;  Location: [facility];  Service:    •  "COLONOSCOPY  01/23/2015   • COLONOSCOPY N/A 2/24/2020    Procedure: COLONOSCOPY;  Surgeon: Santhosh Patrick MD;  Location: [facility] ENDOSCOPY;  Service: Gastroenterology;  Laterality: N/A;   • CYST REMOVAL  08/18/1961    Excision of sebaceous cyst. Left ear   • FINGER/THUMB LESION/CYST EXCISION  10/29/2014   • HAND SURGERY  06/03/2013   • HYSTERECTOMY     • OOPHORECTOMY     • ORIF ULNA/RADIUS FRACTURES  07/22/2013   • TUBAL ABDOMINAL LIGATION  08/14/1985    Laparoscopic tubal sterilization; dilatation and curettage. Desires tubal ligation;             ROS: Review of systems has been updated as of today's date.  All other systems are negative except as noted previously.    PHYSICAL EXAMINATION:       Ht 160 cm (63\")   Wt 100 kg (221 lb)   LMP 03/09/1991 (Approximate)   BMI 39.15 kg/m²     Physical Exam   Constitutional: She is oriented to person, place, and time. She appears well-developed.   HENT:   Head: Normocephalic and atraumatic.   Eyes: Pupils are equal, round, and reactive to light. EOM are normal.   Neck: Neck supple.   Pulmonary/Chest: Effort normal.   Musculoskeletal: She exhibits edema and tenderness.   Neurological: She is alert and oriented to person, place, and time.   Skin: Skin is warm and dry.   Psychiatric: She has a normal mood and affect.       GAIT:     []  Normal  []  Antalgic    Assistive device: [x]  None  []  Walker     []  Crutches  []  Cane     []  Wheelchair  []  Stretcher    Ortho Exam  Some swelling observed in the knee.  Motion fairly well-preserved.  She does have crepitus of both patellofemoral joints 2+ left 1+ right there is tenderness posterior laterally about the knee no obvious palpable cyst at this point the calf is otherwise negative neurovascular intact good motion of the hip.    No results found.    X-rays knee 2 view weightbearing films show only minimal degenerative change consistent with age.  Most on patella superior border.      ASSESSMENT:    Diagnoses and all orders " for this visit:    Left knee pain, unspecified chronicity  -     methylPREDNISolone acetate (DEPO-medrol) injection 80 mg    Primary osteoarthritis of left knee          PLAN at this point we discussed options is been going on a relatively short period of time.  We will try some Depo-Medrol IM and follow this along if she is not significant better could consider intra-articular injection there mild degenerative findings will take this a step at a time she will let me know how she is doing if not significant better  Patient's Body mass index is 39.15 kg/m². BMI is above normal parameters. Recommendations include: exercise counseling and nutrition counseling.  No follow-ups on file.      This document has been electronically signed by Romain Clifton MD on July 17, 2020 12:58

## 2020-07-27 ENCOUNTER — TELEPHONE (OUTPATIENT)
Dept: FAMILY MEDICINE CLINIC | Facility: CLINIC | Age: 60
End: 2020-07-27

## 2020-07-27 ENCOUNTER — LAB (OUTPATIENT)
Dept: LAB | Facility: HOSPITAL | Age: 60
End: 2020-07-27

## 2020-07-27 DIAGNOSIS — R30.0 DYSURIA: ICD-10-CM

## 2020-07-27 DIAGNOSIS — R39.15 URINARY URGENCY: Primary | ICD-10-CM

## 2020-07-27 PROCEDURE — 87077 CULTURE AEROBIC IDENTIFY: CPT

## 2020-07-27 PROCEDURE — 81001 URINALYSIS AUTO W/SCOPE: CPT

## 2020-07-27 PROCEDURE — 87086 URINE CULTURE/COLONY COUNT: CPT

## 2020-07-27 PROCEDURE — 87186 SC STD MICRODIL/AGAR DIL: CPT

## 2020-07-27 NOTE — TELEPHONE ENCOUNTER
Patient would like a callback from Gretchen Gauthier; patient has following symptoms, frequent urination, burning when urinating, and a lot of pressure feeling as if she has to pee; [place] she would like something called in to treat a uti she thinks; please call to advise and confirm: [phone]    Pharmacy: [address] [phone]  - [phone]   [phone]

## 2020-07-28 ENCOUNTER — TELEPHONE (OUTPATIENT)
Dept: FAMILY MEDICINE CLINIC | Facility: CLINIC | Age: 60
End: 2020-07-28

## 2020-07-28 LAB
BACTERIA UR QL AUTO: ABNORMAL /HPF
BILIRUB UR QL STRIP: NEGATIVE
CLARITY UR: ABNORMAL
COLOR UR: YELLOW
GLUCOSE UR STRIP-MCNC: NEGATIVE MG/DL
HGB UR QL STRIP.AUTO: ABNORMAL
HYALINE CASTS UR QL AUTO: ABNORMAL /LPF
KETONES UR QL STRIP: ABNORMAL
LEUKOCYTE ESTERASE UR QL STRIP.AUTO: ABNORMAL
NITRITE UR QL STRIP: NEGATIVE
PH UR STRIP.AUTO: 5.5 [PH] (ref 5–8)
PROT UR QL STRIP: ABNORMAL
RBC # UR: ABNORMAL /HPF
REF LAB TEST METHOD: ABNORMAL
SP GR UR STRIP: 1.03 (ref 1–1.03)
SQUAMOUS #/AREA URNS HPF: ABNORMAL /HPF
UROBILINOGEN UR QL STRIP: ABNORMAL
WBC UR QL AUTO: ABNORMAL /HPF
YEAST URNS QL MICRO: ABNORMAL /HPF

## 2020-07-29 ENCOUNTER — TELEPHONE (OUTPATIENT)
Dept: FAMILY MEDICINE CLINIC | Facility: CLINIC | Age: 60
End: 2020-07-29

## 2020-07-30 DIAGNOSIS — R30.0 DYSURIA: Primary | ICD-10-CM

## 2020-07-30 LAB — BACTERIA SPEC AEROBE CULT: ABNORMAL

## 2020-07-30 RX ORDER — NITROFURANTOIN 25; 75 MG/1; MG/1
100 CAPSULE ORAL 2 TIMES DAILY
Qty: 14 CAPSULE | Refills: 0 | Status: SHIPPED | OUTPATIENT
Start: 2020-07-30 | End: 2020-08-06

## 2020-08-21 ENCOUNTER — LAB (OUTPATIENT)
Dept: LAB | Facility: HOSPITAL | Age: 60
End: 2020-08-21

## 2020-08-21 ENCOUNTER — TRANSCRIBE ORDERS (OUTPATIENT)
Dept: LAB | Facility: HOSPITAL | Age: 60
End: 2020-08-21

## 2020-08-21 DIAGNOSIS — N18.30 CHRONIC KIDNEY DISEASE, STAGE III (MODERATE) (HCC): ICD-10-CM

## 2020-08-21 DIAGNOSIS — N18.30 CHRONIC KIDNEY DISEASE, STAGE III (MODERATE) (HCC): Primary | ICD-10-CM

## 2020-08-21 PROCEDURE — 82570 ASSAY OF URINE CREATININE: CPT

## 2020-08-21 PROCEDURE — 36415 COLL VENOUS BLD VENIPUNCTURE: CPT | Performed by: INTERNAL MEDICINE

## 2020-08-21 PROCEDURE — 82043 UR ALBUMIN QUANTITATIVE: CPT

## 2020-08-21 PROCEDURE — 80069 RENAL FUNCTION PANEL: CPT | Performed by: INTERNAL MEDICINE

## 2020-08-22 LAB
ALBUMIN SERPL-MCNC: 3.9 G/DL (ref 3.5–5.2)
ALBUMIN UR-MCNC: <1.2 MG/DL
ANION GAP SERPL CALCULATED.3IONS-SCNC: 9.9 MMOL/L (ref 5–15)
BUN SERPL-MCNC: 19 MG/DL (ref 6–20)
BUN/CREAT SERPL: 17.8 (ref 7–25)
CALCIUM SPEC-SCNC: 9.7 MG/DL (ref 8.6–10.5)
CHLORIDE SERPL-SCNC: 103 MMOL/L (ref 98–107)
CO2 SERPL-SCNC: 22.1 MMOL/L (ref 22–29)
CREAT SERPL-MCNC: 1.07 MG/DL (ref 0.57–1)
CREAT UR-MCNC: 171 MG/DL
GFR SERPL CREATININE-BSD FRML MDRD: 52 ML/MIN/1.73
GLUCOSE SERPL-MCNC: 74 MG/DL (ref 65–99)
MICROALBUMIN/CREAT UR: NORMAL MG/G{CREAT}
PHOSPHATE SERPL-MCNC: 3.5 MG/DL (ref 2.5–4.5)
POTASSIUM SERPL-SCNC: 4.3 MMOL/L (ref 3.5–5.2)
SODIUM SERPL-SCNC: 135 MMOL/L (ref 136–145)

## 2020-08-27 ENCOUNTER — OFFICE VISIT (OUTPATIENT)
Dept: FAMILY MEDICINE CLINIC | Facility: CLINIC | Age: 60
End: 2020-08-27

## 2020-08-27 ENCOUNTER — LAB (OUTPATIENT)
Dept: LAB | Facility: HOSPITAL | Age: 60
End: 2020-08-27

## 2020-08-27 VITALS
DIASTOLIC BLOOD PRESSURE: 76 MMHG | SYSTOLIC BLOOD PRESSURE: 132 MMHG | HEIGHT: 63 IN | WEIGHT: 221.9 LBS | BODY MASS INDEX: 39.32 KG/M2

## 2020-08-27 DIAGNOSIS — E78.01 FAMILIAL HYPERCHOLESTEROLEMIA: ICD-10-CM

## 2020-08-27 DIAGNOSIS — M79.10 MUSCLE PAIN: ICD-10-CM

## 2020-08-27 DIAGNOSIS — I10 BENIGN ESSENTIAL HYPERTENSION: Primary | Chronic | ICD-10-CM

## 2020-08-27 DIAGNOSIS — M79.7 FIBROMYALGIA: ICD-10-CM

## 2020-08-27 DIAGNOSIS — G54.0 THORACIC OUTLET SYNDROME: ICD-10-CM

## 2020-08-27 PROCEDURE — 85025 COMPLETE CBC W/AUTO DIFF WBC: CPT

## 2020-08-27 PROCEDURE — 84466 ASSAY OF TRANSFERRIN: CPT

## 2020-08-27 PROCEDURE — 99214 OFFICE O/P EST MOD 30 MIN: CPT | Performed by: NURSE PRACTITIONER

## 2020-08-27 PROCEDURE — 83540 ASSAY OF IRON: CPT

## 2020-08-27 RX ORDER — FLUOXETINE HYDROCHLORIDE 40 MG/1
CAPSULE ORAL
COMMUNITY
Start: 2020-07-06 | End: 2020-08-27 | Stop reason: DRUGHIGH

## 2020-08-27 RX ORDER — CYCLOBENZAPRINE HCL 5 MG
TABLET ORAL
COMMUNITY
Start: 2020-08-26 | End: 2020-08-27 | Stop reason: DRUGHIGH

## 2020-08-28 ENCOUNTER — TELEPHONE (OUTPATIENT)
Dept: FAMILY MEDICINE CLINIC | Facility: CLINIC | Age: 60
End: 2020-08-28

## 2020-08-28 LAB
BASOPHILS # BLD AUTO: 0.07 10*3/MM3 (ref 0–0.2)
BASOPHILS NFR BLD AUTO: 0.8 % (ref 0–1.5)
DEPRECATED RDW RBC AUTO: 39.1 FL (ref 37–54)
EOSINOPHIL # BLD AUTO: 0.33 10*3/MM3 (ref 0–0.4)
EOSINOPHIL NFR BLD AUTO: 3.8 % (ref 0.3–6.2)
ERYTHROCYTE [DISTWIDTH] IN BLOOD BY AUTOMATED COUNT: 12.4 % (ref 12.3–15.4)
HCT VFR BLD AUTO: 36.4 % (ref 34–46.6)
HGB BLD-MCNC: 12.3 G/DL (ref 12–15.9)
IMM GRANULOCYTES # BLD AUTO: 0.04 10*3/MM3 (ref 0–0.05)
IMM GRANULOCYTES NFR BLD AUTO: 0.5 % (ref 0–0.5)
IRON 24H UR-MRATE: 83 MCG/DL (ref 37–145)
IRON SATN MFR SERPL: 22 % (ref 20–50)
LYMPHOCYTES # BLD AUTO: 2.91 10*3/MM3 (ref 0.7–3.1)
LYMPHOCYTES NFR BLD AUTO: 33.1 % (ref 19.6–45.3)
MCH RBC QN AUTO: 29.3 PG (ref 26.6–33)
MCHC RBC AUTO-ENTMCNC: 33.8 G/DL (ref 31.5–35.7)
MCV RBC AUTO: 86.7 FL (ref 79–97)
MONOCYTES # BLD AUTO: 0.77 10*3/MM3 (ref 0.1–0.9)
MONOCYTES NFR BLD AUTO: 8.8 % (ref 5–12)
NEUTROPHILS NFR BLD AUTO: 4.67 10*3/MM3 (ref 1.7–7)
NEUTROPHILS NFR BLD AUTO: 53 % (ref 42.7–76)
NRBC BLD AUTO-RTO: 0 /100 WBC (ref 0–0.2)
PLATELET # BLD AUTO: 339 10*3/MM3 (ref 140–450)
PMV BLD AUTO: 10.6 FL (ref 6–12)
RBC # BLD AUTO: 4.2 10*6/MM3 (ref 3.77–5.28)
TIBC SERPL-MCNC: 371 MCG/DL (ref 298–536)
TRANSFERRIN SERPL-MCNC: 249 MG/DL (ref 200–360)
WBC # BLD AUTO: 8.79 10*3/MM3 (ref 3.4–10.8)

## 2020-08-28 NOTE — TELEPHONE ENCOUNTER
Per  MAYELA Terry, Ms. Bernal has been called with recent lab results & recommendations.  Continue current medications and follow-up as planned or sooner if any problems.        ----- Message from MAYELA Flores sent at 8/28/2020  7:15 AM CDT -----  White blood cell count was within normal limits as well as her blood count and iron studies.  If muscle cramping continues I would recommend she speak with her neurologist to determine whether or not she would need a muscle biopsy.

## 2020-09-10 DIAGNOSIS — G47.33 OSA ON CPAP: Primary | ICD-10-CM

## 2020-09-10 DIAGNOSIS — Z99.89 OSA ON CPAP: Primary | ICD-10-CM

## 2020-09-10 NOTE — PROGRESS NOTES
Refill request for PAP supplies.    Last seen by Sept 19, doing well.  CPAP 10 cm. No issues.    Appt moved to Dec 20 due to ECTOR Reyes's leave.     Jimmy Pang II, MD  09/10/20 @ 2:52 PM

## 2020-09-14 RX ORDER — GABAPENTIN 300 MG/1
300 CAPSULE ORAL 2 TIMES DAILY
Qty: 180 CAPSULE | Refills: 0 | Status: SHIPPED | OUTPATIENT
Start: 2020-09-14 | End: 2020-11-30 | Stop reason: SDUPTHER

## 2020-09-30 DIAGNOSIS — R25.1 TREMOR: Primary | ICD-10-CM

## 2020-10-01 RX ORDER — PRIMIDONE 50 MG/1
TABLET ORAL
Qty: 90 TABLET | Refills: 0 | Status: SHIPPED | OUTPATIENT
Start: 2020-10-01 | End: 2021-03-02

## 2020-10-30 ENCOUNTER — LAB (OUTPATIENT)
Dept: LAB | Facility: HOSPITAL | Age: 60
End: 2020-10-30

## 2020-10-30 DIAGNOSIS — R74.8 ELEVATED LIVER ENZYMES: ICD-10-CM

## 2020-10-30 DIAGNOSIS — K76.0 FATTY LIVER: ICD-10-CM

## 2020-10-30 LAB
INR PPP: 0.94 (ref 0.8–1.2)
PROTHROMBIN TIME: 12.9 SECONDS (ref 11.1–15.3)

## 2020-10-30 PROCEDURE — 82947 ASSAY GLUCOSE BLOOD QUANT: CPT

## 2020-10-30 PROCEDURE — 36415 COLL VENOUS BLD VENIPUNCTURE: CPT

## 2020-10-30 PROCEDURE — 84478 ASSAY OF TRIGLYCERIDES: CPT

## 2020-10-30 PROCEDURE — 83883 ASSAY NEPHELOMETRY NOT SPEC: CPT

## 2020-10-30 PROCEDURE — 82977 ASSAY OF GGT: CPT

## 2020-10-30 PROCEDURE — 82247 BILIRUBIN TOTAL: CPT

## 2020-10-30 PROCEDURE — 85610 PROTHROMBIN TIME: CPT

## 2020-10-30 PROCEDURE — 82172 ASSAY OF APOLIPOPROTEIN: CPT

## 2020-10-30 PROCEDURE — 84450 TRANSFERASE (AST) (SGOT): CPT

## 2020-10-30 PROCEDURE — 82465 ASSAY BLD/SERUM CHOLESTEROL: CPT

## 2020-10-30 PROCEDURE — 83010 ASSAY OF HAPTOGLOBIN QUANT: CPT

## 2020-10-30 PROCEDURE — 84460 ALANINE AMINO (ALT) (SGPT): CPT

## 2020-11-02 ENCOUNTER — OFFICE VISIT (OUTPATIENT)
Dept: GASTROENTEROLOGY | Facility: CLINIC | Age: 60
End: 2020-11-02

## 2020-11-02 VITALS
HEIGHT: 63 IN | WEIGHT: 218.2 LBS | SYSTOLIC BLOOD PRESSURE: 132 MMHG | BODY MASS INDEX: 38.66 KG/M2 | DIASTOLIC BLOOD PRESSURE: 75 MMHG | HEART RATE: 84 BPM

## 2020-11-02 DIAGNOSIS — K57.90 DIVERTICULOSIS: ICD-10-CM

## 2020-11-02 DIAGNOSIS — R74.8 ELEVATED LIVER ENZYMES: ICD-10-CM

## 2020-11-02 DIAGNOSIS — R25.1 TREMOR: ICD-10-CM

## 2020-11-02 DIAGNOSIS — K76.0 FATTY LIVER: ICD-10-CM

## 2020-11-02 DIAGNOSIS — M79.10 MUSCLE PAIN: ICD-10-CM

## 2020-11-02 DIAGNOSIS — K21.00 GASTROESOPHAGEAL REFLUX DISEASE WITH ESOPHAGITIS WITHOUT HEMORRHAGE: Primary | ICD-10-CM

## 2020-11-02 DIAGNOSIS — M79.7 FIBROMYALGIA: ICD-10-CM

## 2020-11-02 PROCEDURE — 99213 OFFICE O/P EST LOW 20 MIN: CPT | Performed by: PHYSICIAN ASSISTANT

## 2020-11-02 RX ORDER — PRIMIDONE 50 MG/1
TABLET ORAL
Qty: 90 TABLET | Refills: 0 | OUTPATIENT
Start: 2020-11-02

## 2020-11-02 RX ORDER — CYCLOBENZAPRINE HCL 10 MG
10 TABLET ORAL 3 TIMES DAILY PRN
Qty: 270 TABLET | Refills: 1 | Status: SHIPPED | OUTPATIENT
Start: 2020-11-02 | End: 2021-03-23

## 2020-11-02 RX ORDER — SPIRONOLACTONE 25 MG/1
25 TABLET ORAL DAILY
COMMUNITY
Start: 2020-10-23 | End: 2021-10-29

## 2020-11-02 RX ORDER — FLUOXETINE HYDROCHLORIDE 40 MG/1
40 CAPSULE ORAL DAILY
COMMUNITY
Start: 2020-09-15 | End: 2022-08-26

## 2020-11-02 RX ORDER — FUROSEMIDE 40 MG/1
40 TABLET ORAL AS NEEDED
COMMUNITY
Start: 2020-10-23

## 2020-11-02 RX ORDER — OMEPRAZOLE 20 MG/1
20 CAPSULE, DELAYED RELEASE ORAL 2 TIMES DAILY
COMMUNITY

## 2020-11-02 NOTE — PROGRESS NOTES
Chief Complaint   Patient presents with   • Fatty Liver   • Heartburn   • Elevated Hepatic Enzymes       ENDO PROCEDURE ORDERED:    Subjective    Lashaun Bernal is a 59 y.o. female. she is here today for follow-up.    History of Present Illness    Patient seen on a recheck of her GERD, elevated liver enzymes, hepatic fibrosis, F0/F2/N1. Last seen 07/02/2020. Patient currently denies abdominal pain. GERD is doing fairly well on the Prilosec 20 mg b.i.d. She denied nausea, vomiting, dysphagia. Bowels are moving without blood or mucus. Weight is down 2.4 pounds since last visit. Last colonoscopy with family history 02/24/2020.     She had a UTI 07/27/2020. BMP on 08/21/2020 showed creatinine 1.07, sodium 135, GFR 52, otherwise normal. Laboratories on 08/27/2020: Serum iron 83 with 22% saturation. Fairly normal CBC.     Laboratories 10/30/2020: INR 0.94. CADENA FibroSure is pending.     I did review the importance of dietary modification and weight loss with her.     A/P: Patient with hepatic fibrosis with steatosis, CADENA FibroSure pending. Encouraged to continue dietary modification and significant weight loss. GERD is doing well on the Prilosec. Will plan follow up in 4 months with CMP prior, further pending clinical course and the results of the above.       The following portions of the patient's history were reviewed and updated as appropriate:   Past Medical History:   Diagnosis Date   • Allergic rhinitis    • Arthritis    • Asthma    • Breast lump    • C. difficile diarrhea 2013   • Carotid artery disease (CMS/HCC)    • Chest pain    • Chronic low back pain    • Constipation    • Depressive disorder    • Diarrhea    • Diverticular disease of colon    • Epigastric pain    • DIETER (generalized anxiety disorder)    • GERD (gastroesophageal reflux disease)    • Head ache    • Hematochezia    • Herpes zoster     mid back, near spine   • Hypertension    • Periumbilical pain    • PONV (postoperative nausea and vomiting)   "  • [place] Mountain spotted fever     pt states \"currently has it\"   • Sleep apnea     using c-pap   • SVT (supraventricular tachycardia) (CMS/[facility])    • Tachycardia     spent 4 days in [facility] in January for this.      Past Surgical History:   Procedure Laterality Date   • ABDOMINOPLASTY  2004   • AUGMENTATION MAMMAPLASTY     • BREAST AUGMENTATION BILATERAL MASTOPEXY     • BUNIONECTOMY Left 3/20/2017    Procedure: EXCISION OF CALCANEAL SPURS LEFT FOOT AND REATTACHMENT OF ACHILLES TENDON ;  Surgeon: Jarrell Mar MD;  Location: [facility] OR;  Service:    • COLONOSCOPY  2015   • COLONOSCOPY N/A 2020    Procedure: COLONOSCOPY;  Surgeon: Santhosh Patrick MD;  Location: [facility] ENDOSCOPY;  Service: Gastroenterology;  Laterality: N/A;   • CYST REMOVAL  1961    Excision of sebaceous cyst. Left ear   • FINGER/THUMB LESION/CYST EXCISION  10/29/2014   • HAND SURGERY  2013   • HYSTERECTOMY     • OOPHORECTOMY     • ORIF ULNA/RADIUS FRACTURES  2013   • TUBAL ABDOMINAL LIGATION  1985    Laparoscopic tubal sterilization; dilatation and curettage. Desires tubal ligation;       Family History   Problem Relation Age of Onset   • Diabetes Other    • Heart disease Other    • Hypertension Other    • Heart disease Mother         Myocardial infarct   • Hypertension Mother    • Diabetes Mother    • Heart disease Father         CHF     OB History        2    Para   2    Term   2            AB        Living           SAB        TAB        Ectopic        Molar        Multiple        Live Births                  Allergies   Allergen Reactions   • Cefprozil Nausea And Vomiting   • Penicillins Rash     Social History     Socioeconomic History   • Marital status:      Spouse name: Not on file   • Number of children: Not on file   • Years of education: Not on file   • Highest education level: Not on file   Tobacco Use   • Smoking status: Never Smoker   • Smokeless tobacco: " Never Used   Substance and Sexual Activity   • Alcohol use: No   • Drug use: No   • Sexual activity: Defer     Current Medications:  Prior to Admission medications    Medication Sig Start Date End Date Taking? Authorizing Provider   albuterol (PROVENTIL HFA;VENTOLIN HFA) 108 (90 Base) MCG/ACT inhaler Inhale 2 puffs Every 4 (Four) Hours As Needed for Wheezing. 11/27/18  Yes Gretchen Gauthier APRN   albuterol (PROVENTIL) (2.5 MG/3ML) 0.083% nebulizer solution Take 2.5 mg by nebulization Every 6 (Six) Hours As Needed for Wheezing. 11/27/18  Yes Gretchen Gauthier APRN   aspirin 81 MG EC tablet Take 81 mg by mouth Daily.   Yes Maryam Mustafa MD   clonazePAM (KlonoPIN) 0.5 MG tablet Take 0.5 mg by mouth 2 (Two) Times a Day As Needed for Anxiety. 6/11/19  Yes Maryam Mustafa MD   clopidogrel (PLAVIX) 75 MG tablet Take 75 mg by mouth Daily.   Yes Maryam Mustafa MD   cyclobenzaprine (FLEXERIL) 10 MG tablet Take 1 tablet by mouth 3 (Three) Times a Day As Needed for Muscle Spasms. 6/23/20  Yes Gretchen Gauthier APRN   dilTIAZem (CARDIZEM) 30 MG tablet Take 30 mg by mouth 2 (Two) Times a Day.   Yes Maryam Mustafa MD   estradiol (ESTRACE) 1 MG tablet Take 1 tablet by mouth Daily. 6/23/20  Yes Gretchen Gauthier APRN   FLUoxetine (PROzac) 40 MG capsule Take 40 mg by mouth Daily. 9/15/20  Yes Maryam Mustafa MD   furosemide (LASIX) 40 MG tablet Take 40 mg by mouth Daily. 10/23/20  Yes Maryam Mustafa MD   gabapentin (NEURONTIN) 300 MG capsule Take 1 capsule by mouth 2 (Two) Times a Day. 9/14/20  Yes Gretchen Gauthier APRN   hydroxychloroquine (PLAQUENIL) 200 MG tablet Take 200 mg by mouth Daily.   Yes Maryam Mustafa MD   magnesium oxide (MAGOX) 400 (241.3 Mg) MG tablet tablet Take 400 mg by mouth 2 (Two) Times a Day. 5/13/20  Yes Maryam Mustafa MD   metoprolol tartrate (LOPRESSOR) 25 MG tablet Take 12.5 mg by mouth 2 (Two) Times a Day.   Yes Provider, MD Maryam   omeprazole (priLOSEC) 20  "MG capsule Take 20 mg by mouth 2 (two) times a day.   Yes Provider, MD Maryam   primidone (MYSOLINE) 50 MG tablet TAKE 1 TABLET AT BEDTIME 10/1/20  Yes Gonzalo Patino PA   spironolactone (ALDACTONE) 25 MG tablet Take 25 mg by mouth Daily. 10/23/20  Yes Maryam Mustafa MD   fluconazole (DIFLUCAN) 200 MG tablet Take one at the first sign of infection and may repeat in 3 days as needed. 12/7/19 11/2/20 Yes Laura Patterson APRN   FLUoxetine (PROzac) 20 MG capsule Take 20 mg by mouth Daily.  11/2/20 Yes Maryam Mustafa MD   omeprazole (priLOSEC) 40 MG capsule Take 40 mg by mouth 2 (Two) Times a Day.  11/2/20 Yes Maryam Mustafa MD     Review of Systems  Review of Systems       Objective    /75   Pulse 84   Ht 160 cm (63\")   Wt 99 kg (218 lb 3.2 oz)   LMP 03/09/1991 (Approximate)   BMI 38.65 kg/m²   Physical Exam  Vitals signs and nursing note reviewed.   Constitutional:       General: She is not in acute distress.     Appearance: She is well-developed.   HENT:      Head: Normocephalic and atraumatic.   Eyes:      Pupils: Pupils are equal, round, and reactive to light.   Neck:      Musculoskeletal: Normal range of motion.   Cardiovascular:      Rate and Rhythm: Normal rate and regular rhythm.      Heart sounds: Normal heart sounds.   Pulmonary:      Effort: Pulmonary effort is normal.      Breath sounds: Normal breath sounds.   Abdominal:      General: Bowel sounds are normal. There is no distension or abdominal bruit.      Palpations: Abdomen is soft. Abdomen is not rigid. There is no shifting dullness or mass.      Tenderness: There is abdominal tenderness. There is no guarding or rebound.      Hernia: No hernia is present. There is no hernia in the ventral area.      Comments: Obese, mild   Musculoskeletal: Normal range of motion.   Skin:     General: Skin is warm and dry.   Neurological:      Mental Status: She is alert and oriented to person, place, and time. "   Psychiatric:         Behavior: Behavior normal.         Thought Content: Thought content normal.         Judgment: Judgment normal.       Assessment/Plan      1. Gastroesophageal reflux disease with esophagitis without hemorrhage    2. Elevated liver enzymes    3. Fatty liver    4. Diverticulosis    .   Diagnoses and all orders for this visit:    1. Gastroesophageal reflux disease with esophagitis without hemorrhage (Primary)    2. Elevated liver enzymes  -     Comprehensive Metabolic Panel; Future  -     Protime-INR; Future    3. Fatty liver  -     Comprehensive Metabolic Panel; Future  -     Protime-INR; Future    4. Diverticulosis        Orders placed during this encounter include:  Orders Placed This Encounter   Procedures   • Comprehensive Metabolic Panel     Standing Status:   Future     Standing Expiration Date:   5/1/2021   • Protime-INR     Standing Status:   Future     Standing Expiration Date:   5/1/2021       Medications prescribed:  No orders of the defined types were placed in this encounter.    Discontinued Medications       Reason for Discontinue     fluconazole (DIFLUCAN) 200 MG tablet    *Therapy completed     FLUoxetine (PROzac) 20 MG capsule    Discontinued by another clinician     omeprazole (priLOSEC) 40 MG capsule    Cost of medication        Requested Prescriptions      No prescriptions requested or ordered in this encounter       Review and/or summary of lab tests, radiology, procedures, medications. Review and summary of old records and obtaining of history. The risks and benefits of my recommendations, as well as other treatment options were discussed with the patient today. Questions were answered.    Follow-up: Return in about 4 months (around 3/2/2021), or if symptoms worsen or fail to improve.     * Surgery not found *      This document has been electronically signed by Marcial Walker PA-C on November 6, 2020 12:20 CST      Results for orders placed or performed in visit on  10/30/20   CADENA Fibrosure    Specimen: Blood   Result Value Ref Range    Fibrosis Score (References) 0.08 0.00 - 0.21    Fibrosis Stage (Reference) Comment     Steatosis Score (Reference) 0.68 (H) 0.00 - 0.30    Steatosis Grade (Reference) Comment     CADENA Score (Reference) 0.75 (H) 0.25    Cadena Grade (Reference) Comment     Height: (Reference) 63 in    Weight: (Reference) 221 LBS    Alpha 2-Macroglobulins, Qn 177 110 - 276 mg/dL    Haptoglobin 121 33 - 346 mg/dL    Apolipoprotein A-1 139 116 - 209 mg/dL    Total Bilirubin 0.2 0.0 - 1.2 mg/dL    GGT 15 0 - 60 IU/L    ALT (SGPT) 37 0 - 40 IU/L    AST (SGOT) P5P (Reference) 28 0 - 40 IU/L    Cholesterol, Total (Reference) 181 100 - 199 mg/dL    Glucose, Serum (Reference) 110 (H) 65 - 99 mg/dL    Triglycerides 241 (H) 0 - 149 mg/dL    Interpretations: (Reference) Comment     Fibrosis Scoring: Comment     Steatosis Grading (Reference) Comment     Cadena Scoring (Reference) Comment     Limitations: (Reference) Comment     Comment (Reference) Comment    Protime-INR    Specimen: Blood   Result Value Ref Range    Protime 12.9 11.1 - 15.3 Seconds    INR 0.94 0.80 - 1.20   Results for orders placed or performed in visit on 08/27/20   CBC Auto Differential    Specimen: Blood   Result Value Ref Range    WBC 8.79 3.40 - 10.80 10*3/mm3    RBC 4.20 3.77 - 5.28 10*6/mm3    Hemoglobin 12.3 12.0 - 15.9 g/dL    Hematocrit 36.4 34.0 - 46.6 %    MCV 86.7 79.0 - 97.0 fL    MCH 29.3 26.6 - 33.0 pg    MCHC 33.8 31.5 - 35.7 g/dL    RDW 12.4 12.3 - 15.4 %    RDW-SD 39.1 37.0 - 54.0 fl    MPV 10.6 6.0 - 12.0 fL    Platelets 339 140 - 450 10*3/mm3    Neutrophil % 53.0 42.7 - 76.0 %    Lymphocyte % 33.1 19.6 - 45.3 %    Monocyte % 8.8 5.0 - 12.0 %    Eosinophil % 3.8 0.3 - 6.2 %    Basophil % 0.8 0.0 - 1.5 %    Immature Grans % 0.5 0.0 - 0.5 %    Neutrophils, Absolute 4.67 1.70 - 7.00 10*3/mm3    Lymphocytes, Absolute 2.91 0.70 - 3.10 10*3/mm3    Monocytes, Absolute 0.77 0.10 - 0.90 10*3/mm3     Eosinophils, Absolute 0.33 0.00 - 0.40 10*3/mm3    Basophils, Absolute 0.07 0.00 - 0.20 10*3/mm3    Immature Grans, Absolute 0.04 0.00 - 0.05 10*3/mm3    nRBC 0.0 0.0 - 0.2 /100 WBC   Iron Profile    Specimen: Blood   Result Value Ref Range    Iron 83 37 - 145 mcg/dL    Iron Saturation 22 20 - 50 %    Transferrin 249 200 - 360 mg/dL    TIBC 371 298 - 536 mcg/dL   Results for orders placed or performed in visit on 08/21/20   Renal Function Panel    Specimen: Blood   Result Value Ref Range    Glucose 74 65 - 99 mg/dL    BUN 19 6 - 20 mg/dL    Creatinine 1.07 (H) 0.57 - 1.00 mg/dL    Sodium 135 (L) 136 - 145 mmol/L    Potassium 4.3 3.5 - 5.2 mmol/L    Chloride 103 98 - 107 mmol/L    CO2 22.1 22.0 - 29.0 mmol/L    Calcium 9.7 8.6 - 10.5 mg/dL    Albumin 3.90 3.50 - 5.20 g/dL    Phosphorus 3.5 2.5 - 4.5 mg/dL    Anion Gap 9.9 5.0 - 15.0 mmol/L    BUN/Creatinine Ratio 17.8 7.0 - 25.0    eGFR Non African Amer 52 (L) >60 mL/min/1.73   Results for orders placed or performed in visit on 08/21/20   Microalbumin / Creatinine Urine Ratio - Urine, Clean Catch    Specimen: Urine, Clean Catch   Result Value Ref Range    Microalbumin/Creatinine Ratio      Creatinine, Urine 171.0 mg/dL    Microalbumin, Urine <1.2 mg/dL   Results for orders placed or performed in visit on 07/27/20   Urinalysis, Microscopic Only - Urine, Clean Catch    Specimen: Urine, Clean Catch   Result Value Ref Range    RBC, UA 0-2 None Seen, 0-2 /HPF    WBC, UA 31-50 (A) None Seen, 0-2 /HPF    Bacteria, UA 2+ (A) None Seen /HPF    Squamous Epithelial Cells, UA 3-6 (A) None Seen, 0-2 /HPF    Yeast, UA Small/1+ Yeast None Seen /HPF    Hyaline Casts, UA None Seen None Seen /LPF    Methodology Manual Light Microscopy    Urinalysis With Culture If Indicated - Urine, Clean Catch    Specimen: Urine, Clean Catch   Result Value Ref Range    Color, UA Yellow Yellow, Straw    Appearance, UA Cloudy (A) Clear    pH, UA 5.5 5.0 - 8.0    Specific Gravity, UA 1.028 1.005 - 1.030     Glucose, UA Negative Negative    Ketones, UA Trace (A) Negative    Bilirubin, UA Negative Negative    Blood, UA Large (3+) (A) Negative    Protein, UA 30 mg/dL (1+) (A) Negative    Leuk Esterase, UA Large (3+) (A) Negative    Nitrite, UA Negative Negative    Urobilinogen, UA 1.0 E.U./dL 0.2 - 1.0 E.U./dL   Urine Culture - Urine, Urine, Clean Catch    Specimen: Urine, Clean Catch   Result Value Ref Range    Urine Culture >100,000 CFU/mL Escherichia coli (A)        Susceptibility    Escherichia coli - CAMILO     Ampicillin <=2 Susceptible ug/ml     Ampicillin + Sulbactam <=2 Susceptible ug/ml     Cefazolin <=4 Susceptible ug/ml     Cefepime <=1 Susceptible ug/ml     Ceftazidime <=1 Susceptible ug/ml     Ceftriaxone <=1 Susceptible ug/ml     Gentamicin <=1 Susceptible ug/ml     Levofloxacin <=0.12 Susceptible ug/ml     Nitrofurantoin <=16 Susceptible ug/ml     Piperacillin + Tazobactam <=4 Susceptible ug/ml     Tetracycline <=1 Susceptible ug/ml     Trimethoprim + Sulfamethoxazole <=20 Susceptible ug/ml   Results for orders placed or performed in visit on 06/29/20   Microalbumin / Creatinine Urine Ratio - Urine, Clean Catch    Specimen: Urine, Clean Catch   Result Value Ref Range    Microalbumin/Creatinine Ratio      Creatinine, Urine 168.5 mg/dL    Microalbumin, Urine <1.2 mg/dL   Protime-INR    Specimen: Blood   Result Value Ref Range    Protime 12.1 11.1 - 15.3 Seconds    INR 0.91 0.80 - 1.20   Phosphorus    Specimen: Blood   Result Value Ref Range    Phosphorus 3.6 2.5 - 4.5 mg/dL   Comprehensive Metabolic Panel    Specimen: Blood   Result Value Ref Range    Glucose 110 (H) 65 - 99 mg/dL    BUN 23 (H) 6 - 20 mg/dL    Creatinine 1.03 (H) 0.57 - 1.00 mg/dL    Sodium 137 136 - 145 mmol/L    Potassium 4.3 3.5 - 5.2 mmol/L    Chloride 103 98 - 107 mmol/L    CO2 20.0 (L) 22.0 - 29.0 mmol/L    Calcium 9.6 8.6 - 10.5 mg/dL    Total Protein 6.9 6.0 - 8.5 g/dL    Albumin 4.00 3.50 - 5.20 g/dL    ALT (SGPT) 42 (H) 1 - 33 U/L     AST (SGOT) 31 1 - 32 U/L    Alkaline Phosphatase 73 39 - 117 U/L    Total Bilirubin 0.3 0.2 - 1.2 mg/dL    eGFR Non African Amer 55 (L) >60 mL/min/1.73    Globulin 2.9 gm/dL    A/G Ratio 1.4 g/dL    BUN/Creatinine Ratio 22.3 7.0 - 25.0    Anion Gap 14.0 5.0 - 15.0 mmol/L     *Note: Due to a large number of results and/or encounters for the requested time period, some results have not been displayed. A complete set of results can be found in Results Review.       Some portions of this note have been dictated using voice recognition software and may contain errors and/or omissions.

## 2020-11-02 NOTE — PATIENT INSTRUCTIONS
"BMI for Adults  What is BMI?  Body mass index (BMI) is a number that is calculated from a person's weight and height. BMI can help estimate how much of a person's weight is composed of fat. BMI does not measure body fat directly. Rather, it is an alternative to procedures that directly measure body fat, which can be difficult and expensive.  BMI can help identify people who may be at higher risk for certain medical problems.  What are BMI measurements used for?  BMI is used as a screening tool to identify possible weight problems. It helps determine whether a person is obese, overweight, a healthy weight, or underweight.  BMI is useful for:  · Identifying a weight problem that may be related to a medical condition or may increase the risk for medical problems.  · Promoting changes, such as changes in diet and exercise, to help reach a healthy weight. BMI screening can be repeated to see if these changes are working.  How is BMI calculated?  BMI involves measuring your weight in relation to your height. Both height and weight are measured, and the BMI is calculated from those numbers. This can be done either in English ([place] or metric measurements. Note that charts and online BMI calculators are available to help you find your BMI quickly and easily without having to do these calculations yourself.  To calculate your BMI in English (U[place] measurements:    1. Measure your weight in pounds (lb).  2. Multiply the number of pounds by 703.  ? For example, for a person who weighs 180 lb, multiply that number by 703, which equals 126,540.  3. Measure your height in inches. Then multiply that number by itself to get a measurement called \"inches squared.\"  ? For example, for a person who is 70 inches tall, the \"inches squared\" measurement is 70 inches x 70 inches, which equals 4,900 inches squared.  4. Divide the total from step 2 (number of lb x 703) by the total from step 3 (inches squared): 126,540 ÷ 4,900 = 25.8. This is " "your BMI.  To calculate your BMI in metric measurements:  1. Measure your weight in kilograms (kg).  2. Measure your height in meters (m). Then multiply that number by itself to get a measurement called \"meters squared.\"  ? For example, for a person who is 1.75 m tall, the \"meters squared\" measurement is 1.75 m x 1.75 m, which is equal to 3.1 meters squared.  3. Divide the number of kilograms (your weight) by the meters squared number. In this example: 70 ÷ 3.1 = 22.6. This is your BMI.  What do the results mean?  BMI charts are used to identify whether you are underweight, normal weight, overweight, or obese. The following guidelines will be used:  · Underweight: BMI less than 18.5.  · Normal weight: BMI between 18.5 and 24.9.  · Overweight: BMI between 25 and 29.9.  · Obese: BMI of 30 or above.  Keep these notes in mind:  · Weight includes both fat and muscle, so someone with a muscular build, such as an athlete, may have a BMI that is higher than 24.9. In cases like these, BMI is not an accurate measure of body fat.  · To determine if excess body fat is the cause of a BMI of 25 or higher, further assessments may need to be done by a health care provider.  · BMI is usually interpreted in the same way for men and women.  Where to find more information  For more information about BMI, including tools to quickly calculate your BMI, go to these websites:  · Centers for Disease Control and Prevention: www.cdc.gov  · American Heart Association: www.heart.org  · National Heart, Lung, and Blood [place]: www.nhlbi.nih.gov  Summary  · Body mass index (BMI) is a number that is calculated from a person's weight and height.  · BMI may help estimate how much of a person's weight is composed of fat. BMI can help identify those who may be at higher risk for certain medical problems.  · BMI can be measured using English measurements or metric measurements.  · BMI charts are used to identify whether you are underweight, normal " weight, overweight, or obese.  This information is not intended to replace advice given to you by your health care provider. Make sure you discuss any questions you have with your health care provider.  Document Released: 08/29/2005 Document Revised: 09/09/2020 Document Reviewed: 07/17/2020  [employer] Patient Education © 2020 [employer]

## 2020-11-03 LAB
A2 MACROGLOB SERPL-MCNC: 177 MG/DL (ref 110–276)
ALT SERPL W P-5'-P-CCNC: 37 IU/L (ref 0–40)
APO A-I SERPL-MCNC: 139 MG/DL (ref 116–209)
AST SERPL W P-5'-P-CCNC: 28 IU/L (ref 0–40)
BILIRUB SERPL-MCNC: 0.2 MG/DL (ref 0–1.2)
CHOLEST SERPL-MCNC: 181 MG/DL (ref 100–199)
FIBROSIS SCORING:: ABNORMAL
FIBROSIS STAGE SERPL QL: ABNORMAL
GGT SERPL-CCNC: 15 IU/L (ref 0–60)
GLUCOSE SERPL-MCNC: 110 MG/DL (ref 65–99)
HAPTOGLOB SERPL-MCNC: 121 MG/DL (ref 33–346)
INTERPRETATIONS: (REFERENCE): ABNORMAL
LABORATORY COMMENT REPORT: ABNORMAL
LIVER FIBR SCORE SERPL CALC.FIBROSURE: 0.08 (ref 0–0.21)
NASH SCORING (REFERENCE): ABNORMAL
NECROINFLAMMATORY ACT GRADE SERPL QL: ABNORMAL
NECROINFLAMMATORY ACT SCORE SERPL: 0.75
SERVICE CMNT-IMP: ABNORMAL
STEATOSIS GRADE (REFERENCE): ABNORMAL
STEATOSIS GRADING (REFERENCE): ABNORMAL
STEATOSIS SCORE (REFERENCE): 0.68 (ref 0–0.3)
TRIGL SERPL-MCNC: 241 MG/DL (ref 0–149)

## 2020-11-30 ENCOUNTER — OFFICE VISIT (OUTPATIENT)
Dept: FAMILY MEDICINE CLINIC | Facility: CLINIC | Age: 60
End: 2020-11-30

## 2020-11-30 ENCOUNTER — LAB (OUTPATIENT)
Dept: LAB | Facility: HOSPITAL | Age: 60
End: 2020-11-30

## 2020-11-30 VITALS
HEIGHT: 63 IN | SYSTOLIC BLOOD PRESSURE: 132 MMHG | DIASTOLIC BLOOD PRESSURE: 72 MMHG | BODY MASS INDEX: 38.66 KG/M2 | WEIGHT: 218.2 LBS

## 2020-11-30 DIAGNOSIS — Z00.00 MEDICARE ANNUAL WELLNESS VISIT, INITIAL: Primary | ICD-10-CM

## 2020-11-30 DIAGNOSIS — G54.0 THORACIC OUTLET SYNDROME: ICD-10-CM

## 2020-11-30 PROCEDURE — G0438 PPPS, INITIAL VISIT: HCPCS | Performed by: NURSE PRACTITIONER

## 2020-11-30 PROCEDURE — 1160F RVW MEDS BY RX/DR IN RCRD: CPT | Performed by: NURSE PRACTITIONER

## 2020-11-30 PROCEDURE — 96160 PT-FOCUSED HLTH RISK ASSMT: CPT | Performed by: NURSE PRACTITIONER

## 2020-11-30 RX ORDER — GABAPENTIN 300 MG/1
300 CAPSULE ORAL 2 TIMES DAILY
Qty: 180 CAPSULE | Refills: 0 | Status: SHIPPED | OUTPATIENT
Start: 2020-11-30 | End: 2021-03-01 | Stop reason: SDUPTHER

## 2020-12-07 ENCOUNTER — OFFICE VISIT (OUTPATIENT)
Dept: SLEEP MEDICINE | Facility: HOSPITAL | Age: 60
End: 2020-12-07

## 2020-12-07 VITALS
DIASTOLIC BLOOD PRESSURE: 85 MMHG | HEIGHT: 63 IN | SYSTOLIC BLOOD PRESSURE: 129 MMHG | BODY MASS INDEX: 38.62 KG/M2 | OXYGEN SATURATION: 98 % | WEIGHT: 218 LBS | HEART RATE: 103 BPM

## 2020-12-07 DIAGNOSIS — G47.33 OBSTRUCTIVE SLEEP APNEA, ADULT: Primary | ICD-10-CM

## 2020-12-07 DIAGNOSIS — F51.04 PSYCHOPHYSIOLOGICAL INSOMNIA: ICD-10-CM

## 2020-12-07 DIAGNOSIS — G25.81 RESTLESS LEGS SYNDROME (RLS): ICD-10-CM

## 2020-12-07 PROCEDURE — 99213 OFFICE O/P EST LOW 20 MIN: CPT | Performed by: NURSE PRACTITIONER

## 2020-12-07 NOTE — PROGRESS NOTES
Sleep Clinic Follow Up    Date: 2020  Primary Care Physician: Gretchen Gauthier APRN    Last office visit: 09/10/2019 (I reviewed this note)    CC: Follow up: MAX on CPAP      Interim History:  Since the last visit:    1) mild MAX -  Lashaun Bernal has remained compliant with CPAP. She denies mask and machine issues, dry mouth, headaches, or pressures intolerance. She denies abnormal dreams, sleep paralysis, nasal congestion, URI sx.    Sleep Testin. PSG on 2019, AHI of 10, REM AHI of 28.7   2. Currently on 10 cm H2O    PAP Data:    Time frame: 2019-2020   Compliance: 97.2 %  Average use on days used: 9 hrs 25 min  Percent of days with usage greater than or equal to 4 hours: 97.2%  PAP range: 10 cm H2O  Average 90% pressure: 10 cmH2O  Leak: 0 minutes  Average AHI: 1.4 events/hr  Mask type: Nasal pillows  DME: [employer]    Bed time: 2230  Sleep latency: 60 minutes  Number of times awakens during the night: 0  Wake time: 5130-5122  Estimated total sleep time at night: 7-9 hours  Caffeine intake: 0 cups of coffee, 0 cups of tea, and 1 sodas per day  Alcohol intake: 0 drinks per week  Nap time: rare   Sleepiness with Driving: none     [place] - 6    2) Patient reports RLS symptoms 2-3 nights per week.    3) Insomnia - Still having trouble moreso falling asleep than staying asleep. Takes up to 1 hour at times. Sees therapist, she encouraged pet therapy. Patient got a dog who sleeps in the bed with her. She does not awaken her at night.    PMHx, FH, SH reviewed and pertinent changes are: Was hospitalized last year for chest pain, ended up having flu.       REVIEW OF SYSTEMS:   Negative for chest pain, SOA, fever, chills, cough, N/V/D, abdominal pain.    Smoking:none        Exam:  Vitals:    20 1125   BP: 129/85   Pulse: 103   SpO2: 98%           20  1125   Weight: 98.9 kg (218 lb)     Body mass index is 38.63 kg/m². Patient's Body mass index is 38.63 kg/m². BMI is above normal  "parameters. Recommendations include: referral to primary care.      Gen:                No distress, conversant, pleasant, appears stated age, alert, oriented  Eyes:               Anicteric sclera, moist conjunctiva, no lid lag                           PERRL, EOMI   Heent:             NC/AT                          Oropharynx clear                          Normal hearing  Lungs:             Normal effort, non-labored breathing                          Clear to auscultation bilaterally          CV:                  Normal S1/S2, no murmur                          No lower extremity edema  ABD:               Soft, rounded, non-distended                          Normal bowel sounds                    Psych:             Appropriate affect  Neuro:             CN 2-12 appear intact    Past Medical History:   Diagnosis Date   • Allergic rhinitis    • Arthritis    • Asthma    • Breast lump    • C. difficile diarrhea 2013   • Carotid artery disease (CMS/[facility])    • Chest pain    • Chronic low back pain    • Constipation    • Depressive disorder    • Diarrhea    • Diverticular disease of colon    • Epigastric pain    • DIETER (generalized anxiety disorder)    • GERD (gastroesophageal reflux disease)    • Head ache    • Hematochezia    • Herpes zoster     mid back, near spine   • Hypertension    • Periumbilical pain    • PONV (postoperative nausea and vomiting)    • Maurice Mountain spotted fever     pt states \"currently has it\"   • Sleep apnea     using c-pap   • SVT (supraventricular tachycardia) (CMS/[facility])    • Tachycardia 12/012019    spent 4 days in [facility] in January for this.        Current Outpatient Medications:   •  albuterol (PROVENTIL HFA;VENTOLIN HFA) 108 (90 Base) MCG/ACT inhaler, Inhale 2 puffs Every 4 (Four) Hours As Needed for Wheezing., Disp: 18 g, Rfl: 2  •  albuterol (PROVENTIL) (2.5 MG/3ML) 0.083% nebulizer solution, Take 2.5 mg by nebulization Every 6 (Six) Hours As Needed for Wheezing., Disp: 120 vial, " Rfl: 2  •  aspirin 81 MG EC tablet, Take 81 mg by mouth Daily., Disp: , Rfl:   •  clonazePAM (KlonoPIN) 0.5 MG tablet, Take 0.5 mg by mouth 2 (Two) Times a Day As Needed for Anxiety., Disp: , Rfl:   •  clopidogrel (PLAVIX) 75 MG tablet, Take 75 mg by mouth Daily., Disp: , Rfl:   •  cyclobenzaprine (FLEXERIL) 10 MG tablet, TAKE 1 TABLET BY MOUTH 3 (THREE) TIMES A DAY AS NEEDED FOR MUSCLE SPASMS., Disp: 270 tablet, Rfl: 1  •  dilTIAZem (CARDIZEM) 30 MG tablet, Take 30 mg by mouth 2 (Two) Times a Day., Disp: , Rfl:   •  estradiol (ESTRACE) 1 MG tablet, Take 1 tablet by mouth Daily., Disp: 90 tablet, Rfl: 2  •  FLUoxetine (PROzac) 40 MG capsule, Take 40 mg by mouth Daily., Disp: , Rfl:   •  furosemide (LASIX) 40 MG tablet, Take 40 mg by mouth Daily., Disp: , Rfl:   •  gabapentin (NEURONTIN) 300 MG capsule, Take 1 capsule by mouth 2 (Two) Times a Day., Disp: 180 capsule, Rfl: 0  •  hydroxychloroquine (PLAQUENIL) 200 MG tablet, Take 200 mg by mouth Daily., Disp: , Rfl:   •  magnesium oxide (MAGOX) 400 (241.3 Mg) MG tablet tablet, Take 400 mg by mouth 2 (Two) Times a Day., Disp: , Rfl:   •  metoprolol tartrate (LOPRESSOR) 25 MG tablet, Take 12.5 mg by mouth 2 (Two) Times a Day., Disp: , Rfl:   •  omeprazole (priLOSEC) 20 MG capsule, Take 20 mg by mouth 2 (two) times a day., Disp: , Rfl:   •  primidone (MYSOLINE) 50 MG tablet, TAKE 1 TABLET AT BEDTIME, Disp: 90 tablet, Rfl: 0  •  spironolactone (ALDACTONE) 25 MG tablet, Take 25 mg by mouth Daily., Disp: , Rfl:       Assessment and Plan:    1. Obstructive sleep apnea Established, stable (1)  1. Compliant with PAP therapy  2. Continue PAP as prescribed  3. Script for PAP supplies  4. Return to clinic in 1 year with compliance report unless change in symptoms in interim period  2. Restless leg syndrome/ /Cruz-Ekbom disease (RLS/WED) self-limited or minor problem  3. Insomnia - sleep onset Established, stable (1)   1. Recommend CBT-I  karlo  2. Discuss with  therapist        8 of 15 minutes spent face-to-face counseling patient extensively regarding:   PAP therapy, PAP compliance, PAP maintenance and Sleep hygiene      RTC in 12 months. Patient agrees to return sooner if changes in symptoms.        This document has been electronically signed by MAYELA Fatima on December 7, 2020 11:29 CST          CC: Gretchen Gauthier APRN          No ref. provider found   DISPLAY PLAN FREE TEXT

## 2021-01-06 RX ORDER — ESTRADIOL 1 MG/1
TABLET ORAL
Qty: 90 TABLET | Refills: 2 | Status: SHIPPED | OUTPATIENT
Start: 2021-01-06 | End: 2021-08-30

## 2021-01-11 ENCOUNTER — OFFICE VISIT (OUTPATIENT)
Dept: CARDIAC SURGERY | Facility: CLINIC | Age: 61
End: 2021-01-11

## 2021-01-11 VITALS
HEIGHT: 63 IN | SYSTOLIC BLOOD PRESSURE: 128 MMHG | OXYGEN SATURATION: 98 % | DIASTOLIC BLOOD PRESSURE: 78 MMHG | BODY MASS INDEX: 39.69 KG/M2 | WEIGHT: 224 LBS | TEMPERATURE: 98.6 F | HEART RATE: 86 BPM

## 2021-01-11 DIAGNOSIS — I65.23 BILATERAL CAROTID ARTERY STENOSIS: Primary | ICD-10-CM

## 2021-01-11 DIAGNOSIS — I10 BENIGN ESSENTIAL HYPERTENSION: Chronic | ICD-10-CM

## 2021-01-11 DIAGNOSIS — E66.01 CLASS 2 SEVERE OBESITY DUE TO EXCESS CALORIES WITH SERIOUS COMORBIDITY AND BODY MASS INDEX (BMI) OF 39.0 TO 39.9 IN ADULT (HCC): ICD-10-CM

## 2021-01-11 DIAGNOSIS — N18.2 CHRONIC KIDNEY DISEASE, STAGE 2 (MILD): ICD-10-CM

## 2021-01-11 DIAGNOSIS — M79.7 FIBROMYALGIA: ICD-10-CM

## 2021-01-11 PROCEDURE — 99214 OFFICE O/P EST MOD 30 MIN: CPT | Performed by: THORACIC SURGERY (CARDIOTHORACIC VASCULAR SURGERY)

## 2021-01-11 NOTE — PROGRESS NOTES
1/11/2021    Lashaun Bernal  1960    Chief Complaint:    Chief Complaint   Patient presents with   • Carotid Artery Disease       HPI:      PCP:  Gretchen Gauthier, MAYELA  Neurology:  Dr Dos Santos      60y.o. female with HTN(stable, increased risk stroke, rupture), Hyperlipidemia(stable, increased risk cardiovascular events), Obesity(uncontrolled, increased risk cardiovascular events) and Chronic Kidney Disease(stable, increased risk renal failure) , neuropathy, radiculopathy(new, possible thoracic outlet).  never smoked.  no new problems. Mild-moderate weakness both arms x 6 months.  Difficulty drying hair.  No pain, numbness arms, hands..  Dx treated RMSF 7/2019..numbness face resolved.  Neck pain cervical disk protrusion (L>R). Asymptomatic carotid stenosis.  Mild to moderate vertigo, tremor RIGHT hand x 6months.No TIA stroke amaurosis.  No MI claudication. No other associated signs, symptoms or modifying factors.     5/2019 Carotid Duplex:  MARIANO 0-49% (118cm/s, ratio 1.4).  LICA 50-69% (129cm/s, ratio 1.3).   Antegrade verts.  1/2020 Carotid Duplex:  MARIANO 50-69% (136/46cm/s, ratio 1.8.  LICA 50-69% (157/40cm/s, ratio 2.1).   Antegrade verts.  1/2021 Carotid Duplex:  MARIANO 0-49% (105/37cm/s, ratio 0.69).  LICA 50-69% (147/51cm/s, ratio 1.4).   Antegrade verts.     5/2019 Nerve conduction study:  C5/6 nerve root irritation without acute denervation.  7/2019 CXR:  No cervical rib noted  10/2019 Upper extremity arterial duplex with stress:  RBI:  RIGHT 1.2 triphasic, minimal dampening  position.  LEFT 1.2 triphasic, minimal dampening  position.  All other provokative positions show no dampening.     10/2014 Stress Test:  No ischemic changes.  Low risk study.  6/2019 ECG:  NSR 77, QTc 434    The following portions of the patient's history were reviewed and updated as appropriate: allergies, current medications, past family history, past medical history, past social history, past surgical history and  "problem list.  Recent images independently reviewed.  Available laboratory values reviewed.    PMH:  Past Medical History:   Diagnosis Date   • Allergic rhinitis    • Arthritis    • Asthma    • Breast lump    • C. difficile diarrhea 2013   • Carotid artery disease (CMS/[facility])    • Chest pain    • Chronic low back pain    • Constipation    • Depressive disorder    • Diarrhea    • Diverticular disease of colon    • Epigastric pain    • DIETER (generalized anxiety disorder)    • GERD (gastroesophageal reflux disease)    • Head ache    • Hematochezia    • Herpes zoster     mid back, near spine   • Hypertension    • Periumbilical pain    • PONV (postoperative nausea and vomiting)    • Maurice Mountain spotted fever     pt states \"currently has it\"   • Sleep apnea     using c-pap   • SVT (supraventricular tachycardia) (CMS/[facility])    • Tachycardia 12/012019    spent 4 days in [facility] in January for this.      Past Surgical History:   Procedure Laterality Date   • ABDOMINOPLASTY  12/22/2004   • AUGMENTATION MAMMAPLASTY     • BREAST AUGMENTATION BILATERAL MASTOPEXY     • BUNIONECTOMY Left 3/20/2017    Procedure: EXCISION OF CALCANEAL SPURS LEFT FOOT AND REATTACHMENT OF ACHILLES TENDON ;  Surgeon: Jarrell Mar MD;  Location: [facility] OR;  Service:    • COLONOSCOPY  01/23/2015   • COLONOSCOPY N/A 2/24/2020    Procedure: COLONOSCOPY;  Surgeon: Santhosh Patrick MD;  Location: [facility] ENDOSCOPY;  Service: Gastroenterology;  Laterality: N/A;   • CYST REMOVAL  08/18/1961    Excision of sebaceous cyst. Left ear   • FINGER/THUMB LESION/CYST EXCISION  10/29/2014   • HAND SURGERY  06/03/2013   • HYSTERECTOMY     • OOPHORECTOMY     • ORIF ULNA/RADIUS FRACTURES  07/22/2013   • TUBAL ABDOMINAL LIGATION  08/14/1985    Laparoscopic tubal sterilization; dilatation and curettage. Desires tubal ligation;       Family History   Problem Relation Age of Onset   • Diabetes Other    • Heart disease Other    • Hypertension Other    • Heart disease " Mother         Myocardial infarct   • Hypertension Mother    • Diabetes Mother    • Heart disease Father         CHF     Social History     Tobacco Use   • Smoking status: Never Smoker   • Smokeless tobacco: Never Used   Substance Use Topics   • Alcohol use: No   • Drug use: No       ALLERGIES:  Allergies   Allergen Reactions   • Cefprozil Nausea And Vomiting   • Penicillins Rash         MEDICATIONS:    Current Outpatient Medications:   •  albuterol (PROVENTIL HFA;VENTOLIN HFA) 108 (90 Base) MCG/ACT inhaler, Inhale 2 puffs Every 4 (Four) Hours As Needed for Wheezing., Disp: 18 g, Rfl: 2  •  albuterol (PROVENTIL) (2.5 MG/3ML) 0.083% nebulizer solution, Take 2.5 mg by nebulization Every 6 (Six) Hours As Needed for Wheezing., Disp: 120 vial, Rfl: 2  •  aspirin 81 MG EC tablet, Take 81 mg by mouth Daily., Disp: , Rfl:   •  clonazePAM (KlonoPIN) 0.5 MG tablet, Take 0.5 mg by mouth 2 (Two) Times a Day As Needed for Anxiety., Disp: , Rfl:   •  clopidogrel (PLAVIX) 75 MG tablet, Take 75 mg by mouth Daily., Disp: , Rfl:   •  cyclobenzaprine (FLEXERIL) 10 MG tablet, TAKE 1 TABLET BY MOUTH 3 (THREE) TIMES A DAY AS NEEDED FOR MUSCLE SPASMS., Disp: 270 tablet, Rfl: 1  •  dilTIAZem (CARDIZEM) 30 MG tablet, Take 30 mg by mouth 2 (Two) Times a Day., Disp: , Rfl:   •  estradiol (ESTRACE) 1 MG tablet, TAKE 1 TABLET EVERY DAY, Disp: 90 tablet, Rfl: 2  •  FLUoxetine (PROzac) 40 MG capsule, Take 40 mg by mouth Daily., Disp: , Rfl:   •  furosemide (LASIX) 40 MG tablet, Take 40 mg by mouth Daily., Disp: , Rfl:   •  gabapentin (NEURONTIN) 300 MG capsule, Take 1 capsule by mouth 2 (Two) Times a Day., Disp: 180 capsule, Rfl: 0  •  hydroxychloroquine (PLAQUENIL) 200 MG tablet, Take 200 mg by mouth Daily., Disp: , Rfl:   •  magnesium oxide (MAGOX) 400 (241.3 Mg) MG tablet tablet, Take 400 mg by mouth 2 (Two) Times a Day., Disp: , Rfl:   •  metoprolol tartrate (LOPRESSOR) 25 MG tablet, Take 12.5 mg by mouth 2 (Two) Times a Day., Disp: , Rfl:  "  •  omeprazole (priLOSEC) 20 MG capsule, Take 20 mg by mouth 2 (two) times a day., Disp: , Rfl:   •  primidone (MYSOLINE) 50 MG tablet, TAKE 1 TABLET AT BEDTIME, Disp: 90 tablet, Rfl: 0  •  spironolactone (ALDACTONE) 25 MG tablet, Take 25 mg by mouth Daily., Disp: , Rfl:     Review of Systems   Review of Systems   Constitution: Positive for malaise/fatigue and weight gain. Negative for weight loss.   Cardiovascular: Negative for chest pain, claudication and dyspnea on exertion.   Respiratory: Positive for shortness of breath and sleep disturbances due to breathing. Negative for cough.    Skin: Negative for color change and poor wound healing.   Musculoskeletal: Positive for muscle weakness.   Neurological: Negative for dizziness, numbness and weakness.   Psychiatric/Behavioral: Positive for depression. The patient is nervous/anxious.        Physical Exam   Vitals:    01/11/21 1323   BP: 128/78   BP Location: Left arm   Patient Position: Sitting   Cuff Size: Adult   Pulse: 86   Temp: 98.6 °F (37 °C)   TempSrc: Temporal   SpO2: 98%   Weight: 102 kg (224 lb)   Height: 160 cm (63\")     Physical Exam  Constitutional:       General: She is not in acute distress.     Appearance: She is not ill-appearing.   HENT:      Right Ear: Hearing normal.      Left Ear: Hearing normal.      Nose: No nasal deformity.      Mouth/Throat:      Dentition: Normal dentition. Does not have dentures.   Cardiovascular:      Rate and Rhythm: Normal rate and regular rhythm.      Pulses:           Carotid pulses are 2+ on the right side and 2+ on the left side with bruit.       Radial pulses are 2+ on the right side and 2+ on the left side.        Dorsalis pedis pulses are 2+ on the right side and 2+ on the left side.        Posterior tibial pulses are 2+ on the right side and 2+ on the left side.      Heart sounds: No murmur.   Pulmonary:      Effort: Pulmonary effort is normal.      Breath sounds: Normal breath sounds.   Abdominal:      " General: There is no distension.      Palpations: Abdomen is soft. There is no mass.      Tenderness: There is no abdominal tenderness.   Musculoskeletal:         General: No deformity.      Comments: Gait normal.    Skin:     General: Skin is warm and dry.      Coloration: Skin is not pale.      Findings: No erythema.      Comments: No venous staining   Neurological:      Mental Status: She is alert and oriented to person, place, and time.   Psychiatric:         Speech: Speech normal.         Behavior: Behavior is cooperative.         Thought Content: Thought content normal.         Judgment: Judgment normal.         BUN   Date Value Ref Range Status   08/21/2020 19 6 - 20 mg/dL Final   02/13/2018 19 (H) 7 - 18 mg/dL Final     Creatinine   Date Value Ref Range Status   08/21/2020 1.07 (H) 0.57 - 1.00 mg/dL Final   02/13/2018 0.9 0.6 - 1.3 mg/dL Final     eGFR Non  Amer   Date Value Ref Range Status   08/21/2020 52 (L) >60 mL/min/1.73 Final       ASSESSMENT:  Diagnoses and all orders for this visit:    1. Bilateral carotid artery stenosis (Primary)  -     Duplex Carotid Ultrasound CAR; Future    2. Benign essential hypertension    3. Chronic kidney disease, stage 2 (mild)    4. Class 2 severe obesity due to excess calories with serious comorbidity and body mass index (BMI) of 39.0 to 39.9 in adult (CMS/HCC)    5. Fibromyalgia    PLAN:  Detailed discussion with Lashaun Bernal regarding situation and options.  Stable carotid stenosis..  Multiple risk factors with severe comorbidities.  No intervention indicated at this time.  Will follow with interval imaging.  Risks, benefits discussed.  Understands and wishes to proceed with plan.    Return in 1 year with carotid duplex    Return after above studies complete  Obesity Class  2. Increased risk cardiovascular events, sleep and breathing disorders, joint issues, type 2 diabetes mellitus. Options for weight management, heart healthy diet, exercise programs, and  associated health risks of obesity discussed.  Recommended regular physical activity, progressive walking program.  Continue current medications as directed.    Thank you for the opportunity to participate in this patient's care.    Copy to primary care provider.

## 2021-01-12 ENCOUNTER — OFFICE VISIT (OUTPATIENT)
Dept: FAMILY MEDICINE CLINIC | Facility: CLINIC | Age: 61
End: 2021-01-12

## 2021-01-12 VITALS
SYSTOLIC BLOOD PRESSURE: 126 MMHG | WEIGHT: 220.5 LBS | BODY MASS INDEX: 39.07 KG/M2 | DIASTOLIC BLOOD PRESSURE: 76 MMHG | HEART RATE: 82 BPM | HEIGHT: 63 IN | OXYGEN SATURATION: 98 %

## 2021-01-12 DIAGNOSIS — L98.491 SKIN ULCER, LIMITED TO BREAKDOWN OF SKIN (HCC): Primary | ICD-10-CM

## 2021-01-12 PROCEDURE — 99213 OFFICE O/P EST LOW 20 MIN: CPT | Performed by: NURSE PRACTITIONER

## 2021-01-12 NOTE — PROGRESS NOTES
"Subjective   Lashaun Bernal is a 60 y.o. female.  Had an abdominoplasty back in 2003.  Approximately 1 week ago the incision \"opened up and has been draining.\"  Seizure was performed by Dr. Castro who is no longer associated with this area.    HPI: Skin ulceration.  Chronicity is new.  Onset more than a week ago.  Progression since onset is unchanged.  Associated symptoms include drainage and redness.  No treatments tried.       The following portions of the patient's history were reviewed and updated as appropriate:     Current Outpatient Medications   Medication Sig Dispense Refill   • albuterol (PROVENTIL HFA;VENTOLIN HFA) 108 (90 Base) MCG/ACT inhaler Inhale 2 puffs Every 4 (Four) Hours As Needed for Wheezing. 18 g 2   • albuterol (PROVENTIL) (2.5 MG/3ML) 0.083% nebulizer solution Take 2.5 mg by nebulization Every 6 (Six) Hours As Needed for Wheezing. 120 vial 2   • aspirin 81 MG EC tablet Take 81 mg by mouth Daily.     • clonazePAM (KlonoPIN) 0.5 MG tablet Take 0.5 mg by mouth 2 (Two) Times a Day As Needed for Anxiety.     • clopidogrel (PLAVIX) 75 MG tablet Take 75 mg by mouth Daily.     • cyclobenzaprine (FLEXERIL) 10 MG tablet TAKE 1 TABLET BY MOUTH 3 (THREE) TIMES A DAY AS NEEDED FOR MUSCLE SPASMS. 270 tablet 1   • dilTIAZem (CARDIZEM) 30 MG tablet Take 30 mg by mouth 2 (Two) Times a Day.     • estradiol (ESTRACE) 1 MG tablet TAKE 1 TABLET EVERY DAY 90 tablet 2   • FLUoxetine (PROzac) 40 MG capsule Take 40 mg by mouth Daily.     • furosemide (LASIX) 40 MG tablet Take 40 mg by mouth Daily.     • gabapentin (NEURONTIN) 300 MG capsule Take 1 capsule by mouth 2 (Two) Times a Day. 180 capsule 0   • hydroxychloroquine (PLAQUENIL) 200 MG tablet Take 200 mg by mouth Daily.     • magnesium oxide (MAGOX) 400 (241.3 Mg) MG tablet tablet Take 400 mg by mouth 2 (Two) Times a Day.     • metoprolol tartrate (LOPRESSOR) 25 MG tablet Take 12.5 mg by mouth 2 (Two) Times a Day.     • omeprazole (priLOSEC) 20 MG capsule " Take 20 mg by mouth 2 (two) times a day.     • primidone (MYSOLINE) 50 MG tablet TAKE 1 TABLET AT BEDTIME 90 tablet 0   • spironolactone (ALDACTONE) 25 MG tablet Take 25 mg by mouth Daily.     • mupirocin (Bactroban) 2 % ointment Apply  topically to the appropriate area as directed 3 (Three) Times a Day. 15 g 0     No current facility-administered medications for this visit.      Current Outpatient Medications on File Prior to Visit   Medication Sig   • albuterol (PROVENTIL HFA;VENTOLIN HFA) 108 (90 Base) MCG/ACT inhaler Inhale 2 puffs Every 4 (Four) Hours As Needed for Wheezing.   • albuterol (PROVENTIL) (2.5 MG/3ML) 0.083% nebulizer solution Take 2.5 mg by nebulization Every 6 (Six) Hours As Needed for Wheezing.   • aspirin 81 MG EC tablet Take 81 mg by mouth Daily.   • clonazePAM (KlonoPIN) 0.5 MG tablet Take 0.5 mg by mouth 2 (Two) Times a Day As Needed for Anxiety.   • clopidogrel (PLAVIX) 75 MG tablet Take 75 mg by mouth Daily.   • cyclobenzaprine (FLEXERIL) 10 MG tablet TAKE 1 TABLET BY MOUTH 3 (THREE) TIMES A DAY AS NEEDED FOR MUSCLE SPASMS.   • dilTIAZem (CARDIZEM) 30 MG tablet Take 30 mg by mouth 2 (Two) Times a Day.   • estradiol (ESTRACE) 1 MG tablet TAKE 1 TABLET EVERY DAY   • FLUoxetine (PROzac) 40 MG capsule Take 40 mg by mouth Daily.   • furosemide (LASIX) 40 MG tablet Take 40 mg by mouth Daily.   • gabapentin (NEURONTIN) 300 MG capsule Take 1 capsule by mouth 2 (Two) Times a Day.   • hydroxychloroquine (PLAQUENIL) 200 MG tablet Take 200 mg by mouth Daily.   • magnesium oxide (MAGOX) 400 (241.3 Mg) MG tablet tablet Take 400 mg by mouth 2 (Two) Times a Day.   • metoprolol tartrate (LOPRESSOR) 25 MG tablet Take 12.5 mg by mouth 2 (Two) Times a Day.   • omeprazole (priLOSEC) 20 MG capsule Take 20 mg by mouth 2 (two) times a day.   • primidone (MYSOLINE) 50 MG tablet TAKE 1 TABLET AT BEDTIME   • spironolactone (ALDACTONE) 25 MG tablet Take 25 mg by mouth Daily.     No current facility-administered  medications on file prior to visit.      She is allergic to cefprozil and penicillins..    Review of Systems   Constitutional: Negative for chills, diaphoresis, fatigue and fever.   HENT: Negative.  Negative for sore throat.    Respiratory: Negative.  Negative for cough and shortness of breath.    Cardiovascular: Negative.    Skin: Positive for wound.       Objective   Physical Exam  Vitals signs and nursing note reviewed.   Constitutional:       Appearance: She is well-developed.   Neck:      Musculoskeletal: Normal range of motion and neck supple.   Cardiovascular:      Rate and Rhythm: Normal rate and regular rhythm.      Heart sounds: Normal heart sounds.   Pulmonary:      Effort: Pulmonary effort is normal.      Breath sounds: Normal breath sounds.   Abdominal:       Musculoskeletal: Normal range of motion.   Skin:     General: Skin is warm.   Neurological:      Mental Status: She is alert and oriented to person, place, and time.   Psychiatric:         Behavior: Behavior normal.         Assessment/Plan   Diagnoses and all orders for this visit:    1. Skin ulcer, limited to breakdown of skin (CMS/HCC) (Primary)  -     mupirocin (Bactroban) 2 % ointment; Apply  topically to the appropriate area as directed 3 (Three) Times a Day.  Dispense: 15 g; Refill: 0    Cleanse area thoroughly with antibacterial soap and water and pat dry prior to application of mupirocin  Encouraged to apply 2 x 2 to area to help reduce chafing  Discussed at length how weight loss would help to reduce chafing of skin folds  Encouraged to seek emergency medical treatment for any new or worsening pain, redness, warmth, drainage or other signs of infection    Continue on current medications as previously prescribed   Return if symptoms worsen or fail to improve, for Next scheduled follow up.        This document has been electronically signed by MAYELA Flores on January 12, 2021 11:09 CST

## 2021-02-01 DIAGNOSIS — L98.491 SKIN ULCER, LIMITED TO BREAKDOWN OF SKIN (HCC): ICD-10-CM

## 2021-02-17 ENCOUNTER — TRANSCRIBE ORDERS (OUTPATIENT)
Dept: LAB | Facility: HOSPITAL | Age: 61
End: 2021-02-17

## 2021-02-17 ENCOUNTER — LAB (OUTPATIENT)
Dept: LAB | Facility: HOSPITAL | Age: 61
End: 2021-02-17

## 2021-02-17 DIAGNOSIS — N18.30 STAGE 3 CHRONIC KIDNEY DISEASE, UNSPECIFIED WHETHER STAGE 3A OR 3B CKD (HCC): Primary | ICD-10-CM

## 2021-02-17 LAB
ALBUMIN SERPL-MCNC: 3.8 G/DL (ref 3.5–5.2)
ANION GAP SERPL CALCULATED.3IONS-SCNC: 9.6 MMOL/L (ref 5–15)
BUN SERPL-MCNC: 19 MG/DL (ref 8–23)
BUN/CREAT SERPL: 15.7 (ref 7–25)
CALCIUM SPEC-SCNC: 9.4 MG/DL (ref 8.6–10.5)
CHLORIDE SERPL-SCNC: 103 MMOL/L (ref 98–107)
CO2 SERPL-SCNC: 25.4 MMOL/L (ref 22–29)
CREAT SERPL-MCNC: 1.21 MG/DL (ref 0.57–1)
GFR SERPL CREATININE-BSD FRML MDRD: 45 ML/MIN/1.73
GLUCOSE SERPL-MCNC: 162 MG/DL (ref 65–99)
PHOSPHATE SERPL-MCNC: 3.2 MG/DL (ref 2.5–4.5)
POTASSIUM SERPL-SCNC: 4.8 MMOL/L (ref 3.5–5.2)
SODIUM SERPL-SCNC: 138 MMOL/L (ref 136–145)

## 2021-02-17 PROCEDURE — 80069 RENAL FUNCTION PANEL: CPT | Performed by: INTERNAL MEDICINE

## 2021-02-17 PROCEDURE — 36415 COLL VENOUS BLD VENIPUNCTURE: CPT | Performed by: INTERNAL MEDICINE

## 2021-02-23 ENCOUNTER — IMMUNIZATION (OUTPATIENT)
Dept: VACCINE CLINIC | Facility: HOSPITAL | Age: 61
End: 2021-02-23

## 2021-02-23 PROCEDURE — 91300 HC SARSCOV02 VAC 30MCG/0.3ML IM: CPT | Performed by: THORACIC SURGERY (CARDIOTHORACIC VASCULAR SURGERY)

## 2021-02-23 PROCEDURE — 0001A: CPT | Performed by: THORACIC SURGERY (CARDIOTHORACIC VASCULAR SURGERY)

## 2021-02-26 ENCOUNTER — LAB (OUTPATIENT)
Dept: LAB | Facility: HOSPITAL | Age: 61
End: 2021-02-26

## 2021-02-26 DIAGNOSIS — K76.0 FATTY LIVER: ICD-10-CM

## 2021-02-26 DIAGNOSIS — R74.8 ELEVATED LIVER ENZYMES: ICD-10-CM

## 2021-02-26 LAB
ALBUMIN SERPL-MCNC: 3.8 G/DL (ref 3.5–5.2)
ALBUMIN/GLOB SERPL: 1.4 G/DL
ALP SERPL-CCNC: 84 U/L (ref 39–117)
ALT SERPL W P-5'-P-CCNC: 22 U/L (ref 1–33)
ANION GAP SERPL CALCULATED.3IONS-SCNC: 10.5 MMOL/L (ref 5–15)
AST SERPL-CCNC: 21 U/L (ref 1–32)
BILIRUB SERPL-MCNC: 0.3 MG/DL (ref 0–1.2)
BUN SERPL-MCNC: 17 MG/DL (ref 8–23)
BUN/CREAT SERPL: 15.3 (ref 7–25)
CALCIUM SPEC-SCNC: 8.9 MG/DL (ref 8.6–10.5)
CHLORIDE SERPL-SCNC: 104 MMOL/L (ref 98–107)
CO2 SERPL-SCNC: 23.5 MMOL/L (ref 22–29)
CREAT SERPL-MCNC: 1.11 MG/DL (ref 0.57–1)
GFR SERPL CREATININE-BSD FRML MDRD: 50 ML/MIN/1.73
GLOBULIN UR ELPH-MCNC: 2.8 GM/DL
GLUCOSE SERPL-MCNC: 117 MG/DL (ref 65–99)
INR PPP: 0.94 (ref 0.8–1.2)
POTASSIUM SERPL-SCNC: 4.6 MMOL/L (ref 3.5–5.2)
PROT SERPL-MCNC: 6.6 G/DL (ref 6–8.5)
PROTHROMBIN TIME: 12.9 SECONDS (ref 11.1–15.3)
SODIUM SERPL-SCNC: 138 MMOL/L (ref 136–145)

## 2021-02-26 PROCEDURE — 36415 COLL VENOUS BLD VENIPUNCTURE: CPT

## 2021-02-26 PROCEDURE — 85610 PROTHROMBIN TIME: CPT

## 2021-02-26 PROCEDURE — 80053 COMPREHEN METABOLIC PANEL: CPT

## 2021-03-01 ENCOUNTER — LAB (OUTPATIENT)
Dept: LAB | Facility: HOSPITAL | Age: 61
End: 2021-03-01

## 2021-03-01 ENCOUNTER — OFFICE VISIT (OUTPATIENT)
Dept: FAMILY MEDICINE CLINIC | Facility: CLINIC | Age: 61
End: 2021-03-01

## 2021-03-01 VITALS
SYSTOLIC BLOOD PRESSURE: 124 MMHG | HEIGHT: 63 IN | BODY MASS INDEX: 38.96 KG/M2 | WEIGHT: 219.9 LBS | DIASTOLIC BLOOD PRESSURE: 72 MMHG

## 2021-03-01 DIAGNOSIS — M79.7 FIBROMYALGIA: ICD-10-CM

## 2021-03-01 DIAGNOSIS — Z79.899 HIGH RISK MEDICATION USE: ICD-10-CM

## 2021-03-01 DIAGNOSIS — F32.A DEPRESSIVE DISORDER: ICD-10-CM

## 2021-03-01 DIAGNOSIS — I10 BENIGN ESSENTIAL HYPERTENSION: Primary | Chronic | ICD-10-CM

## 2021-03-01 LAB
AMPHET+METHAMPHET UR QL: NEGATIVE
AMPHETAMINES UR QL: NEGATIVE
BARBITURATES UR QL SCN: NEGATIVE
BENZODIAZ UR QL SCN: NEGATIVE
BUPRENORPHINE SERPL-MCNC: NEGATIVE NG/ML
CANNABINOIDS SERPL QL: NEGATIVE
COCAINE UR QL: NEGATIVE
METHADONE UR QL SCN: NEGATIVE
OPIATES UR QL: NEGATIVE
OXYCODONE UR QL SCN: NEGATIVE
PCP UR QL SCN: NEGATIVE
PROPOXYPH UR QL: NEGATIVE
TRICYCLICS UR QL SCN: NEGATIVE

## 2021-03-01 PROCEDURE — 99213 OFFICE O/P EST LOW 20 MIN: CPT | Performed by: NURSE PRACTITIONER

## 2021-03-01 PROCEDURE — 80306 DRUG TEST PRSMV INSTRMNT: CPT

## 2021-03-01 RX ORDER — GABAPENTIN 300 MG/1
300 CAPSULE ORAL 2 TIMES DAILY
Qty: 180 CAPSULE | Refills: 0 | Status: SHIPPED | OUTPATIENT
Start: 2021-03-01 | End: 2021-09-15 | Stop reason: SDUPTHER

## 2021-03-01 NOTE — PROGRESS NOTES
Subjective   Lashaun Bernal is a 60 y.o. female.  3-month follow-up for high blood pressure, fibromyalgia and depression     Hypertension  This is a chronic problem. The current episode started more than 1 year ago. The problem is unchanged. The problem is controlled. Associated symptoms include peripheral edema. Risk factors for coronary artery disease include dyslipidemia, family history, obesity, post-menopausal state, sedentary lifestyle and stress. Current antihypertension treatment includes lifestyle changes. Compliance problems include exercise and diet.    Fibromyalgia  This is a chronic problem. The current episode started more than 1 year ago. The problem occurs constantly. The problem has been gradually improving. Associated symptoms include arthralgias. Pertinent negatives include no chills, coughing, diaphoresis, fatigue, fever or sore throat. Nothing aggravates the symptoms. Treatments tried: Cymbalta. The treatment provided mild relief.   Depression  Visit Type: follow-up  Patient presents with the following symptoms: decreased concentration, depressed mood, malaise and restlessness.  Patient is not experiencing: confusion.  Frequency of symptoms: occasionally   Severity: mild   Sleep quality: fair  Nighttime awakenings: occasional  Compliance with medications:  %             The following portions of the patient's history were reviewed and updated as appropriate:   Current Outpatient Medications   Medication Sig Dispense Refill   • albuterol (PROVENTIL HFA;VENTOLIN HFA) 108 (90 Base) MCG/ACT inhaler Inhale 2 puffs Every 4 (Four) Hours As Needed for Wheezing. 18 g 2   • albuterol (PROVENTIL) (2.5 MG/3ML) 0.083% nebulizer solution Take 2.5 mg by nebulization Every 6 (Six) Hours As Needed for Wheezing. 120 vial 2   • aspirin 81 MG EC tablet Take 81 mg by mouth Daily.     • clonazePAM (KlonoPIN) 0.5 MG tablet Take 0.5 mg by mouth 2 (Two) Times a Day As Needed for Anxiety.     • clopidogrel  (PLAVIX) 75 MG tablet Take 75 mg by mouth Daily.     • cyclobenzaprine (FLEXERIL) 10 MG tablet TAKE 1 TABLET BY MOUTH 3 (THREE) TIMES A DAY AS NEEDED FOR MUSCLE SPASMS. 270 tablet 1   • dilTIAZem (CARDIZEM) 30 MG tablet Take 30 mg by mouth 2 (Two) Times a Day.     • estradiol (ESTRACE) 1 MG tablet TAKE 1 TABLET EVERY DAY 90 tablet 2   • FLUoxetine (PROzac) 40 MG capsule Take 40 mg by mouth Daily.     • furosemide (LASIX) 40 MG tablet Take 40 mg by mouth Daily.     • gabapentin (NEURONTIN) 300 MG capsule Take 1 capsule by mouth 2 (Two) Times a Day. 180 capsule 0   • hydroxychloroquine (PLAQUENIL) 200 MG tablet Take 200 mg by mouth Daily.     • magnesium oxide (MAGOX) 400 (241.3 Mg) MG tablet tablet Take 400 mg by mouth 2 (Two) Times a Day.     • metoprolol tartrate (LOPRESSOR) 25 MG tablet Take 12.5 mg by mouth 2 (Two) Times a Day.     • mupirocin (BACTROBAN) 2 % ointment APPLY TOPICALLY TO THE APPROPRIATE AREA AS DIRECTED 3 TIMES A DAY. 22 g 1   • omeprazole (priLOSEC) 20 MG capsule Take 20 mg by mouth 2 (two) times a day.     • spironolactone (ALDACTONE) 25 MG tablet Take 25 mg by mouth Daily.       No current facility-administered medications for this visit.      Current Outpatient Medications on File Prior to Visit   Medication Sig   • albuterol (PROVENTIL HFA;VENTOLIN HFA) 108 (90 Base) MCG/ACT inhaler Inhale 2 puffs Every 4 (Four) Hours As Needed for Wheezing.   • albuterol (PROVENTIL) (2.5 MG/3ML) 0.083% nebulizer solution Take 2.5 mg by nebulization Every 6 (Six) Hours As Needed for Wheezing.   • aspirin 81 MG EC tablet Take 81 mg by mouth Daily.   • clonazePAM (KlonoPIN) 0.5 MG tablet Take 0.5 mg by mouth 2 (Two) Times a Day As Needed for Anxiety.   • clopidogrel (PLAVIX) 75 MG tablet Take 75 mg by mouth Daily.   • cyclobenzaprine (FLEXERIL) 10 MG tablet TAKE 1 TABLET BY MOUTH 3 (THREE) TIMES A DAY AS NEEDED FOR MUSCLE SPASMS.   • dilTIAZem (CARDIZEM) 30 MG tablet Take 30 mg by mouth 2 (Two) Times a Day.   •  "estradiol (ESTRACE) 1 MG tablet TAKE 1 TABLET EVERY DAY   • FLUoxetine (PROzac) 40 MG capsule Take 40 mg by mouth Daily.   • furosemide (LASIX) 40 MG tablet Take 40 mg by mouth Daily.   • hydroxychloroquine (PLAQUENIL) 200 MG tablet Take 200 mg by mouth Daily.   • magnesium oxide (MAGOX) 400 (241.3 Mg) MG tablet tablet Take 400 mg by mouth 2 (Two) Times a Day.   • metoprolol tartrate (LOPRESSOR) 25 MG tablet Take 12.5 mg by mouth 2 (Two) Times a Day.   • mupirocin (BACTROBAN) 2 % ointment APPLY TOPICALLY TO THE APPROPRIATE AREA AS DIRECTED 3 TIMES A DAY.   • omeprazole (priLOSEC) 20 MG capsule Take 20 mg by mouth 2 (two) times a day.   • spironolactone (ALDACTONE) 25 MG tablet Take 25 mg by mouth Daily.   • [DISCONTINUED] primidone (MYSOLINE) 50 MG tablet TAKE 1 TABLET AT BEDTIME     No current facility-administered medications on file prior to visit.      She is allergic to cefprozil and penicillins..    Review of Systems   Constitutional: Negative.  Negative for chills, diaphoresis, fatigue and fever.   HENT: Negative.  Negative for sore throat.    Respiratory: Negative.  Negative for cough.    Cardiovascular: Negative.    Gastrointestinal: Negative.    Genitourinary: Negative.    Musculoskeletal: Positive for arthralgias.   Skin: Negative.    Neurological: Negative.    Psychiatric/Behavioral: Positive for decreased concentration. Negative for confusion.       Objective    Visit Vitals  /72   Ht 160 cm (63\")   Wt 99.7 kg (219 lb 14.4 oz)   LMP 03/09/1991 (Approximate)   BMI 38.95 kg/m²       Physical Exam  Vitals signs and nursing note reviewed.   Constitutional:       Appearance: She is well-developed.   HENT:      Head: Normocephalic.      Right Ear: External ear normal.      Left Ear: External ear normal.   Eyes:      Pupils: Pupils are equal, round, and reactive to light.   Neck:      Musculoskeletal: Normal range of motion and neck supple.   Cardiovascular:      Rate and Rhythm: Normal rate and regular " rhythm.      Heart sounds: Normal heart sounds.   Pulmonary:      Effort: Pulmonary effort is normal.      Breath sounds: Normal breath sounds.   Abdominal:      General: Bowel sounds are normal.      Palpations: Abdomen is soft.   Musculoskeletal: Normal range of motion.   Skin:     General: Skin is warm.      Capillary Refill: Capillary refill takes less than 2 seconds.   Neurological:      Mental Status: She is alert and oriented to person, place, and time.   Psychiatric:         Behavior: Behavior normal.         Assessment/Plan   Diagnoses and all orders for this visit:    1. Benign essential hypertension (Primary)    2. Fibromyalgia  -     gabapentin (NEURONTIN) 300 MG capsule; Take 1 capsule by mouth 2 (Two) Times a Day.  Dispense: 180 capsule; Refill: 0    3. Depressive disorder    4. High risk medication use  -     Urine Drug Screen - Urine, Clean Catch; Future        1.  Benign essential hypertension:  Continue on Zyrtec and Lopressor as previously prescribed  Encouraged to continue with low-sodium diet of no more than 2000 mg/day  Has stopped using additional salt when cooking     2.  Fibromyalgia:  Continue on Neurontin as previously prescribed and refill prescription sent to pharmacy  Discussed natural treatment such as yoga, acupuncture and stretching  Educated on importance of maintaining regular physical activity to help combat tiredness and improve energy level    3.  Depressive disorder:  Continue on Prozac as previously prescribed  Encourage stress reducing techniques such as deep breathing, meditation and guided imagery  Encouraged to seek emergency medical treatment for any new or worsening thoughts of hopelessness, helplessness or self-harm    4.  High risk medication use:  TEE reviewed by me in medical record  Complete urine drug screen as ordered     Continue on current medications as previously prescribed   I spent 20 minutes charting and in face to face contact with patient.  Greater  than 50% of this time was spent counseling patient and discussing plan of care.  Return in about 3 months (around 6/1/2021) for Next scheduled follow up.        This document has been electronically signed by MAYELA Flores on March 2, 2021 07:22 CST

## 2021-03-03 ENCOUNTER — OFFICE VISIT (OUTPATIENT)
Dept: GASTROENTEROLOGY | Facility: CLINIC | Age: 61
End: 2021-03-03

## 2021-03-03 VITALS
SYSTOLIC BLOOD PRESSURE: 117 MMHG | DIASTOLIC BLOOD PRESSURE: 68 MMHG | BODY MASS INDEX: 39.16 KG/M2 | WEIGHT: 221 LBS | HEART RATE: 83 BPM | HEIGHT: 63 IN

## 2021-03-03 DIAGNOSIS — E71.30 DISORDER OF FATTY-ACID METABOLISM, UNSPECIFIED: ICD-10-CM

## 2021-03-03 DIAGNOSIS — K76.0 FATTY LIVER: ICD-10-CM

## 2021-03-03 DIAGNOSIS — K21.00 GASTROESOPHAGEAL REFLUX DISEASE WITH ESOPHAGITIS WITHOUT HEMORRHAGE: Primary | ICD-10-CM

## 2021-03-03 DIAGNOSIS — R74.8 ELEVATED LIVER ENZYMES: ICD-10-CM

## 2021-03-03 PROCEDURE — 99213 OFFICE O/P EST LOW 20 MIN: CPT | Performed by: PHYSICIAN ASSISTANT

## 2021-03-03 NOTE — PATIENT INSTRUCTIONS
MyPlate from USDA    MyPlate is an outline of a general healthy diet based on the 2010 Dietary Guidelines for Americans, from the [place] Department of Agriculture (USDA). It sets guidelines for how much food you should eat from each food group based on your age, sex, and level of physical activity.  What are tips for following MyPlate?  To follow MyPlate recommendations:  · Eat a wide variety of fruits and vegetables, grains, and protein foods.  · Serve smaller portions and eat less food throughout the day.  · Limit portion sizes to avoid overeating.  · Enjoy your food.  · Get at least 150 minutes of exercise every week. This is about 30 minutes each day, 5 or more days per week.  It can be difficult to have every meal look like MyPlate. Think about MyPlate as eating guidelines for an entire day, rather than each individual meal.  Fruits and vegetables  · Make half of your plate fruits and vegetables.  · Eat many different colors of fruits and vegetables each day.  · For a 2,000 calorie daily food plan, eat:  ? 2½ cups of vegetables every day.  ? 2 cups of fruit every day.  · 1 cup is equal to:  ? 1 cup raw or cooked vegetables.  ? 1 cup raw fruit.  ? 1 medium-sized orange, apple, or banana.  ? 1 cup 100% fruit or vegetable juice.  ? 2 cups raw leafy greens, such as lettuce, spinach, or kale.  ? ½ cup dried fruit.  Grains  · One fourth of your plate should be grains.  · Make at least half of the grains you eat each day whole grains.  · For a 2,000 calorie daily food plan, eat 6 oz of grains every day.  · 1 oz is equal to:  ? 1 slice bread.  ? 1 cup cereal.  ? ½ cup cooked rice, cereal, or pasta.  Protein  · One fourth of your plate should be protein.  · Eat a wide variety of protein foods, including meat, poultry, fish, eggs, beans, nuts, and tofu.  · For a 2,000 calorie daily food plan, eat 5½ oz of protein every day.  · 1 oz is equal to:  ? 1 oz meat, poultry, or fish.  ? ¼ cup cooked beans.  ? 1 egg.  ? ½ oz nuts  or seeds.  ? 1 Tbsp peanut butter.  Dairy  · Drink fat-free or low-fat (1%) milk.  · Eat or drink dairy as a side to meals.  · For a 2,000 calorie daily food plan, eat or drink 3 cups of dairy every day.  · 1 cup is equal to:  ? 1 cup milk, yogurt, cottage cheese, or soy milk (soy beverage).  ? 2 oz processed cheese.  ? 1½ oz natural cheese.  Fats, oils, salt, and sugars  · Only small amounts of oils are recommended.  · Avoid foods that are high in calories and low in nutritional value (empty calories), like foods high in fat or added sugars.  · Choose foods that are low in salt (sodium). Choose foods that have less than 140 milligrams (mg) of sodium per serving.  · Drink water instead of sugary drinks. Drink enough water each day to keep your urine pale yellow.  Where to find support  · Work with your health care provider or a nutrition specialist (dietitian) to develop a customized eating plan that is right for you.  · Download an karlo (mobile application) to help you track your daily food intake.  Where to find more information  · Go to ChooseMyPlate.gov for more information.  Summary  · MyPlate is a general guideline for healthy eating from the USDA. It is based on the 2010 Dietary Guidelines for Americans.  · In general, fruits and vegetables should take up ½ of your plate, grains should take up ¼ of your plate, and protein should take up ¼ of your plate.  This information is not intended to replace advice given to you by your health care provider. Make sure you discuss any questions you have with your health care provider.  Document Revised: 05/21/2020 Document Reviewed: 03/19/2018  Elsevier Patient Education © 2020 [employer]  BMI for Adults  What is BMI?  Body mass index (BMI) is a number that is calculated from a person's weight and height. BMI can help estimate how much of a person's weight is composed of fat. BMI does not measure body fat directly. Rather, it is an alternative to procedures that  "directly measure body fat, which can be difficult and expensive.  BMI can help identify people who may be at higher risk for certain medical problems.  What are BMI measurements used for?  BMI is used as a screening tool to identify possible weight problems. It helps determine whether a person is obese, overweight, a healthy weight, or underweight.  BMI is useful for:  · Identifying a weight problem that may be related to a medical condition or may increase the risk for medical problems.  · Promoting changes, such as changes in diet and exercise, to help reach a healthy weight. BMI screening can be repeated to see if these changes are working.  How is BMI calculated?  BMI involves measuring your weight in relation to your height. Both height and weight are measured, and the BMI is calculated from those numbers. This can be done either in English ([place] or metric measurements. Note that charts and online BMI calculators are available to help you find your BMI quickly and easily without having to do these calculations yourself.  To calculate your BMI in English ([place] measurements:    1. Measure your weight in pounds (lb).  2. Multiply the number of pounds by 703.  ? For example, for a person who weighs 180 lb, multiply that number by 703, which equals 126,540.  3. Measure your height in inches. Then multiply that number by itself to get a measurement called \"inches squared.\"  ? For example, for a person who is 70 inches tall, the \"inches squared\" measurement is 70 inches x 70 inches, which equals 4,900 inches squared.  4. Divide the total from step 2 (number of lb x 703) by the total from step 3 (inches squared): 126,540 ÷ 4,900 = 25.8. This is your BMI.  To calculate your BMI in metric measurements:  1. Measure your weight in kilograms (kg).  2. Measure your height in meters (m). Then multiply that number by itself to get a measurement called \"meters squared.\"  ? For example, for a person who is 1.75 m tall, the " "\"meters squared\" measurement is 1.75 m x 1.75 m, which is equal to 3.1 meters squared.  3. Divide the number of kilograms (your weight) by the meters squared number. In this example: 70 ÷ 3.1 = 22.6. This is your BMI.  What do the results mean?  BMI charts are used to identify whether you are underweight, normal weight, overweight, or obese. The following guidelines will be used:  · Underweight: BMI less than 18.5.  · Normal weight: BMI between 18.5 and 24.9.  · Overweight: BMI between 25 and 29.9.  · Obese: BMI of 30 or above.  Keep these notes in mind:  · Weight includes both fat and muscle, so someone with a muscular build, such as an athlete, may have a BMI that is higher than 24.9. In cases like these, BMI is not an accurate measure of body fat.  · To determine if excess body fat is the cause of a BMI of 25 or higher, further assessments may need to be done by a health care provider.  · BMI is usually interpreted in the same way for men and women.  Where to find more information  For more information about BMI, including tools to quickly calculate your BMI, go to these websites:  · Centers for Disease Control and Prevention: www.cdc.gov  · American Heart Association: www.heart.org  · National Heart, Lung, and Blood [place]: www.nhlbi.nih.gov  Summary  · Body mass index (BMI) is a number that is calculated from a person's weight and height.  · BMI may help estimate how much of a person's weight is composed of fat. BMI can help identify those who may be at higher risk for certain medical problems.  · BMI can be measured using English measurements or metric measurements.  · BMI charts are used to identify whether you are underweight, normal weight, overweight, or obese.  This information is not intended to replace advice given to you by your health care provider. Make sure you discuss any questions you have with your health care provider.  Document Revised: 09/09/2020 Document Reviewed: 07/17/2020  Ana Laura " Patient Education © 2020 [employer]

## 2021-03-03 NOTE — PROGRESS NOTES
Chief Complaint   Patient presents with   • Heartburn   • Fatty Liver   • Elevated Hepatic Enzymes       ENDO PROCEDURE ORDERED:    Subjective    Lashaun Bernal is a 60 y.o. female. she is here today for follow-up.    History of Present Illness    The patient was seen on a recheck of her GERD, fatty liver, elevated liver enzymes, F0/S2/N1.  Last seen 11/02/2020.  Patient states her GERD is doing well on the Prilosec 20 mg b.i.d.  She denied nausea, vomiting, dysphagia.  Bowels are moving without blood or mucus.  Weight is up 2.7 pounds since last visit.  The patient does have a family history of colon cancer with last colonoscopy 02/04/2020.    Laboratories on 02/17/2021, renal function panel showed glucose 162, creatinine 1.21, GFR 45, otherwise normal.  Laboratories on 02/26/2021, INR 0.94.  CMP showed glucose 117, creatinine 1.11, GFR 50, otherwise normal.  Urine drug screen negative on 03/01/2021.    ASSESSMENT/PLAN:  Patient with chronic GERD, steatohepatitis with last LFTs normal.  She was encouraged to follow up with Gretchen for her renal disease.  Encouraged to continue dietary and significant weight loss.  We will plan follow up in 4 months with CADENA Fibrosure, INR prior.  Further pending clinical course and the results of the above.      The following portions of the patient's history were reviewed and updated as appropriate:   Past Medical History:   Diagnosis Date   • Allergic rhinitis    • Arthritis    • Asthma    • Breast lump    • C. difficile diarrhea 2013   • Carotid artery disease (CMS/HCC)    • Chest pain    • Chronic low back pain    • Constipation    • Depressive disorder    • Diarrhea    • Diverticular disease of colon    • Epigastric pain    • DIETER (generalized anxiety disorder)    • GERD (gastroesophageal reflux disease)    • Head ache    • Hematochezia    • Herpes zoster     mid back, near spine   • Hypertension    • Periumbilical pain    • PONV (postoperative nausea and vomiting)    • Maurice  "Mountain spotted fever     pt states \"currently has it\"   • Sleep apnea     using c-pap   • SVT (supraventricular tachycardia) (CMS/[facility])    • Tachycardia     spent 4 days in [facility] in January for this.      Past Surgical History:   Procedure Laterality Date   • ABDOMINOPLASTY  2004   • AUGMENTATION MAMMAPLASTY     • BREAST AUGMENTATION BILATERAL MASTOPEXY     • BUNIONECTOMY Left 3/20/2017    Procedure: EXCISION OF CALCANEAL SPURS LEFT FOOT AND REATTACHMENT OF ACHILLES TENDON ;  Surgeon: Jarrell Mar MD;  Location: [facility] OR;  Service:    • COLONOSCOPY  2015   • COLONOSCOPY N/A 2020    Procedure: COLONOSCOPY;  Surgeon: Santhosh Patrick MD;  Location: [facility] ENDOSCOPY;  Service: Gastroenterology;  Laterality: N/A;   • CYST REMOVAL  1961    Excision of sebaceous cyst. Left ear   • FINGER/THUMB LESION/CYST EXCISION  10/29/2014   • HAND SURGERY  2013   • HYSTERECTOMY     • OOPHORECTOMY     • ORIF ULNA/RADIUS FRACTURES  2013   • TUBAL ABDOMINAL LIGATION  1985    Laparoscopic tubal sterilization; dilatation and curettage. Desires tubal ligation;       Family History   Problem Relation Age of Onset   • Diabetes Other    • Heart disease Other    • Hypertension Other    • Heart disease Mother         Myocardial infarct   • Hypertension Mother    • Diabetes Mother    • Heart disease Father         CHF     OB History        2    Para   2    Term   2            AB        Living           SAB        TAB        Ectopic        Molar        Multiple        Live Births                  Allergies   Allergen Reactions   • Cefprozil Nausea And Vomiting   • Penicillins Rash     Social History     Socioeconomic History   • Marital status:      Spouse name: Not on file   • Number of children: Not on file   • Years of education: Not on file   • Highest education level: Not on file   Tobacco Use   • Smoking status: Never Smoker   • Smokeless tobacco: Never " Used   Vaping Use   • Vaping Use: Never used   Substance and Sexual Activity   • Alcohol use: No   • Drug use: No   • Sexual activity: Defer     Current Medications:  Prior to Admission medications    Medication Sig Start Date End Date Taking? Authorizing Provider   albuterol (PROVENTIL HFA;VENTOLIN HFA) 108 (90 Base) MCG/ACT inhaler Inhale 2 puffs Every 4 (Four) Hours As Needed for Wheezing. 11/27/18  Yes Gretchen Gauthier APRN   albuterol (PROVENTIL) (2.5 MG/3ML) 0.083% nebulizer solution Take 2.5 mg by nebulization Every 6 (Six) Hours As Needed for Wheezing. 11/27/18  Yes Gretchen Gauthier APRN   aspirin 81 MG EC tablet Take 81 mg by mouth Daily.   Yes Maryam Mustafa MD   clonazePAM (KlonoPIN) 0.5 MG tablet Take 0.5 mg by mouth 2 (Two) Times a Day As Needed for Anxiety. 6/11/19  Yes Maryam Mustafa MD   clopidogrel (PLAVIX) 75 MG tablet Take 75 mg by mouth Daily.   Yes ProviderMaryam MD   cyclobenzaprine (FLEXERIL) 10 MG tablet TAKE 1 TABLET BY MOUTH 3 (THREE) TIMES A DAY AS NEEDED FOR MUSCLE SPASMS. 11/2/20  Yes Gretchen Gauthier APRN   dilTIAZem (CARDIZEM) 30 MG tablet Take 30 mg by mouth 2 (Two) Times a Day.   Yes Maryam Mustafa MD   estradiol (ESTRACE) 1 MG tablet TAKE 1 TABLET EVERY DAY 1/6/21  Yes Gretchen Gauthier APRN   FLUoxetine (PROzac) 40 MG capsule Take 40 mg by mouth Daily. 9/15/20  Yes Maryam Mustafa MD   furosemide (LASIX) 40 MG tablet Take 40 mg by mouth Daily. 10/23/20  Yes Maryam Mustafa MD   gabapentin (NEURONTIN) 300 MG capsule Take 1 capsule by mouth 2 (Two) Times a Day. 3/1/21  Yes Gretchen Gauthier APRN   hydroxychloroquine (PLAQUENIL) 200 MG tablet Take 200 mg by mouth Daily.   Yes Maryam Mustafa MD   magnesium oxide (MAGOX) 400 (241.3 Mg) MG tablet tablet Take 400 mg by mouth 2 (Two) Times a Day. 5/13/20  Yes Maryam Mustafa MD   metoprolol tartrate (LOPRESSOR) 25 MG tablet Take 12.5 mg by mouth 2 (Two) Times a Day.   Yes Provider, MD Maryam  "  mupirocin (BACTROBAN) 2 % ointment APPLY TOPICALLY TO THE APPROPRIATE AREA AS DIRECTED 3 TIMES A DAY. 2/2/21  Yes Gretchen Gauthier APRN   omeprazole (priLOSEC) 20 MG capsule Take 20 mg by mouth 2 (two) times a day.   Yes Provider, MD Maryam   spironolactone (ALDACTONE) 25 MG tablet Take 25 mg by mouth Daily. 10/23/20  Yes Provider, MD Maryam     Review of Systems  Review of Systems       Objective    /68   Pulse 83   Ht 160 cm (63\")   Wt 100 kg (221 lb)   LMP 03/09/1991 (Approximate)   BMI 39.15 kg/m²   Physical Exam  Vitals signs and nursing note reviewed.   Constitutional:       General: She is not in acute distress.     Appearance: She is well-developed.   HENT:      Head: Normocephalic and atraumatic.   Eyes:      Pupils: Pupils are equal, round, and reactive to light.   Neck:      Musculoskeletal: Normal range of motion.   Cardiovascular:      Rate and Rhythm: Normal rate and regular rhythm.      Heart sounds: Normal heart sounds.   Pulmonary:      Effort: Pulmonary effort is normal.      Breath sounds: Normal breath sounds.   Abdominal:      General: Bowel sounds are normal. There is no distension or abdominal bruit.      Palpations: Abdomen is soft. Abdomen is not rigid. There is no shifting dullness or mass.      Tenderness: There is abdominal tenderness. There is no guarding or rebound.      Hernia: No hernia is present. There is no hernia in the ventral area.      Comments: Mild, obese   Musculoskeletal: Normal range of motion.   Skin:     General: Skin is warm and dry.   Neurological:      Mental Status: She is alert and oriented to person, place, and time.   Psychiatric:         Behavior: Behavior normal.         Thought Content: Thought content normal.         Judgment: Judgment normal.       Assessment/Plan      1. Gastroesophageal reflux disease with esophagitis without hemorrhage    2. Elevated liver enzymes    3. Fatty liver    4. Disorder of fatty-acid metabolism, unspecified   "   .   Diagnoses and all orders for this visit:    1. Gastroesophageal reflux disease with esophagitis without hemorrhage (Primary)  -     CADENA Fibrosure; Future  -     Protime-INR; Future    2. Elevated liver enzymes  -     CADENA Fibrosure; Future  -     Protime-INR; Future    3. Fatty liver  -     CADENA Fibrosure; Future  -     Protime-INR; Future    4. Disorder of fatty-acid metabolism, unspecified   -     CADENA Fibrosure; Future        Orders placed during this encounter include:  Orders Placed This Encounter   Procedures   • CADENA Fibrosure     Standing Status:   Future     Standing Expiration Date:   8/30/2021   • Protime-INR     Standing Status:   Future     Standing Expiration Date:   8/30/2021       Medications prescribed:  No orders of the defined types were placed in this encounter.      Requested Prescriptions      No prescriptions requested or ordered in this encounter       Review and/or summary of lab tests, radiology, procedures, medications. Review and summary of old records and obtaining of history. The risks and benefits of my recommendations, as well as other treatment options were discussed with the patient today. Questions were answered.    Follow-up: Return in about 4 months (around 7/3/2021), or if symptoms worsen or fail to improve, for lab prior.     * Surgery not found *      This document has been electronically signed by Marcial Walker PA-C on March 12, 2021 13:37 CST      Results for orders placed or performed in visit on 03/01/21   Urine Drug Screen - Urine, Clean Catch    Specimen: Urine, Clean Catch   Result Value Ref Range    THC, Screen, Urine Negative Negative    Phencyclidine (PCP), Urine Negative Negative    Cocaine Screen, Urine Negative Negative    Methamphetamine, Ur Negative Negative    Opiate Screen Negative Negative    Amphetamine Screen, Urine Negative Negative    Benzodiazepine Screen, Urine Negative Negative    Tricyclic Antidepressants Screen Negative Negative     Methadone Screen, Urine Negative Negative    Barbiturates Screen, Urine Negative Negative    Oxycodone Screen, Urine Negative Negative    Propoxyphene Screen Negative Negative    Buprenorphine, Screen, Urine Negative Negative   Results for orders placed or performed in visit on 02/26/21   Protime-INR    Specimen: Blood   Result Value Ref Range    Protime 12.9 11.1 - 15.3 Seconds    INR 0.94 0.80 - 1.20   Comprehensive Metabolic Panel    Specimen: Blood   Result Value Ref Range    Glucose 117 (H) 65 - 99 mg/dL    BUN 17 8 - 23 mg/dL    Creatinine 1.11 (H) 0.57 - 1.00 mg/dL    Sodium 138 136 - 145 mmol/L    Potassium 4.6 3.5 - 5.2 mmol/L    Chloride 104 98 - 107 mmol/L    CO2 23.5 22.0 - 29.0 mmol/L    Calcium 8.9 8.6 - 10.5 mg/dL    Total Protein 6.6 6.0 - 8.5 g/dL    Albumin 3.80 3.50 - 5.20 g/dL    ALT (SGPT) 22 1 - 33 U/L    AST (SGOT) 21 1 - 32 U/L    Alkaline Phosphatase 84 39 - 117 U/L    Total Bilirubin 0.3 0.0 - 1.2 mg/dL    eGFR Non African Amer 50 (L) >60 mL/min/1.73    Globulin 2.8 gm/dL    A/G Ratio 1.4 g/dL    BUN/Creatinine Ratio 15.3 7.0 - 25.0    Anion Gap 10.5 5.0 - 15.0 mmol/L   Results for orders placed or performed in visit on 02/17/21   Renal Function Panel    Specimen: Blood   Result Value Ref Range    Glucose 162 (H) 65 - 99 mg/dL    BUN 19 8 - 23 mg/dL    Creatinine 1.21 (H) 0.57 - 1.00 mg/dL    Sodium 138 136 - 145 mmol/L    Potassium 4.8 3.5 - 5.2 mmol/L    Chloride 103 98 - 107 mmol/L    CO2 25.4 22.0 - 29.0 mmol/L    Calcium 9.4 8.6 - 10.5 mg/dL    Albumin 3.80 3.50 - 5.20 g/dL    Phosphorus 3.2 2.5 - 4.5 mg/dL    Anion Gap 9.6 5.0 - 15.0 mmol/L    BUN/Creatinine Ratio 15.7 7.0 - 25.0    eGFR Non African Amer 45 (L) >60 mL/min/1.73   Results for orders placed or performed during the hospital encounter of 01/11/21   Duplex Carotid Ultrasound CAR   Result Value Ref Range    Prox CCA .0 cm/sec    Prox CCA .4 cm/sec    Prox CCA EDV 31.5 cm/sec    Prox CCA EDV 23.9 cm/sec    Dist  CCA PSV 82.2 cm/sec    Dist CCA PSV 91.0 cm/sec    Dist CCA EDV 31.5 cm/sec    Dist CCA EDV 30.4 cm/sec    Prox ECA .2 cm/sec    Prox ECA .2 cm/sec    Prox ECA EDV 33.3 cm/sec    Prox ECA EDV 26.1 cm/sec    Prox ICA PSV 93.2 cm/sec    Prox ICA PSV 85.1 cm/sec    Prox ICA EDV 30.4 cm/sec    Prox ICA EDV 21.0 cm/sec    Mid ICA .9 cm/sec    Mid ICA PSV 91.5 cm/sec    Mid ICA EDV 46.1 cm/sec    Mid ICA EDV 37.0 cm/sec    Dist ICA .1 cm/sec    Dist ICA .1 cm/sec    Dist ICA EDV 50.5 cm/sec    Dist ICA EDV 37.2 cm/sec    Vertebral A PSV 40 cm/sec    Vertebral A EDV 8 cm/sec    ICA/CCA ratio 0.9     Vertebral A PSV 68 cm/sec    Vertebral A EDV 20 cm/sec    ICA/CCA ratio 1.4     Diastolic ICA/CCA Ratio 1.6     ICA/CCA diastolic ratio 1.6    Results for orders placed or performed in visit on 10/30/20   CADENA Fibrosure    Specimen: Blood   Result Value Ref Range    Fibrosis Score (References) 0.08 0.00 - 0.21    Fibrosis Stage (Reference) Comment     Steatosis Score (Reference) 0.68 (H) 0.00 - 0.30    Steatosis Grade (Reference) Comment     CADENA Score (Reference) 0.75 (H) 0.25    Cadena Grade (Reference) Comment     Height: (Reference) 63 in    Weight: (Reference) 221 LBS    Alpha 2-Macroglobulins, Qn 177 110 - 276 mg/dL    Haptoglobin 121 33 - 346 mg/dL    Apolipoprotein A-1 139 116 - 209 mg/dL    Total Bilirubin 0.2 0.0 - 1.2 mg/dL    GGT 15 0 - 60 IU/L    ALT (SGPT) 37 0 - 40 IU/L    AST (SGOT) P5P (Reference) 28 0 - 40 IU/L    Cholesterol, Total (Reference) 181 100 - 199 mg/dL    Glucose, Serum (Reference) 110 (H) 65 - 99 mg/dL    Triglycerides 241 (H) 0 - 149 mg/dL    Interpretations: (Reference) Comment     Fibrosis Scoring: Comment     Steatosis Grading (Reference) Comment     Cadena Scoring (Reference) Comment     Limitations: (Reference) Comment     Comment (Reference) Comment      *Note: Due to a large number of results and/or encounters for the requested time period, some results have  not been displayed. A complete set of results can be found in Results Review.       Some portions of this note have been dictated using voice recognition software and may contain errors and/or omissions.

## 2021-03-16 ENCOUNTER — IMMUNIZATION (OUTPATIENT)
Dept: VACCINE CLINIC | Facility: HOSPITAL | Age: 61
End: 2021-03-16

## 2021-03-16 PROCEDURE — 0002A: CPT | Performed by: NURSE PRACTITIONER

## 2021-03-16 PROCEDURE — 91300 HC SARSCOV02 VAC 30MCG/0.3ML IM: CPT | Performed by: NURSE PRACTITIONER

## 2021-03-22 DIAGNOSIS — M79.7 FIBROMYALGIA: ICD-10-CM

## 2021-03-22 DIAGNOSIS — M79.10 MUSCLE PAIN: ICD-10-CM

## 2021-03-23 RX ORDER — CYCLOBENZAPRINE HCL 10 MG
10 TABLET ORAL 3 TIMES DAILY PRN
Qty: 270 TABLET | Refills: 1 | Status: SHIPPED | OUTPATIENT
Start: 2021-03-23 | End: 2021-08-30

## 2021-04-06 DIAGNOSIS — L98.491 SKIN ULCER, LIMITED TO BREAKDOWN OF SKIN (HCC): ICD-10-CM

## 2021-05-21 DIAGNOSIS — L98.491 SKIN ULCER, LIMITED TO BREAKDOWN OF SKIN (HCC): ICD-10-CM

## 2021-06-02 ENCOUNTER — LAB (OUTPATIENT)
Dept: LAB | Facility: HOSPITAL | Age: 61
End: 2021-06-02

## 2021-06-02 ENCOUNTER — OFFICE VISIT (OUTPATIENT)
Dept: FAMILY MEDICINE CLINIC | Facility: CLINIC | Age: 61
End: 2021-06-02

## 2021-06-02 VITALS
DIASTOLIC BLOOD PRESSURE: 72 MMHG | SYSTOLIC BLOOD PRESSURE: 126 MMHG | BODY MASS INDEX: 37.7 KG/M2 | HEIGHT: 63 IN | WEIGHT: 212.8 LBS

## 2021-06-02 DIAGNOSIS — Z12.31 ENCOUNTER FOR SCREENING MAMMOGRAM FOR MALIGNANT NEOPLASM OF BREAST: ICD-10-CM

## 2021-06-02 DIAGNOSIS — M06.09 RHEUMATOID ARTHRITIS WITHOUT RHEUMATOID FACTOR, MULTIPLE SITES (HCC): ICD-10-CM

## 2021-06-02 DIAGNOSIS — E78.5 HYPERLIPIDEMIA, UNSPECIFIED HYPERLIPIDEMIA TYPE: ICD-10-CM

## 2021-06-02 DIAGNOSIS — I10 BENIGN ESSENTIAL HYPERTENSION: Primary | Chronic | ICD-10-CM

## 2021-06-02 DIAGNOSIS — N18.30 STAGE 3 CHRONIC KIDNEY DISEASE, UNSPECIFIED WHETHER STAGE 3A OR 3B CKD (HCC): ICD-10-CM

## 2021-06-02 DIAGNOSIS — M79.7 FIBROMYALGIA: ICD-10-CM

## 2021-06-02 DIAGNOSIS — Z13.29 SCREENING FOR THYROID DISORDER: ICD-10-CM

## 2021-06-02 PROCEDURE — 99213 OFFICE O/P EST LOW 20 MIN: CPT | Performed by: NURSE PRACTITIONER

## 2021-06-02 RX ORDER — FLUOXETINE HYDROCHLORIDE 20 MG/1
CAPSULE ORAL
COMMUNITY
End: 2021-06-02 | Stop reason: DRUGHIGH

## 2021-06-02 NOTE — PROGRESS NOTES
Chief Complaint  Hypertension (3 month check up), Hyperlipidemia, Fibromyalgia, Depression, Chronic Kidney Disease, and Arthritis    Subjective          Lashaun Bernal presents to [facility]  Hypertension  This is a chronic problem. The current episode started more than 1 year ago. The problem is unchanged. The problem is controlled. Associated symptoms include chest pain, peripheral edema and shortness of breath. Risk factors for coronary artery disease include dyslipidemia, family history, obesity, post-menopausal state, sedentary lifestyle and stress. Current antihypertension treatment includes lifestyle changes. Compliance problems include exercise and diet.    Fibromyalgia  This is a chronic problem. The current episode started more than 1 year ago. The problem occurs constantly. The problem has been gradually improving. Associated symptoms include arthralgias and chest pain. Pertinent negatives include no chills, coughing, diaphoresis, fatigue, fever or sore throat. Nothing aggravates the symptoms. Treatments tried: Cymbalta. The treatment provided mild relief.   Depression  Visit Type: follow-up  Patient presents with the following symptoms: decreased concentration, depressed mood, malaise, restlessness and shortness of breath.  Patient is not experiencing: confusion.  Frequency of symptoms: occasionally   Severity: mild   Sleep quality: fair  Nighttime awakenings: occasional  Compliance with medications:  %        Hyperlipidemia  This is a chronic problem. The current episode started more than 1 year ago. Recent lipid tests were reviewed and are normal. Factors aggravating her hyperlipidemia include fatty foods. Associated symptoms include chest pain and shortness of breath. Current antihyperlipidemic treatment includes diet change. The current treatment provides mild improvement of lipids. Compliance problems include adherence to exercise and adherence to diet.   "  Chronic Kidney Disease  This is a chronic problem. The current episode started more than 1 year ago. The problem occurs constantly. The problem has been unchanged. Associated symptoms include arthralgias and chest pain. Pertinent negatives include no chills, coughing, diaphoresis, fatigue, fever or sore throat. Exacerbated by: medications  Treatments tried: Sees Dr. Will nepherologist  The treatment provided moderate relief.   Arthritis  Presents for follow-up visit. She complains of pain and stiffness. The symptoms have been stable. Associated symptoms include pain at night and pain while resting. Pertinent negatives include no fatigue or fever. Compliance with total regimen is %. Compliance with medications is %.       Objective   Vital Signs:   /72   Ht 160 cm (63\")   Wt 96.5 kg (212 lb 12.8 oz)   BMI 37.70 kg/m²     Physical Exam  Vitals and nursing note reviewed.   Constitutional:       Appearance: She is well-developed.   HENT:      Head: Normocephalic.      Right Ear: External ear normal.      Left Ear: External ear normal.   Eyes:      Pupils: Pupils are equal, round, and reactive to light.   Cardiovascular:      Rate and Rhythm: Normal rate and regular rhythm.      Heart sounds: Normal heart sounds.   Pulmonary:      Effort: Pulmonary effort is normal.      Breath sounds: Normal breath sounds.   Abdominal:      General: Bowel sounds are normal.      Palpations: Abdomen is soft.   Musculoskeletal:         General: Normal range of motion.      Cervical back: Normal range of motion and neck supple.   Skin:     General: Skin is warm.      Capillary Refill: Capillary refill takes less than 2 seconds.   Neurological:      Mental Status: She is alert and oriented to person, place, and time.   Psychiatric:         Behavior: Behavior normal.        Result Review :     Common labs    Common Labsle 10/30/20 2/17/21 2/26/21   Glucose  162 (A) 117 (A)   BUN  19 17   Creatinine  1.21 (A) 1.11 (A) "   eGFR Non  Am  45 (A) 50 (A)   Sodium  138 138   Potassium  4.8 4.6   Chloride  103 104   Calcium  9.4 8.9   Albumin  3.80 3.80   Total Bilirubin   0.3   Alkaline Phosphatase   84   AST (SGOT)   21   ALT (SGPT)   22   Triglycerides 241 (A)     (A) Abnormal value                      Assessment and Plan    Diagnoses and all orders for this visit:    1. Benign essential hypertension (Primary)  -     CBC & Differential; Future  -     Comprehensive Metabolic Panel; Future    2. Hyperlipidemia, unspecified hyperlipidemia type  -     Lipid Panel; Future    3. Rheumatoid arthritis without rheumatoid factor, multiple sites (CMS/HCC)    4. Stage 3 chronic kidney disease, unspecified whether stage 3a or 3b CKD (CMS/[facility])    5. Fibromyalgia    6. Screening for thyroid disorder  -     TSH; Future    7. Encounter for screening mammogram for malignant neoplasm of breast    Other orders  -     Cancel: Pneumococcal Polysaccharide Vaccine 23-Valent Greater Than or Equal To 1yo Subcutaneous / IM  -     Cancel: Mammo Screening Digital Tomosynthesis Bilateral With CAD; Future      Continue on current medications as previously prescribed   Complete fasting lab work as ordered and will notify of results when available  Encouraged to continue to adhere to a low-sodium/low-fat diet  Discussed importance of avoiding nonsteroidal anti-inflammatory medications as these may worsen kidney function  Continue nephrology follow-up with Dr. Will as scheduled  Discussed importance of remaining physically active through low impact exercising such as walking, bicycling or swimming to help reduce rheumatoid arthritic pain and improve fibromyalgia  Encouraged warm water soaks to help promote muscle relaxation  Is scheduled for mammogram that has been ordered per Dr. Springer  I spent 21 minutes caring for Lashaun on this date of service. This time includes time spent by me in the following activities:preparing for the visit, reviewing tests, obtaining  and/or reviewing a separately obtained history, performing a medically appropriate examination and/or evaluation , counseling and educating the patient/family/caregiver, ordering medications, tests, or procedures, documenting information in the medical record and care coordination  Follow Up   Return in about 6 months (around 12/2/2021) for Annual physical.  Patient was given instructions and counseling regarding her condition or for health maintenance advice. Please see specific information pulled into the AVS if appropriate.         This document has been electronically signed by MAYELA Flores on Hanna 3, 2021 06:23 CDT

## 2021-06-15 DIAGNOSIS — L98.491 SKIN ULCER, LIMITED TO BREAKDOWN OF SKIN (HCC): ICD-10-CM

## 2021-06-17 ENCOUNTER — LAB (OUTPATIENT)
Dept: LAB | Facility: HOSPITAL | Age: 61
End: 2021-06-17

## 2021-06-17 DIAGNOSIS — K76.0 FATTY LIVER: ICD-10-CM

## 2021-06-17 DIAGNOSIS — K21.00 GASTROESOPHAGEAL REFLUX DISEASE WITH ESOPHAGITIS WITHOUT HEMORRHAGE: ICD-10-CM

## 2021-06-17 DIAGNOSIS — I10 BENIGN ESSENTIAL HYPERTENSION: ICD-10-CM

## 2021-06-17 DIAGNOSIS — Z13.29 SCREENING FOR THYROID DISORDER: ICD-10-CM

## 2021-06-17 DIAGNOSIS — E71.30 DISORDER OF FATTY-ACID METABOLISM, UNSPECIFIED: ICD-10-CM

## 2021-06-17 DIAGNOSIS — E78.5 HYPERLIPIDEMIA, UNSPECIFIED HYPERLIPIDEMIA TYPE: ICD-10-CM

## 2021-06-17 DIAGNOSIS — R74.8 ELEVATED LIVER ENZYMES: ICD-10-CM

## 2021-06-17 LAB
ALBUMIN SERPL-MCNC: 4.3 G/DL (ref 3.5–5.2)
ALBUMIN/GLOB SERPL: 2 G/DL
ALP SERPL-CCNC: 75 U/L (ref 39–117)
ALT SERPL W P-5'-P-CCNC: 27 U/L (ref 1–33)
ANION GAP SERPL CALCULATED.3IONS-SCNC: 10.8 MMOL/L (ref 5–15)
AST SERPL-CCNC: 24 U/L (ref 1–32)
BASOPHILS # BLD AUTO: 0.06 10*3/MM3 (ref 0–0.2)
BASOPHILS NFR BLD AUTO: 0.9 % (ref 0–1.5)
BILIRUB SERPL-MCNC: 0.3 MG/DL (ref 0–1.2)
BUN SERPL-MCNC: 16 MG/DL (ref 8–23)
BUN/CREAT SERPL: 15.8 (ref 7–25)
CALCIUM SPEC-SCNC: 9.3 MG/DL (ref 8.6–10.5)
CHLORIDE SERPL-SCNC: 101 MMOL/L (ref 98–107)
CHOLEST SERPL-MCNC: 198 MG/DL (ref 0–200)
CO2 SERPL-SCNC: 21.2 MMOL/L (ref 22–29)
CREAT SERPL-MCNC: 1.01 MG/DL (ref 0.57–1)
DEPRECATED RDW RBC AUTO: 38.9 FL (ref 37–54)
EOSINOPHIL # BLD AUTO: 0.26 10*3/MM3 (ref 0–0.4)
EOSINOPHIL NFR BLD AUTO: 4 % (ref 0.3–6.2)
ERYTHROCYTE [DISTWIDTH] IN BLOOD BY AUTOMATED COUNT: 12.5 % (ref 12.3–15.4)
GFR SERPL CREATININE-BSD FRML MDRD: 56 ML/MIN/1.73
GLOBULIN UR ELPH-MCNC: 2.2 GM/DL
GLUCOSE SERPL-MCNC: 115 MG/DL (ref 65–99)
HCT VFR BLD AUTO: 37.5 % (ref 34–46.6)
HDLC SERPL-MCNC: 40 MG/DL (ref 40–60)
HGB BLD-MCNC: 12.8 G/DL (ref 12–15.9)
IMM GRANULOCYTES # BLD AUTO: 0.02 10*3/MM3 (ref 0–0.05)
IMM GRANULOCYTES NFR BLD AUTO: 0.3 % (ref 0–0.5)
INR PPP: 0.9 (ref 0.8–1.2)
LDLC SERPL CALC-MCNC: 120 MG/DL (ref 0–100)
LDLC/HDLC SERPL: 2.88 {RATIO}
LYMPHOCYTES # BLD AUTO: 2.35 10*3/MM3 (ref 0.7–3.1)
LYMPHOCYTES NFR BLD AUTO: 36.3 % (ref 19.6–45.3)
MCH RBC QN AUTO: 29.4 PG (ref 26.6–33)
MCHC RBC AUTO-ENTMCNC: 34.1 G/DL (ref 31.5–35.7)
MCV RBC AUTO: 86 FL (ref 79–97)
MONOCYTES # BLD AUTO: 0.61 10*3/MM3 (ref 0.1–0.9)
MONOCYTES NFR BLD AUTO: 9.4 % (ref 5–12)
NEUTROPHILS NFR BLD AUTO: 3.18 10*3/MM3 (ref 1.7–7)
NEUTROPHILS NFR BLD AUTO: 49.1 % (ref 42.7–76)
NRBC BLD AUTO-RTO: 0 /100 WBC (ref 0–0.2)
PLATELET # BLD AUTO: 336 10*3/MM3 (ref 140–450)
PMV BLD AUTO: 10.6 FL (ref 6–12)
POTASSIUM SERPL-SCNC: 4.3 MMOL/L (ref 3.5–5.2)
PROT SERPL-MCNC: 6.5 G/DL (ref 6–8.5)
PROTHROMBIN TIME: 12.5 SECONDS (ref 11.1–15.3)
RBC # BLD AUTO: 4.36 10*6/MM3 (ref 3.77–5.28)
SODIUM SERPL-SCNC: 133 MMOL/L (ref 136–145)
TRIGL SERPL-MCNC: 214 MG/DL (ref 0–150)
TSH SERPL DL<=0.05 MIU/L-ACNC: 2.63 UIU/ML (ref 0.27–4.2)
VLDLC SERPL-MCNC: 38 MG/DL (ref 5–40)
WBC # BLD AUTO: 6.48 10*3/MM3 (ref 3.4–10.8)

## 2021-06-17 PROCEDURE — 80053 COMPREHEN METABOLIC PANEL: CPT

## 2021-06-17 PROCEDURE — 82247 BILIRUBIN TOTAL: CPT

## 2021-06-17 PROCEDURE — 80061 LIPID PANEL: CPT

## 2021-06-17 PROCEDURE — 82947 ASSAY GLUCOSE BLOOD QUANT: CPT

## 2021-06-17 PROCEDURE — 84478 ASSAY OF TRIGLYCERIDES: CPT

## 2021-06-17 PROCEDURE — 83010 ASSAY OF HAPTOGLOBIN QUANT: CPT

## 2021-06-17 PROCEDURE — 84443 ASSAY THYROID STIM HORMONE: CPT

## 2021-06-17 PROCEDURE — 82465 ASSAY BLD/SERUM CHOLESTEROL: CPT

## 2021-06-17 PROCEDURE — 83883 ASSAY NEPHELOMETRY NOT SPEC: CPT

## 2021-06-17 PROCEDURE — 82977 ASSAY OF GGT: CPT

## 2021-06-17 PROCEDURE — 82172 ASSAY OF APOLIPOPROTEIN: CPT

## 2021-06-17 PROCEDURE — 85025 COMPLETE CBC W/AUTO DIFF WBC: CPT

## 2021-06-17 PROCEDURE — 84450 TRANSFERASE (AST) (SGOT): CPT

## 2021-06-17 PROCEDURE — 84460 ALANINE AMINO (ALT) (SGPT): CPT

## 2021-06-17 PROCEDURE — 36415 COLL VENOUS BLD VENIPUNCTURE: CPT

## 2021-06-17 PROCEDURE — 85610 PROTHROMBIN TIME: CPT

## 2021-06-19 LAB
A2 MACROGLOB SERPL-MCNC: 177 MG/DL (ref 110–276)
ALT SERPL W P-5'-P-CCNC: 31 IU/L (ref 0–40)
APO A-I SERPL-MCNC: 129 MG/DL (ref 116–209)
AST SERPL W P-5'-P-CCNC: 30 IU/L (ref 0–40)
BILIRUB SERPL-MCNC: 0.3 MG/DL (ref 0–1.2)
CHOLEST SERPL-MCNC: 203 MG/DL (ref 100–199)
FIBROSIS SCORING:: ABNORMAL
FIBROSIS STAGE SERPL QL: ABNORMAL
GGT SERPL-CCNC: 11 IU/L (ref 0–60)
GLUCOSE SERPL-MCNC: 110 MG/DL (ref 65–99)
HAPTOGLOB SERPL-MCNC: 119 MG/DL (ref 33–346)
INTERPRETATIONS: (REFERENCE): ABNORMAL
LABORATORY COMMENT REPORT: ABNORMAL
LIVER FIBR SCORE SERPL CALC.FIBROSURE: 0.11 (ref 0–0.21)
NASH SCORING (REFERENCE): ABNORMAL
NECROINFLAMMATORY ACT GRADE SERPL QL: ABNORMAL
NECROINFLAMMATORY ACT SCORE SERPL: 0.75
SERVICE CMNT-IMP: ABNORMAL
STEATOSIS GRADE (REFERENCE): ABNORMAL
STEATOSIS GRADING (REFERENCE): ABNORMAL
STEATOSIS SCORE (REFERENCE): 0.66 (ref 0–0.3)
TRIGL SERPL-MCNC: 239 MG/DL (ref 0–149)

## 2021-06-28 ENCOUNTER — OFFICE VISIT (OUTPATIENT)
Dept: SURGERY | Facility: CLINIC | Age: 61
End: 2021-06-28

## 2021-06-28 VITALS
HEIGHT: 63 IN | HEART RATE: 97 BPM | DIASTOLIC BLOOD PRESSURE: 82 MMHG | TEMPERATURE: 96.3 F | SYSTOLIC BLOOD PRESSURE: 124 MMHG | WEIGHT: 212 LBS | OXYGEN SATURATION: 98 % | BODY MASS INDEX: 37.56 KG/M2

## 2021-06-28 DIAGNOSIS — N60.19 FIBROCYSTIC BREAST DISEASE (FCBD), UNSPECIFIED LATERALITY: Primary | ICD-10-CM

## 2021-06-28 PROCEDURE — 99212 OFFICE O/P EST SF 10 MIN: CPT | Performed by: SURGERY

## 2021-06-28 NOTE — PROGRESS NOTES
Chief Complaint: This is a 60 y.o. woman seen in consultation for routine followup benign breast disease    History of Present Illness:  Patient has noted no new masses, skin changes, nipple discharge, nipple changes prior to her most recent imaging.  Her most recent imaging includes the following:   Study Result    Narrative & Impression         PROCEDURE: Bilateral screening mammogram with implants with 3-D  tomosynthesis with CAD.     Reason for exam :screening exam.     FINDINGS: Standard mammographic projections as well as pushback  views of the breast were performed. Bilateral breast implants are  intact. No evidence of rupture. Comparison exam dated 6/12/2020,  3/22/2019, 2/20/2018, and 1/24/2017.     No significant interval change in appearance of the breast  parenchyma. Scattered normal appearing fibroglandular tissue. No  suspicious mass or malignant type microcalcifications. No skin  thickening or retraction.     IMPRESSION:  No mammographic evidence of malignancy.     BI-RADS category 2: Benign findings.     Recommendation: Annual mammography.      Electronically signed by:  Jhonny Varela MD  6/22/2021 10:19 AM CDT  Workstation: [id number]       ( I have personally reviewed the breast imaging and concur with the findings of the radiologist- BiRADS 2)    Breast Operations, and year: Implants in 2003    Her family history includes the following: breast cancer in a paternal cousin    She is here for evaluation.    Past Medical History:   Diagnosis Date   • Allergic rhinitis    • Arthritis    • Asthma    • Breast lump    • C. difficile diarrhea 2013   • Carotid artery disease (CMS/HCC)    • Chest pain    • Chronic low back pain    • Constipation    • Depressive disorder    • Diarrhea    • Diverticular disease of colon    • Epigastric pain    • DIETER (generalized anxiety disorder)    • GERD (gastroesophageal reflux disease)    • Head ache    • Hematochezia    • Herpes zoster     mid back, near spine   •  "Hypertension    • Periumbilical pain    • PONV (postoperative nausea and vomiting)    • [place] Mountain spotted fever     pt states \"currently has it\"   • Sleep apnea     using c-pap   • SVT (supraventricular tachycardia) (CMS/HCC)    • Tachycardia 12/012019    spent 4 days in [facility] in January for this.      Past Surgical History:   Procedure Laterality Date   • ABDOMINOPLASTY  12/22/2004   • AUGMENTATION MAMMAPLASTY     • BREAST AUGMENTATION BILATERAL MASTOPEXY     • BUNIONECTOMY Left 3/20/2017    Procedure: EXCISION OF CALCANEAL SPURS LEFT FOOT AND REATTACHMENT OF ACHILLES TENDON ;  Surgeon: Jarrell Mar MD;  Location: [facility] OR;  Service:    • COLONOSCOPY  01/23/2015   • COLONOSCOPY N/A 2/24/2020    Procedure: COLONOSCOPY;  Surgeon: Santhosh Patrick MD;  Location: [facility] ENDOSCOPY;  Service: Gastroenterology;  Laterality: N/A;   • CYST REMOVAL  08/18/1961    Excision of sebaceous cyst. Left ear   • FINGER/THUMB LESION/CYST EXCISION  10/29/2014   • HAND SURGERY  06/03/2013   • HYSTERECTOMY     • OOPHORECTOMY     • ORIF ULNA/RADIUS FRACTURES  07/22/2013   • TUBAL ABDOMINAL LIGATION  08/14/1985    Laparoscopic tubal sterilization; dilatation and curettage. Desires tubal ligation;       Prior to Admission medications    Medication Sig Start Date End Date Taking? Authorizing Provider   albuterol (PROVENTIL HFA;VENTOLIN HFA) 108 (90 Base) MCG/ACT inhaler Inhale 2 puffs Every 4 (Four) Hours As Needed for Wheezing. 11/27/18  Yes Gretchen Gauthier APRN   albuterol (PROVENTIL) (2.5 MG/3ML) 0.083% nebulizer solution Take 2.5 mg by nebulization Every 6 (Six) Hours As Needed for Wheezing. 11/27/18  Yes Gretchen Gauthier APRN   aspirin 81 MG EC tablet Take 81 mg by mouth Daily.   Yes ProviderMaryam MD   clonazePAM (KlonoPIN) 0.5 MG tablet Take 0.5 mg by mouth 2 (Two) Times a Day As Needed for Anxiety. 6/11/19  Yes Maryam Mustafa MD   clopidogrel (PLAVIX) 75 MG tablet Take 75 mg by mouth Daily.   Yes " Maryam Mustafa MD   cyclobenzaprine (FLEXERIL) 10 MG tablet TAKE 1 TABLET BY MOUTH 3 (THREE) TIMES A DAY AS NEEDED FOR MUSCLE SPASMS. 3/23/21  Yes Gretchen Gauthier APRN   dilTIAZem (CARDIZEM) 30 MG tablet Take 30 mg by mouth 2 (Two) Times a Day.   Yes Maryam Mustafa MD   estradiol (ESTRACE) 1 MG tablet TAKE 1 TABLET EVERY DAY 1/6/21  Yes Gretchen Gauthier APRN   FLUoxetine (PROzac) 40 MG capsule Take 40 mg by mouth Daily. 9/15/20  Yes Maryam Mustafa MD   furosemide (LASIX) 40 MG tablet Take 40 mg by mouth Daily. 10/23/20  Yes Maryam Mustafa MD   gabapentin (NEURONTIN) 300 MG capsule Take 1 capsule by mouth 2 (Two) Times a Day. 3/1/21  Yes Gretchen Gauthier APRN   hydroxychloroquine (PLAQUENIL) 200 MG tablet Take 200 mg by mouth Daily.   Yes Maryam Mustafa MD   magnesium oxide (MAGOX) 400 (241.3 Mg) MG tablet tablet Take 400 mg by mouth 2 (Two) Times a Day. 5/13/20  Yes Maryam Mustafa MD   metoprolol tartrate (LOPRESSOR) 25 MG tablet Take 25 mg by mouth 2 (Two) Times a Day.   Yes Maryam Mustafa MD   mupirocin (BACTROBAN) 2 % ointment APPLY TOPICALLY TO AFFECTED AREA(S) THREE TIMES DAILY AS DIRECTED 6/16/21  Yes Gretchen Gauthier APRN   omeprazole (priLOSEC) 20 MG capsule Take 20 mg by mouth 2 (two) times a day.   Yes Maryam Mustafa MD   spironolactone (ALDACTONE) 25 MG tablet Take 25 mg by mouth Daily. 10/23/20  Yes Maryam Mustafa MD     Allergies   Allergen Reactions   • Cefprozil Nausea And Vomiting   • Penicillins Rash     Family History   Problem Relation Age of Onset   • Diabetes Other    • Heart disease Other    • Hypertension Other    • Heart disease Mother         Myocardial infarct   • Hypertension Mother    • Diabetes Mother    • Heart disease Father         CHF     Social History     Socioeconomic History   • Marital status:      Spouse name: Not on file   • Number of children: Not on file   • Years of education: Not on file   • Highest education  level: Not on file   Tobacco Use   • Smoking status: Never Smoker   • Smokeless tobacco: Never Used   Vaping Use   • Vaping Use: Never used   Substance and Sexual Activity   • Alcohol use: No   • Drug use: No   • Sexual activity: Defer       Physical Exam  Vitals and nursing note reviewed.   Constitutional:       Appearance: Normal appearance.   HENT:      Head: Normocephalic and atraumatic.      Nose: Nose normal.   Eyes:      Extraocular Movements: Extraocular movements intact.      Pupils: Pupils are equal, round, and reactive to light.   Cardiovascular:      Rate and Rhythm: Normal rate and regular rhythm.   Pulmonary:      Effort: Pulmonary effort is normal. No respiratory distress.   Chest:      Breasts: Breasts are symmetrical.         Right: No inverted nipple, mass, nipple discharge, skin change or tenderness.         Left: No inverted nipple, mass, nipple discharge, skin change or tenderness.   Musculoskeletal:         General: Normal range of motion.      Cervical back: Normal range of motion and neck supple.   Skin:     General: Skin is warm and dry.   Neurological:      General: No focal deficit present.      Mental Status: She is alert and oriented to person, place, and time.   Psychiatric:         Mood and Affect: Mood normal.         Behavior: Behavior normal.         Thought Content: Thought content normal.         Judgment: Judgment normal.       Vitals:    06/28/21 1336   BP: 124/82   Pulse: 97   Temp: 96.3 °F (35.7 °C)   SpO2: 98%     Assessment:    Diagnoses and all orders for this visit:    1. Fibrocystic breast disease (FCBD), unspecified laterality (Primary)        Plan:  1. Recheck 1 year with mammogram and exam                This document has been electronically signed by Ruben Springer MD on June 28, 2021 13:48 CDT

## 2021-06-28 NOTE — PATIENT INSTRUCTIONS
"BMI for Adults  What is BMI?  Body mass index (BMI) is a number that is calculated from a person's weight and height. BMI can help estimate how much of a person's weight is composed of fat. BMI does not measure body fat directly. Rather, it is an alternative to procedures that directly measure body fat, which can be difficult and expensive.  BMI can help identify people who may be at higher risk for certain medical problems.  What are BMI measurements used for?  BMI is used as a screening tool to identify possible weight problems. It helps determine whether a person is obese, overweight, a healthy weight, or underweight.  BMI is useful for:  · Identifying a weight problem that may be related to a medical condition or may increase the risk for medical problems.  · Promoting changes, such as changes in diet and exercise, to help reach a healthy weight. BMI screening can be repeated to see if these changes are working.  How is BMI calculated?  BMI involves measuring your weight in relation to your height. Both height and weight are measured, and the BMI is calculated from those numbers. This can be done either in English (U[place] or metric measurements. Note that charts and online BMI calculators are available to help you find your BMI quickly and easily without having to do these calculations yourself.  To calculate your BMI in English (U.S[place] measurements:    1. Measure your weight in pounds (lb).  2. Multiply the number of pounds by 703.  ? For example, for a person who weighs 180 lb, multiply that number by 703, which equals 126,540.  3. Measure your height in inches. Then multiply that number by itself to get a measurement called \"inches squared.\"  ? For example, for a person who is 70 inches tall, the \"inches squared\" measurement is 70 inches x 70 inches, which equals 4,900 inches squared.  4. Divide the total from step 2 (number of lb x 703) by the total from step 3 (inches squared): 126,540 ÷ 4,900 = 25.8. This is " "your BMI.  To calculate your BMI in metric measurements:  1. Measure your weight in kilograms (kg).  2. Measure your height in meters (m). Then multiply that number by itself to get a measurement called \"meters squared.\"  ? For example, for a person who is 1.75 m tall, the \"meters squared\" measurement is 1.75 m x 1.75 m, which is equal to 3.1 meters squared.  3. Divide the number of kilograms (your weight) by the meters squared number. In this example: 70 ÷ 3.1 = 22.6. This is your BMI.  What do the results mean?  BMI charts are used to identify whether you are underweight, normal weight, overweight, or obese. The following guidelines will be used:  · Underweight: BMI less than 18.5.  · Normal weight: BMI between 18.5 and 24.9.  · Overweight: BMI between 25 and 29.9.  · Obese: BMI of 30 or above.  Keep these notes in mind:  · Weight includes both fat and muscle, so someone with a muscular build, such as an athlete, may have a BMI that is higher than 24.9. In cases like these, BMI is not an accurate measure of body fat.  · To determine if excess body fat is the cause of a BMI of 25 or higher, further assessments may need to be done by a health care provider.  · BMI is usually interpreted in the same way for men and women.  Where to find more information  For more information about BMI, including tools to quickly calculate your BMI, go to these websites:  · Centers for Disease Control and Prevention: www.cdc.gov  · American Heart Association: www.heart.org  · National Heart, Lung, and [employer]: www.nhlbi.nih.gov  Summary  · Body mass index (BMI) is a number that is calculated from a person's weight and height.  · BMI may help estimate how much of a person's weight is composed of fat. BMI can help identify those who may be at higher risk for certain medical problems.  · BMI can be measured using English measurements or metric measurements.  · BMI charts are used to identify whether you are underweight, normal " weight, overweight, or obese.  This information is not intended to replace advice given to you by your health care provider. Make sure you discuss any questions you have with your health care provider.  Document Revised: 09/09/2020 Document Reviewed: 07/17/2020  Elsevier Patient Education © 2021 [employer]

## 2021-06-29 ENCOUNTER — LAB (OUTPATIENT)
Dept: LAB | Facility: HOSPITAL | Age: 61
End: 2021-06-29

## 2021-06-29 ENCOUNTER — OFFICE VISIT (OUTPATIENT)
Dept: FAMILY MEDICINE CLINIC | Facility: CLINIC | Age: 61
End: 2021-06-29

## 2021-06-29 VITALS
TEMPERATURE: 98.1 F | HEART RATE: 97 BPM | SYSTOLIC BLOOD PRESSURE: 148 MMHG | HEIGHT: 63 IN | DIASTOLIC BLOOD PRESSURE: 90 MMHG | WEIGHT: 211.3 LBS | BODY MASS INDEX: 37.44 KG/M2 | OXYGEN SATURATION: 98 %

## 2021-06-29 DIAGNOSIS — R05.9 COUGH: Primary | ICD-10-CM

## 2021-06-29 DIAGNOSIS — R06.2 WHEEZING: ICD-10-CM

## 2021-06-29 DIAGNOSIS — Z12.31 SCREENING MAMMOGRAM FOR HIGH-RISK PATIENT: Primary | ICD-10-CM

## 2021-06-29 DIAGNOSIS — J01.00 ACUTE MAXILLARY SINUSITIS, RECURRENCE NOT SPECIFIED: ICD-10-CM

## 2021-06-29 DIAGNOSIS — J45.21 MILD INTERMITTENT ASTHMA WITH ACUTE EXACERBATION: Chronic | ICD-10-CM

## 2021-06-29 LAB — SARS-COV-2 N GENE RESP QL NAA+PROBE: NOT DETECTED

## 2021-06-29 PROCEDURE — 99214 OFFICE O/P EST MOD 30 MIN: CPT | Performed by: NURSE PRACTITIONER

## 2021-06-29 PROCEDURE — 87635 SARS-COV-2 COVID-19 AMP PRB: CPT | Performed by: NURSE PRACTITIONER

## 2021-06-29 RX ORDER — ALBUTEROL SULFATE 90 UG/1
2 AEROSOL, METERED RESPIRATORY (INHALATION) EVERY 4 HOURS PRN
Qty: 18 G | Refills: 2 | Status: SHIPPED | OUTPATIENT
Start: 2021-06-29 | End: 2021-06-29

## 2021-06-29 RX ORDER — AZITHROMYCIN 250 MG/1
TABLET, FILM COATED ORAL
Qty: 6 TABLET | Refills: 0 | Status: SHIPPED | OUTPATIENT
Start: 2021-06-29 | End: 2021-07-07

## 2021-06-29 RX ORDER — DEXTROMETHORPHAN HYDROBROMIDE AND PROMETHAZINE HYDROCHLORIDE 15; 6.25 MG/5ML; MG/5ML
5 SOLUTION ORAL 4 TIMES DAILY PRN
Qty: 180 ML | Refills: 0 | Status: SHIPPED | OUTPATIENT
Start: 2021-06-29 | End: 2021-07-07

## 2021-06-29 RX ORDER — ALBUTEROL SULFATE 90 UG/1
2 AEROSOL, METERED RESPIRATORY (INHALATION) EVERY 4 HOURS PRN
Qty: 18 G | Refills: 2 | Status: SHIPPED | OUTPATIENT
Start: 2021-06-29 | End: 2022-07-07 | Stop reason: SDUPTHER

## 2021-06-29 NOTE — PROGRESS NOTES
Chief Complaint  office visit (sinus infection)    Subjective  Has been having issues for 5 days, coughing, drainage, headache         Lashaun Bernal presents to [facility]  Headache   This is a new problem. The current episode started in the past 7 days. The problem occurs constantly. The problem has been gradually worsening. The pain is located in the frontal region. The quality of the pain is described as aching and pulsating. Associated symptoms include coughing, drainage, rhinorrhea, sinus pressure and a sore throat. Pertinent negatives include no back pain, dizziness, ear pain, nausea, seizures or weakness. The symptoms are aggravated by coughing. She has tried nothing for the symptoms. The treatment provided no relief.   Cough  This is a new problem. The current episode started in the past 7 days. The problem has been gradually worsening. The cough is productive of sputum. Associated symptoms include headaches, nasal congestion, rhinorrhea and a sore throat. Pertinent negatives include no chest pain, chills, ear pain, myalgias or wheezing. She has tried oral steroids for the symptoms. The treatment provided no relief. Her past medical history is significant for asthma.       Review of Systems   Constitutional: Negative for activity change, appetite change and chills.   HENT: Positive for rhinorrhea, sinus pressure and sore throat. Negative for congestion, ear pain and trouble swallowing.    Eyes: Negative for discharge, itching and visual disturbance.   Respiratory: Positive for cough. Negative for apnea and wheezing.    Cardiovascular: Negative for chest pain and leg swelling.   Gastrointestinal: Negative for abdominal distention, constipation, diarrhea and nausea.   Endocrine: Negative for cold intolerance, heat intolerance and polyuria.   Genitourinary: Negative for dysuria, frequency and urgency.   Musculoskeletal: Negative for arthralgias, back pain and myalgias.   Skin:  "Negative for color change, pallor and wound.   Neurological: Positive for headaches. Negative for dizziness, seizures, syncope, weakness and light-headedness.   Psychiatric/Behavioral: Negative for agitation, confusion and sleep disturbance. The patient is not nervous/anxious.          Objective   Vital Signs:   /90   Pulse 97   Temp 98.1 °F (36.7 °C)   Ht 160 cm (63\")   Wt 95.8 kg (211 lb 4.8 oz)   SpO2 98%   BMI 37.43 kg/m²     Physical Exam  Vitals and nursing note reviewed.   Constitutional:       Appearance: She is well-developed.   HENT:      Head: Normocephalic and atraumatic.   Eyes:      General: Lids are normal.      Conjunctiva/sclera: Conjunctivae normal.   Neck:      Thyroid: No thyroid mass or thyromegaly.      Trachea: Trachea normal. No tracheal tenderness.   Cardiovascular:      Rate and Rhythm: Normal rate.      Pulses: Normal pulses.      Heart sounds: Normal heart sounds.   Pulmonary:      Effort: Pulmonary effort is normal. No respiratory distress.      Breath sounds: Examination of the right-upper field reveals wheezing and rhonchi. Examination of the left-upper field reveals wheezing and rhonchi. Examination of the right-middle field reveals wheezing. Examination of the left-middle field reveals wheezing. Examination of the right-lower field reveals wheezing. Examination of the left-lower field reveals wheezing. Wheezing and rhonchi present.   Abdominal:      General: There is no distension.      Palpations: Abdomen is soft. There is no mass.   Musculoskeletal:         General: Normal range of motion.      Cervical back: Normal range of motion. No edema.   Lymphadenopathy:      Head:      Right side of head: No submental, submandibular or tonsillar adenopathy.      Left side of head: No submental, submandibular or tonsillar adenopathy.   Skin:     General: Skin is warm and dry.      Coloration: Skin is not pale.      Findings: No abrasion, erythema or lesion.   Neurological:      " Mental Status: She is alert and oriented to person, place, and time.   Psychiatric:         Mood and Affect: Mood is not anxious. Affect is not inappropriate.         Speech: Speech normal.         Behavior: Behavior normal.         Thought Content: Thought content normal.         Judgment: Judgment normal. Judgment is not impulsive.        Result Review :                 Assessment and Plan    Diagnoses and all orders for this visit:    1. Cough (Primary)  -     XR Chest 2 View; Future  -     Discontinue: albuterol sulfate  (90 Base) MCG/ACT inhaler; Inhale 2 puffs Every 4 (Four) Hours As Needed for Wheezing.  Dispense: 18 g; Refill: 2  -     COVID-19, [facility] IN-HOUSE, NP SWAB IN TRANSPORT MEDIA 8-10 HR TAT - Swab, Nasopharynx; Future  -     albuterol sulfate  (90 Base) MCG/ACT inhaler; Inhale 2 puffs Every 4 (Four) Hours As Needed for Wheezing.  Dispense: 18 g; Refill: 2  -     COVID-19, [facility] IN-HOUSE, NP SWAB IN TRANSPORT MEDIA 8-10 HR TAT - Swab, Nasopharynx  -     azithromycin (Zithromax Z-Beni) 250 MG tablet; Take 2 tablets by mouth on day 1, then 1 tablet daily on days 2-5  Dispense: 6 tablet; Refill: 0  -     promethazine-dextromethorphan (PROMETHAZINE-DM) 6.25-15 MG/5ML solution; Take 5 mL by mouth 4 (Four) Times a Day As Needed for Cough.  Dispense: 180 mL; Refill: 0    2. Mild intermittent asthma with acute exacerbation  -     XR Chest 2 View; Future  -     Discontinue: albuterol sulfate  (90 Base) MCG/ACT inhaler; Inhale 2 puffs Every 4 (Four) Hours As Needed for Wheezing.  Dispense: 18 g; Refill: 2  -     COVID-19, [facility] IN-HOUSE, NP SWAB IN TRANSPORT MEDIA 8-10 HR TAT - Swab, Nasopharynx; Future  -     albuterol sulfate  (90 Base) MCG/ACT inhaler; Inhale 2 puffs Every 4 (Four) Hours As Needed for Wheezing.  Dispense: 18 g; Refill: 2  -     COVID-19, [facility] IN-HOUSE, NP SWAB IN TRANSPORT MEDIA 8-10 HR TAT - Swab, Nasopharynx    3. Wheezing  -     XR Chest 2  View; Future  -     Discontinue: albuterol sulfate  (90 Base) MCG/ACT inhaler; Inhale 2 puffs Every 4 (Four) Hours As Needed for Wheezing.  Dispense: 18 g; Refill: 2  -     COVID-19, [facility] IN-HOUSE, NP SWAB IN TRANSPORT MEDIA 8-10 HR TAT - Swab, Nasopharynx; Future  -     albuterol sulfate  (90 Base) MCG/ACT inhaler; Inhale 2 puffs Every 4 (Four) Hours As Needed for Wheezing.  Dispense: 18 g; Refill: 2  -     COVID-19, [facility] IN-HOUSE, NP SWAB IN TRANSPORT MEDIA 8-10 HR TAT - Swab, Nasopharynx  -     azithromycin (Zithromax Z-Beni) 250 MG tablet; Take 2 tablets by mouth on day 1, then 1 tablet daily on days 2-5  Dispense: 6 tablet; Refill: 0    4. Acute maxillary sinusitis, recurrence not specified  -     XR Chest 2 View; Future  -     Discontinue: albuterol sulfate  (90 Base) MCG/ACT inhaler; Inhale 2 puffs Every 4 (Four) Hours As Needed for Wheezing.  Dispense: 18 g; Refill: 2  -     COVID-19, [facility] IN-HOUSE, NP SWAB IN TRANSPORT MEDIA 8-10 HR TAT - Swab, Nasopharynx; Future  -     albuterol sulfate  (90 Base) MCG/ACT inhaler; Inhale 2 puffs Every 4 (Four) Hours As Needed for Wheezing.  Dispense: 18 g; Refill: 2  -     COVID-19, [facility] IN-HOUSE, NP SWAB IN TRANSPORT MEDIA 8-10 HR TAT - Swab, Nasopharynx  -     azithromycin (Zithromax Z-Beni) 250 MG tablet; Take 2 tablets by mouth on day 1, then 1 tablet daily on days 2-5  Dispense: 6 tablet; Refill: 0      1.Cough  Xray today   Start zpak today   Start cough syrup today  Continue to use Albuterol inhaler/ treatments    2. Asthma  Continue to use albuterol inhaler     3. Wheezing  Chest xray   I will call with results    4. Acute maxillary sinusitis   Start zpak  Drink plenty of fluids   May use normal saline   Covid test performed     I will call with xray results as well as Covid screen results      I spent 30 minutes caring for Lashaun on this date of service. This time includes time spent by me in the following  activities:preparing for the visit, performing a medically appropriate examination and/or evaluation , counseling and educating the patient/family/caregiver, ordering medications, tests, or procedures and documenting information in the medical record  Follow Up   Return if symptoms worsen or fail to improve, for Recheck.  Patient was given instructions and counseling regarding her condition or for health maintenance advice. Please see specific information pulled into the AVS if appropriate.           This document has been electronically signed by MAYELA Miramontes on June 29, 2021 09:16 CDT

## 2021-06-30 ENCOUNTER — TELEPHONE (OUTPATIENT)
Dept: FAMILY MEDICINE CLINIC | Facility: CLINIC | Age: 61
End: 2021-06-30

## 2021-07-07 ENCOUNTER — OFFICE VISIT (OUTPATIENT)
Dept: GASTROENTEROLOGY | Facility: CLINIC | Age: 61
End: 2021-07-07

## 2021-07-07 VITALS
DIASTOLIC BLOOD PRESSURE: 69 MMHG | BODY MASS INDEX: 36.54 KG/M2 | HEIGHT: 63 IN | WEIGHT: 206.2 LBS | SYSTOLIC BLOOD PRESSURE: 139 MMHG | HEART RATE: 85 BPM

## 2021-07-07 DIAGNOSIS — K21.00 GASTROESOPHAGEAL REFLUX DISEASE WITH ESOPHAGITIS WITHOUT HEMORRHAGE: Primary | ICD-10-CM

## 2021-07-07 DIAGNOSIS — K76.0 FATTY LIVER: ICD-10-CM

## 2021-07-07 DIAGNOSIS — R74.8 ELEVATED LIVER ENZYMES: ICD-10-CM

## 2021-07-07 PROCEDURE — 99213 OFFICE O/P EST LOW 20 MIN: CPT | Performed by: PHYSICIAN ASSISTANT

## 2021-07-07 NOTE — PROGRESS NOTES
Chief Complaint   Patient presents with   • Heartburn   • Fatty Liver   • Elevated Hepatic Enzymes       ENDO PROCEDURE ORDERED:    Subjective    Lashaun Bernal is a 60 y.o. female. she is here today for follow-up.    History of Present Illness    This patient is seen on a recheck of her gastroesophageal reflux disease, elevated liver enzymes, fatty liver, F0/S2/N1. Last seen 03/03/2021. The patient has lost 15 pounds since last visit. She is trying to lose. Gastroesophageal reflux disease is doing well on the Prilosec. She denied nausea, vomiting, and dysphagia. Bowels are moving without blood or mucus. She does have a family history of colon cancer with the last colonoscopy 02/24/2020.    Laboratories 06/17/2021 showed normal TSH and CBC. CMP showed a glucose of 115, sodium 133, CO2 21.2, glomerular filtration rate 56, otherwise normal. INR 0.90. CADENA FibroSure 0.11/F0. Steatosis 0.66/S2, 0.75/N2. Cholesterol 203, glucose 110, triglycerides 239.     Studies on 06/29/2021 chest x-ray was negative, COVID was negative, cholesterol panel showed triglycerides 214, , otherwise normal.    ASSESSMENT/PLAN: This is a patient with significant steatohepatitis with hepatic fibrosis, improving on current regimen. Encouraged to continue dietary modification and weight loss. Gastroesophageal reflux disease doing well on the Prilosec. We will plan follow-up in 4 months with CMP and INR prior, further pending clinical course and the results of the above.      The following portions of the patient's history were reviewed and updated as appropriate:   Past Medical History:   Diagnosis Date   • Allergic rhinitis    • Arthritis    • Asthma    • Breast lump    • C. 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      Past Surgical History:   Procedure Laterality Date   • ABDOMINOPLASTY  2004   • AUGMENTATION MAMMAPLASTY     • BREAST AUGMENTATION BILATERAL MASTOPEXY     • BUNIONECTOMY Left 3/20/2017    Procedure: EXCISION OF CALCANEAL SPURS LEFT FOOT AND REATTACHMENT OF ACHILLES TENDON ;  Surgeon: Jarrell Mar MD;  Location: [facility] OR;  Service:    • COLONOSCOPY  2015   • COLONOSCOPY N/A 2020    Procedure: COLONOSCOPY;  Surgeon: Santhosh Patrick MD;  Location: [facility] ENDOSCOPY;  Service: Gastroenterology;  Laterality: N/A;   • CYST REMOVAL  1961    Excision of sebaceous cyst. Left ear   • FINGER/THUMB LESION/CYST EXCISION  10/29/2014   • HAND SURGERY  2013   • HYSTERECTOMY     • OOPHORECTOMY     • ORIF ULNA/RADIUS FRACTURES  2013   • TUBAL ABDOMINAL LIGATION  1985    Laparoscopic tubal sterilization; dilatation and curettage. Desires tubal ligation;       Family History   Problem Relation Age of Onset   • Diabetes Other    • Heart disease Other    • Hypertension Other    • Heart disease Mother         Myocardial infarct   • Hypertension Mother    • Diabetes Mother    • Heart disease Father         CHF     OB History        2    Para   2    Term   2            AB        Living           SAB        TAB        Ectopic        Molar        Multiple        Live Births                  Allergies   Allergen Reactions   • Cefprozil Nausea And Vomiting   • Penicillins Rash     Social History     Socioeconomic History   • Marital status:      Spouse " name: Not on file   • Number of children: Not on file   • Years of education: Not on file   • Highest education level: Not on file   Tobacco Use   • Smoking status: Never Smoker   • Smokeless tobacco: Never Used   Vaping Use   • Vaping Use: Never used   Substance and Sexual Activity   • Alcohol use: No   • Drug use: No   • Sexual activity: Defer     Current Medications:  Prior to Admission medications    Medication Sig Start Date End Date Taking? Authorizing Provider   albuterol (PROVENTIL) (2.5 MG/3ML) 0.083% nebulizer solution Take 2.5 mg by nebulization Every 6 (Six) Hours As Needed for Wheezing. 11/27/18  Yes Gretchen Gauthier APRN   albuterol sulfate  (90 Base) MCG/ACT inhaler Inhale 2 puffs Every 4 (Four) Hours As Needed for Wheezing. 6/29/21  Yes Natalie Vallejo APRN   aspirin 81 MG EC tablet Take 81 mg by mouth Daily.   Yes Maryam Mustafa MD   clonazePAM (KlonoPIN) 0.5 MG tablet Take 0.5 mg by mouth 2 (Two) Times a Day As Needed for Anxiety. 6/11/19  Yes Maryam Mustafa MD   clopidogrel (PLAVIX) 75 MG tablet Take 75 mg by mouth Daily.   Yes Maryam Mustafa MD   cyclobenzaprine (FLEXERIL) 10 MG tablet TAKE 1 TABLET BY MOUTH 3 (THREE) TIMES A DAY AS NEEDED FOR MUSCLE SPASMS. 3/23/21  Yes Gretchen Gauthier APRN   dilTIAZem (CARDIZEM) 30 MG tablet Take 30 mg by mouth 2 (Two) Times a Day.   Yes Maryam Mustafa MD   estradiol (ESTRACE) 1 MG tablet TAKE 1 TABLET EVERY DAY 1/6/21  Yes Gretchen Gauthier APRN   FLUoxetine (PROzac) 40 MG capsule Take 40 mg by mouth Daily. 9/15/20  Yes Maryam Mustafa MD   furosemide (LASIX) 40 MG tablet Take 40 mg by mouth Daily. 10/23/20  Yes Maryam Mustafa MD   gabapentin (NEURONTIN) 300 MG capsule Take 1 capsule by mouth 2 (Two) Times a Day. 3/1/21  Yes Gretchen Gauthier APRN   hydroxychloroquine (PLAQUENIL) 200 MG tablet Take 200 mg by mouth Daily.   Yes Maryam Mustafa MD   magnesium oxide (MAGOX) 400 (241.3 Mg) MG tablet tablet Take 400 mg by  "mouth 2 (Two) Times a Day. 5/13/20  Yes Maryam Mustafa MD   metoprolol tartrate (LOPRESSOR) 25 MG tablet Take 25 mg by mouth 2 (Two) Times a Day.   Yes ProviderMaryam MD   mupirocin (BACTROBAN) 2 % ointment APPLY TOPICALLY TO AFFECTED AREA(S) THREE TIMES DAILY AS DIRECTED 6/16/21  Yes Gretchen Gauthier APRN   omeprazole (priLOSEC) 20 MG capsule Take 20 mg by mouth 2 (two) times a day.   Yes Maryam Mustafa MD   spironolactone (ALDACTONE) 25 MG tablet Take 25 mg by mouth Daily. 10/23/20  Yes Maryam Mustafa MD   azithromycin (Zithromax Z-Beni) 250 MG tablet Take 2 tablets by mouth on day 1, then 1 tablet daily on days 2-5 6/29/21 7/7/21 Yes Natalie Vallejo APRN   promethazine-dextromethorphan (PROMETHAZINE-DM) 6.25-15 MG/5ML solution Take 5 mL by mouth 4 (Four) Times a Day As Needed for Cough. 6/29/21 7/7/21 Yes Natalie Vallejo APRN     Review of Systems  Review of Systems       Objective    /69   Pulse 85   Ht 160 cm (63\")   Wt 93.5 kg (206 lb 3.2 oz)   LMP 03/09/1991 (Approximate)   BMI 36.53 kg/m²   Physical Exam  Vitals and nursing note reviewed.   Constitutional:       General: She is not in acute distress.     Appearance: She is well-developed.   HENT:      Head: Normocephalic and atraumatic.   Eyes:      Pupils: Pupils are equal, round, and reactive to light.   Cardiovascular:      Rate and Rhythm: Normal rate and regular rhythm.      Heart sounds: Normal heart sounds.   Pulmonary:      Effort: Pulmonary effort is normal.      Breath sounds: Normal breath sounds.   Abdominal:      General: Bowel sounds are normal. There is no distension or abdominal bruit.      Palpations: Abdomen is soft. Abdomen is not rigid. There is no shifting dullness or mass.      Tenderness: There is abdominal tenderness. There is no guarding or rebound.      Hernia: No hernia is present. There is no hernia in the ventral area.      Comments: mild   Musculoskeletal:         General: Normal range of " motion.      Cervical back: Normal range of motion.   Skin:     General: Skin is warm and dry.   Neurological:      Mental Status: She is alert and oriented to person, place, and time.   Psychiatric:         Behavior: Behavior normal.         Thought Content: Thought content normal.         Judgment: Judgment normal.       Assessment/Plan      1. Gastroesophageal reflux disease with esophagitis without hemorrhage    2. Elevated liver enzymes    3. Fatty liver    .   Diagnoses and all orders for this visit:    1. Gastroesophageal reflux disease with esophagitis without hemorrhage (Primary)    2. Elevated liver enzymes  -     Comprehensive Metabolic Panel; Future  -     Protime-INR; Future    3. Fatty liver  Comments:  F0/S2N2  Orders:  -     Comprehensive Metabolic Panel; Future  -     Protime-INR; Future        Orders placed during this encounter include:  Orders Placed This Encounter   Procedures   • Comprehensive Metabolic Panel     Standing Status:   Future     Standing Expiration Date:   1/3/2022     Order Specific Question:   Release to patient     Answer:   Immediate   • Protime-INR     Standing Status:   Future     Standing Expiration Date:   1/3/2022       Medications prescribed:  No orders of the defined types were placed in this encounter.    Discontinued Medications       Reason for Discontinue     azithromycin (Zithromax Z-Beni) 250 MG tablet    *Therapy completed     promethazine-dextromethorphan (PROMETHAZINE-DM) 6.25-15 MG/5ML solution    *Therapy completed        Requested Prescriptions      No prescriptions requested or ordered in this encounter       Review and/or summary of lab tests, radiology, procedures, medications. Review and summary of old records and obtaining of history. The risks and benefits of my recommendations, as well as other treatment options were discussed with the patient today. Questions were answered.    Follow-up: Return in about 4 weeks (around 8/4/2021), or if symptoms worsen  or fail to improve, for lab prior.     * Surgery not found *      This document has been electronically signed by Marcial Walker PA-C on July 20, 2021 18:58 CDT      Results for orders placed or performed in visit on 06/29/21   COVID-19, [facility]-HOUSE, NP SWAB IN TRANSPORT MEDIA 8-10 HR TAT - Swab, Nasopharynx    Specimen: Nasopharynx; Swab   Result Value Ref Range    COVID19 Not Detected Not Detected - Ref. Range   Results for orders placed or performed in visit on 06/17/21   CADENA Fibrosure    Specimen: Blood   Result Value Ref Range    Fibrosis Score (References) 0.11 0.00 - 0.21    Fibrosis Stage (Reference) Comment     Steatosis Score (Reference) 0.66 (H) 0.00 - 0.30    Steatosis Grade (Reference) Comment     CADENA Score (Reference) 0.75 (H) 0.25    [place] Grade (Reference) Comment     Height: (Reference) 63 in    Weight: (Reference) 212 LBS    Alpha 2-Macroglobulins, Qn 177 110 - 276 mg/dL    Haptoglobin 119 33 - 346 mg/dL    Apolipoprotein A-1 129 116 - 209 mg/dL    Total Bilirubin 0.3 0.0 - 1.2 mg/dL    GGT 11 0 - 60 IU/L    ALT (SGPT) 31 0 - 40 IU/L    AST (SGOT) P5P (Reference) 30 0 - 40 IU/L    Cholesterol, Total (Reference) 203 (H) 100 - 199 mg/dL    Glucose, Serum (Reference) 110 (H) 65 - 99 mg/dL    Triglycerides 239 (H) 0 - 149 mg/dL    Interpretations: (Reference) Comment     Fibrosis Scoring: Comment     Steatosis Grading (Reference) Comment     Cadena Scoring (Reference) Comment     Limitations: (Reference) Comment     Comment (Reference) Comment    CBC Auto Differential    Specimen: Blood   Result Value Ref Range    WBC 6.48 3.40 - 10.80 10*3/mm3    RBC 4.36 3.77 - 5.28 10*6/mm3    Hemoglobin 12.8 12.0 - 15.9 g/dL    Hematocrit 37.5 34.0 - 46.6 %    MCV 86.0 79.0 - 97.0 fL    MCH 29.4 26.6 - 33.0 pg    MCHC 34.1 31.5 - 35.7 g/dL    RDW 12.5 12.3 - 15.4 %    RDW-SD 38.9 37.0 - 54.0 fl    MPV 10.6 6.0 - 12.0 fL    Platelets 336 140 - 450 10*3/mm3    Neutrophil % 49.1 42.7 - 76.0 %    Lymphocyte %  36.3 19.6 - 45.3 %    Monocyte % 9.4 5.0 - 12.0 %    Eosinophil % 4.0 0.3 - 6.2 %    Basophil % 0.9 0.0 - 1.5 %    Immature Grans % 0.3 0.0 - 0.5 %    Neutrophils, Absolute 3.18 1.70 - 7.00 10*3/mm3    Lymphocytes, Absolute 2.35 0.70 - 3.10 10*3/mm3    Monocytes, Absolute 0.61 0.10 - 0.90 10*3/mm3    Eosinophils, Absolute 0.26 0.00 - 0.40 10*3/mm3    Basophils, Absolute 0.06 0.00 - 0.20 10*3/mm3    Immature Grans, Absolute 0.02 0.00 - 0.05 10*3/mm3    nRBC 0.0 0.0 - 0.2 /100 WBC   Protime-INR    Specimen: Blood   Result Value Ref Range    Protime 12.5 11.1 - 15.3 Seconds    INR 0.90 0.80 - 1.20   TSH    Specimen: Blood   Result Value Ref Range    TSH 2.630 0.270 - 4.200 uIU/mL   Lipid Panel    Specimen: Blood   Result Value Ref Range    Total Cholesterol 198 0 - 200 mg/dL    Triglycerides 214 (H) 0 - 150 mg/dL    HDL Cholesterol 40 40 - 60 mg/dL    LDL Cholesterol  120 (H) 0 - 100 mg/dL    VLDL Cholesterol 38 5 - 40 mg/dL    LDL/HDL Ratio 2.88    Comprehensive Metabolic Panel    Specimen: Blood   Result Value Ref Range    Glucose 115 (H) 65 - 99 mg/dL    BUN 16 8 - 23 mg/dL    Creatinine 1.01 (H) 0.57 - 1.00 mg/dL    Sodium 133 (L) 136 - 145 mmol/L    Potassium 4.3 3.5 - 5.2 mmol/L    Chloride 101 98 - 107 mmol/L    CO2 21.2 (L) 22.0 - 29.0 mmol/L    Calcium 9.3 8.6 - 10.5 mg/dL    Total Protein 6.5 6.0 - 8.5 g/dL    Albumin 4.30 3.50 - 5.20 g/dL    ALT (SGPT) 27 1 - 33 U/L    AST (SGOT) 24 1 - 32 U/L    Alkaline Phosphatase 75 39 - 117 U/L    Total Bilirubin 0.3 0.0 - 1.2 mg/dL    eGFR Non African Amer 56 (L) >60 mL/min/1.73    Globulin 2.2 gm/dL    A/G Ratio 2.0 g/dL    BUN/Creatinine Ratio 15.8 7.0 - 25.0    Anion Gap 10.8 5.0 - 15.0 mmol/L   Results for orders placed or performed in visit on 03/01/21   Urine Drug Screen - Urine, Clean Catch    Specimen: Urine, Clean Catch   Result Value Ref Range    THC, Screen, Urine Negative Negative    Phencyclidine (PCP), Urine Negative Negative    Cocaine Screen, Urine  Negative Negative    Methamphetamine, Ur Negative Negative    Opiate Screen Negative Negative    Amphetamine Screen, Urine Negative Negative    Benzodiazepine Screen, Urine Negative Negative    Tricyclic Antidepressants Screen Negative Negative    Methadone Screen, Urine Negative Negative    Barbiturates Screen, Urine Negative Negative    Oxycodone Screen, Urine Negative Negative    Propoxyphene Screen Negative Negative    Buprenorphine, Screen, Urine Negative Negative   Results for orders placed or performed in visit on 02/26/21   Protime-INR    Specimen: Blood   Result Value Ref Range    Protime 12.9 11.1 - 15.3 Seconds    INR 0.94 0.80 - 1.20   Comprehensive Metabolic Panel    Specimen: Blood   Result Value Ref Range    Glucose 117 (H) 65 - 99 mg/dL    BUN 17 8 - 23 mg/dL    Creatinine 1.11 (H) 0.57 - 1.00 mg/dL    Sodium 138 136 - 145 mmol/L    Potassium 4.6 3.5 - 5.2 mmol/L    Chloride 104 98 - 107 mmol/L    CO2 23.5 22.0 - 29.0 mmol/L    Calcium 8.9 8.6 - 10.5 mg/dL    Total Protein 6.6 6.0 - 8.5 g/dL    Albumin 3.80 3.50 - 5.20 g/dL    ALT (SGPT) 22 1 - 33 U/L    AST (SGOT) 21 1 - 32 U/L    Alkaline Phosphatase 84 39 - 117 U/L    Total Bilirubin 0.3 0.0 - 1.2 mg/dL    eGFR Non African Amer 50 (L) >60 mL/min/1.73    Globulin 2.8 gm/dL    A/G Ratio 1.4 g/dL    BUN/Creatinine Ratio 15.3 7.0 - 25.0    Anion Gap 10.5 5.0 - 15.0 mmol/L     *Note: Due to a large number of results and/or encounters for the requested time period, some results have not been displayed. A complete set of results can be found in Results Review.       Some portions of this note have been dictated using voice recognition software and may contain errors and/or omissions.

## 2021-07-07 NOTE — PATIENT INSTRUCTIONS
MyPlate from USDA    MyPlate is an outline of a general healthy diet based on the 2010 Dietary Guidelines for Americans, from the U.S. Department of Agriculture (USDA). It sets guidelines for how much food you should eat from each food group based on your age, sex, and level of physical activity.  What are tips for following MyPlate?  To follow MyPlate recommendations:  · Eat a wide variety of fruits and vegetables, grains, and protein foods.  · Serve smaller portions and eat less food throughout the day.  · Limit portion sizes to avoid overeating.  · Enjoy your food.  · Get at least 150 minutes of exercise every week. This is about 30 minutes each day, 5 or more days per week.  It can be difficult to have every meal look like MyPlate. Think about MyPlate as eating guidelines for an entire day, rather than each individual meal.  Fruits and vegetables  · Make half of your plate fruits and vegetables.  · Eat many different colors of fruits and vegetables each day.  · For a 2,000 calorie daily food plan, eat:  ? 2½ cups of vegetables every day.  ? 2 cups of fruit every day.  · 1 cup is equal to:  ? 1 cup raw or cooked vegetables.  ? 1 cup raw fruit.  ? 1 medium-sized orange, apple, or banana.  ? 1 cup 100% fruit or vegetable juice.  ? 2 cups raw leafy greens, such as lettuce, spinach, or kale.  ? ½ cup dried fruit.  Grains  · One fourth of your plate should be grains.  · Make at least half of the grains you eat each day whole grains.  · For a 2,000 calorie daily food plan, eat 6 oz of grains every day.  · 1 oz is equal to:  ? 1 slice bread.  ? 1 cup cereal.  ? ½ cup cooked rice, cereal, or pasta.  Protein  · One fourth of your plate should be protein.  · Eat a wide variety of protein foods, including meat, poultry, fish, eggs, beans, nuts, and tofu.  · For a 2,000 calorie daily food plan, eat 5½ oz of protein every day.  · 1 oz is equal to:  ? 1 oz meat, poultry, or fish.  ? ¼ cup cooked beans.  ? 1 egg.  ? ½ oz nuts  or seeds.  ? 1 Tbsp peanut butter.  Dairy  · Drink fat-free or low-fat (1%) milk.  · Eat or drink dairy as a side to meals.  · For a 2,000 calorie daily food plan, eat or drink 3 cups of dairy every day.  · 1 cup is equal to:  ? 1 cup milk, yogurt, cottage cheese, or soy milk (soy beverage).  ? 2 oz processed cheese.  ? 1½ oz natural cheese.  Fats, oils, salt, and sugars  · Only small amounts of oils are recommended.  · Avoid foods that are high in calories and low in nutritional value (empty calories), like foods high in fat or added sugars.  · Choose foods that are low in salt (sodium). Choose foods that have less than 140 milligrams (mg) of sodium per serving.  · Drink water instead of sugary drinks. Drink enough water each day to keep your urine pale yellow.  Where to find support  · Work with your health care provider or a nutrition specialist (dietitian) to develop a customized eating plan that is right for you.  · Download an karlo (mobile application) to help you track your daily food intake.  Where to find more information  · Go to ChooseMyPlate.gov for more information.  Summary  · MyPlate is a general guideline for healthy eating from the USDA. It is based on the 2010 Dietary Guidelines for Americans.  · In general, fruits and vegetables should take up ½ of your plate, grains should take up ¼ of your plate, and protein should take up ¼ of your plate.  This information is not intended to replace advice given to you by your health care provider. Make sure you discuss any questions you have with your health care provider.  Document Revised: 05/21/2020 Document Reviewed: 03/19/2018  Elsevier Patient Education © 2021 [employer]  BMI for Adults  What is BMI?  Body mass index (BMI) is a number that is calculated from a person's weight and height. BMI can help estimate how much of a person's weight is composed of fat. BMI does not measure body fat directly. Rather, it is an alternative to procedures that  "directly measure body fat, which can be difficult and expensive.  BMI can help identify people who may be at higher risk for certain medical problems.  What are BMI measurements used for?  BMI is used as a screening tool to identify possible weight problems. It helps determine whether a person is obese, overweight, a healthy weight, or underweight.  BMI is useful for:  · Identifying a weight problem that may be related to a medical condition or may increase the risk for medical problems.  · Promoting changes, such as changes in diet and exercise, to help reach a healthy weight. BMI screening can be repeated to see if these changes are working.  How is BMI calculated?  BMI involves measuring your weight in relation to your height. Both height and weight are measured, and the BMI is calculated from those numbers. This can be done either in English (U[place] or metric measurements. Note that charts and online BMI calculators are available to help you find your BMI quickly and easily without having to do these calculations yourself.  To calculate your BMI in English (U.S[place] measurements:    1. Measure your weight in pounds (lb).  2. Multiply the number of pounds by 703.  ? For example, for a person who weighs 180 lb, multiply that number by 703, which equals 126,540.  3. Measure your height in inches. Then multiply that number by itself to get a measurement called \"inches squared.\"  ? For example, for a person who is 70 inches tall, the \"inches squared\" measurement is 70 inches x 70 inches, which equals 4,900 inches squared.  4. Divide the total from step 2 (number of lb x 703) by the total from step 3 (inches squared): 126,540 ÷ 4,900 = 25.8. This is your BMI.  To calculate your BMI in metric measurements:  1. Measure your weight in kilograms (kg).  2. Measure your height in meters (m). Then multiply that number by itself to get a measurement called \"meters squared.\"  ? For example, for a person who is 1.75 m tall, the " "\"meters squared\" measurement is 1.75 m x 1.75 m, which is equal to 3.1 meters squared.  3. Divide the number of kilograms (your weight) by the meters squared number. In this example: 70 ÷ 3.1 = 22.6. This is your BMI.  What do the results mean?  BMI charts are used to identify whether you are underweight, normal weight, overweight, or obese. The following guidelines will be used:  · Underweight: BMI less than 18.5.  · Normal weight: BMI between 18.5 and 24.9.  · Overweight: BMI between 25 and 29.9.  · Obese: BMI of 30 or above.  Keep these notes in mind:  · Weight includes both fat and muscle, so someone with a muscular build, such as an athlete, may have a BMI that is higher than 24.9. In cases like these, BMI is not an accurate measure of body fat.  · To determine if excess body fat is the cause of a BMI of 25 or higher, further assessments may need to be done by a health care provider.  · BMI is usually interpreted in the same way for men and women.  Where to find more information  For more information about BMI, including tools to quickly calculate your BMI, go to these websites:  · Centers for Disease Control and Prevention: www.cdc.gov  · American Heart Association: www.heart.org  · National Heart, Lung, and Blood [place]: www.nhlbi.nih.gov  Summary  · Body mass index (BMI) is a number that is calculated from a person's weight and height.  · BMI may help estimate how much of a person's weight is composed of fat. BMI can help identify those who may be at higher risk for certain medical problems.  · BMI can be measured using English measurements or metric measurements.  · BMI charts are used to identify whether you are underweight, normal weight, overweight, or obese.  This information is not intended to replace advice given to you by your health care provider. Make sure you discuss any questions you have with your health care provider.  Document Revised: 09/09/2020 Document Reviewed: 07/17/2020  Ana Laura " Patient Education © 2021 [employer] Inc.

## 2021-08-04 RX ORDER — CLINDAMYCIN HYDROCHLORIDE 300 MG/1
300 CAPSULE ORAL 3 TIMES DAILY
Qty: 30 CAPSULE | Refills: 0 | Status: SHIPPED | OUTPATIENT
Start: 2021-08-04 | End: 2021-08-14

## 2021-08-24 ENCOUNTER — TRANSCRIBE ORDERS (OUTPATIENT)
Dept: LAB | Facility: HOSPITAL | Age: 61
End: 2021-08-24

## 2021-08-24 DIAGNOSIS — N18.30 STAGE 3 CHRONIC KIDNEY DISEASE, UNSPECIFIED WHETHER STAGE 3A OR 3B CKD (HCC): Primary | ICD-10-CM

## 2021-08-28 DIAGNOSIS — M79.7 FIBROMYALGIA: ICD-10-CM

## 2021-08-28 DIAGNOSIS — M79.10 MUSCLE PAIN: ICD-10-CM

## 2021-08-30 RX ORDER — CYCLOBENZAPRINE HCL 10 MG
10 TABLET ORAL 3 TIMES DAILY PRN
Qty: 270 TABLET | Refills: 1 | Status: SHIPPED | OUTPATIENT
Start: 2021-08-30 | End: 2021-09-15 | Stop reason: SDUPTHER

## 2021-08-30 RX ORDER — ESTRADIOL 1 MG/1
TABLET ORAL
Qty: 90 TABLET | Refills: 2 | Status: SHIPPED | OUTPATIENT
Start: 2021-08-30 | End: 2022-04-07

## 2021-09-15 ENCOUNTER — LAB (OUTPATIENT)
Dept: LAB | Facility: HOSPITAL | Age: 61
End: 2021-09-15

## 2021-09-15 ENCOUNTER — OFFICE VISIT (OUTPATIENT)
Dept: FAMILY MEDICINE CLINIC | Facility: CLINIC | Age: 61
End: 2021-09-15

## 2021-09-15 VITALS
BODY MASS INDEX: 37.12 KG/M2 | WEIGHT: 209.5 LBS | SYSTOLIC BLOOD PRESSURE: 124 MMHG | HEIGHT: 63 IN | HEART RATE: 76 BPM | DIASTOLIC BLOOD PRESSURE: 68 MMHG | OXYGEN SATURATION: 96 %

## 2021-09-15 DIAGNOSIS — N18.30 STAGE 3 CHRONIC KIDNEY DISEASE, UNSPECIFIED WHETHER STAGE 3A OR 3B CKD (HCC): ICD-10-CM

## 2021-09-15 DIAGNOSIS — M13.0 POLYARTICULAR ARTHRITIS: Chronic | ICD-10-CM

## 2021-09-15 DIAGNOSIS — F32.A DEPRESSIVE DISORDER: ICD-10-CM

## 2021-09-15 DIAGNOSIS — Z79.899 HIGH RISK MEDICATION USE: ICD-10-CM

## 2021-09-15 DIAGNOSIS — M79.10 MUSCLE PAIN: ICD-10-CM

## 2021-09-15 DIAGNOSIS — M79.7 FIBROMYALGIA: ICD-10-CM

## 2021-09-15 DIAGNOSIS — I10 BENIGN ESSENTIAL HYPERTENSION: Primary | Chronic | ICD-10-CM

## 2021-09-15 PROCEDURE — 80306 DRUG TEST PRSMV INSTRMNT: CPT

## 2021-09-15 PROCEDURE — 99213 OFFICE O/P EST LOW 20 MIN: CPT | Performed by: NURSE PRACTITIONER

## 2021-09-15 RX ORDER — CYCLOBENZAPRINE HCL 10 MG
10 TABLET ORAL 3 TIMES DAILY PRN
Qty: 270 TABLET | Refills: 1 | Status: SHIPPED | OUTPATIENT
Start: 2021-09-15 | End: 2022-04-21

## 2021-09-15 RX ORDER — GABAPENTIN 300 MG/1
300 CAPSULE ORAL 2 TIMES DAILY
Qty: 180 CAPSULE | Refills: 0 | Status: SHIPPED | OUTPATIENT
Start: 2021-09-15 | End: 2022-07-07 | Stop reason: SDUPTHER

## 2021-09-15 NOTE — PROGRESS NOTES
Chief Complaint  Hypertension (3 month check up, benign essential hypertension)    Subjective  States her blood pressure is doing really good.         Lashaun Bernal presents to [facility] - [place]  Hypertension  This is a chronic problem. The current episode started more than 1 year ago. The problem is unchanged. The problem is controlled. Associated symptoms include chest pain, peripheral edema and shortness of breath. Risk factors for coronary artery disease include dyslipidemia, family history, obesity, post-menopausal state, sedentary lifestyle and stress. Current antihypertension treatment includes lifestyle changes. Compliance problems include exercise and diet.    Fibromyalgia  This is a chronic problem. The current episode started more than 1 year ago. The problem occurs constantly. The problem has been gradually improving. Associated symptoms include arthralgias and chest pain. Pertinent negatives include no chills, coughing, diaphoresis, fatigue, fever or sore throat. Nothing aggravates the symptoms. Treatments tried: Cymbalta. The treatment provided mild relief.   Depression  Visit Type: follow-up  Patient presents with the following symptoms: decreased concentration, depressed mood, malaise, restlessness and shortness of breath.  Patient is not experiencing: confusion.  Frequency of symptoms: occasionally   Severity: mild   Sleep quality: fair  Nighttime awakenings: occasional  Compliance with medications:  %        Hyperlipidemia  This is a chronic problem. The current episode started more than 1 year ago. Recent lipid tests were reviewed and are normal. Factors aggravating her hyperlipidemia include fatty foods. Associated symptoms include chest pain and shortness of breath. Current antihyperlipidemic treatment includes diet change. The current treatment provides mild improvement of lipids. Compliance problems include adherence to exercise and  "adherence to diet.    Chronic Kidney Disease  This is a chronic problem. The current episode started more than 1 year ago. The problem occurs constantly. The problem has been unchanged. Associated symptoms include arthralgias and chest pain. Pertinent negatives include no chills, coughing, diaphoresis, fatigue, fever or sore throat. Exacerbated by: medications  Treatments tried: Sees Dr. Will nepherologist  The treatment provided moderate relief.   Arthritis  Presents for follow-up visit. She complains of pain and stiffness. The symptoms have been stable. Associated symptoms include pain at night and pain while resting. Pertinent negatives include no fatigue or fever. Compliance with total regimen is %. Compliance with medications is %.         Objective   Vital Signs:   /68   Pulse 76   Ht 160 cm (63\")   Wt 95 kg (209 lb 8 oz)   SpO2 96%   BMI 37.11 kg/m²     Physical Exam  Vitals and nursing note reviewed.   Constitutional:       Appearance: She is well-developed.   HENT:      Head: Normocephalic.      Right Ear: External ear normal.      Left Ear: External ear normal.   Eyes:      Pupils: Pupils are equal, round, and reactive to light.   Cardiovascular:      Rate and Rhythm: Normal rate and regular rhythm.      Heart sounds: Normal heart sounds.   Pulmonary:      Effort: Pulmonary effort is normal.      Breath sounds: Normal breath sounds.   Abdominal:      General: Bowel sounds are normal.      Palpations: Abdomen is soft.   Musculoskeletal:         General: Normal range of motion.      Cervical back: Normal range of motion and neck supple.   Skin:     General: Skin is warm.      Capillary Refill: Capillary refill takes less than 2 seconds.   Neurological:      Mental Status: She is alert and oriented to person, place, and time.   Psychiatric:         Behavior: Behavior normal.        Result Review :   The following data was reviewed by: MAYELA Flores on 09/15/2021:  Common labs  "   Common Labsle 2/17/21 2/26/21 6/17/21 6/17/21 6/17/21 6/17/21      0812 0812 0812 0812   Glucose 162 (A) 117 (A)   115 (A)    BUN 19 17   16    Creatinine 1.21 (A) 1.11 (A)   1.01 (A)    eGFR Non African Am 45 (A) 50 (A)   56 (A)    Sodium 138 138   133 (A)    Potassium 4.8 4.6   4.3    Chloride 103 104   101    Calcium 9.4 8.9   9.3    Albumin 3.80 3.80   4.30    Total Bilirubin  0.3   0.3    Alkaline Phosphatase  84   75    AST (SGOT)  21   24    ALT (SGPT)  22   27    WBC   6.48      Hemoglobin   12.8      Hematocrit   37.5      Platelets   336      Total Cholesterol    198     Triglycerides    214 (A)  239 (A)   HDL Cholesterol    40     LDL Cholesterol     120 (A)     (A) Abnormal value                      Assessment and Plan    Diagnoses and all orders for this visit:    1. Benign essential hypertension (Primary)    2. Fibromyalgia  -     gabapentin (NEURONTIN) 300 MG capsule; Take 1 capsule by mouth 2 (Two) Times a Day.  Dispense: 180 capsule; Refill: 0  -     cyclobenzaprine (FLEXERIL) 10 MG tablet; Take 1 tablet by mouth 3 (Three) Times a Day As Needed for Muscle Spasms.  Dispense: 270 tablet; Refill: 1    3. Stage 3 chronic kidney disease, unspecified whether stage 3a or 3b CKD (CMS/HCC)    4. Depressive disorder    5. Polyarticular arthritis    6. Muscle pain  -     cyclobenzaprine (FLEXERIL) 10 MG tablet; Take 1 tablet by mouth 3 (Three) Times a Day As Needed for Muscle Spasms.  Dispense: 270 tablet; Refill: 1    7. High risk medication use  -     Urine Drug Screen - Urine, Clean Catch; Future    1.  Benign essential hypertension:  Continue on diltiazem centimeters and metoprolol tartrate as previously prescribed  Continue to adhere to a low-sodium diet  Avoid adding extra salt to food during cooking process or once cooking has been completed    2.  Fibromyalgia:  Continue on Neurontin and Flexeril as previously prescribed refill prescriptions for both medications sent to pharmacy    3.  Stage III  chronic kidney disease:  Continue neurology follow-up with Dr. Will as scheduled    4.  Depressive disorder:  Continue on Prozac as previously prescribed    5.  Polyarticular arthritis:  Continue on Plaquenil as previously prescribed    6.  Muscle pain:  Continue on Flexeril as previously prescribed    7.  High-risk medication use:  PDMP reviewed  Complete UDS as ordered    I spent 25 minutes caring for Lashaun on this date of service. This time includes time spent by me in the following activities:preparing for the visit, reviewing tests, obtaining and/or reviewing a separately obtained history, performing a medically appropriate examination and/or evaluation , counseling and educating the patient/family/caregiver, ordering medications, tests, or procedures and documenting information in the medical record  Follow Up   Return in about 3 months (around 12/15/2021) for Medicare Wellness.  Patient was given instructions and counseling regarding her condition or for health maintenance advice. Please see specific information pulled into the AVS if appropriate.         This document has been electronically signed by MAYELA Flores on September 15, 2021 12:31 CDT

## 2021-10-01 ENCOUNTER — LAB (OUTPATIENT)
Dept: LAB | Facility: HOSPITAL | Age: 61
End: 2021-10-01

## 2021-10-01 DIAGNOSIS — N18.30 STAGE 3 CHRONIC KIDNEY DISEASE, UNSPECIFIED WHETHER STAGE 3A OR 3B CKD (HCC): ICD-10-CM

## 2021-10-01 PROCEDURE — 80069 RENAL FUNCTION PANEL: CPT

## 2021-10-01 PROCEDURE — 36415 COLL VENOUS BLD VENIPUNCTURE: CPT

## 2021-10-01 PROCEDURE — 85652 RBC SED RATE AUTOMATED: CPT

## 2021-10-01 NOTE — PATIENT INSTRUCTIONS

## 2021-10-02 LAB
ALBUMIN SERPL-MCNC: 4 G/DL (ref 3.5–5.2)
ANION GAP SERPL CALCULATED.3IONS-SCNC: 10.7 MMOL/L (ref 5–15)
BUN SERPL-MCNC: 18 MG/DL (ref 8–23)
BUN/CREAT SERPL: 17 (ref 7–25)
CALCIUM SPEC-SCNC: 8.9 MG/DL (ref 8.6–10.5)
CHLORIDE SERPL-SCNC: 106 MMOL/L (ref 98–107)
CO2 SERPL-SCNC: 21.3 MMOL/L (ref 22–29)
CREAT SERPL-MCNC: 1.06 MG/DL (ref 0.57–1)
ERYTHROCYTE [SEDIMENTATION RATE] IN BLOOD: 19 MM/HR (ref 0–30)
GFR SERPL CREATININE-BSD FRML MDRD: 53 ML/MIN/1.73
GLUCOSE SERPL-MCNC: 72 MG/DL (ref 65–99)
PHOSPHATE SERPL-MCNC: 2.6 MG/DL (ref 2.5–4.5)
POTASSIUM SERPL-SCNC: 4.5 MMOL/L (ref 3.5–5.2)
SODIUM SERPL-SCNC: 138 MMOL/L (ref 136–145)

## 2021-10-02 PROCEDURE — 87086 URINE CULTURE/COLONY COUNT: CPT | Performed by: NURSE PRACTITIONER

## 2021-10-05 ENCOUNTER — LAB (OUTPATIENT)
Dept: LAB | Facility: HOSPITAL | Age: 61
End: 2021-10-05

## 2021-10-05 DIAGNOSIS — N18.30 STAGE 3 CHRONIC KIDNEY DISEASE, UNSPECIFIED WHETHER STAGE 3A OR 3B CKD (HCC): ICD-10-CM

## 2021-10-05 PROCEDURE — 84156 ASSAY OF PROTEIN URINE: CPT

## 2021-10-05 PROCEDURE — 82570 ASSAY OF URINE CREATININE: CPT

## 2021-10-06 LAB
CREAT UR-MCNC: 123.9 MG/DL
PROT UR-MCNC: 7 MG/DL
PROT/CREAT UR: 56.5 MG/G CREA (ref 0–200)

## 2021-10-07 NOTE — PROGRESS NOTES
Discharge Planning Assessment  [facility]     Patient Name: Lashaun Bernal  MRN: [id number]  Today's Date: 1/14/2020    Admit Date: 1/13/2020    Discharge Needs Assessment     Row Name 01/14/20 1551       Living Environment    Lives With  alone    Current Living Arrangements  home/apartment/condo Contact information confirmed.     Primary Care Provided by  self    Provides Primary Care For  no one    Family Caregiver if Needed  child(yusef), adult    Family Caregiver Names  Vicky Rose (daughter)    Quality of Family Relationships  helpful;involved    Able to Return to Prior Arrangements  yes       Resource/Environmental Concerns    Resource/Environmental Concerns  none    Transportation Concerns  car, none       Transition Planning    Patient/Family Anticipates Transition to  home    Patient/Family Anticipated Services at Transition  none    Transportation Anticipated  car, drives self;family or friend will provide       Discharge Needs Assessment    Readmission Within the Last 30 Days  no previous admission in last 30 days    Concerns to be Addressed  denies needs/concerns at this time    Equipment Currently Used at Home  bipap/cpap;nebulizer Patient uses [facility] for CPAP and supplies.     Anticipated Changes Related to Illness  none    Equipment Needed After Discharge  none        Discharge Plan     Row Name 01/14/20 1603       Plan    Plan  home with no services     Plan Comments  LACE complete. HUMBERTO explained and offered and home health transition visit. Patient declined service. Plan is to return home. Patient denies any needs and/or concerns at this time. Continue with medical management.....Sade AMEZQUITA    Row Name 01/14/20 6024       Plan    Plan Comments  Chart notes reviewed. HUMBERTO attempted room visit to complete LACE. Patient is asleep at this time. Continue with medical management...Sade AMEZQUITA        Destination      Coordination has not been started for this encounter.     Met with patient regarding discharge planning.  Patient confirmed her sister would be funding a motel in [place] for a week.  Patient stated she would know more this evening after she talked to her sister again.  Patient confirmed she would need assistance with transportation to motel at discharge.  ROB Thibodeaux, DC Coordinator, 10/7/2021       Durable Medical Equipment      Coordination has not been started for this encounter.      Dialysis/Infusion      Coordination has not been started for this encounter.      Home Medical Care      Coordination has not been started for this encounter.      Therapy      Coordination has not been started for this encounter.      Community Resources      Coordination has not been started for this encounter.        Expected Discharge Date and Time     Expected Discharge Date Expected Discharge Time    Stef 15, 2020         Demographic Summary     Row Name 01/14/20 1546       General Information    Admission Type  observation    Arrived From  emergency department    Referral Source  high risk screening LACE score 7    Preferred Language  English     Used During This Interaction  no       Contact Information    Contact Information Obtained for          Functional Status     Row Name 01/14/20 1551       Functional Status    Usual Activity Tolerance  good    Functional Status Comments  Patient is independent with ADL's.        Functional Status, IADL    Medications  independent Pharmacy, [facility], and prescription coverage confirmed.     Meal Preparation  assistive person    Housekeeping  assistive person    Laundry  assistive person    Shopping  assistive person    IADL Comments  Patient's  daughter assists her at times with performing IADL's.        Mental Status Summary    Recent Changes in Mental Status/Cognitive Functioning  no changes       Employment/    Employment Status  unemployed;other (see comments) Current source of income: Patient receives a widows pension. Patient has applied for disability and is pending determination.          Psychosocial    No documentation.       Abuse/Neglect    No documentation.       Legal    No documentation.       Substance Abuse    No documentation.       Patient Forms    No documentation.           BIA Ivory

## 2021-10-29 PROCEDURE — 87635 SARS-COV-2 COVID-19 AMP PRB: CPT | Performed by: NURSE PRACTITIONER

## 2021-11-01 ENCOUNTER — HOSPITAL ENCOUNTER (EMERGENCY)
Facility: HOSPITAL | Age: 61
Discharge: HOME | End: 2021-11-01
Admitting: NURSE PRACTITIONER

## 2021-11-01 VITALS
SYSTOLIC BLOOD PRESSURE: 114 MMHG | BODY MASS INDEX: 36.68 KG/M2 | DIASTOLIC BLOOD PRESSURE: 56 MMHG | WEIGHT: 207 LBS | RESPIRATION RATE: 18 BRPM | TEMPERATURE: 98.9 F | HEART RATE: 70 BPM | OXYGEN SATURATION: 97 % | HEIGHT: 63 IN

## 2021-11-01 PROCEDURE — 99202 OFFICE O/P NEW SF 15 MIN: CPT

## 2021-11-01 PROCEDURE — 25010000002 INJECTION, BAMLANIVIMAB AND ETESEVIMAB, 2100 MG: Performed by: NURSE PRACTITIONER

## 2021-11-01 PROCEDURE — M0245 HC IV INFUSION, BAMLANIVIMAB AND ETESEVIMAB, 2100 MG: HCPCS | Performed by: NURSE PRACTITIONER

## 2021-11-01 RX ORDER — SODIUM CHLORIDE 9 MG/ML
30 INJECTION, SOLUTION INTRAVENOUS ONCE
Status: COMPLETED | OUTPATIENT
Start: 2021-11-01 | End: 2021-11-01

## 2021-11-01 RX ORDER — EPINEPHRINE 1 MG/ML
0.3 INJECTION, SOLUTION INTRAMUSCULAR; SUBCUTANEOUS ONCE AS NEEDED
Status: DISCONTINUED | OUTPATIENT
Start: 2021-11-01 | End: 2021-11-01 | Stop reason: HOSPADM

## 2021-11-01 RX ORDER — METHYLPREDNISOLONE SODIUM SUCCINATE 125 MG/2ML
125 INJECTION, POWDER, LYOPHILIZED, FOR SOLUTION INTRAMUSCULAR; INTRAVENOUS ONCE AS NEEDED
Status: DISCONTINUED | OUTPATIENT
Start: 2021-11-01 | End: 2021-11-01 | Stop reason: HOSPADM

## 2021-11-01 RX ORDER — DIPHENHYDRAMINE HYDROCHLORIDE 50 MG/ML
50 INJECTION INTRAMUSCULAR; INTRAVENOUS ONCE AS NEEDED
Status: DISCONTINUED | OUTPATIENT
Start: 2021-11-01 | End: 2021-11-01 | Stop reason: HOSPADM

## 2021-11-01 RX ADMIN — SODIUM CHLORIDE 30 ML: 9 INJECTION, SOLUTION INTRAVENOUS at 10:11

## 2021-11-01 RX ADMIN — SODIUM CHLORIDE: 9 INJECTION, SOLUTION INTRAVENOUS at 09:50

## 2021-11-02 RX ORDER — NITROFURANTOIN 25; 75 MG/1; MG/1
100 CAPSULE ORAL 2 TIMES DAILY
Qty: 14 CAPSULE | Refills: 0 | Status: SHIPPED | OUTPATIENT
Start: 2021-11-02 | End: 2021-11-11

## 2021-11-10 ENCOUNTER — LAB (OUTPATIENT)
Dept: LAB | Facility: HOSPITAL | Age: 61
End: 2021-11-10

## 2021-11-10 DIAGNOSIS — K76.0 FATTY LIVER: ICD-10-CM

## 2021-11-10 DIAGNOSIS — R74.8 ELEVATED LIVER ENZYMES: ICD-10-CM

## 2021-11-10 LAB
ALBUMIN SERPL-MCNC: 3.7 G/DL (ref 3.5–5.2)
ALBUMIN/GLOB SERPL: 1.2 G/DL
ALP SERPL-CCNC: 80 U/L (ref 39–117)
ALT SERPL W P-5'-P-CCNC: 24 U/L (ref 1–33)
ANION GAP SERPL CALCULATED.3IONS-SCNC: 7.8 MMOL/L (ref 5–15)
AST SERPL-CCNC: 17 U/L (ref 1–32)
BILIRUB SERPL-MCNC: 0.2 MG/DL (ref 0–1.2)
BUN SERPL-MCNC: 16 MG/DL (ref 8–23)
BUN/CREAT SERPL: 13.3 (ref 7–25)
CALCIUM SPEC-SCNC: 8.9 MG/DL (ref 8.6–10.5)
CHLORIDE SERPL-SCNC: 107 MMOL/L (ref 98–107)
CO2 SERPL-SCNC: 23.2 MMOL/L (ref 22–29)
CREAT SERPL-MCNC: 1.2 MG/DL (ref 0.57–1)
GFR SERPL CREATININE-BSD FRML MDRD: 46 ML/MIN/1.73
GLOBULIN UR ELPH-MCNC: 3.1 GM/DL
GLUCOSE SERPL-MCNC: 117 MG/DL (ref 65–99)
INR PPP: 0.85 (ref 0.8–1.2)
POTASSIUM SERPL-SCNC: 4.1 MMOL/L (ref 3.5–5.2)
PROT SERPL-MCNC: 6.8 G/DL (ref 6–8.5)
PROTHROMBIN TIME: 11.6 SECONDS (ref 11.1–15.3)
SODIUM SERPL-SCNC: 138 MMOL/L (ref 136–145)

## 2021-11-10 PROCEDURE — 80053 COMPREHEN METABOLIC PANEL: CPT

## 2021-11-10 PROCEDURE — 36415 COLL VENOUS BLD VENIPUNCTURE: CPT

## 2021-11-10 PROCEDURE — 85610 PROTHROMBIN TIME: CPT

## 2021-11-11 ENCOUNTER — OFFICE VISIT (OUTPATIENT)
Dept: GASTROENTEROLOGY | Facility: CLINIC | Age: 61
End: 2021-11-11

## 2021-11-11 ENCOUNTER — OFFICE VISIT (OUTPATIENT)
Dept: FAMILY MEDICINE CLINIC | Facility: CLINIC | Age: 61
End: 2021-11-11

## 2021-11-11 VITALS
HEIGHT: 63 IN | DIASTOLIC BLOOD PRESSURE: 66 MMHG | WEIGHT: 210.6 LBS | SYSTOLIC BLOOD PRESSURE: 124 MMHG | BODY MASS INDEX: 37.32 KG/M2 | HEART RATE: 63 BPM

## 2021-11-11 VITALS
OXYGEN SATURATION: 96 % | DIASTOLIC BLOOD PRESSURE: 68 MMHG | HEIGHT: 63 IN | BODY MASS INDEX: 37.32 KG/M2 | HEART RATE: 60 BPM | SYSTOLIC BLOOD PRESSURE: 122 MMHG | WEIGHT: 210.6 LBS

## 2021-11-11 DIAGNOSIS — E71.30 DISORDER OF FATTY-ACID METABOLISM, UNSPECIFIED: ICD-10-CM

## 2021-11-11 DIAGNOSIS — R74.8 ELEVATED LIVER ENZYMES: ICD-10-CM

## 2021-11-11 DIAGNOSIS — K76.0 FATTY LIVER: ICD-10-CM

## 2021-11-11 DIAGNOSIS — U07.1 COVID-19: Primary | ICD-10-CM

## 2021-11-11 DIAGNOSIS — K21.00 GASTROESOPHAGEAL REFLUX DISEASE WITH ESOPHAGITIS WITHOUT HEMORRHAGE: Primary | ICD-10-CM

## 2021-11-11 PROCEDURE — 99213 OFFICE O/P EST LOW 20 MIN: CPT | Performed by: PHYSICIAN ASSISTANT

## 2021-11-11 PROCEDURE — 99213 OFFICE O/P EST LOW 20 MIN: CPT | Performed by: NURSE PRACTITIONER

## 2021-11-11 NOTE — PROGRESS NOTES
"Chief Complaint  Shortness of Breath (recheck after having covid)    Subjective  Follow up after having Covid has been having a lot of congestion and drainage but other than that just feeling tired but no shortness of breath or difficulty breathing has not been running fever.         Lashaun Bernal presents to [facility] [place]  Shortness of Breath  This is a new problem. The current episode started 1 to 4 weeks ago. The problem occurs daily. The problem has been waxing and waning. Associated symptoms include PND and rhinorrhea. The symptoms are aggravated by any activity. She has tried beta agonist inhalers for the symptoms. The treatment provided moderate relief.       Objective   Vital Signs:   /68   Pulse 60   Ht 160 cm (63\")   Wt 95.5 kg (210 lb 9.6 oz)   SpO2 96%   BMI 37.31 kg/m²     Physical Exam  Vitals and nursing note reviewed.   Constitutional:       Appearance: She is well-developed.   Cardiovascular:      Rate and Rhythm: Normal rate and regular rhythm.      Heart sounds: Normal heart sounds.   Pulmonary:      Effort: Pulmonary effort is normal.      Breath sounds: Normal breath sounds.   Abdominal:      General: Bowel sounds are normal.      Palpations: Abdomen is soft.   Musculoskeletal:         General: Normal range of motion.      Cervical back: Normal range of motion and neck supple.   Skin:     General: Skin is warm.      Capillary Refill: Capillary refill takes less than 2 seconds.   Neurological:      Mental Status: She is alert and oriented to person, place, and time.   Psychiatric:         Behavior: Behavior normal.        Result Review :     Common labs    Common Labsle 6/17/21 6/17/21 6/17/21 6/17/21 10/1/21 11/10/21    0812 0812 0812 0812     Glucose   115 (A)  72 117 (A)   BUN   16  18 16   Creatinine   1.01 (A)  1.06 (A) 1.20 (A)   eGFR Non African Am   56 (A)  53 (A) 46 (A)   Sodium   133 (A)  138 138   Potassium   4.3  4.5 " 4.1   Chloride   101  106 107   Calcium   9.3  8.9 8.9   Albumin   4.30  4.00 3.70   Total Bilirubin   0.3   0.2   Alkaline Phosphatase   75   80   AST (SGOT)   24   17   ALT (SGPT)   27   24   WBC 6.48        Hemoglobin 12.8        Hematocrit 37.5        Platelets 336        Total Cholesterol  198       Triglycerides  214 (A)  239 (A)     HDL Cholesterol  40       LDL Cholesterol   120 (A)       (A) Abnormal value                      Assessment and Plan    Diagnoses and all orders for this visit:    1. COVID-19 (Primary)    1. Continues to complain of SOB but continues to improve  Educated to get plenty of rest and to stay hydrated with water.   Encouraged to use albuterol inhaler when having SOB episodes  Pamphlet for [facility] long term covid program provided.   Seek emergency medical treatment for any new or worsening SOB or chest tightness.     I spent 22 minutes caring for Lashaun on this date of service. This time includes time spent by me in the following activities:preparing for the visit, reviewing tests, obtaining and/or reviewing a separately obtained history, performing a medically appropriate examination and/or evaluation , counseling and educating the patient/family/caregiver, ordering medications, tests, or procedures, documenting information in the medical record and care coordination  Follow Up   Return if symptoms worsen or fail to improve, for Next scheduled follow up.  Patient was given instructions and counseling regarding her condition or for health maintenance advice. Please see specific information pulled into the AVS if appropriate.         This document has been electronically signed by MAYELA Flores on November 11, 2021 12:20 CST

## 2021-12-21 ENCOUNTER — OFFICE VISIT (OUTPATIENT)
Dept: FAMILY MEDICINE CLINIC | Facility: CLINIC | Age: 61
End: 2021-12-21

## 2021-12-21 ENCOUNTER — LAB (OUTPATIENT)
Dept: LAB | Facility: HOSPITAL | Age: 61
End: 2021-12-21

## 2021-12-21 VITALS — OXYGEN SATURATION: 97 % | HEIGHT: 63 IN | BODY MASS INDEX: 37.62 KG/M2 | WEIGHT: 212.3 LBS

## 2021-12-21 DIAGNOSIS — R53.1 WEAKNESS GENERALIZED: Primary | ICD-10-CM

## 2021-12-21 DIAGNOSIS — R79.9 ABNORMAL FINDING OF BLOOD CHEMISTRY, UNSPECIFIED: ICD-10-CM

## 2021-12-21 DIAGNOSIS — R23.3 EASY BRUISING: ICD-10-CM

## 2021-12-21 LAB
ALBUMIN SERPL-MCNC: 3.8 G/DL (ref 3.5–5.2)
ALBUMIN/GLOB SERPL: 1.3 G/DL
ALP SERPL-CCNC: 76 U/L (ref 39–117)
ALT SERPL W P-5'-P-CCNC: 19 U/L (ref 1–33)
ANION GAP SERPL CALCULATED.3IONS-SCNC: 11 MMOL/L (ref 5–15)
AST SERPL-CCNC: 19 U/L (ref 1–32)
BASOPHILS # BLD AUTO: 0.08 10*3/MM3 (ref 0–0.2)
BASOPHILS NFR BLD AUTO: 0.8 % (ref 0–1.5)
BILIRUB SERPL-MCNC: 0.2 MG/DL (ref 0–1.2)
BUN SERPL-MCNC: 14 MG/DL (ref 8–23)
BUN/CREAT SERPL: 11.9 (ref 7–25)
CALCIUM SPEC-SCNC: 9 MG/DL (ref 8.6–10.5)
CHLORIDE SERPL-SCNC: 108 MMOL/L (ref 98–107)
CO2 SERPL-SCNC: 23 MMOL/L (ref 22–29)
CREAT SERPL-MCNC: 1.18 MG/DL (ref 0.57–1)
DEPRECATED RDW RBC AUTO: 41.4 FL (ref 37–54)
EOSINOPHIL # BLD AUTO: 0.22 10*3/MM3 (ref 0–0.4)
EOSINOPHIL NFR BLD AUTO: 2.3 % (ref 0.3–6.2)
ERYTHROCYTE [DISTWIDTH] IN BLOOD BY AUTOMATED COUNT: 12.9 % (ref 12.3–15.4)
ERYTHROCYTE [SEDIMENTATION RATE] IN BLOOD: 16 MM/HR (ref 0–20)
GFR SERPL CREATININE-BSD FRML MDRD: 47 ML/MIN/1.73
GLOBULIN UR ELPH-MCNC: 2.9 GM/DL
GLUCOSE SERPL-MCNC: 97 MG/DL (ref 65–99)
HCT VFR BLD AUTO: 39.1 % (ref 34–46.6)
HGB BLD-MCNC: 12.9 G/DL (ref 12–15.9)
IMM GRANULOCYTES # BLD AUTO: 0.04 10*3/MM3 (ref 0–0.05)
IMM GRANULOCYTES NFR BLD AUTO: 0.4 % (ref 0–0.5)
IRON 24H UR-MRATE: 61 MCG/DL (ref 37–145)
IRON SATN MFR SERPL: 16 % (ref 20–50)
LYMPHOCYTES # BLD AUTO: 2.85 10*3/MM3 (ref 0.7–3.1)
LYMPHOCYTES NFR BLD AUTO: 30 % (ref 19.6–45.3)
MCH RBC QN AUTO: 29 PG (ref 26.6–33)
MCHC RBC AUTO-ENTMCNC: 33 G/DL (ref 31.5–35.7)
MCV RBC AUTO: 87.9 FL (ref 79–97)
MONOCYTES # BLD AUTO: 0.88 10*3/MM3 (ref 0.1–0.9)
MONOCYTES NFR BLD AUTO: 9.3 % (ref 5–12)
NEUTROPHILS NFR BLD AUTO: 5.43 10*3/MM3 (ref 1.7–7)
NEUTROPHILS NFR BLD AUTO: 57.2 % (ref 42.7–76)
NRBC BLD AUTO-RTO: 0 /100 WBC (ref 0–0.2)
PLATELET # BLD AUTO: 382 10*3/MM3 (ref 140–450)
PMV BLD AUTO: 9.7 FL (ref 6–12)
POTASSIUM SERPL-SCNC: 4.2 MMOL/L (ref 3.5–5.2)
PROT SERPL-MCNC: 6.7 G/DL (ref 6–8.5)
RBC # BLD AUTO: 4.45 10*6/MM3 (ref 3.77–5.28)
SODIUM SERPL-SCNC: 142 MMOL/L (ref 136–145)
TIBC SERPL-MCNC: 392 MCG/DL (ref 298–536)
TRANSFERRIN SERPL-MCNC: 263 MG/DL (ref 200–360)
WBC NRBC COR # BLD: 9.5 10*3/MM3 (ref 3.4–10.8)

## 2021-12-21 PROCEDURE — 85025 COMPLETE CBC W/AUTO DIFF WBC: CPT | Performed by: NURSE PRACTITIONER

## 2021-12-21 PROCEDURE — 99213 OFFICE O/P EST LOW 20 MIN: CPT | Performed by: NURSE PRACTITIONER

## 2021-12-21 PROCEDURE — 82607 VITAMIN B-12: CPT | Performed by: NURSE PRACTITIONER

## 2021-12-21 PROCEDURE — 96372 THER/PROPH/DIAG INJ SC/IM: CPT | Performed by: NURSE PRACTITIONER

## 2021-12-21 PROCEDURE — 83540 ASSAY OF IRON: CPT | Performed by: NURSE PRACTITIONER

## 2021-12-21 PROCEDURE — 85651 RBC SED RATE NONAUTOMATED: CPT | Performed by: NURSE PRACTITIONER

## 2021-12-21 PROCEDURE — 84466 ASSAY OF TRANSFERRIN: CPT | Performed by: NURSE PRACTITIONER

## 2021-12-21 PROCEDURE — 80053 COMPREHEN METABOLIC PANEL: CPT | Performed by: NURSE PRACTITIONER

## 2021-12-21 RX ORDER — SPIRONOLACTONE 25 MG/1
TABLET ORAL
COMMUNITY
Start: 2021-12-07 | End: 2022-07-07

## 2021-12-21 RX ORDER — CYANOCOBALAMIN 1000 UG/ML
1000 INJECTION, SOLUTION INTRAMUSCULAR; SUBCUTANEOUS
Status: DISCONTINUED | OUTPATIENT
Start: 2021-12-21 | End: 2022-03-22

## 2021-12-21 RX ADMIN — CYANOCOBALAMIN 1000 MCG: 1000 INJECTION, SOLUTION INTRAMUSCULAR; SUBCUTANEOUS at 15:57

## 2021-12-22 ENCOUNTER — TELEPHONE (OUTPATIENT)
Dept: FAMILY MEDICINE CLINIC | Facility: CLINIC | Age: 61
End: 2021-12-22

## 2021-12-22 DIAGNOSIS — E61.1 LOW IRON: Primary | ICD-10-CM

## 2021-12-22 LAB — VIT B12 BLD-MCNC: 457 PG/ML (ref 211–946)

## 2021-12-22 RX ORDER — LANOLIN ALCOHOL/MO/W.PET/CERES
325 CREAM (GRAM) TOPICAL
Qty: 90 TABLET | Refills: 3 | Status: SHIPPED | OUTPATIENT
Start: 2021-12-22 | End: 2022-07-07 | Stop reason: SDUPTHER

## 2021-12-22 NOTE — TELEPHONE ENCOUNTER
Per MAYELA Godinez, Ms. Bernal has been called with recent lab results & recommendations.  Continue current medications and follow-up as planned or sooner if any problems.     ----- Message from MAYELA Flores sent at 12/22/2021  7:15 AM CST -----  Sed rate, vitamin B12, iron, transferrin and TIBC were within normal limits.  Iron saturation was low.  I have sent in an iron tablet to her mail-in pharmacy.  She will take 1 tablet daily with breakfast.  She needs to begin an over-the-counter vitamin C supplement to help improve absorption.  I will repeat iron levels at next appointment.  All other labs were essentially normal.  I am still waiting on the results of her Mor-Barr virus.  Once that is available I will let her know.

## 2021-12-23 ENCOUNTER — LAB (OUTPATIENT)
Dept: LAB | Facility: HOSPITAL | Age: 61
End: 2021-12-23

## 2021-12-23 PROCEDURE — 86665 EPSTEIN-BARR CAPSID VCA: CPT | Performed by: NURSE PRACTITIONER

## 2021-12-27 LAB
EBV VCA IGG SER IA-ACNC: 327 U/ML (ref 0–17.9)
EBV VCA IGM SER IA-ACNC: <36 U/ML (ref 0–35.9)

## 2021-12-29 ENCOUNTER — TELEPHONE (OUTPATIENT)
Dept: FAMILY MEDICINE CLINIC | Facility: CLINIC | Age: 61
End: 2021-12-29

## 2021-12-29 NOTE — TELEPHONE ENCOUNTER
Per MAYELA Godinez, Ms. Bernal has been called with recent lab results & recommendations.  Continue current medications and follow-up as planned or sooner if any problems.       ----- Message from MAYELA Flores sent at 12/29/2021  9:24 AM CST -----  Mor-Barr virus showed positive for previous infection but negative for current infection.  Need some point she has had Mor-Barr virus.  This is the virus that causes mono.  This could explain chronic fatigue.  She needs to make sure she is getting plenty of sleep and drinking plenty of water.  Again she is is not currently infected

## 2022-01-14 ENCOUNTER — OFFICE VISIT (OUTPATIENT)
Dept: SLEEP MEDICINE | Facility: HOSPITAL | Age: 62
End: 2022-01-14

## 2022-01-14 VITALS
WEIGHT: 215 LBS | OXYGEN SATURATION: 95 % | HEART RATE: 83 BPM | BODY MASS INDEX: 38.09 KG/M2 | HEIGHT: 63 IN | SYSTOLIC BLOOD PRESSURE: 136 MMHG | DIASTOLIC BLOOD PRESSURE: 57 MMHG

## 2022-01-14 DIAGNOSIS — G47.33 OBSTRUCTIVE SLEEP APNEA, ADULT: Primary | ICD-10-CM

## 2022-01-14 DIAGNOSIS — E66.01 MORBID OBESITY: ICD-10-CM

## 2022-01-14 PROCEDURE — 99212 OFFICE O/P EST SF 10 MIN: CPT | Performed by: NURSE PRACTITIONER

## 2022-01-14 NOTE — PROGRESS NOTES
Sleep Clinic Follow Up    Date: 2022  Primary Care Provider: Gretchen Gauthier APRN    Last office visit: 2020 (I reviewed this note)    CC: Follow up: MAX on CPAP      Interim History:  Since the last visit:    1) mild MAX -  Lashaun Bernal has remained compliant with CPAP. She denies mask and machine issues, dry mouth, headaches, or pressures intolerance. She denies abnormal dreams, sleep paralysis, nasal congestion, URI sx. She has received her new replacement machine and does not like the ramp feature.    2) Patient denies RLS symptoms.     Sleep Testin. PSG on 2019, AHI of 10   2. Currently on 10 cm H2O    PAP Data:    Time frame: 01/15/2021-2022   Compliance: 98.6%  Average use on days used: 9 hrs 27 min  Percent of days with usage greater than or equal to 4 hours: 97.5%  PAP range: 10 cm H2O  Average 90% pressure: 10 cmH2O  Leak: 0 minutes  Average AHI: 1.6 events/hr  Mask type: Nasal pillows  DME: [employer]     PAP Data:  DreamStation 2 machine  Time frame: 2021-2022   Compliance 94.4 %  Average use on days used: 9hrs 19 min  Percent of days with usage greater than or equal to 4 hours: 94.4%  PAP range : 10 cm H2O  Average 90% pressure: 10 cmH2O  Leak: 0 minutes  Average AHI 1.5 events/hr    Bed time: 2300  Sleep latency: 10-15 minutes  Number of times awakens during the night: 0  Wake time: 0830  Estimated total sleep time at night: 9 hours  Caffeine intake: 0 cups of coffee, 1 cups of tea, and 0 sodas per day  Alcohol intake: 0 drinks per week  Nap time: very rare   Sleepiness with Driving: none     [place] - 3    PMHx, FH, SH reviewed and pertinent changes are: Had COVID in October - had infusion, recovered well.      REVIEW OF SYSTEMS:   Negative for chest pain, SOA, fever, chills, cough, N/V/D, abdominal pain.    Smoking:none    Lashaun Bernal  reports that she has never smoked. She has never used smokeless tobacco.      Exam:  Vitals:    22 1453   BP:  "136/57   Pulse: 83   SpO2: 95%           01/14/22  1453   Weight: 97.5 kg (215 lb)     Body mass index is 38.1 kg/m². Patient's Body mass index is 38.1 kg/m². indicating that she is morbidly obese (BMI > 40 or > 35 with obesity - related health condition). Obesity-related health conditions include the following: obstructive sleep apnea, hypertension and GERD. Obesity is unchanged. BMI is is above average; BMI management plan is completed. I recommend portion control and increasing exercise.      Gen:                No distress, conversant, pleasant, appears stated age, alert, oriented  Eyes:               Anicteric sclera, moist conjunctiva, no lid lag                           PERRL, EOMI   Heent:             NC/AT                          Normal hearing  Lungs:             Normal effort, non-labored breathing  CV:                  Normal S1/S2                          No lower extremity edema  ABD:               Rounded, non-distended            Psych:             Appropriate affect  Neuro:             CN 2-12 appear intact    Past Medical History:   Diagnosis Date   • Allergic rhinitis    • Arthritis    • Asthma    • Breast lump    • C. difficile diarrhea 2013   • Carotid artery disease (HCC)    • Chest pain    • Chronic low back pain    • Constipation    • Depressive disorder    • Diarrhea    • Diverticular disease of colon    • Epigastric pain    • DIETER (generalized anxiety disorder)    • GERD (gastroesophageal reflux disease)    • Head ache    • Hematochezia    • Herpes zoster     mid back, near spine   • Hypertension    • Periumbilical pain    • PONV (postoperative nausea and vomiting)    • Maurice Mountain spotted fever     pt states \"currently has it\"   • Sleep apnea     using c-pap   • SVT (supraventricular tachycardia) ([facility])    • Tachycardia 12/012019    spent 4 days in [facility] in January for this.        Current Outpatient Medications:   •  albuterol (PROVENTIL) (2.5 MG/3ML) 0.083% nebulizer " solution, Take 2.5 mg by nebulization Every 6 (Six) Hours As Needed for Wheezing., Disp: 120 vial, Rfl: 2  •  albuterol sulfate  (90 Base) MCG/ACT inhaler, Inhale 2 puffs Every 4 (Four) Hours As Needed for Wheezing., Disp: 18 g, Rfl: 2  •  aspirin 81 MG EC tablet, Take 81 mg by mouth Daily., Disp: , Rfl:   •  clonazePAM (KlonoPIN) 0.5 MG tablet, Take 0.5 mg by mouth 2 (Two) Times a Day As Needed for Anxiety., Disp: , Rfl:   •  clopidogrel (PLAVIX) 75 MG tablet, Take 75 mg by mouth Daily., Disp: , Rfl:   •  cyclobenzaprine (FLEXERIL) 10 MG tablet, Take 1 tablet by mouth 3 (Three) Times a Day As Needed for Muscle Spasms., Disp: 270 tablet, Rfl: 1  •  dilTIAZem (CARDIZEM) 30 MG tablet, Take 30 mg by mouth 2 (Two) Times a Day., Disp: , Rfl:   •  estradiol (ESTRACE) 1 MG tablet, TAKE 1 TABLET EVERY DAY, Disp: 90 tablet, Rfl: 2  •  ferrous sulfate 325 (65 FE) MG EC tablet, Take 1 tablet by mouth Daily With Breakfast., Disp: 90 tablet, Rfl: 3  •  FLUoxetine (PROzac) 40 MG capsule, Take 40 mg by mouth Daily., Disp: , Rfl:   •  furosemide (LASIX) 40 MG tablet, Take 40 mg by mouth Daily. Takes as needed, Disp: , Rfl:   •  gabapentin (NEURONTIN) 300 MG capsule, Take 1 capsule by mouth 2 (Two) Times a Day., Disp: 180 capsule, Rfl: 0  •  hydroxychloroquine (PLAQUENIL) 200 MG tablet, Take 200 mg by mouth Daily., Disp: , Rfl:   •  magnesium oxide (MAGOX) 400 (241.3 Mg) MG tablet tablet, Take 400 mg by mouth 2 (Two) Times a Day., Disp: , Rfl:   •  methocarbamol (ROBAXIN) 500 MG tablet, methocarbamol 500 mg tablet, Disp: , Rfl:   •  metoprolol tartrate (LOPRESSOR) 25 MG tablet, Take 25 mg by mouth Daily., Disp: , Rfl:   •  mupirocin (BACTROBAN) 2 % ointment, APPLY TOPICALLY TO AFFECTED AREA(S) THREE TIMES DAILY AS DIRECTED, Disp: 22 g, Rfl: 1  •  omeprazole (priLOSEC) 20 MG capsule, Take 20 mg by mouth 2 (two) times a day., Disp: , Rfl:   •  spironolactone (ALDACTONE) 25 MG tablet, , Disp: , Rfl:     Current  Facility-Administered Medications:   •  cyanocobalamin injection 1,000 mcg, 1,000 mcg, Intramuscular, Q28 Days, Gretchen Gauthier M, APRN, 1,000 mcg at 12/21/21 1557    WBC   Date Value Ref Range Status   12/21/2021 9.50 3.40 - 10.80 10*3/mm3 Final   02/13/2018 7.8 4.0 - 11.0 10*3/uL Final     RBC   Date Value Ref Range Status   12/21/2021 4.45 3.77 - 5.28 10*6/mm3 Final   02/13/2018 4.49 4.15 - 4.87 10*6/uL Final     Hemoglobin   Date Value Ref Range Status   12/21/2021 12.9 12.0 - 15.9 g/dL Final   02/13/2018 12.9 12.7 - 14.7 g/dL Final     Hematocrit   Date Value Ref Range Status   12/21/2021 39.1 34.0 - 46.6 % Final   02/13/2018 40.5 37.9 - 43.9 % Final     MCV   Date Value Ref Range Status   12/21/2021 87.9 79.0 - 97.0 fL Final   02/13/2018 90 85 - 95 fl Final     MCH   Date Value Ref Range Status   12/21/2021 29.0 26.6 - 33.0 pg Final   02/13/2018 28.7 28.0 - 32.0 pg Final     MCHC   Date Value Ref Range Status   12/21/2021 33.0 31.5 - 35.7 g/dL Final   02/13/2018 31.9 (L) 32.0 - 34.0 g/dL Final     RDW   Date Value Ref Range Status   12/21/2021 12.9 12.3 - 15.4 % Final   02/13/2018 41 37 - 54 fl Final     RDW-SD   Date Value Ref Range Status   12/21/2021 41.4 37.0 - 54.0 fl Final     MPV   Date Value Ref Range Status   12/21/2021 9.7 6.0 - 12.0 fL Final   02/13/2018 10.5 8.0 - 12.0 fl Final     Platelets   Date Value Ref Range Status   12/21/2021 382 140 - 450 10*3/mm3 Final   02/13/2018 332 150 - 450 10*3/uL Final     Neutrophil %   Date Value Ref Range Status   12/21/2021 57.2 42.7 - 76.0 % Final     Lymphocyte Rel %   Date Value Ref Range Status   02/13/2018 38.2 5 - 55 % Final     Lymphocyte %   Date Value Ref Range Status   12/21/2021 30.0 19.6 - 45.3 % Final     Monocyte Rel %   Date Value Ref Range Status   02/13/2018 7.9 3 - 8 % Final     Monocyte %   Date Value Ref Range Status   12/21/2021 9.3 5.0 - 12.0 % Final     Eosinophil Rel %   Date Value Ref Range Status   02/13/2018 2.7 0 - 5 % Final      Eosinophil %   Date Value Ref Range Status   12/21/2021 2.3 0.3 - 6.2 % Final     Basophil Rel %   Date Value Ref Range Status   02/13/2018 0.9 0 - 2 % Final     Basophil %   Date Value Ref Range Status   12/21/2021 0.8 0.0 - 1.5 % Final     Immature Grans %   Date Value Ref Range Status   12/21/2021 0.4 0.0 - 0.5 % Final   02/13/2018 50 45 - 80 % Final     Neutrophils, Absolute   Date Value Ref Range Status   12/21/2021 5.43 1.70 - 7.00 10*3/mm3 Final     Lymphocytes, Absolute   Date Value Ref Range Status   12/21/2021 2.85 0.70 - 3.10 10*3/mm3 Final     Monocytes, Absolute   Date Value Ref Range Status   12/21/2021 0.88 0.10 - 0.90 10*3/mm3 Final     Eosinophils, Absolute   Date Value Ref Range Status   12/21/2021 0.22 0.00 - 0.40 10*3/mm3 Final     Basophils, Absolute   Date Value Ref Range Status   12/21/2021 0.08 0.00 - 0.20 10*3/mm3 Final     Immature Grans, Absolute   Date Value Ref Range Status   12/21/2021 0.04 0.00 - 0.05 10*3/mm3 Final     nRBC   Date Value Ref Range Status   12/21/2021 0.0 0.0 - 0.2 /100 WBC Final       Lab Results   Component Value Date    GLUCOSE 97 12/21/2021    BUN 14 12/21/2021    CREATININE 1.18 (H) 12/21/2021    EGFRIFNONA 47 (L) 12/21/2021    BCR 11.9 12/21/2021    K 4.2 12/21/2021    CO2 23.0 12/21/2021    CALCIUM 9.0 12/21/2021    PROTENTOTREF 6.5 05/01/2019    ALBUMIN 3.80 12/21/2021    LABIL2 1.0 05/01/2019    AST 19 12/21/2021    ALT 19 12/21/2021       Assessment and Plan:    1. Obstructive sleep apnea - Established, stable (1)  1. Compliant with PAP therapy  2. Continue PAP as prescribed  3. Script for PAP supplies  4. Turn off ramp feature  5. Return to clinic in 1 year with compliance report unless change in symptoms in interim period  2. Morbid obesity - BMI 38.1 - stable chronic illness      I spent 20 minutes caring for Lashaun on this date of service. This time includes time spent by me in the following activities: preparing for the visit, reviewing tests,  obtaining and/or reviewing a separately obtained history, performing a medically appropriate examination and/or evaluation , counseling and educating the patient/family/caregiver and documenting information in the medical record; discussing PAP therapy, PAP compliance and PAP maintenance    RTC in 12 months. Patient agrees to return sooner if changes in symptoms.        This document has been electronically signed by MAYELA Sarabia on January 14, 2022 14:56 CST          CC: Gretchen Gauthier APRN          No ref. provider found

## 2022-01-17 ENCOUNTER — OFFICE VISIT (OUTPATIENT)
Dept: CARDIAC SURGERY | Facility: CLINIC | Age: 62
End: 2022-01-17

## 2022-01-17 VITALS
BODY MASS INDEX: 39.16 KG/M2 | TEMPERATURE: 97.8 F | HEART RATE: 75 BPM | WEIGHT: 221 LBS | SYSTOLIC BLOOD PRESSURE: 122 MMHG | HEIGHT: 63 IN | DIASTOLIC BLOOD PRESSURE: 70 MMHG | OXYGEN SATURATION: 97 %

## 2022-01-17 DIAGNOSIS — M79.7 FIBROMYALGIA: ICD-10-CM

## 2022-01-17 DIAGNOSIS — N18.30 STAGE 3 CHRONIC KIDNEY DISEASE, UNSPECIFIED WHETHER STAGE 3A OR 3B CKD: ICD-10-CM

## 2022-01-17 DIAGNOSIS — U07.1 COVID-19: ICD-10-CM

## 2022-01-17 DIAGNOSIS — I65.23 BILATERAL CAROTID ARTERY STENOSIS: Primary | ICD-10-CM

## 2022-01-17 DIAGNOSIS — E66.01 CLASS 2 SEVERE OBESITY DUE TO EXCESS CALORIES WITH SERIOUS COMORBIDITY AND BODY MASS INDEX (BMI) OF 38.0 TO 38.9 IN ADULT: ICD-10-CM

## 2022-01-17 DIAGNOSIS — I10 BENIGN ESSENTIAL HYPERTENSION: Chronic | ICD-10-CM

## 2022-01-17 DIAGNOSIS — G60.9 IDIOPATHIC PERIPHERAL NEUROPATHY: ICD-10-CM

## 2022-01-17 PROCEDURE — 99214 OFFICE O/P EST MOD 30 MIN: CPT | Performed by: THORACIC SURGERY (CARDIOTHORACIC VASCULAR SURGERY)

## 2022-01-18 ENCOUNTER — CLINICAL SUPPORT (OUTPATIENT)
Dept: FAMILY MEDICINE CLINIC | Facility: CLINIC | Age: 62
End: 2022-01-18

## 2022-01-18 DIAGNOSIS — R53.83 FATIGUE, UNSPECIFIED TYPE: Primary | ICD-10-CM

## 2022-01-18 PROCEDURE — 96372 THER/PROPH/DIAG INJ SC/IM: CPT | Performed by: NURSE PRACTITIONER

## 2022-01-18 RX ORDER — CYANOCOBALAMIN 1000 UG/ML
1000 INJECTION, SOLUTION INTRAMUSCULAR; SUBCUTANEOUS
Status: DISCONTINUED | OUTPATIENT
Start: 2022-01-18 | End: 2022-03-22

## 2022-01-18 RX ADMIN — CYANOCOBALAMIN 1000 MCG: 1000 INJECTION, SOLUTION INTRAMUSCULAR; SUBCUTANEOUS at 11:10

## 2022-02-07 PROBLEM — U07.1 COVID-19: Status: ACTIVE | Noted: 2022-02-07

## 2022-02-17 ENCOUNTER — TRANSCRIBE ORDERS (OUTPATIENT)
Dept: LAB | Facility: HOSPITAL | Age: 62
End: 2022-02-17

## 2022-02-17 DIAGNOSIS — N18.30 STAGE 3 CHRONIC KIDNEY DISEASE, UNSPECIFIED WHETHER STAGE 3A OR 3B CKD: Primary | ICD-10-CM

## 2022-03-06 PROBLEM — E66.01 CLASS 2 SEVERE OBESITY DUE TO EXCESS CALORIES WITH SERIOUS COMORBIDITY AND BODY MASS INDEX (BMI) OF 38.0 TO 38.9 IN ADULT: Status: ACTIVE | Noted: 2018-10-22

## 2022-03-06 NOTE — PROGRESS NOTES
1/17/2022    Lashaun Bernal  1960    Chief Complaint:  Carotid stenosis    HPI:      PCP:  Gretchen Gauthier, APRN  Neurology:  Dr Dos Santos      61y.o. female with HTN(stable, increased risk stroke, rupture), Hyperlipidemia(stable, increased risk cardiovascular events), Obesity(uncontrolled, increased risk cardiovascular events) and Chronic Kidney Disease(stable, increased risk renal failure) , neuropathy, radiculopathy(new, possible thoracic outlet).  never smoked.  no new problems. Mild-moderate weakness both arms x 6 months.  Difficulty drying hair.  No pain, numbness arms, hands..  Dx treated RMSF 7/2019..numbness face resolved.  Neck pain cervical disk protrusion (L>R). Asymptomatic carotid stenosis.  Mild to moderate vertigo, tremor RIGHT hand x 6months.No TIA stroke amaurosis.  No MI claudication. No other associated signs, symptoms or modifying factors.     5/2019 Carotid Duplex:  MARIANO 0-49% (118cm/s, ratio 1.4).  LICA 50-69% (129cm/s, ratio 1.3).   Antegrade verts.  1/2020 Carotid Duplex:  MARIANO 50-69% (136/46cm/s, ratio 1.8.  LICA 50-69% (157/40cm/s, ratio 2.1).   Antegrade verts.  1/2021 Carotid Duplex:  MARIANO 0-49% (105/37cm/s, ratio 0.69).  LICA 50-69% (147/51cm/s, ratio 1.4).   Antegrade verts.  1/2022 Carotid Duplex:  MARIANO 0-49% (107/34cm/s, ratio 1.3).  LICA 0-49% (100/35cm/s, ratio 1.2).   Antegrade verts.    5/2019 Nerve conduction study:  C5/6 nerve root irritation without acute denervation.  7/2019 CXR:  No cervical rib noted  10/2019 Upper extremity arterial duplex with stress:  RBI:  RIGHT 1.2 triphasic, minimal dampening  position.  LEFT 1.2 triphasic, minimal dampening  position.  All other provokative positions show no dampening.     10/2014 Stress Test:  No ischemic changes.  Low risk study.  6/2019 ECG:  NSR 77, QTc 434     The following portions of the patient's history were reviewed and updated as appropriate: allergies, current medications, past family history, past  "medical history, past social history, past surgical history and problem list.  Recent images independently reviewed.  Available laboratory values reviewed.    PMH:  Past Medical History:   Diagnosis Date   • Allergic rhinitis    • Arthritis    • Asthma    • Breast lump    • C. difficile diarrhea 2013   • Carotid artery disease (HCC)    • Chest pain    • Chronic low back pain    • Constipation    • Depressive disorder    • Diarrhea    • Diverticular disease of colon    • Epigastric pain    • DIETER (generalized anxiety disorder)    • GERD (gastroesophageal reflux disease)    • Head ache    • Hematochezia    • Herpes zoster     mid back, near spine   • Hypertension    • Periumbilical pain    • PONV (postoperative nausea and vomiting)    • Maurice Mountain spotted fever     pt states \"currently has it\"   • Sleep apnea     using c-pap   • SVT (supraventricular tachycardia) ([facility])    • Tachycardia 12/012019    spent 4 days in [facility] in January for this.      Past Surgical History:   Procedure Laterality Date   • ABDOMINOPLASTY  12/22/2004   • AUGMENTATION MAMMAPLASTY     • BREAST AUGMENTATION BILATERAL MASTOPEXY     • BUNIONECTOMY Left 3/20/2017    Procedure: EXCISION OF CALCANEAL SPURS LEFT FOOT AND REATTACHMENT OF ACHILLES TENDON ;  Surgeon: Jarrell Mar MD;  Location: [facility] OR;  Service:    • COLONOSCOPY  01/23/2015   • COLONOSCOPY N/A 2/24/2020    Procedure: COLONOSCOPY;  Surgeon: Santhosh Patrick MD;  Location: [facility] ENDOSCOPY;  Service: Gastroenterology;  Laterality: N/A;   • CYST REMOVAL  08/18/1961    Excision of sebaceous cyst. Left ear   • FINGER/THUMB LESION/CYST EXCISION  10/29/2014   • HAND SURGERY  06/03/2013   • HYSTERECTOMY     • OOPHORECTOMY     • ORIF ULNA/RADIUS FRACTURES  07/22/2013   • TUBAL ABDOMINAL LIGATION  08/14/1985    Laparoscopic tubal sterilization; dilatation and curettage. Desires tubal ligation;       Family History   Problem Relation Age of Onset   • Diabetes Other    • Heart " disease Other    • Hypertension Other    • Heart disease Mother         Myocardial infarct   • Hypertension Mother    • Diabetes Mother    • Heart disease Father         CHF     Social History     Tobacco Use   • Smoking status: Never Smoker   • Smokeless tobacco: Never Used   Vaping Use   • Vaping Use: Never used   Substance Use Topics   • Alcohol use: No   • Drug use: No       ALLERGIES:  Allergies   Allergen Reactions   • Cefprozil Nausea And Vomiting   • Penicillins Rash         MEDICATIONS:    Current Outpatient Medications:   •  albuterol (PROVENTIL) (2.5 MG/3ML) 0.083% nebulizer solution, Take 2.5 mg by nebulization Every 6 (Six) Hours As Needed for Wheezing., Disp: 120 vial, Rfl: 2  •  albuterol sulfate  (90 Base) MCG/ACT inhaler, Inhale 2 puffs Every 4 (Four) Hours As Needed for Wheezing., Disp: 18 g, Rfl: 2  •  aspirin 81 MG EC tablet, Take 81 mg by mouth Daily., Disp: , Rfl:   •  clonazePAM (KlonoPIN) 0.5 MG tablet, Take 0.5 mg by mouth 2 (Two) Times a Day As Needed for Anxiety., Disp: , Rfl:   •  clopidogrel (PLAVIX) 75 MG tablet, Take 75 mg by mouth Daily., Disp: , Rfl:   •  cyclobenzaprine (FLEXERIL) 10 MG tablet, Take 1 tablet by mouth 3 (Three) Times a Day As Needed for Muscle Spasms., Disp: 270 tablet, Rfl: 1  •  dilTIAZem (CARDIZEM) 30 MG tablet, Take 30 mg by mouth 2 (Two) Times a Day., Disp: , Rfl:   •  estradiol (ESTRACE) 1 MG tablet, TAKE 1 TABLET EVERY DAY, Disp: 90 tablet, Rfl: 2  •  ferrous sulfate 325 (65 FE) MG EC tablet, Take 1 tablet by mouth Daily With Breakfast., Disp: 90 tablet, Rfl: 3  •  FLUoxetine (PROzac) 40 MG capsule, Take 40 mg by mouth Daily., Disp: , Rfl:   •  furosemide (LASIX) 40 MG tablet, Take 40 mg by mouth As Needed. Takes as needed, Disp: , Rfl:   •  gabapentin (NEURONTIN) 300 MG capsule, Take 1 capsule by mouth 2 (Two) Times a Day., Disp: 180 capsule, Rfl: 0  •  hydroxychloroquine (PLAQUENIL) 200 MG tablet, Take 200 mg by mouth Daily., Disp: , Rfl:   •   magnesium oxide (MAGOX) 400 (241.3 Mg) MG tablet tablet, Take 400 mg by mouth 2 (Two) Times a Day., Disp: , Rfl:   •  methocarbamol (ROBAXIN) 500 MG tablet, methocarbamol 500 mg tablet, Disp: , Rfl:   •  metoprolol tartrate (LOPRESSOR) 25 MG tablet, Take 25 mg by mouth Daily., Disp: , Rfl:   •  mupirocin (BACTROBAN) 2 % ointment, APPLY TOPICALLY TO AFFECTED AREA(S) THREE TIMES DAILY AS DIRECTED, Disp: 22 g, Rfl: 1  •  omeprazole (priLOSEC) 20 MG capsule, Take 20 mg by mouth 2 (two) times a day., Disp: , Rfl:   •  spironolactone (ALDACTONE) 25 MG tablet, , Disp: , Rfl:   •  azithromycin (ZITHROMAX) 250 MG tablet, Take 2 tablets the first day, then 1 tablet daily for 4 days., Disp: 6 tablet, Rfl: 0  •  fluconazole (DIFLUCAN) 200 MG tablet, Take one at the first sign of infection and may repeat in 3 days as needed., Disp: 2 tablet, Rfl: 0  •  promethazine-dextromethorphan (PROMETHAZINE-DM) 6.25-15 MG/5ML syrup, Take 5 mL by mouth 4 (Four) Times a Day As Needed for Cough., Disp: 120 mL, Rfl: 0    Current Facility-Administered Medications:   •  cyanocobalamin injection 1,000 mcg, 1,000 mcg, Intramuscular, Q28 Days, Gretchen Gauthier APRN, 1,000 mcg at 12/21/21 1557  •  cyanocobalamin injection 1,000 mcg, 1,000 mcg, Intramuscular, Q28 Days, Gretchen Gauthier APRN, 1,000 mcg at 01/18/22 1110    Review of Systems   Review of Systems   Constitutional: Positive for malaise/fatigue. Negative for weight loss.   Cardiovascular: Negative for chest pain, claudication and dyspnea on exertion.   Respiratory: Positive for shortness of breath and sleep disturbances due to breathing. Negative for cough.    Skin: Negative for color change and poor wound healing.   Musculoskeletal: Positive for muscle weakness.   Neurological: Negative for dizziness, numbness and weakness.   Psychiatric/Behavioral: Positive for depression. The patient is nervous/anxious.        Physical Exam   Vitals:    01/17/22 1328 01/17/22 1331   BP: 122/72 122/70   BP  "Location: Left arm Right arm   Patient Position: Sitting Sitting   Cuff Size: Adult Adult   Pulse: 75    Temp: 97.8 °F (36.6 °C)    SpO2: 97%    Weight: 100 kg (221 lb)    Height: 160 cm (63\")      Body surface area is 2.02 meters squared.  Body mass index is 39.15 kg/m².  Physical Exam  Constitutional:       General: She is not in acute distress.     Appearance: She is not ill-appearing.   HENT:      Right Ear: Hearing normal.      Left Ear: Hearing normal.      Nose: No nasal deformity.      Mouth/Throat:      Dentition: Normal dentition. Does not have dentures.   Cardiovascular:      Rate and Rhythm: Normal rate and regular rhythm.      Pulses:           Carotid pulses are 2+ on the right side and 2+ on the left side.       Radial pulses are 2+ on the right side and 2+ on the left side.        Posterior tibial pulses are 2+ on the right side and 2+ on the left side.      Heart sounds: No murmur heard.  Pulmonary:      Effort: Pulmonary effort is normal.      Breath sounds: Normal breath sounds.   Abdominal:      General: There is no distension.      Palpations: Abdomen is soft. There is no mass.      Tenderness: There is no abdominal tenderness.   Musculoskeletal:         General: No deformity.      Comments: Gait normal.    Skin:     General: Skin is warm and dry.      Coloration: Skin is not pale.      Findings: No erythema.      Comments: No venous staining   Neurological:      Mental Status: She is alert and oriented to person, place, and time.   Psychiatric:         Speech: Speech normal.         Behavior: Behavior is cooperative.         Thought Content: Thought content normal.         Judgment: Judgment normal.         BUN   Date Value Ref Range Status   12/21/2021 14 8 - 23 mg/dL Final   02/13/2018 19 (H) 7 - 18 mg/dL Final     Creatinine   Date Value Ref Range Status   12/21/2021 1.18 (H) 0.57 - 1.00 mg/dL Final   02/13/2018 0.9 0.6 - 1.3 mg/dL Final     eGFR Non  Amer   Date Value Ref Range " Status   12/21/2021 47 (L) >60 mL/min/1.73 Final       ASSESSMENT:  Diagnoses and all orders for this visit:    1. Bilateral carotid artery stenosis (Primary)  -     Duplex Carotid Ultrasound CAR; Future    2. Benign essential hypertension    3. Class 2 severe obesity due to excess calories with serious comorbidity and body mass index (BMI) of 38.0 to 38.9 in adult ([facility])    4. Fibromyalgia    5. Idiopathic peripheral neuropathy    6. Stage 3 chronic kidney disease, unspecified whether stage 3a or 3b CKD ([facility])    7. COVID-19    PLAN:  Detailed discussion with Lashaun Bernal regarding situation and options.  Stable carotid stenosis.  Multiple risk factors with severe comorbidities.  No intervention indicated at this time.  Will follow with interval imaging.  Risks, benefits discussed.  Understands and wishes to proceed with plan.    Return after above studies complete  Obesity Class  2. Increased risk cardiovascular events, sleep and breathing disorders, joint issues, type 2 diabetes mellitus. Options for weight management, heart healthy diet, exercise programs, and associated health risks of obesity discussed.    Recommended regular physical activity, progressive walking program.  Continue current medications as directed.  Advance Care Planning   ACP discussion was declined by the patient. Patient does not have an advance directive, declines further assistance.    Thank you for the opportunity to participate in this patient's care.    Copy to primary care provider.

## 2022-03-22 ENCOUNTER — OFFICE VISIT (OUTPATIENT)
Dept: FAMILY MEDICINE CLINIC | Facility: CLINIC | Age: 62
End: 2022-03-22

## 2022-03-22 VITALS
OXYGEN SATURATION: 94 % | DIASTOLIC BLOOD PRESSURE: 68 MMHG | BODY MASS INDEX: 38.45 KG/M2 | HEIGHT: 63 IN | WEIGHT: 217 LBS | HEART RATE: 77 BPM | SYSTOLIC BLOOD PRESSURE: 126 MMHG

## 2022-03-22 DIAGNOSIS — Z00.00 MEDICARE ANNUAL WELLNESS VISIT, SUBSEQUENT: Primary | ICD-10-CM

## 2022-03-22 DIAGNOSIS — Z13.29 SCREENING FOR THYROID DISORDER: ICD-10-CM

## 2022-03-22 DIAGNOSIS — E78.5 HYPERLIPIDEMIA, UNSPECIFIED HYPERLIPIDEMIA TYPE: ICD-10-CM

## 2022-03-22 DIAGNOSIS — N18.30 STAGE 3 CHRONIC KIDNEY DISEASE, UNSPECIFIED WHETHER STAGE 3A OR 3B CKD: ICD-10-CM

## 2022-03-22 DIAGNOSIS — K21.00 GASTROESOPHAGEAL REFLUX DISEASE WITH ESOPHAGITIS WITHOUT HEMORRHAGE: ICD-10-CM

## 2022-03-22 DIAGNOSIS — I10 BENIGN ESSENTIAL HYPERTENSION: ICD-10-CM

## 2022-03-22 PROCEDURE — 1159F MED LIST DOCD IN RCRD: CPT | Performed by: NURSE PRACTITIONER

## 2022-03-22 PROCEDURE — 1126F AMNT PAIN NOTED NONE PRSNT: CPT | Performed by: NURSE PRACTITIONER

## 2022-03-22 PROCEDURE — 1170F FXNL STATUS ASSESSED: CPT | Performed by: NURSE PRACTITIONER

## 2022-03-22 PROCEDURE — G0439 PPPS, SUBSEQ VISIT: HCPCS | Performed by: NURSE PRACTITIONER

## 2022-03-22 PROCEDURE — 96160 PT-FOCUSED HLTH RISK ASSMT: CPT | Performed by: NURSE PRACTITIONER

## 2022-03-22 NOTE — PROGRESS NOTES
The ABCs of the Annual Wellness Visit  Subsequent Medicare Wellness Visit    Chief Complaint   Patient presents with   • Medicare Wellness-subsequent      Subjective    History of Present Illness:  Lashaun Bernal is a 61 y.o. female who presents for a Subsequent Medicare Wellness Visit.    The following portions of the patient's history were reviewed and   updated as appropriate:   Current Outpatient Medications   Medication Sig Dispense Refill   • albuterol (PROVENTIL) (2.5 MG/3ML) 0.083% nebulizer solution Take 2.5 mg by nebulization Every 6 (Six) Hours As Needed for Wheezing. 120 vial 2   • albuterol sulfate  (90 Base) MCG/ACT inhaler Inhale 2 puffs Every 4 (Four) Hours As Needed for Wheezing. 18 g 2   • aspirin 81 MG EC tablet Take 81 mg by mouth Daily.     • clonazePAM (KlonoPIN) 0.5 MG tablet Take 0.5 mg by mouth 2 (Two) Times a Day As Needed for Anxiety.     • clopidogrel (PLAVIX) 75 MG tablet Take 75 mg by mouth Daily.     • cyclobenzaprine (FLEXERIL) 10 MG tablet Take 1 tablet by mouth 3 (Three) Times a Day As Needed for Muscle Spasms. 270 tablet 1   • dilTIAZem (CARDIZEM) 30 MG tablet Take 30 mg by mouth 2 (Two) Times a Day.     • estradiol (ESTRACE) 1 MG tablet TAKE 1 TABLET EVERY DAY 90 tablet 2   • ferrous sulfate 325 (65 FE) MG EC tablet Take 1 tablet by mouth Daily With Breakfast. 90 tablet 3   • FLUoxetine (PROzac) 40 MG capsule Take 40 mg by mouth Daily.     • furosemide (LASIX) 40 MG tablet Take 40 mg by mouth As Needed. Takes as needed     • gabapentin (NEURONTIN) 300 MG capsule Take 1 capsule by mouth 2 (Two) Times a Day. 180 capsule 0   • hydroxychloroquine (PLAQUENIL) 200 MG tablet Take 200 mg by mouth Daily.     • magnesium oxide (MAGOX) 400 (241.3 Mg) MG tablet tablet Take 400 mg by mouth 2 (Two) Times a Day.     • methocarbamol (ROBAXIN) 500 MG tablet methocarbamol 500 mg tablet     • metoprolol tartrate (LOPRESSOR) 25 MG tablet Take 25 mg by mouth Daily.     • mupirocin (BACTROBAN)  2 % ointment APPLY TOPICALLY TO AFFECTED AREA(S) THREE TIMES DAILY AS DIRECTED 22 g 1   • omeprazole (priLOSEC) 20 MG capsule Take 20 mg by mouth 2 (two) times a day.     • spironolactone (ALDACTONE) 25 MG tablet      • promethazine-dextromethorphan (PROMETHAZINE-DM) 6.25-15 MG/5ML syrup Take 5 mL by mouth 4 (Four) Times a Day As Needed for Cough. 120 mL 0     Current Facility-Administered Medications   Medication Dose Route Frequency Provider Last Rate Last Admin   • cyanocobalamin injection 1,000 mcg  1,000 mcg Intramuscular Q28 Days Gretchen Gauthier APRN   1,000 mcg at 12/21/21 1557   • cyanocobalamin injection 1,000 mcg  1,000 mcg Intramuscular Q28 Days Gretchen Gauthier APRN   1,000 mcg at 01/18/22 1110     Current Outpatient Medications on File Prior to Visit   Medication Sig   • albuterol (PROVENTIL) (2.5 MG/3ML) 0.083% nebulizer solution Take 2.5 mg by nebulization Every 6 (Six) Hours As Needed for Wheezing.   • albuterol sulfate  (90 Base) MCG/ACT inhaler Inhale 2 puffs Every 4 (Four) Hours As Needed for Wheezing.   • aspirin 81 MG EC tablet Take 81 mg by mouth Daily.   • clonazePAM (KlonoPIN) 0.5 MG tablet Take 0.5 mg by mouth 2 (Two) Times a Day As Needed for Anxiety.   • clopidogrel (PLAVIX) 75 MG tablet Take 75 mg by mouth Daily.   • cyclobenzaprine (FLEXERIL) 10 MG tablet Take 1 tablet by mouth 3 (Three) Times a Day As Needed for Muscle Spasms.   • dilTIAZem (CARDIZEM) 30 MG tablet Take 30 mg by mouth 2 (Two) Times a Day.   • estradiol (ESTRACE) 1 MG tablet TAKE 1 TABLET EVERY DAY   • ferrous sulfate 325 (65 FE) MG EC tablet Take 1 tablet by mouth Daily With Breakfast.   • FLUoxetine (PROzac) 40 MG capsule Take 40 mg by mouth Daily.   • furosemide (LASIX) 40 MG tablet Take 40 mg by mouth As Needed. Takes as needed   • gabapentin (NEURONTIN) 300 MG capsule Take 1 capsule by mouth 2 (Two) Times a Day.   • hydroxychloroquine (PLAQUENIL) 200 MG tablet Take 200 mg by mouth Daily.   • magnesium oxide  (MAGOX) 400 (241.3 Mg) MG tablet tablet Take 400 mg by mouth 2 (Two) Times a Day.   • methocarbamol (ROBAXIN) 500 MG tablet methocarbamol 500 mg tablet   • metoprolol tartrate (LOPRESSOR) 25 MG tablet Take 25 mg by mouth Daily.   • mupirocin (BACTROBAN) 2 % ointment APPLY TOPICALLY TO AFFECTED AREA(S) THREE TIMES DAILY AS DIRECTED   • omeprazole (priLOSEC) 20 MG capsule Take 20 mg by mouth 2 (two) times a day.   • spironolactone (ALDACTONE) 25 MG tablet    • promethazine-dextromethorphan (PROMETHAZINE-DM) 6.25-15 MG/5ML syrup Take 5 mL by mouth 4 (Four) Times a Day As Needed for Cough.   • [DISCONTINUED] azithromycin (ZITHROMAX) 250 MG tablet Take 2 tablets the first day, then 1 tablet daily for 4 days.   • [DISCONTINUED] fluconazole (DIFLUCAN) 200 MG tablet Take one at the first sign of infection and may repeat in 3 days as needed.     Current Facility-Administered Medications on File Prior to Visit   Medication   • cyanocobalamin injection 1,000 mcg   • cyanocobalamin injection 1,000 mcg     She is allergic to cefprozil and penicillins..    Compared to one year ago, the patient feels her physical   health is the same.    Compared to one year ago, the patient feels her mental   health is better.    Recent Hospitalizations:  She was not admitted to the hospital during the last year.       Current Medical Providers:  Patient Care Team:  Gretchen Gauthier APRN as PCP - General (Nurse Practitioner)  Marcial Walker PA-C as Physician Assistant (Gastroenterology)  Santhosh Dos Santos MD as Consulting Physician (Neurology)  Santhosh Dos Santos MD as Referring Physician (Neurology)  Jeffrey Hooks MD as Consulting Physician (Otolaryngology)  Omayra Toscano APRN as Nurse Practitioner (Neurology)  Ruthann Go APRN as Nurse Practitioner (Nurse Practitioner)    Outpatient Medications Prior to Visit   Medication Sig Dispense Refill   • albuterol (PROVENTIL) (2.5 MG/3ML) 0.083% nebulizer solution Take 2.5 mg  by nebulization Every 6 (Six) Hours As Needed for Wheezing. 120 vial 2   • albuterol sulfate  (90 Base) MCG/ACT inhaler Inhale 2 puffs Every 4 (Four) Hours As Needed for Wheezing. 18 g 2   • aspirin 81 MG EC tablet Take 81 mg by mouth Daily.     • clonazePAM (KlonoPIN) 0.5 MG tablet Take 0.5 mg by mouth 2 (Two) Times a Day As Needed for Anxiety.     • clopidogrel (PLAVIX) 75 MG tablet Take 75 mg by mouth Daily.     • cyclobenzaprine (FLEXERIL) 10 MG tablet Take 1 tablet by mouth 3 (Three) Times a Day As Needed for Muscle Spasms. 270 tablet 1   • dilTIAZem (CARDIZEM) 30 MG tablet Take 30 mg by mouth 2 (Two) Times a Day.     • estradiol (ESTRACE) 1 MG tablet TAKE 1 TABLET EVERY DAY 90 tablet 2   • ferrous sulfate 325 (65 FE) MG EC tablet Take 1 tablet by mouth Daily With Breakfast. 90 tablet 3   • FLUoxetine (PROzac) 40 MG capsule Take 40 mg by mouth Daily.     • furosemide (LASIX) 40 MG tablet Take 40 mg by mouth As Needed. Takes as needed     • gabapentin (NEURONTIN) 300 MG capsule Take 1 capsule by mouth 2 (Two) Times a Day. 180 capsule 0   • hydroxychloroquine (PLAQUENIL) 200 MG tablet Take 200 mg by mouth Daily.     • magnesium oxide (MAGOX) 400 (241.3 Mg) MG tablet tablet Take 400 mg by mouth 2 (Two) Times a Day.     • methocarbamol (ROBAXIN) 500 MG tablet methocarbamol 500 mg tablet     • metoprolol tartrate (LOPRESSOR) 25 MG tablet Take 25 mg by mouth Daily.     • mupirocin (BACTROBAN) 2 % ointment APPLY TOPICALLY TO AFFECTED AREA(S) THREE TIMES DAILY AS DIRECTED 22 g 1   • omeprazole (priLOSEC) 20 MG capsule Take 20 mg by mouth 2 (two) times a day.     • spironolactone (ALDACTONE) 25 MG tablet      • promethazine-dextromethorphan (PROMETHAZINE-DM) 6.25-15 MG/5ML syrup Take 5 mL by mouth 4 (Four) Times a Day As Needed for Cough. 120 mL 0   • azithromycin (ZITHROMAX) 250 MG tablet Take 2 tablets the first day, then 1 tablet daily for 4 days. 6 tablet 0   • fluconazole (DIFLUCAN) 200 MG tablet Take one at  the first sign of infection and may repeat in 3 days as needed. 2 tablet 0     Facility-Administered Medications Prior to Visit   Medication Dose Route Frequency Provider Last Rate Last Admin   • cyanocobalamin injection 1,000 mcg  1,000 mcg Intramuscular Q28 Days Gretchen Gauthier APRN   1,000 mcg at 12/21/21 1557   • cyanocobalamin injection 1,000 mcg  1,000 mcg Intramuscular Q28 Days Gretchen Gauthier APRN   1,000 mcg at 01/18/22 1110       No opioid medication identified on active medication list. I have reviewed chart for other potential  high risk medication/s and harmful drug interactions in the elderly.          Aspirin is on active medication list. Aspirin use is indicated based on review of current medical condition/s. Pros and cons of this therapy have been discussed today. Benefits of this medication outweigh potential harm.  Patient has been encouraged to continue taking this medication.  .      Patient Active Problem List   Diagnosis   • Calcaneal spur of left foot   • Chloe's deformity of left heel   • Left hip pain   • Right hamstring muscle strain   • Benign essential hypertension   • Depressive disorder   • Postmenopausal HRT (hormone replacement therapy)   • Asthma   • History of abnormal mammogram   • Polyarticular arthritis   • Class 2 severe obesity due to excess calories with serious comorbidity and body mass index (BMI) of 38.0 to 38.9 in adult ([facility])   • GERD (gastroesophageal reflux disease)   • Right wrist pain   • Right hand pain   • Left hand pain   • De Quervain's disease (radial styloid tenosynovitis)   • Bilateral carotid artery stenosis   • Tremor   • Fibromyalgia   • Idiopathic peripheral neuropathy   • Urinary tract infection in female   • Chest pain   • History of colon polyps   • Left knee pain   • Primary osteoarthritis of left knee   • Rheumatoid arthritis without rheumatoid factor, multiple sites ([facility])   • Stage 3 chronic kidney disease, unspecified whether stage 3a or 3b CKD ([facility])  "  • COVID-19     Advance Care Planning  Advance Directive is not on file.  ACP discussion was held with the patient during this visit. Patient does not have an advance directive, information provided.    Review of Systems   Constitutional: Negative.    HENT: Negative.    Respiratory: Negative.    Gastrointestinal: Negative.    Genitourinary: Negative.    Musculoskeletal: Positive for arthralgias and myalgias.   Skin: Negative.    Neurological: Negative.         Objective    Vitals:    03/22/22 1258   BP: 126/68   Pulse: 77   SpO2: 94%   Weight: 98.4 kg (217 lb)   Height: 160 cm (63\")   PainSc: 0-No pain     BMI Readings from Last 1 Encounters:   03/22/22 38.44 kg/m²   BMI is above normal parameters. Recommendations include: educational material and exercise counseling    Does the patient have evidence of cognitive impairment? No    Physical Exam  Vitals and nursing note reviewed.   Constitutional:       Appearance: Normal appearance. She is well-developed. She is obese.   HENT:      Head: Normocephalic.      Right Ear: External ear normal.      Left Ear: External ear normal.   Eyes:      Pupils: Pupils are equal, round, and reactive to light.   Cardiovascular:      Rate and Rhythm: Normal rate and regular rhythm.      Heart sounds: Normal heart sounds.   Pulmonary:      Effort: Pulmonary effort is normal.      Breath sounds: Normal breath sounds.   Abdominal:      General: Bowel sounds are normal.      Palpations: Abdomen is soft.   Musculoskeletal:         General: Normal range of motion.      Cervical back: Normal range of motion and neck supple.   Skin:     General: Skin is warm.      Capillary Refill: Capillary refill takes less than 2 seconds.   Neurological:      Mental Status: She is alert and oriented to person, place, and time.   Psychiatric:         Behavior: Behavior normal.                 HEALTH RISK ASSESSMENT    Smoking Status:  Social History     Tobacco Use   Smoking Status Never Smoker   Smokeless " Tobacco Never Used     Alcohol Consumption:  Social History     Substance and Sexual Activity   Alcohol Use No     Fall Risk Screen:    MIREILLEADI Fall Risk Assessment was completed, and patient is at MODERATE risk for falls. Assessment completed on:3/22/2022    Depression Screening:  PHQ-2/PHQ-9 Depression Screening 3/22/2022   Retired Total Score -   Little Interest or Pleasure in Doing Things 1-->several days   Feeling Down, Depressed or Hopeless 1-->several days   Trouble Falling or Staying Asleep, or Sleeping Too Much 1-->several days   Feeling Tired or Having Little Energy 2-->more than half the days   Poor Appetite or Overeating 1-->several days   Feeling Bad about Yourself - or that You are a Failure or Have Let Yourself or Your Family Down 0-->not at all   Trouble Concentrating on Things, Such as Reading the Newspaper or Watching Television 1-->several days   Moving or Speaking So Slowly that Other People Could Have Noticed? Or the Opposite - Being So Fidgety 0-->not at all   Thoughts that You Would be Better Off Dead or of Hurting Yourself in Some Way 0-->not at all   PHQ-9: Brief Depression Severity Measure Score 7   If You Checked Off Any Problems, How Difficult Have These Problems Made It For You to Do Your Work, Take Care of Things at Home, or Get Along with Other People? somewhat difficult       Health Habits and Functional and Cognitive Screening:  Functional & Cognitive Status 3/22/2022   Do you have difficulty preparing food and eating? Yes   Do you have difficulty bathing yourself, getting dressed or grooming yourself? No   Do you have difficulty using the toilet? No   Do you have difficulty moving around from place to place? Yes   Do you have trouble with steps or getting out of a bed or a chair? Yes   Current Diet Well Balanced Diet   Dental Exam Up to date   Eye Exam Up to date   Exercise (times per week) 3 times per week   Current Exercises Include Stationary Bicycling/Spin Class   Do you need  help using the phone?  No   Are you deaf or do you have serious difficulty hearing?  No   Do you need help with transportation? Yes   Do you need help shopping? No   Do you need help preparing meals?  No   Do you need help with housework?  Yes   Do you need help with laundry? No   Do you need help taking your medications? No   Do you need help managing money? No   Do you ever drive or ride in a car without wearing a seat belt? No   Have you felt unusual stress, anger or loneliness in the last month? No   Who do you live with? Alone   If you need help, do you have trouble finding someone available to you? No   Have you been bothered in the last four weeks by sexual problems? No   Do you have difficulty concentrating, remembering or making decisions? No       Age-appropriate Screening Schedule:  Refer to the list below for future screening recommendations based on patient's age, sex and/or medical conditions. Orders for these recommended tests are listed in the plan section. The patient has been provided with a written plan.    Health Maintenance   Topic Date Due   • TDAP/TD VACCINES (2 - Tdap) 03/01/2023 (Originally 3/7/2021)   • ZOSTER VACCINE (1 of 2) 03/22/2023 (Originally 12/28/2010)   • LIPID PANEL  06/17/2022   • MAMMOGRAM  06/21/2023   • INFLUENZA VACCINE  Completed   • PAP SMEAR  Discontinued              Assessment/Plan   CMS Preventative Services Quick Reference  Risk Factors Identified During Encounter  Cardiovascular Disease  Obesity/Overweight   The above risks/problems have been discussed with the patient.  Follow up actions/plans if indicated are seen below in the Assessment/Plan Section.  Pertinent information has been shared with the patient in the After Visit Summary.    Diagnoses and all orders for this visit:    1. Medicare annual wellness visit, subsequent (Primary)    2. Benign essential hypertension  -     CBC & Differential; Future  -     Comprehensive Metabolic Panel; Future    3.  Hyperlipidemia, unspecified hyperlipidemia type  -     TSH; Future    4. Stage 3 chronic kidney disease, unspecified whether stage 3a or 3b CKD (H[facility])    5. Gastroesophageal reflux disease with esophagitis without hemorrhage    6. Screening for thyroid disorder  -     Lipid Panel; Future        Follow Up:   Return in about 6 months (around 9/22/2022) for Recheck.     An After Visit Summary and PPPS were made available to the patient.        I spent 27 minutes caring for Lashaun on this date of service. This time includes time spent by me in the following activities:preparing for the visit, reviewing tests, obtaining and/or reviewing a separately obtained history, performing a medically appropriate examination and/or evaluation , counseling and educating the patient/family/caregiver, ordering medications, tests, or procedures and documenting information in the medical record         This document has been electronically signed by MAYELA Flores on March 22, 2022 13:59 CDT

## 2022-03-29 ENCOUNTER — LAB (OUTPATIENT)
Dept: LAB | Facility: HOSPITAL | Age: 62
End: 2022-03-29

## 2022-03-29 DIAGNOSIS — Z13.29 SCREENING FOR THYROID DISORDER: ICD-10-CM

## 2022-03-29 DIAGNOSIS — I10 BENIGN ESSENTIAL HYPERTENSION: ICD-10-CM

## 2022-03-29 DIAGNOSIS — E78.5 HYPERLIPIDEMIA, UNSPECIFIED HYPERLIPIDEMIA TYPE: ICD-10-CM

## 2022-03-29 PROCEDURE — 80053 COMPREHEN METABOLIC PANEL: CPT

## 2022-03-29 PROCEDURE — 85025 COMPLETE CBC W/AUTO DIFF WBC: CPT

## 2022-03-29 PROCEDURE — 80061 LIPID PANEL: CPT

## 2022-03-29 PROCEDURE — 84443 ASSAY THYROID STIM HORMONE: CPT

## 2022-03-30 ENCOUNTER — TELEPHONE (OUTPATIENT)
Dept: FAMILY MEDICINE CLINIC | Facility: CLINIC | Age: 62
End: 2022-03-30

## 2022-03-30 LAB
ALBUMIN SERPL-MCNC: 3.7 G/DL (ref 3.5–5.2)
ALBUMIN/GLOB SERPL: 1.5 G/DL
ALP SERPL-CCNC: 70 U/L (ref 39–117)
ALT SERPL W P-5'-P-CCNC: 23 U/L (ref 1–33)
ANION GAP SERPL CALCULATED.3IONS-SCNC: 11.1 MMOL/L (ref 5–15)
AST SERPL-CCNC: 20 U/L (ref 1–32)
BASOPHILS # BLD AUTO: 0.07 10*3/MM3 (ref 0–0.2)
BASOPHILS NFR BLD AUTO: 0.9 % (ref 0–1.5)
BILIRUB SERPL-MCNC: 0.3 MG/DL (ref 0–1.2)
BUN SERPL-MCNC: 11 MG/DL (ref 8–23)
BUN/CREAT SERPL: 12 (ref 7–25)
CALCIUM SPEC-SCNC: 8.5 MG/DL (ref 8.6–10.5)
CHLORIDE SERPL-SCNC: 106 MMOL/L (ref 98–107)
CHOLEST SERPL-MCNC: 164 MG/DL (ref 0–200)
CO2 SERPL-SCNC: 20.9 MMOL/L (ref 22–29)
CREAT SERPL-MCNC: 0.92 MG/DL (ref 0.57–1)
DEPRECATED RDW RBC AUTO: 41.5 FL (ref 37–54)
EGFRCR SERPLBLD CKD-EPI 2021: 71 ML/MIN/1.73
EOSINOPHIL # BLD AUTO: 0.3 10*3/MM3 (ref 0–0.4)
EOSINOPHIL NFR BLD AUTO: 4 % (ref 0.3–6.2)
ERYTHROCYTE [DISTWIDTH] IN BLOOD BY AUTOMATED COUNT: 12.6 % (ref 12.3–15.4)
GLOBULIN UR ELPH-MCNC: 2.5 GM/DL
GLUCOSE SERPL-MCNC: 81 MG/DL (ref 65–99)
HCT VFR BLD AUTO: 38.4 % (ref 34–46.6)
HDLC SERPL-MCNC: 46 MG/DL (ref 40–60)
HGB BLD-MCNC: 12.4 G/DL (ref 12–15.9)
IMM GRANULOCYTES # BLD AUTO: 0.03 10*3/MM3 (ref 0–0.05)
IMM GRANULOCYTES NFR BLD AUTO: 0.4 % (ref 0–0.5)
LDLC SERPL CALC-MCNC: 88 MG/DL (ref 0–100)
LDLC/HDLC SERPL: 1.8 {RATIO}
LYMPHOCYTES # BLD AUTO: 2.65 10*3/MM3 (ref 0.7–3.1)
LYMPHOCYTES NFR BLD AUTO: 35.5 % (ref 19.6–45.3)
MCH RBC QN AUTO: 28.9 PG (ref 26.6–33)
MCHC RBC AUTO-ENTMCNC: 32.3 G/DL (ref 31.5–35.7)
MCV RBC AUTO: 89.5 FL (ref 79–97)
MONOCYTES # BLD AUTO: 0.59 10*3/MM3 (ref 0.1–0.9)
MONOCYTES NFR BLD AUTO: 7.9 % (ref 5–12)
NEUTROPHILS NFR BLD AUTO: 3.82 10*3/MM3 (ref 1.7–7)
NEUTROPHILS NFR BLD AUTO: 51.3 % (ref 42.7–76)
NRBC BLD AUTO-RTO: 0.1 /100 WBC (ref 0–0.2)
PLATELET # BLD AUTO: 311 10*3/MM3 (ref 140–450)
PMV BLD AUTO: 10.5 FL (ref 6–12)
POTASSIUM SERPL-SCNC: 4.4 MMOL/L (ref 3.5–5.2)
PROT SERPL-MCNC: 6.2 G/DL (ref 6–8.5)
RBC # BLD AUTO: 4.29 10*6/MM3 (ref 3.77–5.28)
SODIUM SERPL-SCNC: 138 MMOL/L (ref 136–145)
TRIGL SERPL-MCNC: 177 MG/DL (ref 0–150)
TSH SERPL DL<=0.05 MIU/L-ACNC: 3.77 UIU/ML (ref 0.27–4.2)
VLDLC SERPL-MCNC: 30 MG/DL (ref 5–40)
WBC NRBC COR # BLD: 7.46 10*3/MM3 (ref 3.4–10.8)

## 2022-03-30 NOTE — TELEPHONE ENCOUNTER
Per MAYELA Godinez, Ms. eBrnal has been called with recent lab results & recommendations.  Continue current medications and follow-up as planned or sooner if any problems.       ----- Message from MAYELA Flores sent at 3/30/2022  6:27 AM CDT -----  Triglyceride levels remain slightly elevated at 177 but have drastically improved.  She needs to continue to adhere to a low-fat diet and continue medications as prescribed.  All other labs were essentially normal.

## 2022-04-07 RX ORDER — ESTRADIOL 1 MG/1
TABLET ORAL
Qty: 90 TABLET | Refills: 2 | Status: SHIPPED | OUTPATIENT
Start: 2022-04-07 | End: 2022-07-07 | Stop reason: SDUPTHER

## 2022-04-11 ENCOUNTER — TRANSCRIBE ORDERS (OUTPATIENT)
Dept: LAB | Facility: HOSPITAL | Age: 62
End: 2022-04-11

## 2022-04-11 DIAGNOSIS — N18.30 STAGE 3 CHRONIC KIDNEY DISEASE, UNSPECIFIED WHETHER STAGE 3A OR 3B CKD: Primary | ICD-10-CM

## 2022-04-21 DIAGNOSIS — M79.10 MUSCLE PAIN: ICD-10-CM

## 2022-04-21 DIAGNOSIS — M79.7 FIBROMYALGIA: ICD-10-CM

## 2022-04-21 RX ORDER — CYCLOBENZAPRINE HCL 10 MG
TABLET ORAL
Qty: 270 TABLET | Refills: 1 | Status: SHIPPED | OUTPATIENT
Start: 2022-04-21 | End: 2022-12-06

## 2022-05-02 ENCOUNTER — TELEPHONE (OUTPATIENT)
Dept: FAMILY MEDICINE CLINIC | Facility: CLINIC | Age: 62
End: 2022-05-02

## 2022-05-02 NOTE — TELEPHONE ENCOUNTER
Pt would like a different referral with a new Rheumatologist in [place] or [facility]     Pt [phone]

## 2022-05-16 ENCOUNTER — OFFICE VISIT (OUTPATIENT)
Dept: FAMILY MEDICINE CLINIC | Facility: CLINIC | Age: 62
End: 2022-05-16

## 2022-05-16 ENCOUNTER — LAB (OUTPATIENT)
Dept: LAB | Facility: HOSPITAL | Age: 62
End: 2022-05-16

## 2022-05-16 VITALS
WEIGHT: 221.1 LBS | DIASTOLIC BLOOD PRESSURE: 70 MMHG | OXYGEN SATURATION: 97 % | BODY MASS INDEX: 39.18 KG/M2 | HEART RATE: 97 BPM | HEIGHT: 63 IN | SYSTOLIC BLOOD PRESSURE: 138 MMHG | RESPIRATION RATE: 22 BRPM

## 2022-05-16 DIAGNOSIS — N18.30 STAGE 3 CHRONIC KIDNEY DISEASE, UNSPECIFIED WHETHER STAGE 3A OR 3B CKD: ICD-10-CM
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
## 2022-05-16 DIAGNOSIS — R74.8 ELEVATED LIVER ENZYMES: ICD-10-CM

## 2022-05-16 DIAGNOSIS — S60.862A TICK BITE OF LEFT WRIST, INITIAL ENCOUNTER: Primary | ICD-10-CM

## 2022-05-16 DIAGNOSIS — K76.0 FATTY LIVER: ICD-10-CM

## 2022-05-16 DIAGNOSIS — S60.862A TICK BITE OF LEFT WRIST, INITIAL ENCOUNTER: ICD-10-CM

## 2022-05-16 DIAGNOSIS — E71.30 DISORDER OF FATTY-ACID METABOLISM, UNSPECIFIED: ICD-10-CM
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
## 2022-05-16 LAB
ANION GAP SERPL CALCULATED.3IONS-SCNC: 12 MMOL/L (ref 5–15)
BUN SERPL-MCNC: 14 MG/DL (ref 8–23)
BUN/CREAT SERPL: 14 (ref 7–25)
CALCIUM SPEC-SCNC: 9 MG/DL (ref 8.6–10.5)
CHLORIDE SERPL-SCNC: 107 MMOL/L (ref 98–107)
CO2 SERPL-SCNC: 20 MMOL/L (ref 22–29)
CREAT SERPL-MCNC: 1 MG/DL (ref 0.57–1)
EGFRCR SERPLBLD CKD-EPI 2021: 64.2 ML/MIN/1.73
GLUCOSE SERPL-MCNC: 100 MG/DL (ref 65–99)
INR PPP: 0.97 (ref 0.8–1.2)
POTASSIUM SERPL-SCNC: 4.6 MMOL/L (ref 3.5–5.2)
PROTHROMBIN TIME: 12.7 SECONDS (ref 11.1–15.3)
SODIUM SERPL-SCNC: 139 MMOL/L (ref 136–145)

## 2022-05-16 PROCEDURE — 82465 ASSAY BLD/SERUM CHOLESTEROL: CPT

## 2022-05-16 PROCEDURE — 82247 BILIRUBIN TOTAL: CPT

## 2022-05-16 PROCEDURE — 83010 ASSAY OF HAPTOGLOBIN QUANT: CPT

## 2022-05-16 PROCEDURE — 86622 BRUCELLA ANTIBODY: CPT

## 2022-05-16 PROCEDURE — 84478 ASSAY OF TRIGLYCERIDES: CPT

## 2022-05-16 PROCEDURE — 36415 COLL VENOUS BLD VENIPUNCTURE: CPT

## 2022-05-16 PROCEDURE — 82172 ASSAY OF APOLIPOPROTEIN: CPT

## 2022-05-16 PROCEDURE — 85610 PROTHROMBIN TIME: CPT

## 2022-05-16 PROCEDURE — 99213 OFFICE O/P EST LOW 20 MIN: CPT | Performed by: NURSE PRACTITIONER

## 2022-05-16 PROCEDURE — 83883 ASSAY NEPHELOMETRY NOT SPEC: CPT

## 2022-05-16 PROCEDURE — 86666 EHRLICHIA ANTIBODY: CPT

## 2022-05-16 PROCEDURE — 86757 RICKETTSIA ANTIBODY: CPT

## 2022-05-16 PROCEDURE — 84450 TRANSFERASE (AST) (SGOT): CPT

## 2022-05-16 PROCEDURE — 86618 LYME DISEASE ANTIBODY: CPT

## 2022-05-16 PROCEDURE — 82947 ASSAY GLUCOSE BLOOD QUANT: CPT

## 2022-05-16 PROCEDURE — 82977 ASSAY OF GGT: CPT

## 2022-05-16 PROCEDURE — 80048 BASIC METABOLIC PNL TOTAL CA: CPT

## 2022-05-16 PROCEDURE — 84460 ALANINE AMINO (ALT) (SGPT): CPT

## 2022-05-16 RX ORDER — DOXYCYCLINE HYCLATE 100 MG/1
200 CAPSULE ORAL ONCE
Qty: 2 CAPSULE | Refills: 0 | Status: SHIPPED | OUTPATIENT
Start: 2022-05-16 | End: 2022-05-16

## 2022-05-16 NOTE — PROGRESS NOTES
Chief Complaint  Insect Bite (Around wrist/)    Subjective          Lashaun Bernal presents to [facility] [place] with concerns of possible bug bite. She woke up with spot on left wrist this morning, no itching or pain at this time. Does not recall getting bit by anything, however she was working outside in the yard over the weekend.       Insect Bite  This is a new problem. The problem occurs constantly. The problem has been unchanged. Nothing aggravates the symptoms. She has tried nothing for the symptoms. The treatment provided no relief.     Outpatient Medications Prior to Visit   Medication Sig Dispense Refill   • albuterol (PROVENTIL) (2.5 MG/3ML) 0.083% nebulizer solution Take 2.5 mg by nebulization Every 6 (Six) Hours As Needed for Wheezing. 120 vial 2   • albuterol sulfate  (90 Base) MCG/ACT inhaler Inhale 2 puffs Every 4 (Four) Hours As Needed for Wheezing. 18 g 2   • aspirin 81 MG EC tablet Take 81 mg by mouth Daily.     • clonazePAM (KlonoPIN) 0.5 MG tablet Take 0.5 mg by mouth 2 (Two) Times a Day As Needed for Anxiety.     • clopidogrel (PLAVIX) 75 MG tablet Take 75 mg by mouth Daily.     • cyclobenzaprine (FLEXERIL) 10 MG tablet TAKE 1 TABLET 3 TIMES A DAY AS NEEDED FOR MUSCLE SPASMS. 270 tablet 1   • dilTIAZem (CARDIZEM) 30 MG tablet Take 30 mg by mouth 2 (Two) Times a Day.     • estradiol (ESTRACE) 1 MG tablet TAKE 1 TABLET EVERY DAY 90 tablet 2   • FLUoxetine (PROzac) 40 MG capsule Take 40 mg by mouth Daily.     • furosemide (LASIX) 40 MG tablet Take 40 mg by mouth As Needed. Takes as needed     • gabapentin (NEURONTIN) 300 MG capsule Take 1 capsule by mouth 2 (Two) Times a Day. 180 capsule 0   • magnesium oxide (MAGOX) 400 (241.3 Mg) MG tablet tablet Take 400 mg by mouth 2 (Two) Times a Day.     • metoprolol tartrate (LOPRESSOR) 25 MG tablet Take 25 mg by mouth Daily.     • omeprazole (priLOSEC) 20 MG capsule Take 20 mg by mouth 2 (two) times  "a day.     • spironolactone (ALDACTONE) 25 MG tablet      • ferrous sulfate 325 (65 FE) MG EC tablet Take 1 tablet by mouth Daily With Breakfast. 90 tablet 3   • hydroxychloroquine (PLAQUENIL) 200 MG tablet Take 200 mg by mouth Daily.     • methocarbamol (ROBAXIN) 500 MG tablet methocarbamol 500 mg tablet     • mupirocin (BACTROBAN) 2 % ointment APPLY TOPICALLY TO AFFECTED AREA(S) THREE TIMES DAILY AS DIRECTED 22 g 1     No facility-administered medications prior to visit.       Review of Systems      Objective   Vital Signs:   Visit Vitals  /70 (BP Location: Left arm, Patient Position: Sitting, Cuff Size: Large Adult)   Pulse 97   Resp 22   Ht 160 cm (63\")   Wt 100 kg (221 lb 1.6 oz)   LMP 03/09/1991 (Approximate)   SpO2 97%   BMI 39.17 kg/m²     Physical Exam  Vitals and nursing note reviewed.   Constitutional:       Appearance: She is well-developed.   HENT:      Head: Normocephalic and atraumatic.   Eyes:      General: Lids are normal.      Conjunctiva/sclera: Conjunctivae normal.   Neck:      Thyroid: No thyroid mass or thyromegaly.      Trachea: Trachea normal. No tracheal tenderness.   Cardiovascular:      Rate and Rhythm: Normal rate.      Pulses: Normal pulses.      Heart sounds: Normal heart sounds.   Pulmonary:      Effort: Pulmonary effort is normal. No respiratory distress.      Breath sounds: Normal breath sounds. No wheezing.   Abdominal:      General: There is no distension.      Palpations: Abdomen is soft. There is no mass.   Musculoskeletal:         General: Normal range of motion.      Cervical back: Normal range of motion. No edema.   Lymphadenopathy:      Head:      Right side of head: No submental, submandibular or tonsillar adenopathy.      Left side of head: No submental, submandibular or tonsillar adenopathy.   Skin:     General: Skin is warm and dry.      Coloration: Skin is not pale.      Findings: Erythema, lesion and rash present. No abrasion.             Comments: Circular ring " around small erythemous area, black dot present in the center.    Neurological:      Mental Status: She is alert and oriented to person, place, and time.   Psychiatric:         Mood and Affect: Mood is not anxious. Affect is not inappropriate.         Speech: Speech normal.         Behavior: Behavior normal.         Thought Content: Thought content normal.         Judgment: Judgment normal. Judgment is not impulsive.        Result Review :                 Assessment and Plan    Diagnoses and all orders for this visit:    1. Tick bite of left wrist, initial encounter (Primary)  -     Tick Panel - Blood, Arm, Left; Future  -     doxycycline (VIBRAMYCIN) 100 MG capsule; Take 2 capsules by mouth 1 (One) Time for 1 dose.  Dispense: 2 capsule; Refill: 0      Presentation of bite looks comparable to tick bite    Lab work today for tick panel     We will call with results     If rash worsens or changes for the worse, please call the office       Follow Up   Return if symptoms worsen or fail to improve, for Next scheduled follow up.  Patient was given instructions and counseling regarding her condition or for health maintenance advice. Please see specific information pulled into the AVS if appropriate.           This document has been electronically signed by MAYELA Miramontes on May 16, 2022 13:54 CDT

## 2022-05-17 LAB
A2 MACROGLOB SERPL-MCNC: 175 MG/DL (ref 110–276)
ALT SERPL W P-5'-P-CCNC: 25 IU/L (ref 0–40)
APO A-I SERPL-MCNC: 146 MG/DL (ref 116–209)
AST SERPL W P-5'-P-CCNC: 26 IU/L (ref 0–40)
B BURGDOR IGG SER QL: NEGATIVE
BILIRUB SERPL-MCNC: 0.3 MG/DL (ref 0–1.2)
CHOLEST SERPL-MCNC: 190 MG/DL (ref 100–199)
FIBROSIS SCORING:: ABNORMAL
FIBROSIS STAGE SERPL QL: ABNORMAL
GGT SERPL-CCNC: 7 IU/L (ref 0–60)
GLUCOSE SERPL-MCNC: 105 MG/DL (ref 65–99)
HAPTOGLOB SERPL-MCNC: 128 MG/DL (ref 37–355)
INTERPRETATIONS: (REFERENCE): ABNORMAL
LABORATORY COMMENT REPORT: ABNORMAL
LIVER FIBR SCORE SERPL CALC.FIBROSURE: 0.08 (ref 0–0.21)
NASH SCORING (REFERENCE): ABNORMAL
NECROINFLAMMATORY ACT GRADE SERPL QL: ABNORMAL
NECROINFLAMMATORY ACT SCORE SERPL: 0.5
SERVICE CMNT-IMP: ABNORMAL
STEATOSIS GRADE (REFERENCE): ABNORMAL
STEATOSIS GRADING (REFERENCE): ABNORMAL
STEATOSIS SCORE (REFERENCE): 0.49 (ref 0–0.3)
TRIGL SERPL-MCNC: 177 MG/DL (ref 0–149)

## 2022-05-18 LAB
A PHAGOCYTOPH IGG TITR SER IF: NEGATIVE {TITER}
A PHAGOCYTOPH IGM TITR SER IF: NEGATIVE {TITER}
E CHAFFEENSIS IGG TITR SER IF: NEGATIVE {TITER}
E CHAFFEENSIS IGM TITR SER IF: NEGATIVE {TITER}

## 2022-05-19 ENCOUNTER — OFFICE VISIT (OUTPATIENT)
Dept: GASTROENTEROLOGY | Facility: CLINIC | Age: 62
End: 2022-05-19

## 2022-05-19 VITALS
WEIGHT: 220.4 LBS | SYSTOLIC BLOOD PRESSURE: 122 MMHG | DIASTOLIC BLOOD PRESSURE: 66 MMHG | HEART RATE: 76 BPM | BODY MASS INDEX: 39.05 KG/M2 | HEIGHT: 63 IN

## 2022-05-19 DIAGNOSIS — K57.90 DIVERTICULOSIS: ICD-10-CM

## 2022-05-19 DIAGNOSIS — R74.8 ELEVATED LIVER ENZYMES: ICD-10-CM

## 2022-05-19 DIAGNOSIS — K21.00 GASTROESOPHAGEAL REFLUX DISEASE WITH ESOPHAGITIS WITHOUT HEMORRHAGE: Primary | ICD-10-CM

## 2022-05-19 DIAGNOSIS — K76.0 FATTY LIVER: ICD-10-CM

## 2022-05-19 PROCEDURE — 99213 OFFICE O/P EST LOW 20 MIN: CPT | Performed by: PHYSICIAN ASSISTANT

## 2022-05-20 LAB
BRUCELLA IGG SER QL IA: NEGATIVE
BRUCELLA IGM SER QL IA: NEGATIVE
RICK SF IGG TITR SER IF: NORMAL {TITER}
RICK SF IGM TITR SER IF: NORMAL {TITER}
RICK TYPHUS IGG TITR SER IF: NORMAL {TITER}
RICK TYPHUS IGM TITR SER IF: NORMAL {TITER}

## 2022-07-07 ENCOUNTER — OFFICE VISIT (OUTPATIENT)
Dept: FAMILY MEDICINE CLINIC | Facility: CLINIC | Age: 62
End: 2022-07-07

## 2022-07-07 VITALS
WEIGHT: 221 LBS | DIASTOLIC BLOOD PRESSURE: 88 MMHG | SYSTOLIC BLOOD PRESSURE: 148 MMHG | BODY MASS INDEX: 39.16 KG/M2 | HEIGHT: 63 IN | HEART RATE: 94 BPM | OXYGEN SATURATION: 97 %

## 2022-07-07 DIAGNOSIS — E78.5 HYPERLIPIDEMIA, UNSPECIFIED HYPERLIPIDEMIA TYPE: ICD-10-CM

## 2022-07-07 DIAGNOSIS — E61.1 LOW IRON: ICD-10-CM

## 2022-07-07 DIAGNOSIS — F32.A DEPRESSIVE DISORDER: ICD-10-CM

## 2022-07-07 DIAGNOSIS — I10 BENIGN ESSENTIAL HYPERTENSION: Primary | ICD-10-CM

## 2022-07-07 DIAGNOSIS — M79.7 FIBROMYALGIA: ICD-10-CM

## 2022-07-07 DIAGNOSIS — Z13.29 SCREENING FOR THYROID DISORDER: ICD-10-CM

## 2022-07-07 DIAGNOSIS — N18.30 STAGE 3 CHRONIC KIDNEY DISEASE, UNSPECIFIED WHETHER STAGE 3A OR 3B CKD: ICD-10-CM

## 2022-07-07 DIAGNOSIS — J45.21 MILD INTERMITTENT ASTHMA WITH ACUTE EXACERBATION: Chronic | ICD-10-CM

## 2022-07-07 DIAGNOSIS — M13.0 POLYARTICULAR ARTHRITIS: Chronic | ICD-10-CM

## 2022-07-07 DIAGNOSIS — R73.9 HIGH BLOOD SUGAR: ICD-10-CM

## 2022-07-07 PROCEDURE — 99214 OFFICE O/P EST MOD 30 MIN: CPT | Performed by: NURSE PRACTITIONER

## 2022-07-07 RX ORDER — LANOLIN ALCOHOL/MO/W.PET/CERES
325 CREAM (GRAM) TOPICAL
Qty: 90 TABLET | Refills: 3 | Status: SHIPPED | OUTPATIENT
Start: 2022-07-07 | End: 2023-01-16

## 2022-07-07 RX ORDER — ALBUTEROL SULFATE 90 UG/1
2 AEROSOL, METERED RESPIRATORY (INHALATION) EVERY 4 HOURS PRN
Qty: 18 G | Refills: 2 | Status: SHIPPED | OUTPATIENT
Start: 2022-07-07 | End: 2023-02-08 | Stop reason: SDUPTHER

## 2022-07-07 RX ORDER — GABAPENTIN 300 MG/1
300 CAPSULE ORAL 2 TIMES DAILY
Qty: 180 CAPSULE | Refills: 0 | Status: SHIPPED | OUTPATIENT
Start: 2022-07-07 | End: 2022-10-07 | Stop reason: SDUPTHER

## 2022-07-07 RX ORDER — ESTRADIOL 1 MG/1
1 TABLET ORAL DAILY
Qty: 90 TABLET | Refills: 2 | Status: SHIPPED | OUTPATIENT
Start: 2022-07-07 | End: 2023-04-05

## 2022-07-07 NOTE — PROGRESS NOTES
Chief Complaint  Med Refill    Subjective   3-month follow-up for high blood pressure, high cholesterol, chronic kidney disease, arthritis, fibromyalgia and depression.  Reports that she has forgotten to take her BP medicine this morning but does have it in her purseHas been SOB since having Covid. Has been using inhaler 2x a day.       Lashaun Bernal presents to [facility] [place]  Hypertension  This is a chronic problem. The current episode started more than 1 year ago. The problem is unchanged. The problem is controlled. Associated symptoms include chest pain, peripheral edema and shortness of breath. Risk factors for coronary artery disease include dyslipidemia, family history, obesity, post-menopausal state, sedentary lifestyle and stress. Current antihypertension treatment includes lifestyle changes. Compliance problems include exercise and diet.    Fibromyalgia  This is a chronic problem. The current episode started more than 1 year ago. The problem occurs constantly. The problem has been gradually improving. Associated symptoms include arthralgias and chest pain. Pertinent negatives include no chills, coughing, diaphoresis, fatigue, fever or sore throat. Nothing aggravates the symptoms. Treatments tried: Cymbalta. The treatment provided mild relief.   Depression  Visit Type: follow-up  Patient presents with the following symptoms: decreased concentration, depressed mood, malaise, restlessness and shortness of breath.  Patient is not experiencing: confusion.  Frequency of symptoms: occasionally   Severity: mild   Sleep quality: fair  Nighttime awakenings: occasional  Compliance with medications:  %        Hyperlipidemia  This is a chronic problem. The current episode started more than 1 year ago. Recent lipid tests were reviewed and are normal. Factors aggravating her hyperlipidemia include fatty foods. Associated symptoms include chest pain and  shortness of breath. Current antihyperlipidemic treatment includes diet change. The current treatment provides mild improvement of lipids. Compliance problems include adherence to exercise and adherence to diet.    Chronic Kidney Disease  This is a chronic problem. The current episode started more than 1 year ago. The problem occurs constantly. The problem has been unchanged. Associated symptoms include arthralgias and chest pain. Pertinent negatives include no chills, coughing, diaphoresis, fatigue, fever or sore throat. Exacerbated by: medications  Treatments tried: Sees Dr. Will nepherologist  The treatment provided moderate relief.   Arthritis  Presents for follow-up visit. She complains of pain and stiffness. The symptoms have been stable. Associated symptoms include pain at night and pain while resting. Pertinent negatives include no fatigue or fever. Compliance with total regimen is %. Compliance with medications is %.         Current Outpatient Medications on File Prior to Visit   Medication Sig Dispense Refill   • albuterol (PROVENTIL) (2.5 MG/3ML) 0.083% nebulizer solution Take 2.5 mg by nebulization Every 6 (Six) Hours As Needed for Wheezing. 120 vial 2   • aspirin 81 MG EC tablet Take 81 mg by mouth Daily.     • clonazePAM (KlonoPIN) 0.5 MG tablet Take 0.5 mg by mouth 2 (Two) Times a Day As Needed for Anxiety.     • clopidogrel (PLAVIX) 75 MG tablet Take 75 mg by mouth Daily.     • cyclobenzaprine (FLEXERIL) 10 MG tablet TAKE 1 TABLET 3 TIMES A DAY AS NEEDED FOR MUSCLE SPASMS. 270 tablet 1   • dilTIAZem (CARDIZEM) 30 MG tablet Take 30 mg by mouth 2 (Two) Times a Day.     • FLUoxetine (PROzac) 40 MG capsule Take 40 mg by mouth Daily.     • furosemide (LASIX) 40 MG tablet Take 40 mg by mouth As Needed. Takes as needed     • magnesium oxide (MAGOX) 400 (241.3 Mg) MG tablet tablet Take 400 mg by mouth 2 (Two) Times a Day.     • methocarbamol (ROBAXIN) 500 MG tablet methocarbamol 500 mg tablet    "  • metoprolol tartrate (LOPRESSOR) 25 MG tablet Take 25 mg by mouth Daily.     • omeprazole (priLOSEC) 20 MG capsule Take 20 mg by mouth 2 (two) times a day.     • [DISCONTINUED] albuterol sulfate  (90 Base) MCG/ACT inhaler Inhale 2 puffs Every 4 (Four) Hours As Needed for Wheezing. 18 g 2   • [DISCONTINUED] estradiol (ESTRACE) 1 MG tablet TAKE 1 TABLET EVERY DAY 90 tablet 2   • [DISCONTINUED] ferrous sulfate 325 (65 FE) MG EC tablet Take 1 tablet by mouth Daily With Breakfast. 90 tablet 3   • [DISCONTINUED] gabapentin (NEURONTIN) 300 MG capsule Take 1 capsule by mouth 2 (Two) Times a Day. 180 capsule 0   • [DISCONTINUED] mupirocin (BACTROBAN) 2 % ointment APPLY TOPICALLY TO AFFECTED AREA(S) THREE TIMES DAILY AS DIRECTED 22 g 1   • [DISCONTINUED] spironolactone (ALDACTONE) 25 MG tablet        No current facility-administered medications on file prior to visit.     Allergies   Allergen Reactions   • Cefprozil Nausea And Vomiting and Rash   • Penicillins Rash       Objective   Vital Signs:  /88   Pulse 94   Ht 160 cm (63\")   Wt 100 kg (221 lb)   SpO2 97%   BMI 39.15 kg/m²   Estimated body mass index is 39.15 kg/m² as calculated from the following:    Height as of this encounter: 160 cm (63\").    Weight as of this encounter: 100 kg (221 lb).    Class 2 Severe Obesity (BMI >=35 and <=39.9). Obesity-related health conditions include the following: hypertension, dyslipidemias and GERD. Obesity is unchanged. BMI is is above average; BMI management plan is completed. We discussed portion control and increasing exercise.      Physical Exam  Vitals and nursing note reviewed.   Constitutional:       Appearance: Normal appearance. She is well-developed. She is obese.   HENT:      Head: Normocephalic.      Mouth/Throat:      Mouth: Mucous membranes are moist.   Cardiovascular:      Rate and Rhythm: Normal rate and regular rhythm.      Pulses: Normal pulses.      Heart sounds: Normal heart sounds.   Pulmonary: "      Effort: Pulmonary effort is normal.      Breath sounds: Normal breath sounds.   Abdominal:      General: Bowel sounds are normal.      Palpations: Abdomen is soft.   Musculoskeletal:         General: Normal range of motion.      Cervical back: Normal range of motion and neck supple.   Skin:     General: Skin is warm.      Capillary Refill: Capillary refill takes less than 2 seconds.   Neurological:      Mental Status: She is alert and oriented to person, place, and time.   Psychiatric:         Behavior: Behavior normal.        Result Review :  The following data was reviewed by: MAYELA Flores on 07/07/2022:  Common labs    Common Labsle 12/21/21 12/21/21 3/29/22 3/29/22 3/29/22 5/16/22 5/16/22    1447 1447 0954 0954 0954 1018 1018   Glucose  97  81  100 (A)    BUN  14  11  14    Creatinine  1.18 (A)  0.92  1.00    eGFR Non  Am  47 (A)        Sodium  142  138  139    Potassium  4.2  4.4  4.6    Chloride  108 (A)  106  107    Calcium  9.0  8.5 (A)  9.0    Albumin  3.80  3.70      Total Bilirubin  0.2  0.3      Alkaline Phosphatase  76  70      AST (SGOT)  19  20      ALT (SGPT)  19  23      WBC 9.50  7.46       Hemoglobin 12.9  12.4       Hematocrit 39.1  38.4       Platelets 382  311       Total Cholesterol     164     Triglycerides     177 (A)  177 (A)   HDL Cholesterol     46     LDL Cholesterol      88     (A) Abnormal value                      Assessment and Plan   Diagnoses and all orders for this visit:    1. Benign essential hypertension (Primary)  -     CBC & Differential; Future  -     Comprehensive Metabolic Panel; Future    2. Mild intermittent asthma with acute exacerbation  -     albuterol sulfate  (90 Base) MCG/ACT inhaler; Inhale 2 puffs Every 4 (Four) Hours As Needed for Wheezing.  Dispense: 18 g; Refill: 2    3. Hyperlipidemia, unspecified hyperlipidemia type  -     Lipid Panel; Future    4. Fibromyalgia  -     gabapentin (NEURONTIN) 300 MG capsule; Take 1 capsule by mouth 2  (Two) Times a Day.  Dispense: 180 capsule; Refill: 0    5. Low iron  -     ferrous sulfate 325 (65 FE) MG EC tablet; Take 1 tablet by mouth Daily With Breakfast.  Dispense: 90 tablet; Refill: 3    6. Polyarticular arthritis    7. Depressive disorder    8. Stage 3 chronic kidney disease, unspecified whether stage 3a or 3b CKD (HCC)    9. High blood sugar  -     Hemoglobin A1c; Future    10. Screening for thyroid disorder  -     TSH; Future    Other orders  -     estradiol (ESTRACE) 1 MG tablet; Take 1 tablet by mouth Daily.  Dispense: 90 tablet; Refill: 2      1. Benign essential hypertension (Primary)  Continue on lasix as previously prescribed   Continue on low sodium diet of no more than 1500 mg a day   Complete CBC and CMP as ordered will notify when results are available     2. Mild intermittent asthma with acute exacerbation   Continue on albuterol inhaler as previously prescribed   Continue on albuterol nebulizer as previously prescribed   Seek emergency medical treatment for any or worsening SOB     3. Hyperlipidemia, unspecified hyperlipidemia type   Continue to decrease saturated fat in the diet     4. Fibromyalgia   Continue on Flexeril as previously prescribed   Continue gabapentin as previously prescribed and refill prescription sent to pharmacy    5.  Stage III chronic kidney disease:  Continue neurology follow-up with Dr. Will as scheduled     6.  Depressive disorder:  Continue on Prozac as previously prescribed     7.  Polyarticular arthritis:  Continue on Plaquenil as previously prescribed    8. Low iron  Continue on ferrous sulfate as previously prescribed and refill prescription sent to pharmacy  Continue to increase iron rich foods in diet such as leafy green vegetables and red meats    9.  High blood sugar:  Complete hemoglobin A1c as ordered and will notify of results when available    10.  Screening for thyroid disorder  Complete TSH as ordered will notify when results are available      I  spent 37 minutes caring for Lashaun on this date of service. This time includes time spent by me in the following activities:preparing for the visit, obtaining and/or reviewing a separately obtained history, performing a medically appropriate examination and/or evaluation , ordering medications, tests, or procedures, documenting information in the medical record and care coordination  Follow Up   Return in about 3 months (around 10/7/2022) for Recheck.  Patient was given instructions and counseling regarding her condition or for health maintenance advice. Please see specific information pulled into the AVS if appropriate.         This document has been electronically signed by MAYELA Flores on July 7, 2022 14:10 CDT

## 2022-07-08 ENCOUNTER — LAB (OUTPATIENT)
Dept: LAB | Facility: HOSPITAL | Age: 62
End: 2022-07-08

## 2022-07-08 DIAGNOSIS — R73.9 HIGH BLOOD SUGAR: ICD-10-CM

## 2022-07-08 DIAGNOSIS — E78.5 HYPERLIPIDEMIA, UNSPECIFIED HYPERLIPIDEMIA TYPE: ICD-10-CM

## 2022-07-08 DIAGNOSIS — Z13.29 SCREENING FOR THYROID DISORDER: ICD-10-CM

## 2022-07-08 DIAGNOSIS — I10 BENIGN ESSENTIAL HYPERTENSION: ICD-10-CM

## 2022-07-08 LAB
ALBUMIN SERPL-MCNC: 3.7 G/DL (ref 3.5–5.2)
ALBUMIN/GLOB SERPL: 1.3 G/DL
ALP SERPL-CCNC: 74 U/L (ref 39–117)
ALT SERPL W P-5'-P-CCNC: 21 U/L (ref 1–33)
ANION GAP SERPL CALCULATED.3IONS-SCNC: 14 MMOL/L (ref 5–15)
AST SERPL-CCNC: 19 U/L (ref 1–32)
BASOPHILS # BLD AUTO: 0.07 10*3/MM3 (ref 0–0.2)
BASOPHILS NFR BLD AUTO: 0.9 % (ref 0–1.5)
BILIRUB SERPL-MCNC: 0.2 MG/DL (ref 0–1.2)
BUN SERPL-MCNC: 12 MG/DL (ref 8–23)
BUN/CREAT SERPL: 12.2 (ref 7–25)
CALCIUM SPEC-SCNC: 8.9 MG/DL (ref 8.6–10.5)
CHLORIDE SERPL-SCNC: 103 MMOL/L (ref 98–107)
CHOLEST SERPL-MCNC: 189 MG/DL (ref 0–200)
CO2 SERPL-SCNC: 19 MMOL/L (ref 22–29)
CREAT SERPL-MCNC: 0.98 MG/DL (ref 0.57–1)
DEPRECATED RDW RBC AUTO: 37.5 FL (ref 37–54)
EGFRCR SERPLBLD CKD-EPI 2021: 65.8 ML/MIN/1.73
EOSINOPHIL # BLD AUTO: 0.32 10*3/MM3 (ref 0–0.4)
EOSINOPHIL NFR BLD AUTO: 4.3 % (ref 0.3–6.2)
ERYTHROCYTE [DISTWIDTH] IN BLOOD BY AUTOMATED COUNT: 12.4 % (ref 12.3–15.4)
GLOBULIN UR ELPH-MCNC: 2.8 GM/DL
GLUCOSE SERPL-MCNC: 91 MG/DL (ref 65–99)
HBA1C MFR BLD: 6.2 % (ref 4.8–5.6)
HCT VFR BLD AUTO: 34.7 % (ref 34–46.6)
HDLC SERPL-MCNC: 44 MG/DL (ref 40–60)
HGB BLD-MCNC: 11.9 G/DL (ref 12–15.9)
IMM GRANULOCYTES # BLD AUTO: 0.03 10*3/MM3 (ref 0–0.05)
IMM GRANULOCYTES NFR BLD AUTO: 0.4 % (ref 0–0.5)
LDLC SERPL CALC-MCNC: 115 MG/DL (ref 0–100)
LDLC/HDLC SERPL: 2.51 {RATIO}
LYMPHOCYTES # BLD AUTO: 2.75 10*3/MM3 (ref 0.7–3.1)
LYMPHOCYTES NFR BLD AUTO: 36.6 % (ref 19.6–45.3)
MCH RBC QN AUTO: 29.4 PG (ref 26.6–33)
MCHC RBC AUTO-ENTMCNC: 34.3 G/DL (ref 31.5–35.7)
MCV RBC AUTO: 85.7 FL (ref 79–97)
MONOCYTES # BLD AUTO: 0.7 10*3/MM3 (ref 0.1–0.9)
MONOCYTES NFR BLD AUTO: 9.3 % (ref 5–12)
NEUTROPHILS NFR BLD AUTO: 3.65 10*3/MM3 (ref 1.7–7)
NEUTROPHILS NFR BLD AUTO: 48.5 % (ref 42.7–76)
NRBC BLD AUTO-RTO: 0 /100 WBC (ref 0–0.2)
PLATELET # BLD AUTO: 319 10*3/MM3 (ref 140–450)
PMV BLD AUTO: 10.2 FL (ref 6–12)
POTASSIUM SERPL-SCNC: 4.3 MMOL/L (ref 3.5–5.2)
PROT SERPL-MCNC: 6.5 G/DL (ref 6–8.5)
RBC # BLD AUTO: 4.05 10*6/MM3 (ref 3.77–5.28)
SODIUM SERPL-SCNC: 136 MMOL/L (ref 136–145)
TRIGL SERPL-MCNC: 172 MG/DL (ref 0–150)
TSH SERPL DL<=0.05 MIU/L-ACNC: 4.45 UIU/ML (ref 0.27–4.2)
VLDLC SERPL-MCNC: 30 MG/DL (ref 5–40)
WBC NRBC COR # BLD: 7.52 10*3/MM3 (ref 3.4–10.8)

## 2022-07-08 PROCEDURE — 84443 ASSAY THYROID STIM HORMONE: CPT

## 2022-07-08 PROCEDURE — 83036 HEMOGLOBIN GLYCOSYLATED A1C: CPT

## 2022-07-08 PROCEDURE — 80061 LIPID PANEL: CPT

## 2022-07-08 PROCEDURE — 80053 COMPREHEN METABOLIC PANEL: CPT

## 2022-07-08 PROCEDURE — 85025 COMPLETE CBC W/AUTO DIFF WBC: CPT

## 2022-08-11 DIAGNOSIS — G47.33 OBSTRUCTIVE SLEEP APNEA, ADULT: Primary | ICD-10-CM

## 2022-08-11 DIAGNOSIS — R06.83 SNORING: ICD-10-CM

## 2022-08-11 NOTE — PROGRESS NOTES
Patient called with complaints of snoring through her mask while using her CPAP.  I checked 1 month compliance report and AHI is appropriate, vibratory index is not incredibly high. Patient does use a nasal pillow mask so I have advised her to try a chin strap. I will order this through her DME supplier.   If snoring is still an issue, will consider pressure increase, or recommend trying a full face mask.

## 2022-08-26 ENCOUNTER — LAB (OUTPATIENT)
Dept: LAB | Facility: HOSPITAL | Age: 62
End: 2022-08-26

## 2022-08-26 ENCOUNTER — OFFICE VISIT (OUTPATIENT)
Dept: FAMILY MEDICINE CLINIC | Facility: CLINIC | Age: 62
End: 2022-08-26

## 2022-08-26 VITALS
OXYGEN SATURATION: 98 % | HEIGHT: 63 IN | SYSTOLIC BLOOD PRESSURE: 134 MMHG | DIASTOLIC BLOOD PRESSURE: 72 MMHG | HEART RATE: 89 BPM | WEIGHT: 227.1 LBS | BODY MASS INDEX: 40.24 KG/M2

## 2022-08-26 DIAGNOSIS — E66.01 MORBID (SEVERE) OBESITY DUE TO EXCESS CALORIES: ICD-10-CM

## 2022-08-26 DIAGNOSIS — R06.01 ORTHOPNEA: Primary | ICD-10-CM

## 2022-08-26 DIAGNOSIS — J45.21 MILD INTERMITTENT ASTHMA WITH ACUTE EXACERBATION: Chronic | ICD-10-CM

## 2022-08-26 LAB
ALBUMIN SERPL-MCNC: 3.7 G/DL (ref 3.5–5.2)
ALBUMIN/GLOB SERPL: 1.4 G/DL
ALP SERPL-CCNC: 94 U/L (ref 39–117)
ALT SERPL W P-5'-P-CCNC: 29 U/L (ref 1–33)
ANION GAP SERPL CALCULATED.3IONS-SCNC: 12 MMOL/L (ref 5–15)
AST SERPL-CCNC: 27 U/L (ref 1–32)
BASOPHILS # BLD AUTO: 0.06 10*3/MM3 (ref 0–0.2)
BASOPHILS NFR BLD AUTO: 0.7 % (ref 0–1.5)
BILIRUB SERPL-MCNC: 0.2 MG/DL (ref 0–1.2)
BUN SERPL-MCNC: 16 MG/DL (ref 8–23)
BUN/CREAT SERPL: 13.6 (ref 7–25)
CALCIUM SPEC-SCNC: 8.7 MG/DL (ref 8.6–10.5)
CHLORIDE SERPL-SCNC: 106 MMOL/L (ref 98–107)
CO2 SERPL-SCNC: 22 MMOL/L (ref 22–29)
CREAT SERPL-MCNC: 1.18 MG/DL (ref 0.57–1)
DEPRECATED RDW RBC AUTO: 39.4 FL (ref 37–54)
EGFRCR SERPLBLD CKD-EPI 2021: 52.7 ML/MIN/1.73
EOSINOPHIL # BLD AUTO: 0.29 10*3/MM3 (ref 0–0.4)
EOSINOPHIL NFR BLD AUTO: 3.6 % (ref 0.3–6.2)
ERYTHROCYTE [DISTWIDTH] IN BLOOD BY AUTOMATED COUNT: 12.8 % (ref 12.3–15.4)
GLOBULIN UR ELPH-MCNC: 2.6 GM/DL
GLUCOSE SERPL-MCNC: 101 MG/DL (ref 65–99)
HCT VFR BLD AUTO: 35.2 % (ref 34–46.6)
HGB BLD-MCNC: 11.8 G/DL (ref 12–15.9)
IMM GRANULOCYTES # BLD AUTO: 0.07 10*3/MM3 (ref 0–0.05)
IMM GRANULOCYTES NFR BLD AUTO: 0.9 % (ref 0–0.5)
LYMPHOCYTES # BLD AUTO: 2.59 10*3/MM3 (ref 0.7–3.1)
LYMPHOCYTES NFR BLD AUTO: 32.3 % (ref 19.6–45.3)
MCH RBC QN AUTO: 28.9 PG (ref 26.6–33)
MCHC RBC AUTO-ENTMCNC: 33.5 G/DL (ref 31.5–35.7)
MCV RBC AUTO: 86.3 FL (ref 79–97)
MONOCYTES # BLD AUTO: 0.68 10*3/MM3 (ref 0.1–0.9)
MONOCYTES NFR BLD AUTO: 8.5 % (ref 5–12)
NEUTROPHILS NFR BLD AUTO: 4.33 10*3/MM3 (ref 1.7–7)
NEUTROPHILS NFR BLD AUTO: 54 % (ref 42.7–76)
NRBC BLD AUTO-RTO: 0 /100 WBC (ref 0–0.2)
PLATELET # BLD AUTO: 286 10*3/MM3 (ref 140–450)
PMV BLD AUTO: 9.9 FL (ref 6–12)
POTASSIUM SERPL-SCNC: 4.2 MMOL/L (ref 3.5–5.2)
PROT SERPL-MCNC: 6.3 G/DL (ref 6–8.5)
RBC # BLD AUTO: 4.08 10*6/MM3 (ref 3.77–5.28)
SODIUM SERPL-SCNC: 140 MMOL/L (ref 136–145)
WBC NRBC COR # BLD: 8.02 10*3/MM3 (ref 3.4–10.8)

## 2022-08-26 PROCEDURE — 80053 COMPREHEN METABOLIC PANEL: CPT | Performed by: NURSE PRACTITIONER

## 2022-08-26 PROCEDURE — 99214 OFFICE O/P EST MOD 30 MIN: CPT | Performed by: NURSE PRACTITIONER

## 2022-08-26 PROCEDURE — 85025 COMPLETE CBC W/AUTO DIFF WBC: CPT | Performed by: NURSE PRACTITIONER

## 2022-08-26 RX ORDER — CLONAZEPAM 1 MG/1
TABLET ORAL
COMMUNITY
Start: 2022-08-02

## 2022-08-26 NOTE — PROGRESS NOTES
"Chief Complaint  Shortness of Breath    Subjective   One week ago began having increasing episodes of shortness of breath \"when I lay down flat.  It happened 3 times in the past week.  I go to lay down and I feel like I am smothering so I have to put on my CPAP.  If I put my CPAP on within a minute I am fine.\"  Admits to using her albuterol inhaler daily for the past 7 days      Lashaun Bernal presents to [facility] [place]  Shortness of Breath  This is a new problem. The current episode started in the past 7 days. The problem occurs every several days. The problem has been gradually worsening. Associated symptoms include orthopnea. The symptoms are aggravated by lying flat. She has tried beta agonist inhalers for the symptoms. The treatment provided mild relief.   Obesity  This is a chronic problem. The current episode started more than 1 year ago. The problem occurs constantly. The problem has been gradually worsening. The symptoms are aggravated by eating and drinking. Treatments tried: Low calorie diet, low carbohydrate diet, smaller portions and increased walking. The treatment provided no relief.     Current Outpatient Medications on File Prior to Visit   Medication Sig Dispense Refill   • albuterol (PROVENTIL) (2.5 MG/3ML) 0.083% nebulizer solution Take 2.5 mg by nebulization Every 6 (Six) Hours As Needed for Wheezing. 120 vial 2   • albuterol sulfate  (90 Base) MCG/ACT inhaler Inhale 2 puffs Every 4 (Four) Hours As Needed for Wheezing. 18 g 2   • aspirin 81 MG EC tablet Take 81 mg by mouth Daily.     • clonazePAM (KlonoPIN) 1 MG tablet      • clopidogrel (PLAVIX) 75 MG tablet Take 75 mg by mouth Daily.     • cyclobenzaprine (FLEXERIL) 10 MG tablet TAKE 1 TABLET 3 TIMES A DAY AS NEEDED FOR MUSCLE SPASMS. 270 tablet 1   • dilTIAZem (CARDIZEM) 30 MG tablet Take 30 mg by mouth 2 (Two) Times a Day.     • estradiol (ESTRACE) 1 MG tablet Take 1 tablet by " "mouth Daily. 90 tablet 2   • ferrous sulfate 325 (65 FE) MG EC tablet Take 1 tablet by mouth Daily With Breakfast. 90 tablet 3   • furosemide (LASIX) 40 MG tablet Take 40 mg by mouth As Needed. Takes as needed     • gabapentin (NEURONTIN) 300 MG capsule Take 1 capsule by mouth 2 (Two) Times a Day. 180 capsule 0   • magnesium oxide (MAGOX) 400 (241.3 Mg) MG tablet tablet Take 400 mg by mouth 2 (Two) Times a Day.     • metoprolol tartrate (LOPRESSOR) 25 MG tablet Take 25 mg by mouth Daily.     • omeprazole (priLOSEC) 20 MG capsule Take 20 mg by mouth 2 (two) times a day.     • [DISCONTINUED] clonazePAM (KlonoPIN) 0.5 MG tablet Take 0.5 mg by mouth 2 (Two) Times a Day As Needed for Anxiety.     • [DISCONTINUED] FLUoxetine (PROzac) 40 MG capsule Take 40 mg by mouth Daily.     • [DISCONTINUED] methocarbamol (ROBAXIN) 500 MG tablet methocarbamol 500 mg tablet       No current facility-administered medications on file prior to visit.     Allergies   Allergen Reactions   • Cefprozil Nausea And Vomiting and Rash   • Penicillins Rash        Objective   Vital Signs:  /72   Pulse 89   Ht 160 cm (63\")   Wt 103 kg (227 lb 1.6 oz)   SpO2 98%   BMI 40.23 kg/m²   Estimated body mass index is 40.23 kg/m² as calculated from the following:    Height as of this encounter: 160 cm (63\").    Weight as of this encounter: 103 kg (227 lb 1.6 oz).      Physical Exam  Vitals reviewed.   Constitutional:       Appearance: She is well-developed.   Cardiovascular:      Rate and Rhythm: Normal rate and regular rhythm.      Heart sounds: Normal heart sounds.   Pulmonary:      Effort: Tachypnea present.      Breath sounds: Normal breath sounds.   Musculoskeletal:         General: Normal range of motion.   Skin:     General: Skin is warm.      Capillary Refill: Capillary refill takes less than 2 seconds.   Neurological:      Mental Status: She is alert and oriented to person, place, and time.   Psychiatric:         Behavior: Behavior normal. "        Result Review :{Labs  Result Review  Imaging  Med Tab  Media  Procedures  :23}                Assessment and Plan   Diagnoses and all orders for this visit:    1. Orthopnea (Primary)  -     XR Chest 2 View  -     CBC & Differential  -     Comprehensive Metabolic Panel    2. Mild intermittent asthma with acute exacerbation    3. Morbid (severe) obesity due to excess calories (HCC)  -     Ambulatory Referral to Bariatric Surgery      1.  Orthopnea:  Stat chest x-ray performed in office and showed no acute process, patient aware of results  Stat CBC and chemistry panel performed in office and essentially normal, patient aware of results    2.  Mild intermittent asthma with acute exacerbation:  Continue albuterol as previously prescribed  Begin Spiriva as prescribed  Spiriva Respimat 1.25 mcg X 1 box Lot# [id number] Exp: 08/2023  Educated on possible side effects of this medication including not limited to increased risk for bronchospasm  Discussed importance of weight loss in helping to improve pulmonary function    3.  Morbid obesity due to excess calories:  Referral placed to bariatric surgeon to discuss possible gastric sleeve  Class 3 Severe Obesity (BMI >=40). Obesity-related health conditions include the following: hypertension and GERD. Obesity is worsening. BMI is is above average; BMI management plan is completed. We discussed portion control and increasing exercise.  Body mass index is 40.23 kg/m².         Follow Up   Return if symptoms worsen or fail to improve, for Next scheduled follow up.  Patient was given instructions and counseling regarding her condition or for health maintenance advice. Please see specific information pulled into the AVS if appropriate.         This document has been electronically signed by MAYELA Flores on August 26, 2022 14:48 CDT   g

## 2022-08-29 DIAGNOSIS — E66.01 MORBID (SEVERE) OBESITY DUE TO EXCESS CALORIES: Primary | ICD-10-CM

## 2022-08-30 ENCOUNTER — TRANSCRIBE ORDERS (OUTPATIENT)
Dept: LAB | Facility: HOSPITAL | Age: 62
End: 2022-08-30

## 2022-08-30 DIAGNOSIS — N18.30 STAGE 3 CHRONIC KIDNEY DISEASE, UNSPECIFIED WHETHER STAGE 3A OR 3B CKD: Primary | ICD-10-CM

## 2022-09-29 DIAGNOSIS — M25.50 ARTHRALGIA, UNSPECIFIED JOINT: Primary | ICD-10-CM

## 2022-10-07 ENCOUNTER — LAB (OUTPATIENT)
Dept: LAB | Facility: HOSPITAL | Age: 62
End: 2022-10-07

## 2022-10-07 ENCOUNTER — OFFICE VISIT (OUTPATIENT)
Dept: FAMILY MEDICINE CLINIC | Facility: CLINIC | Age: 62
End: 2022-10-07

## 2022-10-07 VITALS
HEART RATE: 80 BPM | SYSTOLIC BLOOD PRESSURE: 126 MMHG | HEIGHT: 63 IN | WEIGHT: 219 LBS | DIASTOLIC BLOOD PRESSURE: 68 MMHG | BODY MASS INDEX: 38.8 KG/M2 | OXYGEN SATURATION: 96 %

## 2022-10-07 DIAGNOSIS — M79.7 FIBROMYALGIA: ICD-10-CM

## 2022-10-07 DIAGNOSIS — I10 BENIGN ESSENTIAL HYPERTENSION: Primary | Chronic | ICD-10-CM

## 2022-10-07 DIAGNOSIS — M06.09 RHEUMATOID ARTHRITIS WITHOUT RHEUMATOID FACTOR, MULTIPLE SITES: ICD-10-CM

## 2022-10-07 DIAGNOSIS — Z79.899 HIGH RISK MEDICATION USE: ICD-10-CM

## 2022-10-07 DIAGNOSIS — N18.30 STAGE 3 CHRONIC KIDNEY DISEASE, UNSPECIFIED WHETHER STAGE 3A OR 3B CKD: ICD-10-CM

## 2022-10-07 DIAGNOSIS — F32.A DEPRESSIVE DISORDER: ICD-10-CM

## 2022-10-07 DIAGNOSIS — E78.00 HYPERCHOLESTEREMIA: ICD-10-CM

## 2022-10-07 DIAGNOSIS — M25.50 ARTHRALGIA, UNSPECIFIED JOINT: ICD-10-CM

## 2022-10-07 DIAGNOSIS — E66.01 CLASS 2 SEVERE OBESITY DUE TO EXCESS CALORIES WITH SERIOUS COMORBIDITY AND BODY MASS INDEX (BMI) OF 38.0 TO 38.9 IN ADULT: ICD-10-CM

## 2022-10-07 DIAGNOSIS — K21.00 GASTROESOPHAGEAL REFLUX DISEASE WITH ESOPHAGITIS WITHOUT HEMORRHAGE: ICD-10-CM

## 2022-10-07 LAB
ALBUMIN SERPL-MCNC: 3.8 G/DL (ref 3.5–5.2)
AMPHET+METHAMPHET UR QL: NEGATIVE
AMPHETAMINES UR QL: NEGATIVE
ANION GAP SERPL CALCULATED.3IONS-SCNC: 11.3 MMOL/L (ref 5–15)
BARBITURATES UR QL SCN: NEGATIVE
BENZODIAZ UR QL SCN: NEGATIVE
BUN SERPL-MCNC: 20 MG/DL (ref 8–23)
BUN/CREAT SERPL: 19.2 (ref 7–25)
BUPRENORPHINE SERPL-MCNC: NEGATIVE NG/ML
CALCIUM SPEC-SCNC: 9.6 MG/DL (ref 8.6–10.5)
CANNABINOIDS SERPL QL: NEGATIVE
CHLORIDE SERPL-SCNC: 104 MMOL/L (ref 98–107)
CHROMATIN AB SERPL-ACNC: <10 IU/ML (ref 0–14)
CO2 SERPL-SCNC: 20.7 MMOL/L (ref 22–29)
COCAINE UR QL: NEGATIVE
CREAT SERPL-MCNC: 1.04 MG/DL (ref 0.57–1)
EGFRCR SERPLBLD CKD-EPI 2021: 61.3 ML/MIN/1.73
GLUCOSE SERPL-MCNC: 119 MG/DL (ref 65–99)
METHADONE UR QL SCN: NEGATIVE
OPIATES UR QL: NEGATIVE
OXYCODONE UR QL SCN: NEGATIVE
PCP UR QL SCN: NEGATIVE
PHOSPHATE SERPL-MCNC: 3 MG/DL (ref 2.5–4.5)
POTASSIUM SERPL-SCNC: 4.6 MMOL/L (ref 3.5–5.2)
PROPOXYPH UR QL: NEGATIVE
PTH-INTACT SERPL-MCNC: 35.9 PG/ML (ref 15–65)
SODIUM SERPL-SCNC: 136 MMOL/L (ref 136–145)
TRICYCLICS UR QL SCN: POSITIVE

## 2022-10-07 PROCEDURE — 83970 ASSAY OF PARATHORMONE: CPT

## 2022-10-07 PROCEDURE — 86431 RHEUMATOID FACTOR QUANT: CPT

## 2022-10-07 PROCEDURE — 36415 COLL VENOUS BLD VENIPUNCTURE: CPT

## 2022-10-07 PROCEDURE — 99213 OFFICE O/P EST LOW 20 MIN: CPT | Performed by: NURSE PRACTITIONER

## 2022-10-07 PROCEDURE — 80069 RENAL FUNCTION PANEL: CPT

## 2022-10-07 PROCEDURE — 86038 ANTINUCLEAR ANTIBODIES: CPT

## 2022-10-07 PROCEDURE — 80306 DRUG TEST PRSMV INSTRMNT: CPT

## 2022-10-07 RX ORDER — GABAPENTIN 300 MG/1
300 CAPSULE ORAL 2 TIMES DAILY
Qty: 180 CAPSULE | Refills: 1 | Status: SHIPPED | OUTPATIENT
Start: 2022-10-07

## 2022-10-07 RX ORDER — SPIRONOLACTONE 25 MG/1
25 TABLET ORAL DAILY
COMMUNITY
Start: 2022-09-27

## 2022-10-07 NOTE — PROGRESS NOTES
"Chief Complaint  Hypertension ( 3 mo f/u )    Subjective   3-month follow-up for high blood pressure, high cholesterol, chronic kidney disease, fibromyalgia, arthritis, depression and obesity.  Recently joined the [employer] and is doing water aerobics and eating a low calorie high protein diet.  \"I feel so much better.  I am breathing better since I lost the weight.\"      Lashaun Bernal presents to [facility] [place]  Hypertension  This is a chronic problem. The current episode started more than 1 year ago. The problem is unchanged. The problem is controlled. Associated symptoms include chest pain, peripheral edema and shortness of breath. Risk factors for coronary artery disease include dyslipidemia, family history, obesity, post-menopausal state, sedentary lifestyle and stress. Current antihypertension treatment includes lifestyle changes. Compliance problems include exercise and diet.    Fibromyalgia  This is a chronic problem. The current episode started more than 1 year ago. The problem occurs constantly. The problem has been gradually improving. Associated symptoms include arthralgias and chest pain. Pertinent negatives include no chills, coughing, diaphoresis, fatigue, fever or sore throat. Nothing aggravates the symptoms. Treatments tried: Cymbalta. The treatment provided mild relief.   Depression  Visit Type: follow-up  Patient presents with the following symptoms: decreased concentration, depressed mood, malaise, restlessness and shortness of breath.  Patient is not experiencing: confusion.  Frequency of symptoms: occasionally   Severity: mild   Sleep quality: fair  Nighttime awakenings: occasional  Compliance with medications:  %        Hyperlipidemia  This is a chronic problem. The current episode started more than 1 year ago. Recent lipid tests were reviewed and are normal. Exacerbating diseases include obesity. Factors aggravating her hyperlipidemia " include fatty foods. Associated symptoms include chest pain and shortness of breath. Current antihyperlipidemic treatment includes diet change. The current treatment provides mild improvement of lipids. Compliance problems include adherence to exercise and adherence to diet.    Chronic Kidney Disease  This is a chronic problem. The current episode started more than 1 year ago. The problem occurs constantly. The problem has been unchanged. Associated symptoms include arthralgias and chest pain. Pertinent negatives include no chills, coughing, diaphoresis, fatigue, fever or sore throat. Exacerbated by: medications  Treatments tried: Sees Dr. Will nepherologist  The treatment provided moderate relief.   Arthritis  Presents for follow-up visit. She complains of pain and stiffness. The symptoms have been stable. Associated symptoms include pain at night and pain while resting. Pertinent negatives include no fatigue or fever. Compliance with total regimen is %. Compliance with medications is %.   Obesity  This is a chronic problem. The current episode started more than 1 year ago. The problem occurs daily. The problem has been gradually improving. Associated symptoms include arthralgias and chest pain. Pertinent negatives include no chills, coughing, diaphoresis, fatigue, fever or sore throat. The symptoms are aggravated by drinking and eating. Treatments tried: Diet changes and increased exercising. The treatment provided moderate relief.     Current Outpatient Medications on File Prior to Visit   Medication Sig Dispense Refill   • albuterol (PROVENTIL) (2.5 MG/3ML) 0.083% nebulizer solution Take 2.5 mg by nebulization Every 6 (Six) Hours As Needed for Wheezing. 120 vial 2   • albuterol sulfate  (90 Base) MCG/ACT inhaler Inhale 2 puffs Every 4 (Four) Hours As Needed for Wheezing. 18 g 2   • aspirin 81 MG EC tablet Take 81 mg by mouth Daily.     • clonazePAM (KlonoPIN) 1 MG tablet      • clopidogrel  "(PLAVIX) 75 MG tablet Take 75 mg by mouth Daily.     • cyclobenzaprine (FLEXERIL) 10 MG tablet TAKE 1 TABLET 3 TIMES A DAY AS NEEDED FOR MUSCLE SPASMS. 270 tablet 1   • dilTIAZem (CARDIZEM) 30 MG tablet Take 30 mg by mouth 2 (Two) Times a Day.     • estradiol (ESTRACE) 1 MG tablet Take 1 tablet by mouth Daily. 90 tablet 2   • ferrous sulfate 325 (65 FE) MG EC tablet Take 1 tablet by mouth Daily With Breakfast. 90 tablet 3   • furosemide (LASIX) 40 MG tablet Take 40 mg by mouth As Needed. Takes as needed     • magnesium oxide (MAGOX) 400 (241.3 Mg) MG tablet tablet Take 400 mg by mouth 2 (Two) Times a Day.     • metoprolol tartrate (LOPRESSOR) 25 MG tablet Take 25 mg by mouth Daily.     • omeprazole (priLOSEC) 20 MG capsule Take 20 mg by mouth 2 (two) times a day.     • spironolactone (ALDACTONE) 25 MG tablet        No current facility-administered medications on file prior to visit.       Allergies   Allergen Reactions   • Cefprozil Nausea And Vomiting and Rash   • Penicillins Rash       Objective   Vital Signs:  /68   Pulse 80   Ht 160 cm (63\")   Wt 99.3 kg (219 lb)   SpO2 96%   BMI 38.79 kg/m²   Estimated body mass index is 38.79 kg/m² as calculated from the following:    Height as of this encounter: 160 cm (63\").    Weight as of this encounter: 99.3 kg (219 lb).      Physical Exam  Vitals and nursing note reviewed.   Constitutional:       Appearance: She is well-developed.   HENT:      Head: Normocephalic.   Cardiovascular:      Rate and Rhythm: Normal rate and regular rhythm.      Heart sounds: Normal heart sounds.   Pulmonary:      Effort: Pulmonary effort is normal.      Breath sounds: Normal breath sounds.   Abdominal:      General: Bowel sounds are normal.      Palpations: Abdomen is soft.   Musculoskeletal:         General: Normal range of motion.      Cervical back: Normal range of motion and neck supple.   Skin:     General: Skin is warm.      Capillary Refill: Capillary refill takes less than " 2 seconds.   Neurological:      Mental Status: She is alert and oriented to person, place, and time.   Psychiatric:         Behavior: Behavior normal.        Result Review :  The following data was reviewed by: MAYELA Florse on 10/07/2022:  CMP    CMP 7/8/22 8/26/22 10/7/22   Glucose 91 101 (A) 119 (A)   BUN 12 16 20   Creatinine 0.98 1.18 (A) 1.04 (A)   Sodium 136 140 136   Potassium 4.3 4.2 4.6   Chloride 103 106 104   Calcium 8.9 8.7 9.6   Albumin 3.70 3.70 3.80   Total Bilirubin 0.2 0.2    Alkaline Phosphatase 74 94    AST (SGOT) 19 27    ALT (SGPT) 21 29    (A) Abnormal value            CBC    CBC 3/29/22 7/8/22 8/26/22   WBC 7.46 7.52 8.02   RBC 4.29 4.05 4.08   Hemoglobin 12.4 11.9 (A) 11.8 (A)   Hematocrit 38.4 34.7 35.2   MCV 89.5 85.7 86.3   MCH 28.9 29.4 28.9   MCHC 32.3 34.3 33.5   RDW 12.6 12.4 12.8   Platelets 311 319 286   (A) Abnormal value            Lipid Panel    Lipid Panel 3/29/22 5/16/22 7/8/22   Total Cholesterol 164  189   Triglycerides 177 (A) 177 (A) 172 (A)   HDL Cholesterol 46  44   VLDL Cholesterol 30  30   LDL Cholesterol  88  115 (A)   LDL/HDL Ratio 1.80  2.51   (A) Abnormal value                      Assessment and Plan   Diagnoses and all orders for this visit:    1. Benign essential hypertension (Primary)    2. Hypercholesteremia    3. Gastroesophageal reflux disease with esophagitis without hemorrhage    4. Rheumatoid arthritis without rheumatoid factor, multiple sites (HCC)    5. Stage 3 chronic kidney disease, unspecified whether stage 3a or 3b CKD ([facility])    6. Depressive disorder    7. Fibromyalgia  -     gabapentin (NEURONTIN) 300 MG capsule; Take 1 capsule by mouth 2 (Two) Times a Day.  Dispense: 180 capsule; Refill: 1    8. Class 2 severe obesity due to excess calories with serious comorbidity and body mass index (BMI) of 38.0 to 38.9 in adult ([facility])    9. High risk medication use  -     Urine Drug Screen - Urine, Clean Catch; Future    1.  Benign essential  hypertension:  Normotensive  Continue metoprolol tartrate  Target systolic BP to remain below 130  Continue cardiology follow-up with Dr. Reyes as scheduled    2.  Hypercholesterolemia:  Continue low-fat diet  Continue weight loss regimen which will help to improve cardiovascular health    3.  Gastroesophageal reflux disease with esophagitis without hemorrhage:  Continue Prilosec as previously prescribed    4.  Rheumatoid arthritis without rheumatoid factor, multiple sites:  May use Tylenol arthritis OTC according to package directions    5.  Stage III chronic kidney disease, unspecified whether stage IIIa or IIIb CKD:  Avoid nephrotoxic drugs such as NSAIDs  Continue Lasix as previously prescribed  Continue nephrology follow-up with Dr. Will as scheduled  Continue gabapentin as previously prescribed and refill prescription sent to pharmacy    6.  Depressive disorder:  Controlled    7.  Fibromyalgia:  Continue gabapentin as previously prescribed and new Rx sent to pharmacy    8.  Class II severe obesity due to excess calories with serious comorbidity and a body mass index of 38.0-38.9 in adult:  Class 2 Severe Obesity (BMI >=35 and <=39.9). Obesity-related health conditions include the following: hypertension, dyslipidemias and GERD. Obesity is improving with lifestyle modifications. BMI is is above average; BMI management plan is completed. We discussed portion control and increasing exercise.  Body mass index is 38.79 kg/m².    9.  High risk medication use:  Complete UDS  PDMP reviewed         Follow Up   Return in about 6 months (around 4/7/2023).  Patient was given instructions and counseling regarding her condition or for health maintenance advice. Please see specific information pulled into the AVS if appropriate. \        This document has been electronically signed by MAYELA Flores on October 24, 2022 07:06 CDT

## 2022-10-08 LAB — ANA SER QL: NEGATIVE

## 2022-10-11 ENCOUNTER — TRANSCRIBE ORDERS (OUTPATIENT)
Dept: LAB | Facility: HOSPITAL | Age: 62
End: 2022-10-11

## 2022-10-11 DIAGNOSIS — N18.30 STAGE 3 CHRONIC KIDNEY DISEASE, UNSPECIFIED WHETHER STAGE 3A OR 3B CKD: Primary | ICD-10-CM

## 2022-10-17 DIAGNOSIS — E66.01 MORBID (SEVERE) OBESITY DUE TO EXCESS CALORIES: Primary | ICD-10-CM

## 2022-11-01 ENCOUNTER — OFFICE VISIT (OUTPATIENT)
Dept: FAMILY MEDICINE CLINIC | Facility: CLINIC | Age: 62
End: 2022-11-01

## 2022-11-01 VITALS
OXYGEN SATURATION: 96 % | DIASTOLIC BLOOD PRESSURE: 76 MMHG | HEART RATE: 95 BPM | HEIGHT: 63 IN | BODY MASS INDEX: 39.05 KG/M2 | SYSTOLIC BLOOD PRESSURE: 128 MMHG | WEIGHT: 220.4 LBS

## 2022-11-01 DIAGNOSIS — E66.01 MORBID (SEVERE) OBESITY DUE TO EXCESS CALORIES: Primary | ICD-10-CM

## 2022-11-01 PROCEDURE — 99213 OFFICE O/P EST LOW 20 MIN: CPT | Performed by: NURSE PRACTITIONER

## 2022-11-01 NOTE — PROGRESS NOTES
"Chief Complaint  Weight Loss    Subjective  Here today to discuss weight loss treatments. At most recent appointment had discussed bariatric surgery. Has been to see Dr. Real reports they will accept three weigh ins from jan last year. \"I've already been to see the cardiologist and gastroenterologist, I just need you to fax my weigh ins and give me a referral to see pulmonology.\"      Lashaun Bernal presents to [facility] [place]  Obesity  This is a chronic problem. The current episode started more than 1 year ago. The problem has been unchanged. Associated symptoms include arthralgias and fatigue. The symptoms are aggravated by eating and drinking. Treatments tried: Low calorie diet, Saxenda, Low carb diet, phentermine. The treatment provided mild relief.       Current Outpatient Medications on File Prior to Visit   Medication Sig Dispense Refill   • albuterol (PROVENTIL) (2.5 MG/3ML) 0.083% nebulizer solution Take 2.5 mg by nebulization Every 6 (Six) Hours As Needed for Wheezing. 120 vial 2   • albuterol sulfate  (90 Base) MCG/ACT inhaler Inhale 2 puffs Every 4 (Four) Hours As Needed for Wheezing. 18 g 2   • aspirin 81 MG EC tablet Take 81 mg by mouth Daily.     • clonazePAM (KlonoPIN) 1 MG tablet      • clopidogrel (PLAVIX) 75 MG tablet Take 75 mg by mouth Daily.     • cyclobenzaprine (FLEXERIL) 10 MG tablet TAKE 1 TABLET 3 TIMES A DAY AS NEEDED FOR MUSCLE SPASMS. 270 tablet 1   • dilTIAZem (CARDIZEM) 30 MG tablet Take 30 mg by mouth 2 (Two) Times a Day.     • estradiol (ESTRACE) 1 MG tablet Take 1 tablet by mouth Daily. 90 tablet 2   • ferrous sulfate 325 (65 FE) MG EC tablet Take 1 tablet by mouth Daily With Breakfast. 90 tablet 3   • furosemide (LASIX) 40 MG tablet Take 40 mg by mouth As Needed. Takes as needed     • gabapentin (NEURONTIN) 300 MG capsule Take 1 capsule by mouth 2 (Two) Times a Day. 180 capsule 1   • magnesium oxide (MAGOX) 400 (241.3 " "Mg) MG tablet tablet Take 400 mg by mouth 2 (Two) Times a Day.     • metoprolol tartrate (LOPRESSOR) 25 MG tablet Take 25 mg by mouth Daily.     • omeprazole (priLOSEC) 20 MG capsule Take 20 mg by mouth 2 (two) times a day.     • spironolactone (ALDACTONE) 25 MG tablet        No current facility-administered medications on file prior to visit.     Allergies   Allergen Reactions   • Cefprozil Nausea And Vomiting and Rash   • Penicillins Rash       Objective   Vital Signs:  /76   Pulse 95   Ht 160 cm (63\")   Wt 100 kg (220 lb 6.4 oz)   SpO2 96%   BMI 39.04 kg/m²   Estimated body mass index is 39.04 kg/m² as calculated from the following:    Height as of this encounter: 160 cm (63\").    Weight as of this encounter: 100 kg (220 lb 6.4 oz).      Physical Exam  Vitals and nursing note reviewed.   Constitutional:       Appearance: She is well-developed.   HENT:      Head: Normocephalic.   Cardiovascular:      Rate and Rhythm: Normal rate and regular rhythm.      Heart sounds: Normal heart sounds.   Pulmonary:      Effort: Pulmonary effort is normal.      Breath sounds: Normal breath sounds.   Musculoskeletal:         General: Normal range of motion.      Cervical back: Normal range of motion and neck supple.   Skin:     General: Skin is warm.      Capillary Refill: Capillary refill takes less than 2 seconds.   Neurological:      Mental Status: She is alert and oriented to person, place, and time.   Psychiatric:         Behavior: Behavior normal.        Result Review :                Assessment and Plan   Diagnoses and all orders for this visit:    1. Morbid (severe) obesity due to excess calories (HCC) (Primary)  -     Ambulatory Referral to Pulmonology      1. Morbid obesity due to excess calories:  Referral sent to pulmonology for clearance for bariatric surgery  Letter of recommendation and patients weigh ins faxed to bariatric provider  Class 2 Severe Obesity (BMI >=35 and <=39.9). Obesity-related health " conditions include the following: hypertension, GERD and osteoarthritis. Obesity is unchanged. BMI is is above average; BMI management plan is completed. We discussed portion control, increasing exercise and consulting a Bariatric surgeon.  Body mass index is 39.04 kg/m².         Follow Up   Return if symptoms worsen or fail to improve, for Next scheduled follow up.  Patient was given instructions and counseling regarding her condition or for health maintenance advice. Please see specific information pulled into the AVS if appropriate.         This document has been electronically signed by MAYELA Flores on November 1, 2022 13:08 CDT

## 2022-11-02 DIAGNOSIS — Z01.818 PRE-OP TESTING: Primary | ICD-10-CM

## 2022-11-03 DIAGNOSIS — Z71.89 ENCOUNTER FOR PSYCHOLOGICAL ASSESSMENT PRIOR TO BARIATRIC SURGERY: Primary | ICD-10-CM

## 2022-11-07 ENCOUNTER — PROCEDURE VISIT (OUTPATIENT)
Dept: PULMONOLOGY | Facility: CLINIC | Age: 62
End: 2022-11-07

## 2022-11-07 ENCOUNTER — OFFICE VISIT (OUTPATIENT)
Dept: PULMONOLOGY | Facility: CLINIC | Age: 62
End: 2022-11-07

## 2022-11-07 VITALS
OXYGEN SATURATION: 97 % | HEART RATE: 79 BPM | WEIGHT: 220 LBS | SYSTOLIC BLOOD PRESSURE: 146 MMHG | BODY MASS INDEX: 38.98 KG/M2 | HEIGHT: 63 IN | DIASTOLIC BLOOD PRESSURE: 72 MMHG

## 2022-11-07 DIAGNOSIS — J45.20 MILD INTERMITTENT ASTHMA WITHOUT COMPLICATION: Chronic | ICD-10-CM

## 2022-11-07 DIAGNOSIS — E66.01 CLASS 2 SEVERE OBESITY DUE TO EXCESS CALORIES WITH SERIOUS COMORBIDITY AND BODY MASS INDEX (BMI) OF 38.0 TO 38.9 IN ADULT: ICD-10-CM

## 2022-11-07 DIAGNOSIS — Z01.818 PRE-OP TESTING: ICD-10-CM

## 2022-11-07 DIAGNOSIS — Z01.811 PREOPERATIVE RESPIRATORY EXAMINATION: Primary | ICD-10-CM

## 2022-11-07 PROBLEM — E66.09 CLASS 2 OBESITY DUE TO EXCESS CALORIES WITH BODY MASS INDEX (BMI) OF 38.0 TO 38.9 IN ADULT: Status: ACTIVE | Noted: 2018-10-22

## 2022-11-07 PROCEDURE — 94010 BREATHING CAPACITY TEST: CPT | Performed by: INTERNAL MEDICINE

## 2022-11-07 PROCEDURE — 99214 OFFICE O/P EST MOD 30 MIN: CPT | Performed by: NURSE PRACTITIONER

## 2022-11-07 RX ORDER — TIRZEPATIDE 5 MG/.5ML
0.5 INJECTION, SOLUTION SUBCUTANEOUS WEEKLY
Qty: 0.5 ML | Refills: 3 | Status: SHIPPED | OUTPATIENT
Start: 2022-11-07 | End: 2022-12-07 | Stop reason: DRUGHIGH

## 2022-11-07 NOTE — PROGRESS NOTES
BASIC SPIROMETRY ONLY FOR PRE-OPERATIVE PURPOSES.     GOOD PATIENT EFFORT AND COOPERATION.     6 SECOND EXHALATION ON SPIROMETRY.     ORDERED BY MAYELA SIMS; READ BY DR. RACHID BELTRAN.

## 2022-11-07 NOTE — PROGRESS NOTES
Pulmonary Office Visit    Gretchen Gauthier, APRN    Thank you for asking me to see Lashaun Bernal for   Chief Complaint   Patient presents with   • Pre-op Exam     Gastric surgery,  Dr. Real in [place]       History of Present Illness    Ms. Bernal is a 61 year old patient who presents to [facility] for pre-op evaluation prior to gastric sleeve with Dr. Real in [place].   She does not have COPD. She is a non smoker.   She does not have complaints. No ER visits or hospitalizations.     Albuterol PRN, uses once or twice a year if she has URI. She does not have seasonal allergies.   She carries an asthmatic bronchitis since she was young. She keeps the prescription active for the inhalers.   No asthma in her family. No lung cancer. No COPD.     She has been more winded since COVID. She has used labuterol without change. Likely due to deconditioning and weight gain.     We discussed non-surgical options and prior medication use as she expressed concern and hesitancy over the surgery. I reviewed her medical history and labs. She has hyperglycemia and associated hypertriglyceridemia, sleep apnea, HTN, and fatty liver. She would be an excellent candidate for GLP-1.   Upon discussion, she was agreeable to try and injectable medication for her pre-diabetes, co-morbidities, and obesity.        Tobacco use history:  Social History     Socioeconomic History   • Marital status:    Tobacco Use   • Smoking status: Never   • Smokeless tobacco: Never   Vaping Use   • Vaping Use: Never used   Substance and Sexual Activity   • Alcohol use: No   • Drug use: No   • Sexual activity: Defer       Review of Systems: History obtained from chart review and the patient.  Review of Systems   Constitutional: Negative for diaphoresis, fatigue, fever and unexpected weight change.   Respiratory: Negative for cough, chest tightness, shortness of breath and wheezing.    Cardiovascular: Negative for chest pain, palpitations  and leg swelling.   Gastrointestinal: Negative for abdominal distention and blood in stool.   Genitourinary: Negative for hematuria.   Musculoskeletal: Negative for arthralgias and myalgias.   Skin: Negative for pallor and rash.   Neurological: Negative for dizziness, syncope and weakness.   Hematological: Does not bruise/bleed easily.   Psychiatric/Behavioral: Negative for confusion. The patient is not nervous/anxious.        As described in the HPI. Otherwise, remainder of ROS (14 systems) were negative.    Patient Active Problem List   Diagnosis   • Calcaneal spur of left foot   • Chloe's deformity of left heel   • Left hip pain   • Right hamstring muscle strain   • Benign essential hypertension   • Depressive disorder   • Postmenopausal HRT (hormone replacement therapy)   • Asthma   • History of abnormal mammogram   • Polyarticular arthritis   • Class 2 obesity due to excess calories with body mass index (BMI) of 38.0 to 38.9 in adult   • GERD (gastroesophageal reflux disease)   • Right wrist pain   • Right hand pain   • Left hand pain   • De Quervain's disease (radial styloid tenosynovitis)   • Bilateral carotid artery stenosis   • Tremor   • Fibromyalgia   • Idiopathic peripheral neuropathy   • Urinary tract infection in female   • Chest pain   • History of colon polyps   • Left knee pain   • Primary osteoarthritis of left knee   • Rheumatoid arthritis without rheumatoid factor, multiple sites (HCC)   • Stage 3 chronic kidney disease, unspecified whether stage 3a or 3b CKD (HCC)   • COVID-19   • Preoperative respiratory examination         Current Outpatient Medications:   •  albuterol (PROVENTIL) (2.5 MG/3ML) 0.083% nebulizer solution, Take 2.5 mg by nebulization Every 6 (Six) Hours As Needed for Wheezing., Disp: 120 vial, Rfl: 2  •  albuterol sulfate  (90 Base) MCG/ACT inhaler, Inhale 2 puffs Every 4 (Four) Hours As Needed for Wheezing., Disp: 18 g, Rfl: 2  •  aspirin 81 MG EC tablet, Take 81 mg by  mouth Daily., Disp: , Rfl:   •  clonazePAM (KlonoPIN) 1 MG tablet, , Disp: , Rfl:   •  clopidogrel (PLAVIX) 75 MG tablet, Take 75 mg by mouth Daily., Disp: , Rfl:   •  cyclobenzaprine (FLEXERIL) 10 MG tablet, TAKE 1 TABLET 3 TIMES A DAY AS NEEDED FOR MUSCLE SPASMS., Disp: 270 tablet, Rfl: 1  •  dilTIAZem (CARDIZEM) 30 MG tablet, Take 30 mg by mouth 2 (Two) Times a Day., Disp: , Rfl:   •  estradiol (ESTRACE) 1 MG tablet, Take 1 tablet by mouth Daily., Disp: 90 tablet, Rfl: 2  •  ferrous sulfate 325 (65 FE) MG EC tablet, Take 1 tablet by mouth Daily With Breakfast., Disp: 90 tablet, Rfl: 3  •  furosemide (LASIX) 40 MG tablet, Take 40 mg by mouth As Needed. Takes as needed, Disp: , Rfl:   •  gabapentin (NEURONTIN) 300 MG capsule, Take 1 capsule by mouth 2 (Two) Times a Day., Disp: 180 capsule, Rfl: 1  •  magnesium oxide (MAGOX) 400 (241.3 Mg) MG tablet tablet, Take 400 mg by mouth 2 (Two) Times a Day., Disp: , Rfl:   •  metoprolol tartrate (LOPRESSOR) 25 MG tablet, Take 25 mg by mouth Daily., Disp: , Rfl:   •  omeprazole (priLOSEC) 20 MG capsule, Take 20 mg by mouth 2 (two) times a day., Disp: , Rfl:   •  spironolactone (ALDACTONE) 25 MG tablet, , Disp: , Rfl:   •  Tirzepatide (Mounjaro) 5 MG/0.5ML solution pen-injector, Inject 0.5 mL under the skin into the appropriate area as directed 1 (One) Time Per Week., Disp: 0.5 mL, Rfl: 3    Allergies   Allergen Reactions   • Cefprozil Nausea And Vomiting and Rash   • Penicillins Rash       Past Medical History:   Diagnosis Date   • Allergic rhinitis    • Arthritis    • Asthma    • Breast lump    • C. difficile diarrhea 2013   • Carotid artery disease (HCC)    • Chest pain    • Chronic low back pain    • Constipation    • Depressive disorder    • Diarrhea    • Diverticular disease of colon    • Epigastric pain    • DIETER (generalized anxiety disorder)    • GERD (gastroesophageal reflux disease)    • Head ache    • Hematochezia    • Herpes zoster     mid back, near spine   •  "Hypertension    • Periumbilical pain    • PONV (postoperative nausea and vomiting)    • Maurice Mountain spotted fever     pt states \"currently has it\"   • Sleep apnea     using c-pap   • SVT (supraventricular tachycardia) (HCC)    • Tachycardia 12/012019    spent 4 days in [facility] in January for this.      Past Surgical History:   Procedure Laterality Date   • ABDOMINOPLASTY  12/22/2004   • AUGMENTATION MAMMAPLASTY     • BREAST AUGMENTATION BILATERAL MASTOPEXY     • BUNIONECTOMY Left 3/20/2017    Procedure: EXCISION OF CALCANEAL SPURS LEFT FOOT AND REATTACHMENT OF ACHILLES TENDON ;  Surgeon: Jarrell Mar MD;  Location: [facility] OR;  Service:    • COLONOSCOPY  01/23/2015   • COLONOSCOPY N/A 2/24/2020    Procedure: COLONOSCOPY;  Surgeon: Santhosh Patrick MD;  Location: [facility] ENDOSCOPY;  Service: Gastroenterology;  Laterality: N/A;   • CYST REMOVAL  08/18/1961    Excision of sebaceous cyst. Left ear   • FINGER/THUMB LESION/CYST EXCISION  10/29/2014   • HAND SURGERY  06/03/2013   • HYSTERECTOMY     • OOPHORECTOMY     • ORIF ULNA/RADIUS FRACTURES  07/22/2013   • TUBAL ABDOMINAL LIGATION  08/14/1985    Laparoscopic tubal sterilization; dilatation and curettage. Desires tubal ligation;       Social History     Socioeconomic History   • Marital status:    Tobacco Use   • Smoking status: Never   • Smokeless tobacco: Never   Vaping Use   • Vaping Use: Never used   Substance and Sexual Activity   • Alcohol use: No   • Drug use: No   • Sexual activity: Defer     Family History   Problem Relation Age of Onset   • Diabetes Other    • Heart disease Other    • Hypertension Other    • Heart disease Mother         Myocardial infarct   • Hypertension Mother    • Diabetes Mother    • Heart disease Father         CHF       Advance Care Planning   ACP discussion was declined by the patient. Patient does not have an advance directive, declines further assistance.        Objective     /72   Pulse 79   Ht 160 cm " "(63\")   Wt 99.8 kg (220 lb)   LMP 03/09/1991 (Approximate)   SpO2 97%   BMI 38.97 kg/m²       Class 2 Severe Obesity (BMI >=35 and <=39.9). Obesity-related health conditions include the following: obstructive sleep apnea and hypertension. Obesity is worsening. BMI is is above average; BMI management plan is completed. We discussed portion control and increasing exercise. She is going to have bariatric surgery.     Lab Results   Component Value Date    HGBA1C 6.20 (H) 07/08/2022     Lab Results   Component Value Date    GLUCOSE 119 (H) 10/07/2022    BUN 20 10/07/2022    CREATININE 1.04 (H) 10/07/2022    EGFRIFNONA 47 (L) 12/21/2021    BCR 19.2 10/07/2022    K 4.6 10/07/2022    CO2 20.7 (L) 10/07/2022    CALCIUM 9.6 10/07/2022    PROTENTOTREF 6.5 05/01/2019    ALBUMIN 3.80 10/07/2022    LABIL2 1.0 05/01/2019    AST 27 08/26/2022    ALT 29 08/26/2022         Physical Exam  Vitals and nursing note reviewed.   Constitutional:       General: She is not in acute distress.     Appearance: She is well-developed. She is not diaphoretic.   HENT:      Head: Normocephalic.   Eyes:      Conjunctiva/sclera: Conjunctivae normal.   Neck:      Vascular: No JVD.   Cardiovascular:      Rate and Rhythm: Normal rate and regular rhythm.      Heart sounds: Normal heart sounds, S1 normal and S2 normal. No murmur heard.    No friction rub. No gallop.   Pulmonary:      Effort: Pulmonary effort is normal. No respiratory distress.      Breath sounds: Normal breath sounds. No wheezing or rales.   Abdominal:      General: Bowel sounds are normal. There is no distension.      Palpations: Abdomen is soft.   Musculoskeletal:         General: Normal range of motion.   Skin:     General: Skin is warm and dry.      Findings: No erythema.   Neurological:      Mental Status: She is alert and oriented to person, place, and time.   Psychiatric:         Behavior: Behavior normal.         Thought Content: Thought content normal.         Judgment: " "Judgment normal.         PFTs    I personally reviewed Complete pulmonary function tests, including spirometry with and without bronchodilator, measurement of lung volume, and diffusing capacity..            PFTs: Done on: 11/7/2022 (independently reviewed by myself, interpreted by physician)  Ratio 79    FEV1 103    Normal.         Radiology (independently reviewed by me, interpreted by physcian)    No radiology results for the last 30 days.  PROCEDURE: XR CHEST 2 VW     VIEWS: PA/Lat     INDICATION: Shortness of breath     COMPARISON: CXR: To 722     FINDINGS:        - lines/tubes: none    - cardiac: size within normal limits.    - mediastinum: contour within normal limits.     - lungs: no evidence of a focal air space process, pulmonary  interstitial edema, nodule(s)/mass.     - pleura: no evidence of  fluid.      - osseous: unremarkable for age.      IMPRESSION:  No acute cardiopulmonary process identified.       Assessment       Discussion/ Recommendations:      Diagnosis Plan   1. Preoperative respiratory examination  Pre-Op Pulmonology Evaluation Assessment  61 y.o. female with planned surgery: gastric sleeve  Known risk factors for perioperative complications: None.      This is not a thoracic surgery so risk assessment is not able to be calculated.     Pre-Op Evaluation Plan  1. Preoperative workup as follows: PFTs   2. Change in medication regimen before surgery: Continue inhalers as prescribed.   3. Non Smoker  4. Aggressive pulmonary toliet post-op with IS as prescribed.    Pulmonology evaluation findings are provisional and no guarantee of outcome. [facility] does not provide \"surgical clearance,\" but only provides a risk assessment and management options related to lung function and diagnosis. The final decision for operative intervention is at the discretion of the operating physician. There is no official risk assessment in non-thoracic surgery.     This patient is at average risk for " perioperative complications.     Normal PFTs.         2. Class 2 severe obesity due to excess calories with serious comorbidity and body mass index (BMI) of 38.0 to 38.9 in adult (HCC)  She has not tried or failed alternative medications for weight loss. Her insurance may not offer Saxenda/Wegovy. However, she has co-morbidities like fatty liver, sleep apnea, hypertriglyceridemia, and HTN associated with her severe obesity. She is at risk for CAD.     She is hesitant to proceed with surgical option due to her age.     Her last A1c was 6.2 with some evidence of hyperglycemia >100.     I will start her on Mounjaro 5mg SQ weekly.   I do not have the sample dose of 2.5mg to offer her, so I asked her to let me know if the side effects were too strong with 5mg.   We reviewed the instructions on how to use online together in the exam room. She felt confident in her ability to follow written instructions.   We discussed s/e including nausea, dehydration, and constipation. She will stay hydrated as she has CKD III.     The prescription is $47 at [facility] pharmacy.     I will follow her back in 1 month to check her progress and note any side effects.         3. Mild intermittent asthma without complication  Stable, uncomplicated.   Albuterol PRN is appropriate. Normal PFTs and no complaints.             Follow up: 1 month.     I spent 35 minutes caring for Lashaun on this date of service. This time includes time spent by me in the following activities: preparing for the visit, reviewing tests, obtaining and/or reviewing a separately obtained history, performing a medically appropriate examination and/or evaluation, counseling and educating the patient/family/caregiver, ordering medications, tests, or procedures, referring and communicating with other health care professionals, documenting information in the medical record, independently interpreting results and communicating that information with the patient/family/caregiver and  care coordination            This document has been electronically signed by MAYELA Beltran on November 7, 2022 11:06 CST

## 2022-11-11 ENCOUNTER — PATIENT ROUNDING (BHMG ONLY) (OUTPATIENT)
Dept: CARDIOLOGY | Facility: CLINIC | Age: 62
End: 2022-11-11

## 2022-11-11 ENCOUNTER — TELEPHONE (OUTPATIENT)
Dept: FAMILY MEDICINE CLINIC | Facility: CLINIC | Age: 62
End: 2022-11-11

## 2022-11-11 NOTE — PROGRESS NOTES
November 11, 2022    Hello, may I speak with Lashaun Bernal?    My name is MCKINLEY Curran      I am  with [facility]ARDI[facility]  [address] [place] [address] [phone].    Before we get started may I verify your date of birth? 1960    I am calling to officially welcome you to our practice and ask about your recent visit. Is this a good time to talk? yes    Tell me about your visit with us. What things went well?  everything went well       We're always looking for ways to make our patients' experiences even better. Do you have recommendations on ways we may improve?  no    Overall were you satisfied with your first visit to our practice? yes       I appreciate you taking the time to speak with me today. Is there anything else I can do for you? no      Thank you, and have a great day.

## 2022-11-14 ENCOUNTER — LAB (OUTPATIENT)
Dept: LAB | Facility: HOSPITAL | Age: 62
End: 2022-11-14

## 2022-11-14 DIAGNOSIS — R74.8 ELEVATED LIVER ENZYMES: ICD-10-CM

## 2022-11-14 DIAGNOSIS — K76.0 FATTY LIVER: ICD-10-CM

## 2022-11-14 LAB
ALBUMIN SERPL-MCNC: 4 G/DL (ref 3.5–5.2)
ALBUMIN/GLOB SERPL: 1.5 G/DL
ALP SERPL-CCNC: 91 U/L (ref 39–117)
ALT SERPL W P-5'-P-CCNC: 61 U/L (ref 1–33)
ANION GAP SERPL CALCULATED.3IONS-SCNC: 12 MMOL/L (ref 5–15)
AST SERPL-CCNC: 59 U/L (ref 1–32)
BILIRUB SERPL-MCNC: 0.3 MG/DL (ref 0–1.2)
BUN SERPL-MCNC: 13 MG/DL (ref 8–23)
BUN/CREAT SERPL: 12.4 (ref 7–25)
CALCIUM SPEC-SCNC: 9.3 MG/DL (ref 8.6–10.5)
CHLORIDE SERPL-SCNC: 105 MMOL/L (ref 98–107)
CO2 SERPL-SCNC: 20 MMOL/L (ref 22–29)
CREAT SERPL-MCNC: 1.05 MG/DL (ref 0.57–1)
EGFRCR SERPLBLD CKD-EPI 2021: 60.6 ML/MIN/1.73
GLOBULIN UR ELPH-MCNC: 2.6 GM/DL
GLUCOSE SERPL-MCNC: 105 MG/DL (ref 65–99)
INR PPP: 0.98 (ref 0.8–1.2)
POTASSIUM SERPL-SCNC: 4.3 MMOL/L (ref 3.5–5.2)
PROT SERPL-MCNC: 6.6 G/DL (ref 6–8.5)
PROTHROMBIN TIME: 12.9 SECONDS (ref 11.1–15.3)
SODIUM SERPL-SCNC: 137 MMOL/L (ref 136–145)

## 2022-11-14 PROCEDURE — 85610 PROTHROMBIN TIME: CPT

## 2022-11-14 PROCEDURE — 36415 COLL VENOUS BLD VENIPUNCTURE: CPT

## 2022-11-14 PROCEDURE — 80053 COMPREHEN METABOLIC PANEL: CPT

## 2022-11-14 NOTE — TELEPHONE ENCOUNTER
I spoke with patient and she stated she the pulmonology prescribed her mounjaro so patient stated she wants to try that first before she goes through the whole process to have the bariatric surgery.

## 2022-11-21 ENCOUNTER — OFFICE VISIT (OUTPATIENT)
Dept: GASTROENTEROLOGY | Facility: CLINIC | Age: 62
End: 2022-11-21

## 2022-11-21 VITALS
HEART RATE: 91 BPM | OXYGEN SATURATION: 95 % | BODY MASS INDEX: 36.71 KG/M2 | DIASTOLIC BLOOD PRESSURE: 64 MMHG | WEIGHT: 207.2 LBS | HEIGHT: 63 IN | SYSTOLIC BLOOD PRESSURE: 112 MMHG

## 2022-11-21 DIAGNOSIS — K76.0 FATTY LIVER: ICD-10-CM

## 2022-11-21 DIAGNOSIS — K57.90 DIVERTICULOSIS: ICD-10-CM

## 2022-11-21 DIAGNOSIS — K21.00 GASTROESOPHAGEAL REFLUX DISEASE WITH ESOPHAGITIS WITHOUT HEMORRHAGE: Primary | ICD-10-CM

## 2022-11-21 DIAGNOSIS — R10.11 RIGHT UPPER QUADRANT ABDOMINAL PAIN: ICD-10-CM

## 2022-11-21 DIAGNOSIS — R74.8 ELEVATED LIVER ENZYMES: ICD-10-CM

## 2022-11-21 PROCEDURE — 99214 OFFICE O/P EST MOD 30 MIN: CPT | Performed by: PHYSICIAN ASSISTANT

## 2022-11-21 NOTE — PROGRESS NOTES
Chief Complaint   Patient presents with   • Heartburn   • Elevated Hepatic Enzymes   • fatty liver   • Follow-up     6 Month       ENDO PROCEDURE ORDERED:    Subjective    Lashaun Bernal is a 61 y.o. female. she is here today for follow-up.    History of Present Illness    Patient seen on recheck of her GERD, fatty liver, hepatic fibrosis with elevated liver enzymes, F0/S1-S2/N1.  Last seen 05/19/2022.  Patient had laboratories on 11/14/2022.  CMP showed glucose 105, creatinine 1.05, CO2 of 20.  AST was 59, ALT 61, otherwise normal.  Normal INR.  Her last LFTs have been running normal.  She has had some right upper quadrant discomfort recently.  Previous ultrasound several years ago did show gallstones.  She is on Prilosec 20 mg daily for chronic heartburn.  Denied nausea, vomiting, dysphagia.  Bowels are moving without blood or mucus.  Weight is down 13.4 pounds since last visit, but she did start Mounjaro and has had her third monthly injection.  Last colonoscopy with family history 02/24/2020.    ASSESSMENT AND PLAN:  Patient with recent weight loss, presumed secondary to the Mounjaro.  Encouraged to continue dietary modification and weight loss.  I am concerned about her slight increase in liver enzymes.  Given her increased abdominal discomfort, known gallstones, I recommended repeating right upper quadrant ultrasound.  May need to repeat laboratories as well.  If this does show that her gallbladder is more actively inflamed, would recommend surgical consultation.  If she is improved, will plan followup at 6 months with [facility], INR prior, sooner as needed, further pending clinical course and results of the above.      The following portions of the patient's history were reviewed and updated as appropriate:   Past Medical History:   Diagnosis Date   • Allergic rhinitis    • Arthritis    • Asthma    • Breast lump    • C. difficile diarrhea 2013   • Carotid artery disease (HCC)    • Chest pain    •  "Chronic low back pain    • Constipation    • Depressive disorder    • Diarrhea    • Diverticular disease of colon    • Epigastric pain    • DIETER (generalized anxiety disorder)    • GERD (gastroesophageal reflux disease)    • Head ache    • Hematochezia    • Herpes zoster     mid back, near spine   • Hypertension    • Periumbilical pain    • PONV (postoperative nausea and vomiting)    • Maurice Mountain spotted fever     pt states \"currently has it\"   • Sleep apnea     using c-pap   • SVT (supraventricular tachycardia) ([facility])    • Tachycardia     spent 4 days in [facility] in January for this.      Past Surgical History:   Procedure Laterality Date   • ABDOMINOPLASTY  2004   • AUGMENTATION MAMMAPLASTY     • BREAST AUGMENTATION BILATERAL MASTOPEXY     • BUNIONECTOMY Left 3/20/2017    Procedure: EXCISION OF CALCANEAL SPURS LEFT FOOT AND REATTACHMENT OF ACHILLES TENDON ;  Surgeon: Jarrell Mar MD;  Location: [facility] OR;  Service:    • COLONOSCOPY  2015   • COLONOSCOPY N/A 2020    Procedure: COLONOSCOPY;  Surgeon: Santhosh Patrick MD;  Location: [facility] ENDOSCOPY;  Service: Gastroenterology;  Laterality: N/A;   • CYST REMOVAL  1961    Excision of sebaceous cyst. Left ear   • FINGER/THUMB LESION/CYST EXCISION  10/29/2014   • HAND SURGERY  2013   • HYSTERECTOMY     • OOPHORECTOMY     • ORIF ULNA/RADIUS FRACTURES  2013   • TUBAL ABDOMINAL LIGATION  1985    Laparoscopic tubal sterilization; dilatation and curettage. Desires tubal ligation;       Family History   Problem Relation Age of Onset   • Diabetes Other    • Heart disease Other    • Hypertension Other    • Heart disease Mother         Myocardial infarct   • Hypertension Mother    • Diabetes Mother    • Heart disease Father         CHF     OB History        2    Para   2    Term   2            AB        Living           SAB        IAB        Ectopic        Molar        Multiple        Live Births        "           Allergies   Allergen Reactions   • Cefprozil Nausea And Vomiting and Rash   • Penicillins Rash     Social History     Socioeconomic History   • Marital status:    Tobacco Use   • Smoking status: Never   • Smokeless tobacco: Never   Vaping Use   • Vaping Use: Never used   Substance and Sexual Activity   • Alcohol use: No   • Drug use: No   • Sexual activity: Defer     Current Medications:  Prior to Admission medications    Medication Sig Start Date End Date Taking? Authorizing Provider   albuterol (PROVENTIL) (2.5 MG/3ML) 0.083% nebulizer solution Take 2.5 mg by nebulization Every 6 (Six) Hours As Needed for Wheezing. 11/27/18  Yes Gretchen Gauthier APRN   albuterol sulfate  (90 Base) MCG/ACT inhaler Inhale 2 puffs Every 4 (Four) Hours As Needed for Wheezing. 7/7/22  Yes Gretchen Gauthier APRN   aspirin 81 MG EC tablet Take 81 mg by mouth Daily.   Yes Maryam Mustafa MD   clonazePAM (KlonoPIN) 1 MG tablet  8/2/22  Yes Maryam Mustafa MD   clopidogrel (PLAVIX) 75 MG tablet Take 75 mg by mouth Daily.   Yes ProviderMaryam MD   cyclobenzaprine (FLEXERIL) 10 MG tablet TAKE 1 TABLET 3 TIMES A DAY AS NEEDED FOR MUSCLE SPASMS. 4/21/22  Yes Gretchen Gauthier APRN   dilTIAZem (CARDIZEM) 30 MG tablet Take 30 mg by mouth 2 (Two) Times a Day.   Yes ProviderMaryam MD   estradiol (ESTRACE) 1 MG tablet Take 1 tablet by mouth Daily. 7/7/22  Yes Gretchen Gauthier APRN   furosemide (LASIX) 40 MG tablet Take 40 mg by mouth As Needed. Takes as needed 10/23/20  Yes Maryam Mustafa MD   gabapentin (NEURONTIN) 300 MG capsule Take 1 capsule by mouth 2 (Two) Times a Day. 10/7/22  Yes Gretchen Gauthier APRN   magnesium oxide (MAGOX) 400 (241.3 Mg) MG tablet tablet Take 400 mg by mouth 2 (Two) Times a Day. 5/13/20  Yes Maryam Mustafa MD   metoprolol tartrate (LOPRESSOR) 25 MG tablet Take 25 mg by mouth Daily.   Yes Maryam Mustafa MD   omeprazole (priLOSEC) 20 MG capsule Take 20 mg by mouth  "2 (two) times a day.   Yes Provider, MD Maryam   spironolactone (ALDACTONE) 25 MG tablet Take 25 mg by mouth Daily. 0.5 Mg Bid 9/27/22  Yes Provider, MD Maryam   Tirzepatide (Mounjaro) 5 MG/0.5ML solution pen-injector Inject 0.5 mL under the skin into the appropriate area as directed 1 (One) Time Per Week. 11/7/22  Yes Susan Loco APRN   ferrous sulfate 325 (65 FE) MG EC tablet Take 1 tablet by mouth Daily With Breakfast. 7/7/22   Gretchen Gauthier APRN     Review of Systems  Review of Systems       Objective    /64 (BP Location: Left arm, Patient Position: Sitting, Cuff Size: Large Adult)   Pulse 91   Ht 160 cm (63\")   Wt 94 kg (207 lb 3.2 oz)   LMP 03/09/1991 (Approximate)   SpO2 95%   BMI 36.70 kg/m²   Physical Exam  Vitals and nursing note reviewed.   Constitutional:       General: She is not in acute distress.     Appearance: She is well-developed.   HENT:      Head: Normocephalic and atraumatic.   Eyes:      Pupils: Pupils are equal, round, and reactive to light.   Cardiovascular:      Rate and Rhythm: Normal rate and regular rhythm.      Heart sounds: Normal heart sounds.   Pulmonary:      Effort: Pulmonary effort is normal.      Breath sounds: Normal breath sounds.   Abdominal:      General: Bowel sounds are normal. There is no distension or abdominal bruit.      Palpations: Abdomen is soft. Abdomen is not rigid. There is no shifting dullness or mass.      Tenderness: There is abdominal tenderness. There is no guarding or rebound.      Hernia: No hernia is present. There is no hernia in the ventral area.   Musculoskeletal:         General: Normal range of motion.      Cervical back: Normal range of motion.   Skin:     General: Skin is warm and dry.   Neurological:      Mental Status: She is alert and oriented to person, place, and time.   Psychiatric:         Behavior: Behavior normal.         Thought Content: Thought content normal.         Judgment: Judgment normal. "       Assessment & Plan      1. Gastroesophageal reflux disease with esophagitis without hemorrhage    2. Elevated liver enzymes    3. Fatty liver    4. Diverticulosis    5. Right upper quadrant abdominal pain    .   Diagnoses and all orders for this visit:    1. Gastroesophageal reflux disease with esophagitis without hemorrhage (Primary)    2. Elevated liver enzymes  -     US Liver    3. Fatty liver  -     US Liver    4. Diverticulosis    5. Right upper quadrant abdominal pain  -     US Liver        Orders placed during this encounter include:  Orders Placed This Encounter   Procedures   • US Liver     Scheduling Instructions:      RUQ     Order Specific Question:   Reason for Exam:     Answer:   RUQ pain, fatty liver, elevated LFT, known gallstone     Order Specific Question:   Will this be performed with Elastography? (BONNIE, ASIYA, and JEANMARIE ONLY) IF YES, must order ADDITIONAL US ELASTOGRAPHY exam.     Answer:   No       Medications prescribed:  No orders of the defined types were placed in this encounter.      Requested Prescriptions      No prescriptions requested or ordered in this encounter       Review and/or summary of lab tests, radiology, procedures, medications. Review and summary of old records and obtaining of history. The risks and benefits of my recommendations, as well as other treatment options were discussed with the patient today. Questions were answered.    Follow-up: Return in about 6 months (around 5/21/2023), or if symptoms worsen or fail to improve, for lab prior.     * Surgery not found *      This document has been electronically signed by Marcial Walker PA-C on November 21, 2022 18:32 CST      Results for orders placed or performed in visit on 11/14/22   Protime-INR    Specimen: Blood   Result Value Ref Range    Protime 12.9 11.1 - 15.3 Seconds    INR 0.98 0.80 - 1.20   Comprehensive Metabolic Panel    Specimen: Blood   Result Value Ref Range    Glucose 105 (H) 65 - 99 mg/dL     BUN 13 8 - 23 mg/dL    Creatinine 1.05 (H) 0.57 - 1.00 mg/dL    Sodium 137 136 - 145 mmol/L    Potassium 4.3 3.5 - 5.2 mmol/L    Chloride 105 98 - 107 mmol/L    CO2 20.0 (L) 22.0 - 29.0 mmol/L    Calcium 9.3 8.6 - 10.5 mg/dL    Total Protein 6.6 6.0 - 8.5 g/dL    Albumin 4.00 3.50 - 5.20 g/dL    ALT (SGPT) 61 (H) 1 - 33 U/L    AST (SGOT) 59 (H) 1 - 32 U/L    Alkaline Phosphatase 91 39 - 117 U/L    Total Bilirubin 0.3 0.0 - 1.2 mg/dL    Globulin 2.6 gm/dL    A/G Ratio 1.5 g/dL    BUN/Creatinine Ratio 12.4 7.0 - 25.0    Anion Gap 12.0 5.0 - 15.0 mmol/L    eGFR 60.6 >60.0 mL/min/1.73   Results for orders placed or performed in visit on 10/07/22   Rheumatoid Factor, Quant    Specimen: Blood   Result Value Ref Range    Rheumatoid Factor Quantitative <10.0 0.0 - 14.0 IU/mL   Urine Drug Screen - Urine, Clean Catch    Specimen: Urine, Clean Catch   Result Value Ref Range    THC, Screen, Urine Negative Negative    Phencyclidine (PCP), Urine Negative Negative    Cocaine Screen, Urine Negative Negative    Methamphetamine, Ur Negative Negative    Opiate Screen Negative Negative    Amphetamine Screen, Urine Negative Negative    Benzodiazepine Screen, Urine Negative Negative    Tricyclic Antidepressants Screen Positive (A) Negative    Methadone Screen, Urine Negative Negative    Barbiturates Screen, Urine Negative Negative    Oxycodone Screen, Urine Negative Negative    Propoxyphene Screen Negative Negative    Buprenorphine, Screen, Urine Negative Negative   LAMONT    Specimen: Blood   Result Value Ref Range    LAMONT Direct Negative Negative   PTH, Intact    Specimen: Blood   Result Value Ref Range    PTH, Intact 35.9 15.0 - 65.0 pg/mL   Renal Function Panel    Specimen: Blood   Result Value Ref Range    Glucose 119 (H) 65 - 99 mg/dL    BUN 20 8 - 23 mg/dL    Creatinine 1.04 (H) 0.57 - 1.00 mg/dL    Sodium 136 136 - 145 mmol/L    Potassium 4.6 3.5 - 5.2 mmol/L    Chloride 104 98 - 107 mmol/L    CO2 20.7 (L) 22.0 - 29.0 mmol/L     Calcium 9.6 8.6 - 10.5 mg/dL    Albumin 3.80 3.50 - 5.20 g/dL    Phosphorus 3.0 2.5 - 4.5 mg/dL    Anion Gap 11.3 5.0 - 15.0 mmol/L    BUN/Creatinine Ratio 19.2 7.0 - 25.0    eGFR 61.3 >60.0 mL/min/1.73   Results for orders placed or performed in visit on 08/26/22   CBC Auto Differential    Specimen: Blood   Result Value Ref Range    WBC 8.02 3.40 - 10.80 10*3/mm3    RBC 4.08 3.77 - 5.28 10*6/mm3    Hemoglobin 11.8 (L) 12.0 - 15.9 g/dL    Hematocrit 35.2 34.0 - 46.6 %    MCV 86.3 79.0 - 97.0 fL    MCH 28.9 26.6 - 33.0 pg    MCHC 33.5 31.5 - 35.7 g/dL    RDW 12.8 12.3 - 15.4 %    RDW-SD 39.4 37.0 - 54.0 fl    MPV 9.9 6.0 - 12.0 fL    Platelets 286 140 - 450 10*3/mm3    Neutrophil % 54.0 42.7 - 76.0 %    Lymphocyte % 32.3 19.6 - 45.3 %    Monocyte % 8.5 5.0 - 12.0 %    Eosinophil % 3.6 0.3 - 6.2 %    Basophil % 0.7 0.0 - 1.5 %    Immature Grans % 0.9 (H) 0.0 - 0.5 %    Neutrophils, Absolute 4.33 1.70 - 7.00 10*3/mm3    Lymphocytes, Absolute 2.59 0.70 - 3.10 10*3/mm3    Monocytes, Absolute 0.68 0.10 - 0.90 10*3/mm3    Eosinophils, Absolute 0.29 0.00 - 0.40 10*3/mm3    Basophils, Absolute 0.06 0.00 - 0.20 10*3/mm3    Immature Grans, Absolute 0.07 (H) 0.00 - 0.05 10*3/mm3    nRBC 0.0 0.0 - 0.2 /100 WBC   Comprehensive Metabolic Panel    Specimen: Blood   Result Value Ref Range    Glucose 101 (H) 65 - 99 mg/dL    BUN 16 8 - 23 mg/dL    Creatinine 1.18 (H) 0.57 - 1.00 mg/dL    Sodium 140 136 - 145 mmol/L    Potassium 4.2 3.5 - 5.2 mmol/L    Chloride 106 98 - 107 mmol/L    CO2 22.0 22.0 - 29.0 mmol/L    Calcium 8.7 8.6 - 10.5 mg/dL    Total Protein 6.3 6.0 - 8.5 g/dL    Albumin 3.70 3.50 - 5.20 g/dL    ALT (SGPT) 29 1 - 33 U/L    AST (SGOT) 27 1 - 32 U/L    Alkaline Phosphatase 94 39 - 117 U/L    Total Bilirubin 0.2 0.0 - 1.2 mg/dL    Globulin 2.6 gm/dL    A/G Ratio 1.4 g/dL    BUN/Creatinine Ratio 13.6 7.0 - 25.0    Anion Gap 12.0 5.0 - 15.0 mmol/L    eGFR 52.7 (L) >60.0 mL/min/1.73   Results for orders placed or performed  in visit on 07/08/22   CBC Auto Differential    Specimen: Blood   Result Value Ref Range    WBC 7.52 3.40 - 10.80 10*3/mm3    RBC 4.05 3.77 - 5.28 10*6/mm3    Hemoglobin 11.9 (L) 12.0 - 15.9 g/dL    Hematocrit 34.7 34.0 - 46.6 %    MCV 85.7 79.0 - 97.0 fL    MCH 29.4 26.6 - 33.0 pg    MCHC 34.3 31.5 - 35.7 g/dL    RDW 12.4 12.3 - 15.4 %    RDW-SD 37.5 37.0 - 54.0 fl    MPV 10.2 6.0 - 12.0 fL    Platelets 319 140 - 450 10*3/mm3    Neutrophil % 48.5 42.7 - 76.0 %    Lymphocyte % 36.6 19.6 - 45.3 %    Monocyte % 9.3 5.0 - 12.0 %    Eosinophil % 4.3 0.3 - 6.2 %    Basophil % 0.9 0.0 - 1.5 %    Immature Grans % 0.4 0.0 - 0.5 %    Neutrophils, Absolute 3.65 1.70 - 7.00 10*3/mm3    Lymphocytes, Absolute 2.75 0.70 - 3.10 10*3/mm3    Monocytes, Absolute 0.70 0.10 - 0.90 10*3/mm3    Eosinophils, Absolute 0.32 0.00 - 0.40 10*3/mm3    Basophils, Absolute 0.07 0.00 - 0.20 10*3/mm3    Immature Grans, Absolute 0.03 0.00 - 0.05 10*3/mm3    nRBC 0.0 0.0 - 0.2 /100 WBC     *Note: Due to a large number of results and/or encounters for the requested time period, some results have not been displayed. A complete set of results can be found in Results Review.        Oral & Maxillofacial Surgery  Department of Dental Medicine  [phone] [place] [address]  Phone: [phone]  Fax: [phone]

## 2022-12-06 DIAGNOSIS — M79.7 FIBROMYALGIA: ICD-10-CM

## 2022-12-06 DIAGNOSIS — M79.10 MUSCLE PAIN: ICD-10-CM

## 2022-12-06 RX ORDER — CYCLOBENZAPRINE HCL 10 MG
TABLET ORAL
Qty: 270 TABLET | Refills: 1 | Status: SHIPPED | OUTPATIENT
Start: 2022-12-06

## 2022-12-07 ENCOUNTER — OFFICE VISIT (OUTPATIENT)
Dept: PULMONOLOGY | Facility: CLINIC | Age: 62
End: 2022-12-07

## 2022-12-07 VITALS
WEIGHT: 205.3 LBS | BODY MASS INDEX: 36.38 KG/M2 | OXYGEN SATURATION: 98 % | HEART RATE: 78 BPM | DIASTOLIC BLOOD PRESSURE: 70 MMHG | SYSTOLIC BLOOD PRESSURE: 108 MMHG | HEIGHT: 63 IN

## 2022-12-07 DIAGNOSIS — J45.20 MILD INTERMITTENT ASTHMA WITHOUT COMPLICATION: Primary | Chronic | ICD-10-CM

## 2022-12-07 DIAGNOSIS — R73.9 HYPERGLYCEMIA: ICD-10-CM

## 2022-12-07 PROCEDURE — 99214 OFFICE O/P EST MOD 30 MIN: CPT | Performed by: NURSE PRACTITIONER

## 2022-12-07 RX ORDER — ONDANSETRON 4 MG/1
4 TABLET, ORALLY DISINTEGRATING ORAL EVERY 8 HOURS PRN
Qty: 90 TABLET | Refills: 11 | Status: SHIPPED | OUTPATIENT
Start: 2022-12-07

## 2022-12-07 RX ORDER — TIRZEPATIDE 7.5 MG/.5ML
7.5 INJECTION, SOLUTION SUBCUTANEOUS WEEKLY
Qty: 0.5 ML | Refills: 1 | Status: SHIPPED | OUTPATIENT
Start: 2022-12-07 | End: 2023-02-08 | Stop reason: SDUPTHER

## 2022-12-07 NOTE — PROGRESS NOTES
Pulmonary Office Visit    Thank you for asking me to see Lashaun Bernal for   Chief Complaint   Patient presents with   • Follow-up       History of Present Illness    Ms. Bernal is a 61 year old patient who presents to [facility] Pulmonology for pre-op evaluation prior to gastric sleeve with Dr. Real in [place].   She does not have COPD. She is a non smoker.   She does not have complaints. No ER visits or hospitalizations.     Albuterol PRN, uses once or twice a year if she has URI. She does not have seasonal allergies.   She carries an asthmatic bronchitis since she was young. She keeps the prescription active for the inhalers.   No asthma in her family. No lung cancer. No COPD.     She has been more winded since COVID. She has used labuterol without change. Likely due to deconditioning and weight gain.     We discussed non-surgical options and prior medication use as she expressed concern and hesitancy over the surgery. I reviewed her medical history and labs. She has hyperglycemia and associated hypertriglyceridemia, sleep apnea, HTN, and fatty liver. She would be an excellent candidate for GLP-1.   Upon discussion, she was agreeable to try and injectable medication for her pre-diabetes, co-morbidities, and obesity.      12/7/22: Returns for 1 month follow up after being started on Mounjaro 5mg. She was 220lb at our visit. She is was 207lb 2 weeks later and now 205lbs at 4 weeks.   Her weight at home is 203lbs.   She has had some mild nausea but nothing too bad. Will give her some Zofran PRN. She is ready to increase to the 7.5mg dose of Mounjaro. Will follow her by text in 1 month for dose change.     Tobacco use history:  Social History     Socioeconomic History   • Marital status:    Tobacco Use   • Smoking status: Never   • Smokeless tobacco: Never   Vaping Use   • Vaping Use: Never used   Substance and Sexual Activity   • Alcohol use: No   • Drug use: No   • Sexual activity: Defer       Review  of Systems: History obtained from chart review and the patient.  Review of Systems   Constitutional: Negative for diaphoresis, fatigue, fever and unexpected weight change.   Respiratory: Negative for cough, chest tightness, shortness of breath and wheezing.    Cardiovascular: Negative for chest pain, palpitations and leg swelling.   Gastrointestinal: Negative for abdominal distention and blood in stool.   Genitourinary: Negative for hematuria.   Musculoskeletal: Negative for arthralgias and myalgias.   Skin: Negative for pallor and rash.   Neurological: Negative for dizziness, syncope and weakness.   Hematological: Does not bruise/bleed easily.   Psychiatric/Behavioral: Negative for confusion. The patient is not nervous/anxious.        As described in the HPI. Otherwise, remainder of ROS (14 systems) were negative.    Patient Active Problem List   Diagnosis   • Calcaneal spur of left foot   • Chloe's deformity of left heel   • Left hip pain   • Right hamstring muscle strain   • Benign essential hypertension   • Depressive disorder   • Postmenopausal HRT (hormone replacement therapy)   • Asthma   • History of abnormal mammogram   • Polyarticular arthritis   • Class 2 obesity due to excess calories with body mass index (BMI) of 38.0 to 38.9 in adult   • GERD (gastroesophageal reflux disease)   • Right wrist pain   • Right hand pain   • Left hand pain   • De Quervain's disease (radial styloid tenosynovitis)   • Bilateral carotid artery stenosis   • Tremor   • Fibromyalgia   • Idiopathic peripheral neuropathy   • Urinary tract infection in female   • Chest pain   • History of colon polyps   • Left knee pain   • Primary osteoarthritis of left knee   • Rheumatoid arthritis without rheumatoid factor, multiple sites (HCC)   • Stage 3 chronic kidney disease, unspecified whether stage 3a or 3b CKD (HCC)   • COVID-19   • Preoperative respiratory examination         Current Outpatient Medications:   •  albuterol (PROVENTIL)  (2.5 MG/3ML) 0.083% nebulizer solution, Take 2.5 mg by nebulization Every 6 (Six) Hours As Needed for Wheezing., Disp: 120 vial, Rfl: 2  •  albuterol sulfate  (90 Base) MCG/ACT inhaler, Inhale 2 puffs Every 4 (Four) Hours As Needed for Wheezing., Disp: 18 g, Rfl: 2  •  aspirin 81 MG EC tablet, Take 81 mg by mouth Daily., Disp: , Rfl:   •  clonazePAM (KlonoPIN) 1 MG tablet, , Disp: , Rfl:   •  clopidogrel (PLAVIX) 75 MG tablet, Take 75 mg by mouth Daily., Disp: , Rfl:   •  cyclobenzaprine (FLEXERIL) 10 MG tablet, TAKE 1 TABLET 3 TIMES A DAY AS NEEDED FOR MUSCLE SPASMS., Disp: 270 tablet, Rfl: 1  •  dilTIAZem (CARDIZEM) 30 MG tablet, Take 30 mg by mouth 2 (Two) Times a Day., Disp: , Rfl:   •  estradiol (ESTRACE) 1 MG tablet, Take 1 tablet by mouth Daily., Disp: 90 tablet, Rfl: 2  •  ferrous sulfate 325 (65 FE) MG EC tablet, Take 1 tablet by mouth Daily With Breakfast., Disp: 90 tablet, Rfl: 3  •  furosemide (LASIX) 40 MG tablet, Take 40 mg by mouth As Needed. Takes as needed, Disp: , Rfl:   •  gabapentin (NEURONTIN) 300 MG capsule, Take 1 capsule by mouth 2 (Two) Times a Day., Disp: 180 capsule, Rfl: 1  •  magnesium oxide (MAGOX) 400 (241.3 Mg) MG tablet tablet, Take 400 mg by mouth 2 (Two) Times a Day., Disp: , Rfl:   •  metoprolol tartrate (LOPRESSOR) 25 MG tablet, Take 25 mg by mouth Daily., Disp: , Rfl:   •  omeprazole (priLOSEC) 20 MG capsule, Take 20 mg by mouth 2 (two) times a day., Disp: , Rfl:   •  spironolactone (ALDACTONE) 25 MG tablet, Take 25 mg by mouth Daily. 0.5 Mg Bid, Disp: , Rfl:   •  Tirzepatide (Mounjaro) 5 MG/0.5ML solution pen-injector, Inject 0.5 mL under the skin into the appropriate area as directed 1 (One) Time Per Week., Disp: 0.5 mL, Rfl: 3    Allergies   Allergen Reactions   • Cefprozil Nausea And Vomiting and Rash   • Penicillins Rash       Past Medical History:   Diagnosis Date   • Allergic rhinitis    • Arthritis    • Asthma    • Breast lump    • C. difficile diarrhea 2013   •  "Carotid artery disease ([facility])    • Chest pain    • Chronic low back pain    • Constipation    • Depressive disorder    • Diarrhea    • Diverticular disease of colon    • Epigastric pain    • DIETER (generalized anxiety disorder)    • GERD (gastroesophageal reflux disease)    • Head ache    • Hematochezia    • Herpes zoster     mid back, near spine   • Hypertension    • Periumbilical pain    • PONV (postoperative nausea and vomiting)    • Maurice Mountain spotted fever     pt states \"currently has it\"   • Sleep apnea     using c-pap   • SVT (supraventricular tachycardia) ([facility])    • Tachycardia 12/012019    spent 4 days in [facility] in January for this.      Past Surgical History:   Procedure Laterality Date   • ABDOMINOPLASTY  12/22/2004   • AUGMENTATION MAMMAPLASTY     • BREAST AUGMENTATION BILATERAL MASTOPEXY     • BUNIONECTOMY Left 3/20/2017    Procedure: EXCISION OF CALCANEAL SPURS LEFT FOOT AND REATTACHMENT OF ACHILLES TENDON ;  Surgeon: Jarrell Mar MD;  Location: [facility] OR;  Service:    • COLONOSCOPY  01/23/2015   • COLONOSCOPY N/A 2/24/2020    Procedure: COLONOSCOPY;  Surgeon: Santhosh Patrick MD;  Location: [facility] ENDOSCOPY;  Service: Gastroenterology;  Laterality: N/A;   • CYST REMOVAL  08/18/1961    Excision of sebaceous cyst. Left ear   • FINGER/THUMB LESION/CYST EXCISION  10/29/2014   • HAND SURGERY  06/03/2013   • HYSTERECTOMY     • OOPHORECTOMY     • ORIF ULNA/RADIUS FRACTURES  07/22/2013   • TUBAL ABDOMINAL LIGATION  08/14/1985    Laparoscopic tubal sterilization; dilatation and curettage. Desires tubal ligation;       Social History     Socioeconomic History   • Marital status:    Tobacco Use   • Smoking status: Never   • Smokeless tobacco: Never   Vaping Use   • Vaping Use: Never used   Substance and Sexual Activity   • Alcohol use: No   • Drug use: No   • Sexual activity: Defer     Family History   Problem Relation Age of Onset   • Diabetes Other    • Heart disease Other    • Hypertension " "Other    • Heart disease Mother         Myocardial infarct   • Hypertension Mother    • Diabetes Mother    • Heart disease Father         CHF       Advance Care Planning   ACP discussion was declined by the patient. Patient does not have an advance directive, declines further assistance.        Objective     /70   Pulse 78   Ht 160 cm (63\")   Wt 93.1 kg (205 lb 4.8 oz)   LMP 03/09/1991 (Approximate)   SpO2 98%   BMI 36.37 kg/m²       Class 2 Severe Obesity (BMI >=35 and <=39.9). Obesity-related health conditions include the following: obstructive sleep apnea and hypertension. Obesity is worsening. BMI is is above average; BMI management plan is completed. We discussed portion control and increasing exercise. She is going to have bariatric surgery.     Lab Results   Component Value Date    HGBA1C 6.20 (H) 07/08/2022     Lab Results   Component Value Date    GLUCOSE 105 (H) 11/14/2022    BUN 13 11/14/2022    CREATININE 1.05 (H) 11/14/2022    EGFRIFNONA 47 (L) 12/21/2021    BCR 12.4 11/14/2022    K 4.3 11/14/2022    CO2 20.0 (L) 11/14/2022    CALCIUM 9.3 11/14/2022    PROTENTOTREF 6.5 05/01/2019    ALBUMIN 4.00 11/14/2022    LABIL2 1.0 05/01/2019    AST 59 (H) 11/14/2022    ALT 61 (H) 11/14/2022         Physical Exam  Vitals and nursing note reviewed.   Constitutional:       General: She is not in acute distress.     Appearance: She is well-developed. She is not diaphoretic.   HENT:      Head: Normocephalic.   Eyes:      Conjunctiva/sclera: Conjunctivae normal.   Neck:      Vascular: No JVD.   Cardiovascular:      Rate and Rhythm: Normal rate and regular rhythm.      Heart sounds: Normal heart sounds, S1 normal and S2 normal. No murmur heard.    No friction rub. No gallop.   Pulmonary:      Effort: Pulmonary effort is normal. No respiratory distress.      Breath sounds: Normal breath sounds. No wheezing or rales.   Abdominal:      General: Bowel sounds are normal. There is no distension.      Palpations: " Abdomen is soft.   Musculoskeletal:         General: Normal range of motion.   Skin:     General: Skin is warm and dry.      Findings: No erythema.   Neurological:      Mental Status: She is alert and oriented to person, place, and time.   Psychiatric:         Behavior: Behavior normal.         Thought Content: Thought content normal.         Judgment: Judgment normal.         PFTs    I personally reviewed Complete pulmonary function tests, including spirometry with and without bronchodilator, measurement of lung volume, and diffusing capacity..          PFTs: Done on: 11/7/2022 (independently reviewed by myself, interpreted by physician)  Ratio 79    FEV1 103    Normal.         Radiology (independently reviewed by me, interpreted by physcian)    No radiology results for the last 30 days.  PROCEDURE: XR CHEST 2 VW     VIEWS: PA/Lat     INDICATION: Shortness of breath     COMPARISON: CXR: To 722     FINDINGS:        - lines/tubes: none    - cardiac: size within normal limits.    - mediastinum: contour within normal limits.     - lungs: no evidence of a focal air space process, pulmonary  interstitial edema, nodule(s)/mass.     - pleura: no evidence of  fluid.      - osseous: unremarkable for age.      IMPRESSION:  No acute cardiopulmonary process identified.       Assessment       Discussion/ Recommendations:      Diagnosis Plan           1. Class 2 severe obesity due to excess calories with serious comorbidity and body mass index (BMI) of 38.0 to 38.9 in adult (HCC)  She has not tried or failed alternative medications for weight loss.    She has elevated blood sugar.  She has co-morbidities like fatty liver, sleep apnea, hypertriglyceridemia, and HTN associated with her severe obesity. She is at risk for CAD.     She is hesitant to proceed with surgical option due to her age.     Her last A1c was 6.2 with some evidence of hyperglycemia >100.     She has completed 1 month for 5mg Mounjaro. She is ready to go  to the 7.5mg dose. We went over nausea s/e and constipation. I put in PRN Zofran for her.   She can increase to 10mg next month if she wishes.     The prescription is $47 at [facility] pharmacy.         2. Mild intermittent asthma without complication  Stable, uncomplicated.   Albuterol PRN is appropriate. Normal PFTs and no complaints.             Follow up: as needed. She does not see Gretchen until April. She will call me for any dose increase needs or concerns.       I spent 35 minutes caring for Lashaun on this date of service. This time includes time spent by me in the following activities: preparing for the visit, reviewing tests, obtaining and/or reviewing a separately obtained history, performing a medically appropriate examination and/or evaluation, counseling and educating the patient/family/caregiver, ordering medications, tests, or procedures, referring and communicating with other health care professionals, documenting information in the medical record, independently interpreting results and communicating that information with the patient/family/caregiver and care coordination            This document has been electronically signed by MAYELA Beltran on December 7, 2022 10:15 CST

## 2022-12-08 ENCOUNTER — HOSPITAL ENCOUNTER (OUTPATIENT)
Dept: ULTRASOUND IMAGING | Facility: HOSPITAL | Age: 62
Discharge: HOME | End: 2022-12-08
Admitting: PHYSICIAN ASSISTANT

## 2022-12-08 PROCEDURE — 76705 ECHO EXAM OF ABDOMEN: CPT

## 2022-12-09 ENCOUNTER — TELEPHONE (OUTPATIENT)
Dept: GASTROENTEROLOGY | Facility: CLINIC | Age: 62
End: 2022-12-09

## 2022-12-09 NOTE — TELEPHONE ENCOUNTER
Patient was called and informed that the US has not been read yet.        ----- Message from Lo Avendaño sent at 12/9/2022 11:56 AM CST -----  Called wanted U/S results. She is a Mar[facility] Ferrara pt. Call back number is [phone]

## 2022-12-16 DIAGNOSIS — K80.20 GALLSTONES: ICD-10-CM

## 2022-12-16 DIAGNOSIS — R10.11 RIGHT UPPER QUADRANT ABDOMINAL PAIN: Primary | ICD-10-CM

## 2022-12-16 DIAGNOSIS — R74.8 ELEVATED LIVER ENZYMES: ICD-10-CM

## 2022-12-21 ENCOUNTER — OFFICE VISIT (OUTPATIENT)
Dept: SURGERY | Facility: CLINIC | Age: 62
End: 2022-12-21

## 2022-12-21 VITALS
HEIGHT: 63 IN | DIASTOLIC BLOOD PRESSURE: 65 MMHG | TEMPERATURE: 97.7 F | HEART RATE: 99 BPM | SYSTOLIC BLOOD PRESSURE: 122 MMHG | BODY MASS INDEX: 35.58 KG/M2 | OXYGEN SATURATION: 100 % | WEIGHT: 200.8 LBS

## 2022-12-21 DIAGNOSIS — K80.20 GALLSTONES: Primary | ICD-10-CM

## 2022-12-21 PROCEDURE — 99213 OFFICE O/P EST LOW 20 MIN: CPT | Performed by: SURGERY

## 2022-12-21 RX ORDER — CLONAZEPAM 1 MG/1
0.5 TABLET ORAL 2 TIMES DAILY
COMMUNITY
Start: 2022-12-10 | End: 2022-12-23 | Stop reason: HOSPADM

## 2022-12-21 RX ORDER — SODIUM CHLORIDE 0.9 % (FLUSH) 0.9 %
10 SYRINGE (ML) INJECTION EVERY 12 HOURS SCHEDULED
Status: CANCELLED | OUTPATIENT
Start: 2022-12-21

## 2022-12-21 RX ORDER — CLONAZEPAM 0.5 MG/1
TABLET ORAL
COMMUNITY
End: 2022-12-23 | Stop reason: HOSPADM

## 2022-12-21 RX ORDER — SODIUM CHLORIDE 0.9 % (FLUSH) 0.9 %
10 SYRINGE (ML) INJECTION AS NEEDED
Status: CANCELLED | OUTPATIENT
Start: 2022-12-21

## 2022-12-21 RX ORDER — SODIUM CHLORIDE, SODIUM LACTATE, POTASSIUM CHLORIDE, CALCIUM CHLORIDE 600; 310; 30; 20 MG/100ML; MG/100ML; MG/100ML; MG/100ML
100 INJECTION, SOLUTION INTRAVENOUS CONTINUOUS
Status: CANCELLED | OUTPATIENT
Start: 2022-12-21

## 2022-12-21 RX ORDER — SODIUM CHLORIDE 9 MG/ML
40 INJECTION, SOLUTION INTRAVENOUS AS NEEDED
Status: CANCELLED | OUTPATIENT
Start: 2022-12-21

## 2022-12-21 NOTE — PROGRESS NOTES
Chief Complaint   Patient presents with   • Advice Only     Consult for Possible Gallbladder Problems        HPI  61 year old woman seen for possible biliary colic. Her primary symptom is pain. No fever, chills, nausea, vomiting, change in bowel habits. She is on dual antiplatelet therapy for CAD.. Imaging;    Study Result    Narrative & Impression      Ultrasound liver     HISTORY: Right upper quadrant pain. Fatty liver. Elevated liver  function tests. Known gallstone. Abnormal levels other serum  enzymes.     Ultrasound examination of the right upper quadrant was performed.     COMPARISON: December 20, 2018     FINDINGS:  Fatty infiltration of the liver.  No focal intrahepatic lesion.  Right upper quadrant tenderness.  Cholelithiasis.  No wall thickening or pericholecystic fluid.  Common bile duct is within normal limits at 6.3 mm.  Images of the right kidney are unremarkable.  Right kidney 9.43 cm in length by 4.59 cm x 3.70 cm transverse  with a volume of 84.04 cc.  Pancreas obscured by bowel gas.     IMPRESSION:  CONCLUSION:  Fatty infiltration of the liver.  Cholelithiasis.  Right upper quadrant tenderness.     [id number]     Electronically signed by:  Claudy Bobby MD  12/9/2022 8:45 PM CST  Workstation: [id number]     I have personally reviewed the imaging and concur with the radiologist's findings.    Past Medical History:   Diagnosis Date   • Allergic rhinitis    • Arthritis    • Asthma    • Breast lump    • C. difficile diarrhea 2013   • Carotid artery disease (HCC)    • Chest pain    • Chronic low back pain    • Constipation    • Depressive disorder    • Diarrhea    • Diverticular disease of colon    • Epigastric pain    • DIETER (generalized anxiety disorder)    • GERD (gastroesophageal reflux disease)    • Head ache    • Hematochezia    • Herpes zoster     mid back, near spine   • Hypertension    • Periumbilical pain    • PONV (postoperative nausea and vomiting)    • Maurice Mountain spotted fever     pt states  "\"currently has it\"   • Sleep apnea     using c-pap   • SVT (supraventricular tachycardia) ([facility])    • Tachycardia 12/012019    spent 4 days in [facility], [place], operated by [facility] in January for this.        Past Surgical History:   Procedure Laterality Date   • ABDOMINOPLASTY  12/22/2004   • AUGMENTATION MAMMAPLASTY     • BREAST AUGMENTATION BILATERAL MASTOPEXY     • BUNIONECTOMY Left 3/20/2017    Procedure: EXCISION OF CALCANEAL SPURS LEFT FOOT AND REATTACHMENT OF ACHILLES TENDON ;  Surgeon: Jarrell Mar MD;  Location: [facility] OR;  Service:    • COLONOSCOPY  01/23/2015   • COLONOSCOPY N/A 2/24/2020    Procedure: COLONOSCOPY;  Surgeon: Santhosh Patrick MD;  Location: [facility] ENDOSCOPY;  Service: Gastroenterology;  Laterality: N/A;   • CYST REMOVAL  08/18/1961    Excision of sebaceous cyst. Left ear   • FINGER/THUMB LESION/CYST EXCISION  10/29/2014   • HAND SURGERY  06/03/2013   • HYSTERECTOMY     • OOPHORECTOMY     • ORIF ULNA/RADIUS FRACTURES  07/22/2013   • TUBAL ABDOMINAL LIGATION  08/14/1985    Laparoscopic tubal sterilization; dilatation and curettage. Desires tubal ligation;         No current facility-administered medications for this visit.  No current outpatient medications on file.    Facility-Administered Medications Ordered in Other Visits:   •  famotidine (PEPCID) tablet 40 mg, 40 mg, Oral, Daily, Osmar Hoffman MD  •  HYDROcodone-acetaminophen (NORCO) 5-325 MG per tablet 1 tablet, 1 tablet, Oral, Q4H PRN, Osmar Hoffman MD  •  HYDROmorphone (DILAUDID) injection 0.5 mg, 0.5 mg, Intravenous, Q2H PRN **AND** naloxone (NARCAN) injection 0.4 mg, 0.4 mg, Intravenous, Q5 Min PRN, Osmar Hoffman MD  •  ondansetron (ZOFRAN) injection 4 mg, 4 mg, Intravenous, Q6H PRN, Osmar Hoffman MD  •  [COMPLETED] Insert Peripheral IV, , , Once **AND** sodium chloride 0.9 % flush 10 mL, 10 mL, Intravenous, PRN, Branden Corona MD  •  sodium chloride 0.9 % flush 10 mL, 10 mL, Intravenous, Q12H, Osmar Hoffman MD  •  sodium chloride " 0.9 % flush 10 mL, 10 mL, Intravenous, PRN, Osmar Hoffman MD  •  sodium chloride 0.9 % infusion 40 mL, 40 mL, Intravenous, PRNYasmin Joshua H, MD    Allergies   Allergen Reactions   • Cefprozil Nausea And Vomiting and Rash   • Penicillins Rash       Family History   Problem Relation Age of Onset   • Diabetes Other    • Heart disease Other    • Hypertension Other    • Heart disease Mother         Myocardial infarct   • Hypertension Mother    • Diabetes Mother    • Heart disease Father         CHF       Social History     Socioeconomic History   • Marital status:    Tobacco Use   • Smoking status: Never   • Smokeless tobacco: Never   Vaping Use   • Vaping Use: Never used   Substance and Sexual Activity   • Alcohol use: No   • Drug use: No   • Sexual activity: Defer       Review of Systems   Constitutional: Negative.    HENT: Negative.    Eyes: Negative.    Respiratory: Negative.    Cardiovascular: Negative.    Gastrointestinal: Positive for abdominal pain and nausea.   Endocrine: Negative.    Genitourinary: Negative.    Musculoskeletal:        Arthritis   Skin: Negative.    Allergic/Immunologic: Negative.    Neurological: Negative.    Hematological: Bruises/bleeds easily.   Psychiatric/Behavioral: Negative.        Physical Exam  Vitals reviewed.   Constitutional:       Appearance: Normal appearance.   HENT:      Head: Normocephalic and atraumatic.   Eyes:      Conjunctiva/sclera: Conjunctivae normal.      Pupils: Pupils are equal, round, and reactive to light.   Cardiovascular:      Rate and Rhythm: Normal rate and regular rhythm.   Pulmonary:      Effort: Pulmonary effort is normal. No respiratory distress.   Abdominal:      General: Abdomen is flat. There is no distension.      Palpations: Abdomen is soft. There is no mass.      Tenderness: There is no abdominal tenderness. There is no guarding or rebound.      Hernia: No hernia is present.   Musculoskeletal:         General: Normal range of motion.       Cervical back: Normal range of motion and neck supple.   Skin:     General: Skin is warm and dry.   Neurological:      Mental Status: She is alert.   Psychiatric:         Mood and Affect: Mood normal.         Behavior: Behavior normal.         Thought Content: Thought content normal.         Judgment: Judgment normal.           ASSESSMENT    Diagnoses and all orders for this visit:    1. Gallstones (Primary)  -     Comprehensive Metabolic Panel; Future  -     ECG 12 Lead; Future  -     lactated ringers infusion  -     sodium chloride 0.9 % flush 10 mL  -     sodium chloride 0.9 % flush 10 mL  -     sodium chloride 0.9 % infusion 40 mL  -     Case Request; Standing  -     ceFAZolin (ANCEF) 2 g in sodium chloride 0.9 % 100 mL IVPB  -     Case Request    Other orders  -     Follow Anesthesia Guidelines / Protocol; Future  -     Provide NPO Instructions to Patient; Future  -     Chlorhexidine Skin Prep; Future  -     Follow Anesthesia Guidelines / Protocol; Standing  -     Insert Peripheral IV; Standing  -     Saline Lock & Maintain IV Access; Standing  -     Verify NPO Status; Standing  -     Obtain Informed Consent; Standing  -     Verify / Perform Chlorhexidine Skin Prep; Standing        PLAN    1. LAPAROSCOPIC POSSIBLE OPEN CHOLECYSTECTOMY WITH INTRAOPERATIVE CHOLANGIOGRAM    The following were discussed with the patient/family:    What are the indications that have led your doctor to the opinion that an operation is necessary?    * Symptoms and studies indicate that there is gallbladder disease causing pain in the abdomen.    What, if any, alternative treatments are available for your condition?    * Watching and waiting with no surgery  * Removing the gallbladder through one large incision made in the abdomen.    What will be the likely result if you don't have the operation?    * Pain, infection, inflammation, and/or stones may continue or get worse    What are the basic procedures involved in the operation?    *  The surgery may be done laparoscopically. Laparoscopic surgery is done using a scope and hollow tube(s) called ports. These are inserted through small cuts in the abdomen. A scope is a thin, lighted instrument with a camera attached. Tools are passed through the ports. Carbon dioxide gas is pumped into the abdomen to create workspace.   If the surgery cannot be performed with a scope, it may be done through a larger cut. This occurs 2% of the time.  The gallbladder will be removed after it is  from the liver, bile duct, and surrounding arteries. An x-ray of the bile ducts is usually performed with contrast dye injected into the ducts.  If stones are found, another procedure may be required to remove them.  A drain may rarely be inserted to keep fluid from building up in the treatment area.     What are the risks?    * Exposure to radiation. Pregnant women and women of childbearing age should talk with their doctor about this.  * Pain, numbness, swelling, weakness or scarring where tissue is cut.  * The gas used in laparoscopic procedures to inflate the abdomen can become trapped in tissues. Gas in the bloodstream can dangerously affect blood flow and  heart function.  * The procedure may not cure or relieve your condition or symptoms. They may come back and even worsen.  * You may need additional tests or treatment.  * Bleeding. You may need blood transfusions or other treatments. This may be discovered during the procedure, or later.  * Embolism. An embolism is an object that moves through your body in your bloodstream. It can be an air bubble, a blood clot, a piece of fat or other material. It can block a blood vessel. This can lead to stroke, pulmonary embolism (blockage of the main artery of the lung), or injury to organs or extremities.  * Reactions to dye used for imaging. These may include hives, swelling of the face and/or throat, difficulty breathing, and kidney failure.  * Retained stones in bile  ducts.  * Wound infection, poor healing or reopening. Blood or clear fluid can also collect at the wound site(s).  * Damage to the bile ducts or nearby structures. This may be discovered during the procedure, or later.  * Incisional hernia. Weak scar tissue may allow tissue to bulge through the cut.  * The instrument(s) placed in your abdomen can cause injuries to nearby structures.  * Death.    How is the operation expected to improve your health or quality of life?    * Operation is expected to decrease pain and nausea/vomiting associated with gallstones.  * This procedure may relieve or prevent infection, inflammation, and/or pain from stones or blockage of bile ducts.    Is hospitalization necessary and, if so, how long can you expect to be hospitalized?    * Most often, the operation is performed on an outpatient basis. Occasionally hospitalization is necessary for pain or nausea control    What can you expect during your recovery period?    * The gas used in laparoscopic procedures to inflate the abdomen can become trapped in tissues. Shoulder pain may occur for a few days after surgery.   * Nausea and pain control are obtained with oral medication.  * If you are on metformin, it will need to be held for 48 hours after surgery    When can you expect to resume normal activities?    * Normal activity can be resumed in 10-14 days if the procedure is performed laparoscopically. Open operations require no lifting or straining for 6 weeks.     Are there likely to be residual effects from the operation?    * Diarrhea often occurs, mostly temporary. Occasionally there is still minor food intolerance.    All questions were answered. The patient agrees to operation.                    This document has been electronically signed by Ruben Springer MD on December 22, 2022 20:12 CST

## 2022-12-22 ENCOUNTER — APPOINTMENT (OUTPATIENT)
Dept: ULTRASOUND IMAGING | Facility: HOSPITAL | Age: 62
End: 2022-12-22

## 2022-12-22 ENCOUNTER — HOSPITAL ENCOUNTER (OUTPATIENT)
Facility: HOSPITAL | Age: 62
Discharge: HOME | End: 2022-12-23
Attending: EMERGENCY MEDICINE | Admitting: STUDENT IN AN ORGANIZED HEALTH CARE EDUCATION/TRAINING PROGRAM

## 2022-12-22 DIAGNOSIS — K80.20 CALCULUS OF GALLBLADDER WITHOUT CHOLECYSTITIS WITHOUT OBSTRUCTION: Primary | ICD-10-CM

## 2022-12-22 DIAGNOSIS — K80.20 GALLSTONES: ICD-10-CM

## 2022-12-22 LAB
ALBUMIN SERPL-MCNC: 4.1 G/DL (ref 3.5–5.2)
ALBUMIN/GLOB SERPL: 1.4 G/DL
ALP SERPL-CCNC: 90 U/L (ref 39–117)
ALT SERPL W P-5'-P-CCNC: 68 U/L (ref 1–33)
ANION GAP SERPL CALCULATED.3IONS-SCNC: 12 MMOL/L (ref 5–15)
AST SERPL-CCNC: 62 U/L (ref 1–32)
BASOPHILS # BLD AUTO: 0.08 10*3/MM3 (ref 0–0.2)
BASOPHILS NFR BLD AUTO: 0.9 % (ref 0–1.5)
BILIRUB SERPL-MCNC: 0.2 MG/DL (ref 0–1.2)
BILIRUB UR QL STRIP: NEGATIVE
BUN SERPL-MCNC: 18 MG/DL (ref 8–23)
BUN/CREAT SERPL: 17.3 (ref 7–25)
CALCIUM SPEC-SCNC: 9.4 MG/DL (ref 8.6–10.5)
CHLORIDE SERPL-SCNC: 107 MMOL/L (ref 98–107)
CLARITY UR: CLEAR
CO2 SERPL-SCNC: 21 MMOL/L (ref 22–29)
COLOR UR: YELLOW
CREAT SERPL-MCNC: 1.04 MG/DL (ref 0.57–1)
DEPRECATED RDW RBC AUTO: 38.2 FL (ref 37–54)
EGFRCR SERPLBLD CKD-EPI 2021: 61.3 ML/MIN/1.73
EOSINOPHIL # BLD AUTO: 0.56 10*3/MM3 (ref 0–0.4)
EOSINOPHIL NFR BLD AUTO: 6.1 % (ref 0.3–6.2)
ERYTHROCYTE [DISTWIDTH] IN BLOOD BY AUTOMATED COUNT: 12.3 % (ref 12.3–15.4)
GLOBULIN UR ELPH-MCNC: 2.9 GM/DL
GLUCOSE SERPL-MCNC: 110 MG/DL (ref 65–99)
GLUCOSE UR STRIP-MCNC: NEGATIVE MG/DL
HCT VFR BLD AUTO: 39.5 % (ref 34–46.6)
HGB BLD-MCNC: 13.1 G/DL (ref 12–15.9)
HGB UR QL STRIP.AUTO: NEGATIVE
HOLD SPECIMEN: NORMAL
HOLD SPECIMEN: NORMAL
IMM GRANULOCYTES # BLD AUTO: 0.02 10*3/MM3 (ref 0–0.05)
IMM GRANULOCYTES NFR BLD AUTO: 0.2 % (ref 0–0.5)
KETONES UR QL STRIP: ABNORMAL
LEUKOCYTE ESTERASE UR QL STRIP.AUTO: NEGATIVE
LIPASE SERPL-CCNC: 24 U/L (ref 13–60)
LYMPHOCYTES # BLD AUTO: 3.19 10*3/MM3 (ref 0.7–3.1)
LYMPHOCYTES NFR BLD AUTO: 34.6 % (ref 19.6–45.3)
MCH RBC QN AUTO: 28.4 PG (ref 26.6–33)
MCHC RBC AUTO-ENTMCNC: 33.2 G/DL (ref 31.5–35.7)
MCV RBC AUTO: 85.7 FL (ref 79–97)
MONOCYTES # BLD AUTO: 0.72 10*3/MM3 (ref 0.1–0.9)
MONOCYTES NFR BLD AUTO: 7.8 % (ref 5–12)
NEUTROPHILS NFR BLD AUTO: 4.65 10*3/MM3 (ref 1.7–7)
NEUTROPHILS NFR BLD AUTO: 50.4 % (ref 42.7–76)
NITRITE UR QL STRIP: NEGATIVE
NRBC BLD AUTO-RTO: 0 /100 WBC (ref 0–0.2)
PH UR STRIP.AUTO: <=5 [PH] (ref 5–9)
PLATELET # BLD AUTO: 292 10*3/MM3 (ref 140–450)
PMV BLD AUTO: 9.6 FL (ref 6–12)
POTASSIUM SERPL-SCNC: 4.1 MMOL/L (ref 3.5–5.2)
PROT SERPL-MCNC: 7 G/DL (ref 6–8.5)
PROT UR QL STRIP: NEGATIVE
RBC # BLD AUTO: 4.61 10*6/MM3 (ref 3.77–5.28)
SODIUM SERPL-SCNC: 140 MMOL/L (ref 136–145)
SP GR UR STRIP: 1.03 (ref 1–1.03)
UROBILINOGEN UR QL STRIP: ABNORMAL
WBC NRBC COR # BLD: 9.22 10*3/MM3 (ref 3.4–10.8)

## 2022-12-22 PROCEDURE — 96375 TX/PRO/DX INJ NEW DRUG ADDON: CPT

## 2022-12-22 PROCEDURE — G0378 HOSPITAL OBSERVATION PER HR: HCPCS

## 2022-12-22 PROCEDURE — 99220 PR INITIAL OBSERVATION CARE/DAY 70 MINUTES: CPT | Performed by: STUDENT IN AN ORGANIZED HEALTH CARE EDUCATION/TRAINING PROGRAM

## 2022-12-22 PROCEDURE — 76705 ECHO EXAM OF ABDOMEN: CPT

## 2022-12-22 PROCEDURE — 99284 EMERGENCY DEPT VISIT MOD MDM: CPT

## 2022-12-22 PROCEDURE — 96374 THER/PROPH/DIAG INJ IV PUSH: CPT

## 2022-12-22 PROCEDURE — 83690 ASSAY OF LIPASE: CPT | Performed by: STUDENT IN AN ORGANIZED HEALTH CARE EDUCATION/TRAINING PROGRAM

## 2022-12-22 PROCEDURE — 81003 URINALYSIS AUTO W/O SCOPE: CPT | Performed by: STUDENT IN AN ORGANIZED HEALTH CARE EDUCATION/TRAINING PROGRAM

## 2022-12-22 PROCEDURE — 25010000002 ONDANSETRON PER 1 MG: Performed by: STUDENT IN AN ORGANIZED HEALTH CARE EDUCATION/TRAINING PROGRAM

## 2022-12-22 PROCEDURE — 80053 COMPREHEN METABOLIC PANEL: CPT | Performed by: STUDENT IN AN ORGANIZED HEALTH CARE EDUCATION/TRAINING PROGRAM

## 2022-12-22 PROCEDURE — 25010000002 HYDROMORPHONE 1 MG/ML SOLUTION: Performed by: STUDENT IN AN ORGANIZED HEALTH CARE EDUCATION/TRAINING PROGRAM

## 2022-12-22 PROCEDURE — 85025 COMPLETE CBC W/AUTO DIFF WBC: CPT | Performed by: STUDENT IN AN ORGANIZED HEALTH CARE EDUCATION/TRAINING PROGRAM

## 2022-12-22 PROCEDURE — 96376 TX/PRO/DX INJ SAME DRUG ADON: CPT

## 2022-12-22 PROCEDURE — 25010000002 MORPHINE PER 10 MG: Performed by: STUDENT IN AN ORGANIZED HEALTH CARE EDUCATION/TRAINING PROGRAM

## 2022-12-22 RX ORDER — MORPHINE SULFATE 2 MG/ML
2 INJECTION, SOLUTION INTRAMUSCULAR; INTRAVENOUS ONCE
Status: COMPLETED | OUTPATIENT
Start: 2022-12-22 | End: 2022-12-22

## 2022-12-22 RX ORDER — HYDROCODONE BITARTRATE AND ACETAMINOPHEN 5; 325 MG/1; MG/1
1 TABLET ORAL EVERY 4 HOURS PRN
Status: DISCONTINUED | OUTPATIENT
Start: 2022-12-22 | End: 2022-12-23 | Stop reason: HOSPADM

## 2022-12-22 RX ORDER — SODIUM CHLORIDE 0.9 % (FLUSH) 0.9 %
10 SYRINGE (ML) INJECTION AS NEEDED
Status: DISCONTINUED | OUTPATIENT
Start: 2022-12-22 | End: 2022-12-23 | Stop reason: HOSPADM

## 2022-12-22 RX ORDER — ONDANSETRON 2 MG/ML
4 INJECTION INTRAMUSCULAR; INTRAVENOUS ONCE
Status: COMPLETED | OUTPATIENT
Start: 2022-12-22 | End: 2022-12-22

## 2022-12-22 RX ORDER — SODIUM CHLORIDE 0.9 % (FLUSH) 0.9 %
10 SYRINGE (ML) INJECTION EVERY 12 HOURS SCHEDULED
Status: DISCONTINUED | OUTPATIENT
Start: 2022-12-22 | End: 2022-12-23 | Stop reason: HOSPADM

## 2022-12-22 RX ORDER — ONDANSETRON 2 MG/ML
4 INJECTION INTRAMUSCULAR; INTRAVENOUS EVERY 6 HOURS PRN
Status: DISCONTINUED | OUTPATIENT
Start: 2022-12-22 | End: 2022-12-23 | Stop reason: HOSPADM

## 2022-12-22 RX ORDER — FAMOTIDINE 40 MG/1
40 TABLET, FILM COATED ORAL DAILY
Status: DISCONTINUED | OUTPATIENT
Start: 2022-12-22 | End: 2022-12-23 | Stop reason: HOSPADM

## 2022-12-22 RX ORDER — NALOXONE HCL 0.4 MG/ML
0.4 VIAL (ML) INJECTION
Status: DISCONTINUED | OUTPATIENT
Start: 2022-12-22 | End: 2022-12-23 | Stop reason: HOSPADM

## 2022-12-22 RX ORDER — SODIUM CHLORIDE 9 MG/ML
40 INJECTION, SOLUTION INTRAVENOUS AS NEEDED
Status: DISCONTINUED | OUTPATIENT
Start: 2022-12-22 | End: 2022-12-23 | Stop reason: HOSPADM

## 2022-12-22 RX ADMIN — Medication 10 ML: at 21:13

## 2022-12-22 RX ADMIN — SODIUM CHLORIDE 1000 ML: 9 INJECTION, SOLUTION INTRAVENOUS at 14:20

## 2022-12-22 RX ADMIN — ONDANSETRON 4 MG: 2 INJECTION INTRAMUSCULAR; INTRAVENOUS at 14:20

## 2022-12-22 RX ADMIN — MORPHINE SULFATE 2 MG: 2 INJECTION, SOLUTION INTRAMUSCULAR; INTRAVENOUS at 16:17

## 2022-12-22 RX ADMIN — HYDROMORPHONE HYDROCHLORIDE 0.5 MG: 1 INJECTION, SOLUTION INTRAMUSCULAR; INTRAVENOUS; SUBCUTANEOUS at 21:12

## 2022-12-22 RX ADMIN — MORPHINE SULFATE 2 MG: 2 INJECTION, SOLUTION INTRAMUSCULAR; INTRAVENOUS at 14:20

## 2022-12-22 NOTE — ED NOTES
"Nursing report ED to floor  Lashaun Bernal  61 y.o.  female    HPI:   Chief Complaint   Patient presents with    Abdominal Pain    Nausea       Admitting doctor:   No admitting provider for patient encounter.    Consulting provider(s):  Consults       No orders found from 11/23/2022 to 12/23/2022.             Admitting diagnosis:   The primary encounter diagnosis was Calculus of gallbladder without cholecystitis without obstruction. A diagnosis of Gallstones was also pertinent to this visit.    Code status:   Current Code Status       Date Active Code Status Order ID Comments User Context       12/22/2022 1621 CPR (Attempt to Resuscitate) [id number]  Osmar Hoffman MD ED        Question Answer    Code Status (Patient has no pulse and is not breathing) CPR (Attempt to Resuscitate)    Medical Interventions (Patient has pulse or is breathing) Full Support    Level Of Support Discussed With Patient                    Allergies:   Cefprozil and Penicillins    Intake and Output    Intake/Output Summary (Last 24 hours) at 12/22/2022 1635  Last data filed at 12/22/2022 1552  Gross per 24 hour   Intake 1000 ml   Output --   Net 1000 ml       Weight:       12/22/22  1305   Weight: 90.7 kg (200 lb)       Most recent vitals:   Vitals:    12/22/22 1305 12/22/22 1422 12/22/22 1610   BP: 143/85 138/67 130/68   BP Location: Right arm Right arm Left arm   Patient Position: Sitting Sitting Lying   Pulse: 92 81 80   Resp: 20 20 20   Temp: 98.3 °F (36.8 °C)     TempSrc: Oral     SpO2: 96% 97% 97%   Weight: 90.7 kg (200 lb)     Height: 162.6 cm (64\")       Oxygen Therapy: ROOM AIR     Active LDAs/IV Access:   Lines, Drains & Airways       Active LDAs       Name Placement date Placement time Site Days    Peripheral IV Anterior;Right Forearm --  --  Forearm  --                    Labs (abnormal labs have a star):   Labs Reviewed   COMPREHENSIVE METABOLIC PANEL - Abnormal; Notable for the following components:       Result Value    " Glucose 110 (*)     Creatinine 1.04 (*)     CO2 21.0 (*)     ALT (SGPT) 68 (*)     AST (SGOT) 62 (*)     All other components within normal limits    Narrative:     GFR Normal >60  Chronic Kidney Disease <60  Kidney Failure <15     URINALYSIS W/ MICROSCOPIC IF INDICATED (NO CULTURE) - Abnormal; Notable for the following components:    Ketones, UA Trace (*)     All other components within normal limits    Narrative:     Urine microscopic not indicated.   CBC WITH AUTO DIFFERENTIAL - Abnormal; Notable for the following components:    Lymphocytes, Absolute 3.19 (*)     Eosinophils, Absolute 0.56 (*)     All other components within normal limits   LIPASE - Normal   CBC AND DIFFERENTIAL    Narrative:     The following orders were created for panel order CBC & Differential.  Procedure                               Abnormality         Status                     ---------                               -----------         ------                     CBC Auto Differential[[id number]]        Abnormal            Final result                 Please view results for these tests on the individual orders.   GOLD TOP - SST   EXTRA TUBES    Narrative:     The following orders were created for panel order Extra Tubes.  Procedure                               Abnormality         Status                     ---------                               -----------         ------                     Gold Top - SST[[id number]]                                   Final result               Gray Top[[id number]]                                         In process                   Please view results for these tests on the individual orders.   GRAY TOP       Meds given in ED:   Medications   sodium chloride 0.9 % flush 10 mL (has no administration in time range)   sodium chloride 0.9 % flush 10 mL (has no administration in time range)   sodium chloride 0.9 % flush 10 mL (has no administration in time range)   sodium chloride 0.9 % infusion 40 mL (has no  administration in time range)   HYDROcodone-acetaminophen (NORCO) 5-325 MG per tablet 1 tablet (has no administration in time range)   HYDROmorphone (DILAUDID) injection 0.5 mg (has no administration in time range)     And   naloxone (NARCAN) injection 0.4 mg (has no administration in time range)   ondansetron (ZOFRAN) injection 4 mg (has no administration in time range)   famotidine (PEPCID) tablet 40 mg (has no administration in time range)   sodium chloride 0.9 % bolus 1,000 mL (0 mL Intravenous Stopped 12/22/22 1552)   ondansetron (ZOFRAN) injection 4 mg (4 mg Intravenous Given 12/22/22 1420)   morphine injection 2 mg (2 mg Intravenous Given 12/22/22 1420)   morphine injection 2 mg (2 mg Intravenous Given 12/22/22 1617)           NIH Stroke Scale:       Isolation/Infection(s):  No active isolations   No active infections     COVID Testing  Collected na   Resulted na    Nursing report ED to floor:  Mental status: A&O  Ambulatory status: STANDBY  Precautions: STANDARD    ED nurse phone [phone]

## 2022-12-22 NOTE — ED PROVIDER NOTES
"Subjective   History of Present Illness  61 year old female patient presents to ER for complaint of increase in right abdominal pain for past 2 hours. She was seen by Dr. Springer in office yesterday and was scheduled for cholecystectomy on 1/16/2023. She reports that her pain is presently 5/10. She has nausea but denies urinary symptoms, vomiting, diarrhea, or fever. She denies tobacco, ETOH, or illicit drug use.         Review of Systems   Constitutional: Negative.  Negative for fever.   HENT: Negative.    Eyes: Negative.    Respiratory: Negative.    Cardiovascular: Negative.    Gastrointestinal: Positive for abdominal pain and nausea. Negative for diarrhea and vomiting.   Endocrine: Negative.    Genitourinary: Negative.  Negative for dysuria.   Musculoskeletal: Negative.    Skin: Negative.    Allergic/Immunologic: Negative.    Neurological: Negative.    Hematological: Negative.    Psychiatric/Behavioral: Negative.        Past Medical History:   Diagnosis Date   • Allergic rhinitis    • Arthritis    • Asthma    • Breast lump    • C. difficile diarrhea 2013   • Carotid artery disease ([facility])    • Chest pain    • Chronic low back pain    • Constipation    • Depressive disorder    • Diarrhea    • Diverticular disease of colon    • Epigastric pain    • DIETER (generalized anxiety disorder)    • GERD (gastroesophageal reflux disease)    • Head ache    • Hematochezia    • Herpes zoster     mid back, near spine   • Hypertension    • Periumbilical pain    • PONV (postoperative nausea and vomiting)    • Maurice Mountain spotted fever     pt states \"currently has it\"   • Sleep apnea     using c-pap   • SVT (supraventricular tachycardia) ([facility])    • Tachycardia 12/012019    spent 4 days in [facility] in January for this.        Allergies   Allergen Reactions   • Cefprozil Nausea And Vomiting and Rash   • Penicillins Rash       Past Surgical History:   Procedure Laterality Date   • ABDOMINOPLASTY  12/22/2004   • AUGMENTATION " "MAMMAPLASTY     • BREAST AUGMENTATION BILATERAL MASTOPEXY     • BUNIONECTOMY Left 3/20/2017    Procedure: EXCISION OF CALCANEAL SPURS LEFT FOOT AND REATTACHMENT OF ACHILLES TENDON ;  Surgeon: Jarrell Mar MD;  Location: [facility] OR;  Service:    • COLONOSCOPY  01/23/2015   • COLONOSCOPY N/A 2/24/2020    Procedure: COLONOSCOPY;  Surgeon: Santhosh Patrick MD;  Location: [facility] ENDOSCOPY;  Service: Gastroenterology;  Laterality: N/A;   • CYST REMOVAL  08/18/1961    Excision of sebaceous cyst. Left ear   • FINGER/THUMB LESION/CYST EXCISION  10/29/2014   • HAND SURGERY  06/03/2013   • HYSTERECTOMY     • OOPHORECTOMY     • ORIF ULNA/RADIUS FRACTURES  07/22/2013   • TUBAL ABDOMINAL LIGATION  08/14/1985    Laparoscopic tubal sterilization; dilatation and curettage. Desires tubal ligation;         Family History   Problem Relation Age of Onset   • Diabetes Other    • Heart disease Other    • Hypertension Other    • Heart disease Mother         Myocardial infarct   • Hypertension Mother    • Diabetes Mother    • Heart disease Father         CHF       Social History     Socioeconomic History   • Marital status:    Tobacco Use   • Smoking status: Never   • Smokeless tobacco: Never   Vaping Use   • Vaping Use: Never used   Substance and Sexual Activity   • Alcohol use: No   • Drug use: No   • Sexual activity: Defer           Objective    /68 (BP Location: Left arm, Patient Position: Lying)   Pulse 80   Temp 98.3 °F (36.8 °C) (Oral)   Resp 20   Ht 162.6 cm (64\")   Wt 90.7 kg (200 lb)   LMP 03/09/1991 (Approximate)   SpO2 97%   BMI 34.33 kg/m²     Physical Exam  Vitals and nursing note reviewed.   Constitutional:       General: She is not in acute distress.     Appearance: Normal appearance. She is not ill-appearing, toxic-appearing or diaphoretic.   HENT:      Head: Normocephalic.      Nose: Nose normal.      Mouth/Throat:      Mouth: Mucous membranes are moist.   Eyes:      Pupils: Pupils are equal, round, " and reactive to light.   Cardiovascular:      Rate and Rhythm: Normal rate and regular rhythm.      Pulses: Normal pulses.      Heart sounds: Normal heart sounds.   Pulmonary:      Effort: Pulmonary effort is normal. No respiratory distress.      Breath sounds: Normal breath sounds. No stridor. No wheezing or rhonchi.   Abdominal:      General: Bowel sounds are normal. There is no distension.      Palpations: Abdomen is soft.      Tenderness: There is abdominal tenderness in the right upper quadrant.   Musculoskeletal:         General: Normal range of motion.      Cervical back: Normal range of motion.   Skin:     General: Skin is warm and dry.      Capillary Refill: Capillary refill takes less than 2 seconds.   Neurological:      Mental Status: She is alert and oriented to person, place, and time.   Psychiatric:         Mood and Affect: Mood normal.         Behavior: Behavior normal.         Thought Content: Thought content normal.         Judgment: Judgment normal.         Procedures           ED Course  ED Course as of 12/22/22 1624   Thu Dec 22, 2022   6465 Dr. Hoffman is agreeable to come evaluate patient. Spoke to patient about her results and plan for surgical eval. She verbalizes understanding and is agreeable with plan. She denies need for further medications presently. [HS]   1623 Dr. Hoffman has evaluated patient and is agreeable with admission [HS]      ED Course User Index  [HS] Maddie Deluca, APRN            Results for orders placed or performed during the hospital encounter of 12/22/22   Comprehensive Metabolic Panel    Specimen: Blood   Result Value Ref Range    Glucose 110 (H) 65 - 99 mg/dL    BUN 18 8 - 23 mg/dL    Creatinine 1.04 (H) 0.57 - 1.00 mg/dL    Sodium 140 136 - 145 mmol/L    Potassium 4.1 3.5 - 5.2 mmol/L    Chloride 107 98 - 107 mmol/L    CO2 21.0 (L) 22.0 - 29.0 mmol/L    Calcium 9.4 8.6 - 10.5 mg/dL    Total Protein 7.0 6.0 - 8.5 g/dL    Albumin 4.10 3.50 - 5.20 g/dL    ALT (SGPT)  68 (H) 1 - 33 U/L    AST (SGOT) 62 (H) 1 - 32 U/L    Alkaline Phosphatase 90 39 - 117 U/L    Total Bilirubin 0.2 0.0 - 1.2 mg/dL    Globulin 2.9 gm/dL    A/G Ratio 1.4 g/dL    BUN/Creatinine Ratio 17.3 7.0 - 25.0    Anion Gap 12.0 5.0 - 15.0 mmol/L    eGFR 61.3 >60.0 mL/min/1.73   Lipase    Specimen: Blood   Result Value Ref Range    Lipase 24 13 - 60 U/L   Urinalysis With Microscopic If Indicated (No Culture) - Urine, Clean Catch    Specimen: Urine, Clean Catch   Result Value Ref Range    Color, UA Yellow Yellow, Straw, Dark Yellow, Carla    Appearance, UA Clear Clear    pH, UA <=5.0 5.0 - 9.0    Specific Gravity, UA 1.028 1.003 - 1.030    Glucose, UA Negative Negative    Ketones, UA Trace (A) Negative    Bilirubin, UA Negative Negative    Blood, UA Negative Negative    Protein, UA Negative Negative    Leuk Esterase, UA Negative Negative    Nitrite, UA Negative Negative    Urobilinogen, UA 1.0 E.U./dL 0.2 - 1.0 E.U./dL   CBC Auto Differential    Specimen: Blood   Result Value Ref Range    WBC 9.22 3.40 - 10.80 10*3/mm3    RBC 4.61 3.77 - 5.28 10*6/mm3    Hemoglobin 13.1 12.0 - 15.9 g/dL    Hematocrit 39.5 34.0 - 46.6 %    MCV 85.7 79.0 - 97.0 fL    MCH 28.4 26.6 - 33.0 pg    MCHC 33.2 31.5 - 35.7 g/dL    RDW 12.3 12.3 - 15.4 %    RDW-SD 38.2 37.0 - 54.0 fl    MPV 9.6 6.0 - 12.0 fL    Platelets 292 140 - 450 10*3/mm3    Neutrophil % 50.4 42.7 - 76.0 %    Lymphocyte % 34.6 19.6 - 45.3 %    Monocyte % 7.8 5.0 - 12.0 %    Eosinophil % 6.1 0.3 - 6.2 %    Basophil % 0.9 0.0 - 1.5 %    Immature Grans % 0.2 0.0 - 0.5 %    Neutrophils, Absolute 4.65 1.70 - 7.00 10*3/mm3    Lymphocytes, Absolute 3.19 (H) 0.70 - 3.10 10*3/mm3    Monocytes, Absolute 0.72 0.10 - 0.90 10*3/mm3    Eosinophils, Absolute 0.56 (H) 0.00 - 0.40 10*3/mm3    Basophils, Absolute 0.08 0.00 - 0.20 10*3/mm3    Immature Grans, Absolute 0.02 0.00 - 0.05 10*3/mm3    nRBC 0.0 0.0 - 0.2 /100 WBC   Gold Top - SST   Result Value Ref Range    Extra Tube Hold for  add-ons.      US Gallbladder    Result Date: 12/22/2022  As on gallbladder. HISTORY: Cholelithiasis. Increasing pain. Prior exam ultrasound liver December 8, 2022. FINDINGS: The liver demonstrates diffuse increased echogenicity indicating fatty metamorphosis. There are several large stones within the gallbladder. No gallbladder wall thickening. Similar appearance to prior examination December 8, 2022. There is right upper quadrant tenderness to ultrasound palpation, positive Rudd's sign. Normal common bile duct 0.54 cm. Pancreas obscured by overlying bowel gas. Normal right kidney 10.3 x 5.2 x 5.0 cm. Normal aorta and inferior vena cava.     Fatty liver. Cholelithiasis similar appearance to prior examination December 8, 2022. Electronically signed by:  Rahat Cash MD  12/22/2022 2:25 PM [facility] Workstation: [id number]                                      [facility]    Final diagnoses:   Calculus of gallbladder without cholecystitis without obstruction       ED Disposition  ED Disposition     ED Disposition   Decision to Admit    Condition   --    Comment   Level of Care: Med/Surg [1]   Diagnosis: Calculus of gallbladder without cholecystitis without obstruction [[id number]]               Ruben Springer MD  [address] [place] [address] DR  [facility] [address] [place], [place] [id number] [phone]      ER follow up as scheduled         Medication List      No changes were made to your prescriptions during this visit.          Maddie Deluca, APRN  12/22/22 9948

## 2022-12-22 NOTE — H&P
"General Surgery History and Physical Exam        Chief complaint Abdomainal pain      HPI: 62 yo woman on asa/plavix who presents with worsening abdominal pain. She has known cholelithiasis and was previously scheduled for a laparoscopic cholecystectomy with Dr. Springer next month but could not make as her pain became worse.         Review of Systems   Constitutional: Negative for activity change and unexpected weight change.   HENT: Negative for congestion and dental problem.    Eyes: Negative for discharge and itching.   Respiratory: Negative for apnea and chest tightness.    Cardiovascular: Negative for chest pain and palpitations.   Gastrointestinal: Negative for abdominal pain/nausea  Endocrine: Negative for polydipsia and polyuria.   Musculoskeletal: Negative for arthralgias and back pain.   Skin: Negative for color change and pallor.   Neurological: Negative for dizziness and facial asymmetry.   Psychiatric/Behavioral: Negative for agitation and behavioral problems.        Past Medical History:   Diagnosis Date   • Allergic rhinitis    • Arthritis    • Asthma    • Breast lump    • C. difficile diarrhea 2013   • Carotid artery disease ([facility])    • Chest pain    • Chronic low back pain    • Constipation    • Depressive disorder    • Diarrhea    • Diverticular disease of colon    • Epigastric pain    • DIETER (generalized anxiety disorder)    • GERD (gastroesophageal reflux disease)    • Head ache    • Hematochezia    • Herpes zoster     mid back, near spine   • Hypertension    • Periumbilical pain    • PONV (postoperative nausea and vomiting)    • Maurice Mountain spotted fever     pt states \"currently has it\"   • Sleep apnea     using c-pap   • SVT (supraventricular tachycardia) ([facility])    • Tachycardia 12/012019    spent 4 days in [facility] in January for this.      Past Surgical History:   Procedure Laterality Date   • ABDOMINOPLASTY  12/22/2004   • AUGMENTATION MAMMAPLASTY     • BREAST AUGMENTATION BILATERAL " MASTOPEXY     • BUNIONECTOMY Left 3/20/2017    Procedure: EXCISION OF CALCANEAL SPURS LEFT FOOT AND REATTACHMENT OF ACHILLES TENDON ;  Surgeon: Jarrell Mar MD;  Location: [facility] OR;  Service:    • COLONOSCOPY  01/23/2015   • COLONOSCOPY N/A 2/24/2020    Procedure: COLONOSCOPY;  Surgeon: Santhosh Patrick MD;  Location: [facility] ENDOSCOPY;  Service: Gastroenterology;  Laterality: N/A;   • CYST REMOVAL  08/18/1961    Excision of sebaceous cyst. Left ear   • FINGER/THUMB LESION/CYST EXCISION  10/29/2014   • HAND SURGERY  06/03/2013   • HYSTERECTOMY     • OOPHORECTOMY     • ORIF ULNA/RADIUS FRACTURES  07/22/2013   • TUBAL ABDOMINAL LIGATION  08/14/1985    Laparoscopic tubal sterilization; dilatation and curettage. Desires tubal ligation;       Family History   Problem Relation Age of Onset   • Diabetes Other    • Heart disease Other    • Hypertension Other    • Heart disease Mother         Myocardial infarct   • Hypertension Mother    • Diabetes Mother    • Heart disease Father         CHF     Social History     Tobacco Use   • Smoking status: Never   • Smokeless tobacco: Never   Vaping Use   • Vaping Use: Never used   Substance Use Topics   • Alcohol use: No   • Drug use: No     Medications Prior to Admission   Medication Sig Dispense Refill Last Dose   • aspirin 81 MG EC tablet Take 81 mg by mouth Daily.   12/22/2022   • clonazePAM (KlonoPIN) 1 MG tablet    12/21/2022   • clopidogrel (PLAVIX) 75 MG tablet Take 75 mg by mouth Daily.   12/22/2022   • cyclobenzaprine (FLEXERIL) 10 MG tablet TAKE 1 TABLET 3 TIMES A DAY AS NEEDED FOR MUSCLE SPASMS. 270 tablet 1 12/21/2022   • dilTIAZem (CARDIZEM) 30 MG tablet Take 30 mg by mouth 2 (Two) Times a Day.   12/22/2022   • estradiol (ESTRACE) 1 MG tablet Take 1 tablet by mouth Daily. 90 tablet 2 12/21/2022   • gabapentin (NEURONTIN) 300 MG capsule Take 1 capsule by mouth 2 (Two) Times a Day. 180 capsule 1 12/21/2022   • magnesium oxide (MAGOX) 400 (241.3 Mg) MG tablet tablet Take  400 mg by mouth 2 (Two) Times a Day.   12/22/2022   • metoprolol tartrate (LOPRESSOR) 25 MG tablet Take 25 mg by mouth Daily. Half tablet in morning and half at night   12/22/2022   • omeprazole (priLOSEC) 20 MG capsule Take 20 mg by mouth 2 (two) times a day.   12/22/2022   • ondansetron ODT (ZOFRAN-ODT) 4 MG disintegrating tablet Place 1 tablet on the tongue Every 8 (Eight) Hours As Needed for Nausea or Vomiting. 90 tablet 11 12/21/2022   • spironolactone (ALDACTONE) 25 MG tablet Take 25 mg by mouth Daily.   12/22/2022   • albuterol (PROVENTIL) (2.5 MG/3ML) 0.083% nebulizer solution Take 2.5 mg by nebulization Every 6 (Six) Hours As Needed for Wheezing. 120 vial 2    • albuterol sulfate  (90 Base) MCG/ACT inhaler Inhale 2 puffs Every 4 (Four) Hours As Needed for Wheezing. 18 g 2 More than a month   • clonazePAM (KlonoPIN) 0.5 MG tablet       • clonazePAM (KlonoPIN) 1 MG tablet 0.5 mg 2 (Two) Times a Day.      • ferrous sulfate 325 (65 FE) MG EC tablet Take 1 tablet by mouth Daily With Breakfast. 90 tablet 3    • furosemide (LASIX) 40 MG tablet Take 40 mg by mouth As Needed. Takes as needed      • Tirzepatide (Mounjaro) 7.5 MG/0.5ML solution pen-injector Inject 7.5 mg under the skin into the appropriate area as directed 1 (One) Time Per Week. 0.5 mL 1 12/18/2022     Allergies:  Cefprozil and Penicillins    Objective      Vital Signs  Temp:  [98.3 °F (36.8 °C)] 98.3 °F (36.8 °C)  Heart Rate:  [80-92] 80  Resp:  [20] 20  BP: (130-143)/(67-85) 130/68    Allergies   Allergen Reactions   • Cefprozil Nausea And Vomiting and Rash   • Penicillins Rash       Prior to Admission medications    Medication Sig Start Date End Date Taking? Authorizing Provider   aspirin 81 MG EC tablet Take 81 mg by mouth Daily.   Yes Provider, MD Maryam   clonazePAM (KlonoPIN) 1 MG tablet  8/2/22  Yes Provider, Historical, MD   clopidogrel (PLAVIX) 75 MG tablet Take 75 mg by mouth Daily.   Yes Provider, Historical, MD    cyclobenzaprine (FLEXERIL) 10 MG tablet TAKE 1 TABLET 3 TIMES A DAY AS NEEDED FOR MUSCLE SPASMS. 12/6/22  Yes Gretchen Gauthier APRN   dilTIAZem (CARDIZEM) 30 MG tablet Take 30 mg by mouth 2 (Two) Times a Day.   Yes Maryam Mustafa MD   estradiol (ESTRACE) 1 MG tablet Take 1 tablet by mouth Daily. 7/7/22  Yes Gretchen Gauthier APRN   gabapentin (NEURONTIN) 300 MG capsule Take 1 capsule by mouth 2 (Two) Times a Day. 10/7/22  Yes Gretchen Gauthier APRN   magnesium oxide (MAGOX) 400 (241.3 Mg) MG tablet tablet Take 400 mg by mouth 2 (Two) Times a Day. 5/13/20  Yes Maryam Mustafa MD   metoprolol tartrate (LOPRESSOR) 25 MG tablet Take 25 mg by mouth Daily. Half tablet in morning and half at night   Yes Maryam Mustafa MD   omeprazole (priLOSEC) 20 MG capsule Take 20 mg by mouth 2 (two) times a day.   Yes Maryam Mustafa MD   ondansetron ODT (ZOFRAN-ODT) 4 MG disintegrating tablet Place 1 tablet on the tongue Every 8 (Eight) Hours As Needed for Nausea or Vomiting. 12/7/22  Yes Susan Loco APRN   spironolactone (ALDACTONE) 25 MG tablet Take 25 mg by mouth Daily. 9/27/22  Yes Maryam Mustafa MD   albuterol (PROVENTIL) (2.5 MG/3ML) 0.083% nebulizer solution Take 2.5 mg by nebulization Every 6 (Six) Hours As Needed for Wheezing. 11/27/18   Gretchen Gauthier APRN   albuterol sulfate  (90 Base) MCG/ACT inhaler Inhale 2 puffs Every 4 (Four) Hours As Needed for Wheezing. 7/7/22   Gretchen Gauthier APRN   clonazePAM (KlonoPIN) 0.5 MG tablet     Maryam Mustafa MD   clonazePAM (KlonoPIN) 1 MG tablet 0.5 mg 2 (Two) Times a Day. 12/10/22   Maryam Mustafa MD   ferrous sulfate 325 (65 FE) MG EC tablet Take 1 tablet by mouth Daily With Breakfast. 7/7/22   Gauthier, Gretchen M, APRN   furosemide (LASIX) 40 MG tablet Take 40 mg by mouth As Needed. Takes as needed 10/23/20   Provider, MD Maryam   Tirzepatide (Mounjaro) 7.5 MG/0.5ML solution pen-injector Inject 7.5 mg under the skin into the  appropriate area as directed 1 (One) Time Per Week. 12/7/22   Susan Loco APRN       Physical Exam:  Constitutional: Alert, nontoxic  Head: Normocephalic, atraumatic  Nose: No congestion, no rhinorrhea  Mouth: Moist, no exudate  Eyes: Conjunctivae within normal limits, no scleral icterus  Neck: Supple, normal range of motion  Cardiovascular: Normal rate, no lower extremity edema  Pulmonary: Normal effort, symmetrical chest rise  Abdomen: Soft, moderately tender    Results Review:  Lab Results (last 48 hours)     Procedure Component Value Units Date/Time    Extra Tubes [[id number]] Collected: 12/22/22 1338    Specimen: Blood, Venous Line Updated: 12/22/22 1445    Narrative:      The following orders were created for panel order Extra Tubes.  Procedure                               Abnormality         Status                     ---------                               -----------         ------                     Gold Top - SST[[id number]]                                   Final result               Gray Top[[id number]]                                         In process                   Please view results for these tests on the individual orders.    Gold Top - SST [[id number]] Collected: 12/22/22 1338    Specimen: Blood Updated: 12/22/22 1445     Extra Tube Hold for add-ons.     Comment: Auto resulted.       Comprehensive Metabolic Panel [[id number]]  (Abnormal) Collected: 12/22/22 1333    Specimen: Blood Updated: 12/22/22 1357     Glucose 110 mg/dL      BUN 18 mg/dL      Creatinine 1.04 mg/dL      Sodium 140 mmol/L      Potassium 4.1 mmol/L      Chloride 107 mmol/L      CO2 21.0 mmol/L      Calcium 9.4 mg/dL      Total Protein 7.0 g/dL      Albumin 4.10 g/dL      ALT (SGPT) 68 U/L      AST (SGOT) 62 U/L      Alkaline Phosphatase 90 U/L      Total Bilirubin 0.2 mg/dL      Globulin 2.9 gm/dL      A/G Ratio 1.4 g/dL      BUN/Creatinine Ratio 17.3     Anion Gap 12.0 mmol/L      eGFR 61.3 mL/min/1.73      Comment:  National Kidney Foundation and American Society of Nephrology (ASN) Task Force recommended calculation based on the Chronic Kidney Disease Epidemiology Collaboration (CKD-EPI) equation refit without adjustment for race.       Narrative:      GFR Normal >60  Chronic Kidney Disease <60  Kidney Failure <15      Lipase [[id number]]  (Normal) Collected: 12/22/22 1333    Specimen: Blood Updated: 12/22/22 1357     Lipase 24 U/L     Urinalysis With Microscopic If Indicated (No Culture) - Urine, Clean Catch [[id number]]  (Abnormal) Collected: 12/22/22 1327    Specimen: Urine, Clean Catch Updated: 12/22/22 1341     Color, UA Yellow     Appearance, UA Clear     pH, UA <=5.0     Specific Gravity, UA 1.028     Glucose, UA Negative     Ketones, UA Trace     Bilirubin, UA Negative     Blood, UA Negative     Protein, UA Negative     Leuk Esterase, UA Negative     Nitrite, UA Negative     Urobilinogen, UA 1.0 E.U./dL    Narrative:      Urine microscopic not indicated.    CBC & Differential [[id number]]  (Abnormal) Collected: 12/22/22 1333    Specimen: Blood Updated: 12/22/22 1339    Narrative:      The following orders were created for panel order CBC & Differential.  Procedure                               Abnormality         Status                     ---------                               -----------         ------                     CBC Auto Differential[[id number]]        Abnormal            Final result                 Please view results for these tests on the individual orders.    CBC Auto Differential [[id number]]  (Abnormal) Collected: 12/22/22 1333    Specimen: Blood Updated: 12/22/22 1339     WBC 9.22 10*3/mm3      RBC 4.61 10*6/mm3      Hemoglobin 13.1 g/dL      Hematocrit 39.5 %      MCV 85.7 fL      MCH 28.4 pg      MCHC 33.2 g/dL      RDW 12.3 %      RDW-SD 38.2 fl      MPV 9.6 fL      Platelets 292 10*3/mm3      Neutrophil % 50.4 %      Lymphocyte % 34.6 %      Monocyte % 7.8 %      Eosinophil % 6.1 %      Basophil %  0.9 %      Immature Grans % 0.2 %      Neutrophils, Absolute 4.65 10*3/mm3      Lymphocytes, Absolute 3.19 10*3/mm3      Monocytes, Absolute 0.72 10*3/mm3      Eosinophils, Absolute 0.56 10*3/mm3      Basophils, Absolute 0.08 10*3/mm3      Immature Grans, Absolute 0.02 10*3/mm3      nRBC 0.0 /100 WBC     [place] Top [[id number]] Collected: 12/22/22 1338    Specimen: Blood Updated: 12/22/22 1338        Imaging Results (Last 48 Hours)     Procedure Component Value Units Date/Time    US Gallbladder [[id number]] Collected: 12/22/22 1327     Updated: 12/22/22 1427    Narrative:      As on gallbladder.    HISTORY: Cholelithiasis. Increasing pain.    Prior exam ultrasound liver December 8, 2022.    FINDINGS: The liver demonstrates diffuse increased echogenicity  indicating fatty metamorphosis.    There are several large stones within the gallbladder. No  gallbladder wall thickening. Similar appearance to prior  examination December 8, 2022.    There is right upper quadrant tenderness to ultrasound palpation,  positive Rudd's sign.    Normal common bile duct 0.54 cm. Pancreas obscured by overlying  bowel gas.    Normal right kidney 10.3 x 5.2 x 5.0 cm.    Normal aorta and inferior vena cava.      Impression:      Fatty liver. Cholelithiasis similar appearance to  prior examination December 8, 2022.    Electronically signed by:  Rahat Cash MD  12/22/2022 2:25 PM CST  Workstation: [id number]           I reviewed the patient's new clinical results.  I reviewed the patient's new imaging results and agree with the interpretation.        Assessment & Plan   62 yo woman with symptomatic cholelithiasis    - will plan on lap latisha tomorrow. She is on aspirin and plavix so I will allow that to wear off somewhat. I have discussed the risks, benefits and alternatives to laparoscopic cholecystectomy with intraoperative cholangiogram. She understands these risks and wishes to proceed with surgery.      Calculus of gallbladder without  cholecystitis without obstruction    Gallstones          I discussed the patients findings and my recommendations with patient, family and consulting provider    Osmar Hoffman MD  12/22/22  17:34 CST

## 2022-12-23 ENCOUNTER — ANESTHESIA EVENT (OUTPATIENT)
Dept: PERIOP | Facility: HOSPITAL | Age: 62
End: 2022-12-23

## 2022-12-23 ENCOUNTER — APPOINTMENT (OUTPATIENT)
Dept: GENERAL RADIOLOGY | Facility: HOSPITAL | Age: 62
End: 2022-12-23

## 2022-12-23 ENCOUNTER — ANESTHESIA (OUTPATIENT)
Dept: PERIOP | Facility: HOSPITAL | Age: 62
End: 2022-12-23

## 2022-12-23 ENCOUNTER — READMISSION MANAGEMENT (OUTPATIENT)
Dept: CALL CENTER | Facility: HOSPITAL | Age: 62
End: 2022-12-23

## 2022-12-23 VITALS
TEMPERATURE: 96.4 F | WEIGHT: 199.8 LBS | DIASTOLIC BLOOD PRESSURE: 55 MMHG | HEART RATE: 82 BPM | HEIGHT: 64 IN | SYSTOLIC BLOOD PRESSURE: 118 MMHG | BODY MASS INDEX: 34.11 KG/M2 | OXYGEN SATURATION: 100 % | RESPIRATION RATE: 18 BRPM

## 2022-12-23 PROCEDURE — 25010000002 ONDANSETRON PER 1 MG: Performed by: NURSE ANESTHETIST, CERTIFIED REGISTERED

## 2022-12-23 PROCEDURE — 25010000002 HYDROMORPHONE 1 MG/ML SOLUTION: Performed by: STUDENT IN AN ORGANIZED HEALTH CARE EDUCATION/TRAINING PROGRAM

## 2022-12-23 PROCEDURE — 25010000002 NEOSTIGMINE 10 MG/10ML SOLUTION: Performed by: NURSE ANESTHETIST, CERTIFIED REGISTERED

## 2022-12-23 PROCEDURE — 88304 TISSUE EXAM BY PATHOLOGIST: CPT

## 2022-12-23 PROCEDURE — 47562 LAPAROSCOPIC CHOLECYSTECTOMY: CPT | Performed by: STUDENT IN AN ORGANIZED HEALTH CARE EDUCATION/TRAINING PROGRAM

## 2022-12-23 PROCEDURE — 25010000002 MIDAZOLAM PER 1 MG: Performed by: NURSE ANESTHETIST, CERTIFIED REGISTERED

## 2022-12-23 PROCEDURE — G0378 HOSPITAL OBSERVATION PER HR: HCPCS

## 2022-12-23 PROCEDURE — 25010000002 FENTANYL CITRATE (PF) 50 MCG/ML SOLUTION: Performed by: NURSE ANESTHETIST, CERTIFIED REGISTERED

## 2022-12-23 PROCEDURE — 25010000002 SUCCINYLCHOLINE PER 20 MG: Performed by: NURSE ANESTHETIST, CERTIFIED REGISTERED

## 2022-12-23 PROCEDURE — 99024 POSTOP FOLLOW-UP VISIT: CPT | Performed by: STUDENT IN AN ORGANIZED HEALTH CARE EDUCATION/TRAINING PROGRAM

## 2022-12-23 PROCEDURE — 25010000002 DEXAMETHASONE PER 1 MG: Performed by: NURSE ANESTHETIST, CERTIFIED REGISTERED

## 2022-12-23 PROCEDURE — 25010000002 CEFAZOLIN PER 500 MG: Performed by: SURGERY

## 2022-12-23 PROCEDURE — 25010000002 HYDROMORPHONE 1 MG/ML SOLUTION: Performed by: NURSE ANESTHETIST, CERTIFIED REGISTERED

## 2022-12-23 PROCEDURE — 25010000002 PROPOFOL 200 MG/20ML EMULSION: Performed by: NURSE ANESTHETIST, CERTIFIED REGISTERED

## 2022-12-23 PROCEDURE — 96376 TX/PRO/DX INJ SAME DRUG ADON: CPT

## 2022-12-23 DEVICE — LIGAMAX 5 MM ENDOSCOPIC MULTIPLE CLIP APPLIER
Type: IMPLANTABLE DEVICE | Site: ABDOMEN | Status: FUNCTIONAL
Brand: LIGAMAX

## 2022-12-23 DEVICE — ABSORBABLE HEMOSTAT (OXIDIZED REGENERATED CELLULOSE, U.S.P.)
Type: IMPLANTABLE DEVICE | Site: ABDOMEN | Status: FUNCTIONAL
Brand: SURGICEL

## 2022-12-23 DEVICE — FLOSEAL HEMOSTATIC MATRIX, 10ML
Type: IMPLANTABLE DEVICE | Site: ABDOMEN | Status: FUNCTIONAL
Brand: FLOSEAL HEMOSTATIC MATRIX

## 2022-12-23 RX ORDER — HYDROCODONE BITARTRATE AND ACETAMINOPHEN 5; 325 MG/1; MG/1
1 TABLET ORAL EVERY 6 HOURS PRN
Qty: 20 TABLET | Refills: 0 | Status: SHIPPED | OUTPATIENT
Start: 2022-12-23 | End: 2022-12-23 | Stop reason: HOSPADM

## 2022-12-23 RX ORDER — HYDROCODONE BITARTRATE AND ACETAMINOPHEN 5; 325 MG/1; MG/1
1 TABLET ORAL EVERY 4 HOURS PRN
Qty: 20 TABLET | Refills: 0 | Status: SHIPPED | OUTPATIENT
Start: 2022-12-23 | End: 2022-12-23 | Stop reason: HOSPADM

## 2022-12-23 RX ORDER — SODIUM CHLORIDE 0.9 % (FLUSH) 0.9 %
10 SYRINGE (ML) INJECTION AS NEEDED
Status: DISCONTINUED | OUTPATIENT
Start: 2022-12-23 | End: 2022-12-23 | Stop reason: HOSPADM

## 2022-12-23 RX ORDER — EPHEDRINE SULFATE 50 MG/ML
INJECTION INTRAVENOUS AS NEEDED
Status: DISCONTINUED | OUTPATIENT
Start: 2022-12-23 | End: 2022-12-23 | Stop reason: SURG

## 2022-12-23 RX ORDER — MIDAZOLAM HYDROCHLORIDE 1 MG/ML
INJECTION INTRAMUSCULAR; INTRAVENOUS AS NEEDED
Status: DISCONTINUED | OUTPATIENT
Start: 2022-12-23 | End: 2022-12-23 | Stop reason: SURG

## 2022-12-23 RX ORDER — ACETAMINOPHEN 650 MG/1
650 SUPPOSITORY RECTAL ONCE AS NEEDED
Status: DISCONTINUED | OUTPATIENT
Start: 2022-12-23 | End: 2022-12-23 | Stop reason: HOSPADM

## 2022-12-23 RX ORDER — SODIUM CHLORIDE 0.9 % (FLUSH) 0.9 %
10 SYRINGE (ML) INJECTION EVERY 12 HOURS SCHEDULED
Status: DISCONTINUED | OUTPATIENT
Start: 2022-12-23 | End: 2022-12-23 | Stop reason: HOSPADM

## 2022-12-23 RX ORDER — ONDANSETRON 2 MG/ML
INJECTION INTRAMUSCULAR; INTRAVENOUS AS NEEDED
Status: DISCONTINUED | OUTPATIENT
Start: 2022-12-23 | End: 2022-12-23 | Stop reason: SURG

## 2022-12-23 RX ORDER — ACETAMINOPHEN 325 MG/1
650 TABLET ORAL ONCE AS NEEDED
Status: DISCONTINUED | OUTPATIENT
Start: 2022-12-23 | End: 2022-12-23 | Stop reason: HOSPADM

## 2022-12-23 RX ORDER — PROPOFOL 10 MG/ML
INJECTION, EMULSION INTRAVENOUS AS NEEDED
Status: DISCONTINUED | OUTPATIENT
Start: 2022-12-23 | End: 2022-12-23 | Stop reason: SURG

## 2022-12-23 RX ORDER — DEXAMETHASONE SODIUM PHOSPHATE 4 MG/ML
INJECTION, SOLUTION INTRA-ARTICULAR; INTRALESIONAL; INTRAMUSCULAR; INTRAVENOUS; SOFT TISSUE AS NEEDED
Status: DISCONTINUED | OUTPATIENT
Start: 2022-12-23 | End: 2022-12-23 | Stop reason: SURG

## 2022-12-23 RX ORDER — NEOSTIGMINE METHYLSULFATE 1 MG/ML
INJECTION, SOLUTION INTRAVENOUS AS NEEDED
Status: DISCONTINUED | OUTPATIENT
Start: 2022-12-23 | End: 2022-12-23 | Stop reason: SURG

## 2022-12-23 RX ORDER — DIPHENHYDRAMINE HCL 25 MG
25 CAPSULE ORAL
Status: DISCONTINUED | OUTPATIENT
Start: 2022-12-23 | End: 2022-12-23 | Stop reason: HOSPADM

## 2022-12-23 RX ORDER — LIDOCAINE HYDROCHLORIDE AND EPINEPHRINE 10; 10 MG/ML; UG/ML
INJECTION, SOLUTION INFILTRATION; PERINEURAL AS NEEDED
Status: DISCONTINUED | OUTPATIENT
Start: 2022-12-23 | End: 2022-12-23 | Stop reason: HOSPADM

## 2022-12-23 RX ORDER — HYDRALAZINE HYDROCHLORIDE 20 MG/ML
5 INJECTION INTRAMUSCULAR; INTRAVENOUS
Status: DISCONTINUED | OUTPATIENT
Start: 2022-12-23 | End: 2022-12-23 | Stop reason: HOSPADM

## 2022-12-23 RX ORDER — ONDANSETRON 2 MG/ML
4 INJECTION INTRAMUSCULAR; INTRAVENOUS ONCE AS NEEDED
Status: COMPLETED | OUTPATIENT
Start: 2022-12-23 | End: 2022-12-23

## 2022-12-23 RX ORDER — FENTANYL CITRATE 50 UG/ML
INJECTION, SOLUTION INTRAMUSCULAR; INTRAVENOUS AS NEEDED
Status: DISCONTINUED | OUTPATIENT
Start: 2022-12-23 | End: 2022-12-23 | Stop reason: SURG

## 2022-12-23 RX ORDER — ASPIRIN 81 MG/1
81 TABLET ORAL DAILY
Qty: 1 TABLET
Start: 2022-12-26

## 2022-12-23 RX ORDER — NALOXONE HCL 0.4 MG/ML
0.4 VIAL (ML) INJECTION AS NEEDED
Status: DISCONTINUED | OUTPATIENT
Start: 2022-12-23 | End: 2022-12-23 | Stop reason: HOSPADM

## 2022-12-23 RX ORDER — ROCURONIUM BROMIDE 10 MG/ML
INJECTION, SOLUTION INTRAVENOUS AS NEEDED
Status: DISCONTINUED | OUTPATIENT
Start: 2022-12-23 | End: 2022-12-23 | Stop reason: SURG

## 2022-12-23 RX ORDER — SCOLOPAMINE TRANSDERMAL SYSTEM 1 MG/1
1 PATCH, EXTENDED RELEASE TRANSDERMAL ONCE
Status: DISCONTINUED | OUTPATIENT
Start: 2022-12-23 | End: 2022-12-23 | Stop reason: HOSPADM

## 2022-12-23 RX ORDER — SUCCINYLCHOLINE CHLORIDE 20 MG/ML
INJECTION INTRAMUSCULAR; INTRAVENOUS AS NEEDED
Status: DISCONTINUED | OUTPATIENT
Start: 2022-12-23 | End: 2022-12-23 | Stop reason: SURG

## 2022-12-23 RX ORDER — IPRATROPIUM BROMIDE AND ALBUTEROL SULFATE 2.5; .5 MG/3ML; MG/3ML
3 SOLUTION RESPIRATORY (INHALATION) ONCE AS NEEDED
Status: DISCONTINUED | OUTPATIENT
Start: 2022-12-23 | End: 2022-12-23 | Stop reason: HOSPADM

## 2022-12-23 RX ORDER — SODIUM CHLORIDE, SODIUM LACTATE, POTASSIUM CHLORIDE, CALCIUM CHLORIDE 600; 310; 30; 20 MG/100ML; MG/100ML; MG/100ML; MG/100ML
100 INJECTION, SOLUTION INTRAVENOUS CONTINUOUS
Status: DISCONTINUED | OUTPATIENT
Start: 2022-12-23 | End: 2022-12-23 | Stop reason: HOSPADM

## 2022-12-23 RX ORDER — DIPHENHYDRAMINE HYDROCHLORIDE 50 MG/ML
12.5 INJECTION INTRAMUSCULAR; INTRAVENOUS
Status: DISCONTINUED | OUTPATIENT
Start: 2022-12-23 | End: 2022-12-23 | Stop reason: HOSPADM

## 2022-12-23 RX ORDER — EPHEDRINE SULFATE 50 MG/ML
5 INJECTION, SOLUTION INTRAVENOUS ONCE AS NEEDED
Status: DISCONTINUED | OUTPATIENT
Start: 2022-12-23 | End: 2022-12-23 | Stop reason: HOSPADM

## 2022-12-23 RX ORDER — LIDOCAINE HYDROCHLORIDE 20 MG/ML
INJECTION, SOLUTION EPIDURAL; INFILTRATION; INTRACAUDAL; PERINEURAL AS NEEDED
Status: DISCONTINUED | OUTPATIENT
Start: 2022-12-23 | End: 2022-12-23 | Stop reason: SURG

## 2022-12-23 RX ORDER — SODIUM CHLORIDE, SODIUM GLUCONATE, SODIUM ACETATE, POTASSIUM CHLORIDE AND MAGNESIUM CHLORIDE 526; 502; 368; 37; 30 MG/100ML; MG/100ML; MG/100ML; MG/100ML; MG/100ML
INJECTION, SOLUTION INTRAVENOUS CONTINUOUS PRN
Status: DISCONTINUED | OUTPATIENT
Start: 2022-12-23 | End: 2022-12-23 | Stop reason: SURG

## 2022-12-23 RX ORDER — SODIUM CHLORIDE 9 MG/ML
40 INJECTION, SOLUTION INTRAVENOUS AS NEEDED
Status: DISCONTINUED | OUTPATIENT
Start: 2022-12-23 | End: 2022-12-23 | Stop reason: HOSPADM

## 2022-12-23 RX ORDER — BUPIVACAINE HCL/0.9 % NACL/PF 0.1 %
2 PLASTIC BAG, INJECTION (ML) EPIDURAL ONCE
Status: COMPLETED | OUTPATIENT
Start: 2022-12-23 | End: 2022-12-23

## 2022-12-23 RX ORDER — OXYCODONE HYDROCHLORIDE AND ACETAMINOPHEN 5; 325 MG/1; MG/1
1 TABLET ORAL EVERY 6 HOURS PRN
Qty: 20 TABLET | Refills: 0 | Status: SHIPPED | OUTPATIENT
Start: 2022-12-23 | End: 2023-01-16

## 2022-12-23 RX ORDER — CLOPIDOGREL BISULFATE 75 MG/1
75 TABLET ORAL DAILY
Qty: 30 TABLET
Start: 2022-12-26

## 2022-12-23 RX ORDER — LABETALOL HYDROCHLORIDE 5 MG/ML
5 INJECTION, SOLUTION INTRAVENOUS
Status: DISCONTINUED | OUTPATIENT
Start: 2022-12-23 | End: 2022-12-23 | Stop reason: HOSPADM

## 2022-12-23 RX ADMIN — PROPOFOL 150 MG: 10 INJECTION, EMULSION INTRAVENOUS at 07:46

## 2022-12-23 RX ADMIN — SCOLOPAMINE TRANSDERMAL SYSTEM 1 PATCH: 1 PATCH, EXTENDED RELEASE TRANSDERMAL at 07:20

## 2022-12-23 RX ADMIN — LIDOCAINE HYDROCHLORIDE 800 MG: 20 INJECTION, SOLUTION EPIDURAL; INFILTRATION; INTRACAUDAL; PERINEURAL at 07:46

## 2022-12-23 RX ADMIN — HYDROMORPHONE HYDROCHLORIDE 0.5 MG: 1 INJECTION, SOLUTION INTRAMUSCULAR; INTRAVENOUS; SUBCUTANEOUS at 09:19

## 2022-12-23 RX ADMIN — DEXAMETHASONE SODIUM PHOSPHATE 4 MG: 4 INJECTION, SOLUTION INTRAMUSCULAR; INTRAVENOUS at 07:55

## 2022-12-23 RX ADMIN — FENTANYL CITRATE 25 MCG: 50 INJECTION, SOLUTION INTRAMUSCULAR; INTRAVENOUS at 08:21

## 2022-12-23 RX ADMIN — EPHEDRINE SULFATE 15 MG: 50 INJECTION INTRAVENOUS at 08:10

## 2022-12-23 RX ADMIN — SUCCINYLCHOLINE CHLORIDE 140 MG: 20 INJECTION, SOLUTION INTRAMUSCULAR; INTRAVENOUS at 07:47

## 2022-12-23 RX ADMIN — FENTANYL CITRATE 25 MCG: 50 INJECTION, SOLUTION INTRAMUSCULAR; INTRAVENOUS at 08:19

## 2022-12-23 RX ADMIN — GLYCOPYRROLATE 0.4 MCG: 0.2 INJECTION, SOLUTION INTRAMUSCULAR; INTRAVITREAL at 08:44

## 2022-12-23 RX ADMIN — HYDROMORPHONE HYDROCHLORIDE 0.5 MG: 1 INJECTION, SOLUTION INTRAMUSCULAR; INTRAVENOUS; SUBCUTANEOUS at 05:51

## 2022-12-23 RX ADMIN — ROCURONIUM BROMIDE 25 MG: 50 INJECTION, SOLUTION INTRAVENOUS at 08:00

## 2022-12-23 RX ADMIN — SODIUM CHLORIDE, SODIUM GLUCONATE, SODIUM ACETATE, POTASSIUM CHLORIDE AND MAGNESIUM CHLORIDE: 526; 502; 368; 37; 30 INJECTION, SOLUTION INTRAVENOUS at 07:30

## 2022-12-23 RX ADMIN — FENTANYL CITRATE 50 MCG: 50 INJECTION, SOLUTION INTRAMUSCULAR; INTRAVENOUS at 07:43

## 2022-12-23 RX ADMIN — ONDANSETRON 4 MG: 2 INJECTION INTRAMUSCULAR; INTRAVENOUS at 09:00

## 2022-12-23 RX ADMIN — Medication 10 ML: at 05:51

## 2022-12-23 RX ADMIN — ONDANSETRON 4 MG: 2 INJECTION INTRAMUSCULAR; INTRAVENOUS at 08:31

## 2022-12-23 RX ADMIN — ROCURONIUM BROMIDE 5 MG: 50 INJECTION, SOLUTION INTRAVENOUS at 07:46

## 2022-12-23 RX ADMIN — Medication 2 G: at 07:50

## 2022-12-23 RX ADMIN — MIDAZOLAM HYDROCHLORIDE 2 MG: 1 INJECTION, SOLUTION INTRAMUSCULAR; INTRAVENOUS at 07:38

## 2022-12-23 RX ADMIN — EPHEDRINE SULFATE 10 MG: 50 INJECTION INTRAVENOUS at 08:12

## 2022-12-23 RX ADMIN — HYDROMORPHONE HYDROCHLORIDE 0.5 MG: 1 INJECTION, SOLUTION INTRAMUSCULAR; INTRAVENOUS; SUBCUTANEOUS at 02:23

## 2022-12-23 RX ADMIN — NEOSTIGMINE METHYLSULFATE 3 MG: 1 INJECTION, SOLUTION INTRAVENOUS at 08:44

## 2022-12-23 RX ADMIN — HYDROMORPHONE HYDROCHLORIDE 0.5 MG: 1 INJECTION, SOLUTION INTRAMUSCULAR; INTRAVENOUS; SUBCUTANEOUS at 09:09

## 2022-12-23 RX ADMIN — Medication 10 ML: at 02:23

## 2022-12-23 NOTE — PROGRESS NOTES
GENERAL SURGERY PROGRESS NOTE     LOS: 0 days         Interval History:     Still having pain    Medication Review:   ceFAZolin, 2 g, Intravenous, Once  famotidine, 40 mg, Oral, Daily  Scopolamine, 1 patch, Transdermal, Once  sodium chloride, 10 mL, Intravenous, Q12H  sodium chloride, 10 mL, Intravenous, Q12H        lactated ringers, 100 mL/hr    Objective     Vital Signs:  Temp:  [96 °F (35.6 °C)-98.6 °F (37 °C)] 97.7 °F (36.5 °C)  Heart Rate:  [72-92] 73  Resp:  [18-20] 18  BP: (101-143)/(55-85) 128/69    Intake/Output Summary (Last 24 hours) at 12/23/2022 0732  Last data filed at 12/22/2022 1552  Gross per 24 hour   Intake 1000 ml   Output --   Net 1000 ml       Physical Exam  Constitutional:       Appearance: Normal appearance.   HENT:      Head: Normocephalic and atraumatic.      Nose: Nose normal. No congestion.      Mouth/Throat:      Mouth: Mucous membranes are moist.      Pharynx: Oropharynx is clear.   Eyes:      General: No scleral icterus.     Pupils: Pupils are equal, round, and reactive to light.   Cardiovascular:      Rate and Rhythm: Normal rate and regular rhythm.   Pulmonary:      Effort: Pulmonary effort is normal. No respiratory distress.   Abdominal:      Tenderness: There is abdominal tenderness.   Skin:     General: Skin is warm and dry.   Neurological:      General: No focal deficit present.      Mental Status: She is alert and oriented to person, place, and time.   Psychiatric:         Mood and Affect: Mood normal.         Behavior: Behavior normal.         Results Review:    Results from last 7 days   Lab Units 12/22/22  1333   SODIUM mmol/L 140   POTASSIUM mmol/L 4.1   CHLORIDE mmol/L 107   CO2 mmol/L 21.0*   BUN mg/dL 18   CREATININE mg/dL 1.04*   GLUCOSE mg/dL 110*   CALCIUM mg/dL 9.4     Results from last 7 days   Lab Units 12/22/22  1333   WBC 10*3/mm3 9.22   HEMOGLOBIN g/dL 13.1   HEMATOCRIT % 39.5   PLATELETS 10*3/mm3 292       Assessment:    Calculus of gallbladder without  cholecystitis without obstruction    Gallstones      60 yo woman with cholelithiasis    Plan:    - OR today for laparoscopic cholecystectomy, possible intraoperative cholangiogram. We discussed the risks of the operation including injury to common bile duct and also discussed that she is at higher risk of postoperative bleeding issue given her current dual antiplatelet therapy. She understands this and wishes to proceed.       This document has been electronically signed by Osmar Hoffman MD on December 23, 2022 07:32 CST        Osmar Hoffman MD  12/23/22  07:32 CST

## 2022-12-23 NOTE — PLAN OF CARE
Goal Outcome Evaluation: Patient is accepting of up coming surgical intervention. She denies any anxiety about procedure. She is resting well with pain being controled. She is alert, oriented and self ambulatory. No concerns at this time.   Problem: Adult Inpatient Plan of Care  Goal: Plan of Care Review  Outcome: Ongoing, Progressing

## 2022-12-23 NOTE — ANESTHESIA POSTPROCEDURE EVALUATION
Patient: Lashaun Bernal    Procedure Summary     Date: 12/23/22 Room / Location: [facility] OR 03 / [facility] OR    Anesthesia Start: 0740 Anesthesia Stop: 0900    Procedure: CHOLECYSTECTOMY LAPAROSCOPIC (Abdomen) Diagnosis:       Gallstones      (Gallstones [K80.20])    Surgeons: Osmar Hoffman MD Provider: Dawn Hernandez CRNA    Anesthesia Type: general ASA Status: 3          Anesthesia Type: general    Vitals  Vitals Value Taken Time   /58 12/23/22 0854   Temp 97.5 °F (36.4 °C) 12/23/22 0854   Pulse 84 12/23/22 0854   Resp 12 12/23/22 0854   SpO2 94 % 12/23/22 0854           Post Anesthesia Care and Evaluation    Patient location during evaluation: PACU  Patient participation: complete - patient participated  Level of consciousness: sleepy but conscious  Pain management: adequate    Airway patency: patent  Anesthetic complications: No anesthetic complications  PONV Status: none  Cardiovascular status: acceptable and hemodynamically stable  Respiratory status: acceptable, spontaneous ventilation and room air  Hydration status: acceptable    Comments: ---------------------------               12/23/22 0854         ---------------------------   BP:          116/58         Pulse:         84           Resp:          12           Temp:   97.5 °F (36.4 °C)   SpO2:          94%         ---------------------------

## 2022-12-23 NOTE — ANESTHESIA PREPROCEDURE EVALUATION
Anesthesia Evaluation     Patient summary reviewed and Nursing notes reviewed   history of anesthetic complications (Scopalamine patch ordered pre-op. ): PONV  NPO Solid Status: > 8 hours  NPO Liquid Status: > 8 hours           Airway   Mallampati: II  TM distance: >3 FB  Neck ROM: full  possible difficult intubation and Large neck circumference  Comment: CRNA: ALLEN LOMBARDI     Indications and Patient Condition  Indications for airway management: airway protection    Preoxygenated: yes  Mask difficulty assessment: 1 - vent by mask     Final Airway Details  Final airway type: endotracheal airway        Successful airway: ETT  Cuffed: yes   Successful intubation technique: direct laryngoscopy  Facilitating devices/methods: intubating stylet  Endotracheal tube insertion site: oral  Blade: José  Blade size: #3  ETT size: 7.0 mm  Cormack-Lehane Classification: grade I - full view of glottis  Placement verified by: chest auscultation and capnometry   Measured from: teeth  ETT to teeth (cm): 20  Number of attempts at approach: 1  Dental    (+) poor dentation    Comment: Upper crowns with overall poor repair.    Pulmonary     breath sounds clear to auscultation  (+) asthma,  (-) shortness of breath, not a smoker  Cardiovascular     PT is on anticoagulation therapy  Patient on routine beta blocker and Beta blocker given within 24 hours of surgery  Rhythm: regular  Rate: normal    (+) hypertension,  carotid artery disease carotid bilateral  (-) angina, MOORE, murmur      Neuro/Psych  (+) headaches, numbness (Idiopathic neuropathy), psychiatric history Anxiety and Depression,    GI/Hepatic/Renal/Endo    (+) obesity,  GERD well controlled,    Renal disease: Creatinine 1.04.    Musculoskeletal     Abdominal   (+) obese,    Substance History - negative use     OB/GYN negative ob/gyn ROS         Other   arthritis,                    Anesthesia Plan    ASA 3     general     intravenous induction     Anesthetic plan,  risks, benefits, and alternatives have been provided, discussed and informed consent has been obtained with: patient.  Pre-procedure education provided  Plan discussed with CRNA.        CODE STATUS:    Level Of Support Discussed With: Patient  Code Status (Patient has no pulse and is not breathing): CPR (Attempt to Resuscitate)  Medical Interventions (Patient has pulse or is breathing): Full Support

## 2022-12-23 NOTE — DISCHARGE SUMMARY
Discharge Summary  Date: 12/23/22  Service: General Surgery  Attending: Osmar Hoffman MD    Procedures: Laparoscopic cholecystectomy  Consults: none  Admitting Diagnoses: Gallstones [K80.20]  Calculus of gallbladder without cholecystitis without obstruction [K80.20]   Discharge Diagnoses: Cholelithiasis  Hospital Course: Patient was admitted with symptomatic cholelithiasis. She was on anitplatelet therapy and so we elected to wait to perform her surgery. This was done the following day without issue. She tolerated the procedure well. She was discharged afterwards when her pain was controlled with oral medications and she was able to tolerate a diet.    Condition at time of Discharge: good  Discharge Diet: Regular      Discharge Medications:     Your medication list      START taking these medications      Instructions Last Dose Given Next Dose Due   HYDROcodone-acetaminophen 5-325 MG per tablet  Commonly known as: NORCO      Take 1 tablet by mouth Every 6 (Six) Hours As Needed (Pain) for up to 20 doses.       HYDROcodone-acetaminophen 5-325 MG per tablet  Commonly known as: NORCO      Take 1 tablet by mouth Every 4 (Four) Hours As Needed for Moderate Pain for up to 20 doses.          CHANGE how you take these medications      Instructions Last Dose Given Next Dose Due   albuterol sulfate  (90 Base) MCG/ACT inhaler  Commonly known as: PROVENTIL HFA;VENTOLIN HFA;PROAIR HFA  What changed: Another medication with the same name was removed. Continue taking this medication, and follow the directions you see here.      Inhale 2 puffs Every 4 (Four) Hours As Needed for Wheezing.       aspirin 81 MG EC tablet  Start taking on: December 26, 2022  What changed: These instructions start on December 26, 2022. If you are unsure what to do until then, ask your doctor or other care provider.      Take 1 tablet by mouth Daily.       clonazePAM 1 MG tablet  Commonly known as: KlonoPIN  What changed: Another medication with  the same name was removed. Continue taking this medication, and follow the directions you see here.           clopidogrel 75 MG tablet  Commonly known as: PLAVIX  Start taking on: December 26, 2022  What changed: These instructions start on December 26, 2022. If you are unsure what to do until then, ask your doctor or other care provider.      Take 1 tablet by mouth Daily.          CONTINUE taking these medications      Instructions Last Dose Given Next Dose Due   cyclobenzaprine 10 MG tablet  Commonly known as: FLEXERIL      TAKE 1 TABLET 3 TIMES A DAY AS NEEDED FOR MUSCLE SPASMS.       dilTIAZem 30 MG tablet  Commonly known as: CARDIZEM      Take 30 mg by mouth 2 (Two) Times a Day.       estradiol 1 MG tablet  Commonly known as: ESTRACE      Take 1 tablet by mouth Daily.       furosemide 40 MG tablet  Commonly known as: LASIX      Take 40 mg by mouth As Needed. Takes as needed       gabapentin 300 MG capsule  Commonly known as: NEURONTIN      Take 1 capsule by mouth 2 (Two) Times a Day.       magnesium oxide 400 (241.3 Mg) MG tablet tablet  Commonly known as: MAGOX      Take 400 mg by mouth 2 (Two) Times a Day.       metoprolol tartrate 25 MG tablet  Commonly known as: LOPRESSOR      Take 25 mg by mouth Daily. Half tablet in morning and half at night       Mounjaro 7.5 MG/0.5ML solution pen-injector  Generic drug: Tirzepatide      Inject 7.5 mg under the skin into the appropriate area as directed 1 (One) Time Per Week.       omeprazole 20 MG capsule  Commonly known as: priLOSEC      Take 20 mg by mouth 2 (two) times a day.       ondansetron ODT 4 MG disintegrating tablet  Commonly known as: ZOFRAN-ODT      Place 1 tablet on the tongue Every 8 (Eight) Hours As Needed for Nausea or Vomiting.       spironolactone 25 MG tablet  Commonly known as: ALDACTONE      Take 25 mg by mouth Daily.          ASK your doctor about these medications      Instructions Last Dose Given Next Dose Due   ferrous sulfate 325 (65 FE) MG EC  tablet      Take 1 tablet by mouth Daily With Breakfast.             Where to Get Your Medications      These medications were sent to [facility]  [address], [facility] [address]    Hours: Monday through Friday 7:00am to 5:00pm Phone: [phone]   · HYDROcodone-acetaminophen 5-325 MG per tablet     These medications were sent to [facility] - [place], [place] - [address] - [phone] [facility] [phone] FX  [address], [facility] [address]    Phone: [phone]   · HYDROcodone-acetaminophen 5-325 MG per tablet     Information about where to get these medications is not yet available    Ask your nurse or doctor about these medications  · aspirin 81 MG EC tablet  · clopidogrel 75 MG tablet         Activity Instructions     Discharge Activity      1) No driving while having significant abdominal pain and no longer taking narcotics.   2) May shower in 48 hours. Do not tub bath or soak in water for two weeks  3) Do not lift / push / pull more than 10 lbs until released by Dr. Hoffman            Your Scheduled Appointments    Stef 10, 2023 10:00 AM  Pre-Admission Testing with GREY WEI 2  [facility]READMISSION T [facility]) [address]  [facility] [address] [phone]      Jan 16, 2023  2:00 PM  Follow Up with MAYELA Sarabia  [facility] SLEEP MEDICINE [address] [address] [phone]   Arrive 15 minutes prior to appointment.     Apr 07, 2023 10:15 AM  Office Visit with MAYELA Flores  [facility] PRIMARY CARE - [facility]) [address]  [place] [address] [place] [address] [phone]   Arrive 15 minutes prior to appointment.  If you are in need of a language or hearing  please call the Department.     May 22, 2023 10:00  AM  Follow Up with Marcial Walker PA-C  [facility] GASTROENTEROLOGY ([address]  [place] [address] [place] [address] [phone]   Arrive 15 minutes prior to appointment.                This document has been electronically signed by Osmar Hoffman MD on December 23, 2022 08:57 CST

## 2022-12-23 NOTE — ANESTHESIA PROCEDURE NOTES
Airway  Urgency: elective    Date/Time: 12/23/2022 7:48 AM  Airway not difficult    General Information and Staff    Patient location during procedure: OR  CRNA/CAA: Dawn Hernandez, CRNA    Indications and Patient Condition  Indications for airway management: airway protection    Preoxygenated: yes  Mask difficulty assessment: 1 - vent by mask    Final Airway Details  Final airway type: endotracheal airway      Successful airway: ETT  Cuffed: yes   Successful intubation technique: direct laryngoscopy  Facilitating devices/methods: intubating stylet  Endotracheal tube insertion site: oral  Blade: José  Blade size: 3  ETT size (mm): 7.0  Cormack-Lehane Classification: grade I - full view of glottis  Placement verified by: chest auscultation and capnometry   Measured from: lips  ETT/EBT  to lips (cm): 18  Number of attempts at approach: 1  Assessment: lips, teeth, and gum same as pre-op and atraumatic intubation

## 2022-12-23 NOTE — OP NOTE
Operative Note    Lashaun Bernal  12/23/2022    Pre-op Diagnosis:   Gallstones [K80.20]    Post-op Diagnosis:     Post-Op Diagnosis Codes:     * Gallstones [K80.20]    Procedure/CPT® Codes:      Procedure(s):  CHOLECYSTECTOMY LAPAROSCOPIC    Surgeon(s):  Osmar Hoffman MD    Anesthesia: General    Staff:   Circulator: Rolanda Conner RN  Scrub Person: Izabela Byers  Assistant: Charley España  Other: Priscilla Abbott    Estimated Blood Loss: minimal    Specimens:                ID Type Source Tests Collected by Time   A (Not marked as sent) : GALLBLADDER Tissue Gallbladder TISSUE EXAM, [facility] ([facility]) Osmar Hoffman MD 12/23/2022 0828         Drains: * No LDAs found *    Findings: cholelithiasis    Complications: none    Indication: 62 yo woman with history of cholelithiasis who presented with acute symptoms to the emergency department. She was taken for a laparoscopic cholecystectomy.    Operative Note:    The patient was brought to the operating room and placed supine on the operating table. After adequate general endotracheal anesthesia, the abdominal area was prepped and draped in a sterile manner. A briefing and timeout were performed and all parties were in agreement.     A transverse incision was made in the subxiphoid area approximately 2cm caudally from the costal margin. A 5 mm XCEL trocar was uneventfully passed into the abdomen under direct vision with no visceral injury. The abdomen was insufflated to a total of 15 mmHg, 4.8 L of CO2. A 5 mm laparoscope revealed an excellent view of the upper and lower abdominal areas. A longitudinal skin incision was made at the umbilicus and a 5 mm  XCEL trocar was placed under direct vision with no visceral injury. The camera was then moved to the umbilical port site and an excellent view of the upper abdomen was obtained. .The 5mm subxiphoid trocar was usized to an 12mm. Two additional trocars were inserted in right upper quadrant triangulating  the gallbladder. The bed was then tilted in reverse Trendelenburg and tipped to the left. The patient tolerated the positioning and insufflation without difficulty.    The gallbladder was retracted upwards and outwards by grasping the top and the infundibulum of the gallbladder with atraumatic graspers passed through the lateral ports. The peritoneum around the infundibulum was taken down for a distance of 1/3 of the length of the gallbladder on the anterior and posterior sides. The cystic duct and artery easily came into view as did the 5th segment of the liver behind these structures.     The cystic duct was doubly clipped and divided. The cystic artery was doubly clipped and divided in all branches. The gallbladder was then dissected out of the liver bed using electrocautery. Once it had been nearly completely excised, pressure in the abdomen was reduced to 8 mmHg with no further bleeding being noted from the liver bed. The remainder of the gallbladder was dissected free.  It was placed into an Endobag and brought out intact through the epigastric port.     All areas were visualized for bleeding and bile leak. None was seen. I did place a small amount of surgicel in the gallbladder fossa as well as floseal to assist in hemostasis given her antiplatelet therapy. The patient was then placed supine.  The epigastric trocar was removed and the fascia was closed with 0 Vicryl using an Endo Close device.  The remainder of the trocars were removed and the abdomen was allowed to flatten. All port sites were closed with 4-0 subcuticular on the skin.    The procedure was terminated. It was well tolerated. Sponge and needle counts were correct, and the patient was transferred to recovery in satisfactory condition.      Assistant: Charley España was responsible for performing the following activities: Retraction, Suction, Suturing, Closing, Placing Dressing and Held/Positioned Camera and their skilled assistance was  necessary for the success of this case.    Osmar Hoffman MD     Date: 12/23/2022  Time: 08:46 CST

## 2022-12-23 NOTE — OUTREACH NOTE
Prep Survey    Flowsheet Row Responses   [facility] facility patient discharged from? [place]   Is LACE score < 7 ? No   Eligibility [facility]   Date of Admission 12/22/22   Date of Discharge 12/23/22   Discharge Disposition Home or Self Care   Discharge diagnosis Lap latisha   Does the patient have one of the following disease processes/diagnoses(primary or secondary)? General Surgery   Does the patient have Home health ordered? No   Is there a DME ordered? No   Prep survey completed? Yes          SONA CRUM - Registered Nurse

## 2022-12-26 ENCOUNTER — TRANSITIONAL CARE MANAGEMENT TELEPHONE ENCOUNTER (OUTPATIENT)
Dept: CALL CENTER | Facility: HOSPITAL | Age: 62
End: 2022-12-26

## 2022-12-26 NOTE — OUTREACH NOTE
Call Center TCM Note    Flowsheet Row Responses   [facility] patient discharged from? [place]   Does the patient have one of the following disease processes/diagnoses(primary or secondary)? General Surgery   TCM attempt successful? Yes   Call start time 1250   Call end time 1252   Discharge diagnosis Lap latisha   Meds reviewed with patient/caregiver? Yes   Is the patient having any side effects they believe may be caused by any medication additions or changes? No   Does the patient have all medications related to this admission filled (includes all antibiotics, pain medications, etc.) Yes   Is the patient taking all medications as directed (includes completed medication regime)? Yes   Comments HOSP DC FU 12/29/22 @ 3 pm.   Does the patient have an appointment with their PCP within 7 days of discharge? Yes   Has home health visited the patient within 72 hours of discharge? N/A   Psychosocial issues? No   Did the patient receive a copy of their discharge instructions? Yes   Nursing interventions Reviewed instructions with patient   What is the patient's perception of their health status since discharge? Improving   Nursing interventions Nurse provided patient education   Is the patient /caregiver able to teach back basic post-op care? Take showers only when approved by MD-sponge bathe until then, No tub bath, swimming, or hot tub until instructed by MD, Lifting as instructed by MD in discharge instructions, Drive as instructed by MD in discharge instructions   Is the patient/caregiver able to teach back signs and symptoms of incisional infection? Increased redness, swelling or pain at the incisonal site, Increased drainage or bleeding, Incisional warmth, Pus or odor from incision, Fever   Is the patient/caregiver able to teach back steps to recovery at home? Eat a well-balance diet, Set small, achievable goals for return to baseline health, Rest and rebuild strength, gradually increase activity   Is the  patient/caregiver able to teach back the hierarchy of who to call/visit for symptoms/problems? PCP, Specialist, Home health nurse, Urgent Care, ED, 911 Yes   TCM call completed? Yes   Wrap up additional comments Pt reports she is progressing well. No needs.    Call end time 1252          Nicky Castro RN    12/26/2022, 12:52 CST

## 2022-12-27 LAB — REF LAB TEST METHOD: NORMAL

## 2022-12-29 ENCOUNTER — OFFICE VISIT (OUTPATIENT)
Dept: FAMILY MEDICINE CLINIC | Facility: CLINIC | Age: 62
End: 2022-12-29

## 2022-12-29 VITALS
DIASTOLIC BLOOD PRESSURE: 74 MMHG | HEART RATE: 92 BPM | OXYGEN SATURATION: 99 % | HEIGHT: 64 IN | SYSTOLIC BLOOD PRESSURE: 116 MMHG | BODY MASS INDEX: 33.99 KG/M2 | WEIGHT: 199.1 LBS

## 2022-12-29 DIAGNOSIS — Z90.49 S/P LAPAROSCOPIC CHOLECYSTECTOMY: Primary | ICD-10-CM

## 2022-12-29 DIAGNOSIS — Z09 HOSPITAL DISCHARGE FOLLOW-UP: ICD-10-CM

## 2022-12-29 PROCEDURE — 99212 OFFICE O/P EST SF 10 MIN: CPT | Performed by: NURSE PRACTITIONER

## 2022-12-29 NOTE — PROGRESS NOTES
Chief Complaint  Hospital Follow Up Visit    Subjective    Hospital discharge follow-up.  Evaluated by Dr. Springer on December 21, 2022 for recurrent right upper quadrant abdominal pain suspect for biliary colic.  Discussed treatment options with patient at that time and opted for cholecystectomy.  Patient was to be scheduled at a later date on December 22, 2022 pain increased and she was evaluated at [facility] emergency department.  Consulted by Dr. Hoffman who performed laparoscopic cholecystectomy.  Arrives in office today reporting significant improvement in pain.        Lashaun Bernal presents to [facility] - [place]  Abdominal Pain  This is a new problem. The current episode started 1 to 4 weeks ago. The problem occurs constantly. The problem has been resolved. Exacerbated by: Surgical intervention. Treatments tried: Cholecystectomy. The treatment provided significant relief. Prior diagnostic workup includes surgery.     Current Outpatient Medications on File Prior to Visit   Medication Sig Dispense Refill   • albuterol sulfate  (90 Base) MCG/ACT inhaler Inhale 2 puffs Every 4 (Four) Hours As Needed for Wheezing. 18 g 2   • aspirin 81 MG EC tablet Take 1 tablet by mouth Daily. 1 tablet    • clonazePAM (KlonoPIN) 1 MG tablet      • clopidogrel (PLAVIX) 75 MG tablet Take 1 tablet by mouth Daily. 30 tablet    • cyclobenzaprine (FLEXERIL) 10 MG tablet TAKE 1 TABLET 3 TIMES A DAY AS NEEDED FOR MUSCLE SPASMS. 270 tablet 1   • dilTIAZem (CARDIZEM) 30 MG tablet Take 30 mg by mouth 2 (Two) Times a Day.     • estradiol (ESTRACE) 1 MG tablet Take 1 tablet by mouth Daily. 90 tablet 2   • ferrous sulfate 325 (65 FE) MG EC tablet Take 1 tablet by mouth Daily With Breakfast. 90 tablet 3   • furosemide (LASIX) 40 MG tablet Take 40 mg by mouth As Needed. Takes as needed     • gabapentin (NEURONTIN) 300 MG capsule Take 1 capsule by mouth 2 (Two) Times  "a Day. 180 capsule 1   • magnesium oxide (MAGOX) 400 (241.3 Mg) MG tablet tablet Take 400 mg by mouth 2 (Two) Times a Day.     • metoprolol tartrate (LOPRESSOR) 25 MG tablet Take 25 mg by mouth Daily. Half tablet in morning and half at night     • omeprazole (priLOSEC) 20 MG capsule Take 20 mg by mouth 2 (two) times a day.     • ondansetron ODT (ZOFRAN-ODT) 4 MG disintegrating tablet Place 1 tablet on the tongue Every 8 (Eight) Hours As Needed for Nausea or Vomiting. 90 tablet 11   • oxyCODONE-acetaminophen (Percocet) 5-325 MG per tablet Take 1 tablet by mouth Every 6 (Six) Hours As Needed for Moderate Pain for up to 20 doses. 20 tablet 0   • spironolactone (ALDACTONE) 25 MG tablet Take 25 mg by mouth Daily.     • Tirzepatide (Mounjaro) 7.5 MG/0.5ML solution pen-injector Inject 7.5 mg under the skin into the appropriate area as directed 1 (One) Time Per Week. 0.5 mL 1     No current facility-administered medications on file prior to visit.     Allergies   Allergen Reactions   • Cefprozil Nausea And Vomiting and Rash   • Penicillins Rash       Objective   Vital Signs:  /74   Pulse 92   Ht 162.6 cm (64\")   Wt 90.3 kg (199 lb 1.6 oz)   SpO2 99%   BMI 34.18 kg/m²   Estimated body mass index is 34.18 kg/m² as calculated from the following:    Height as of this encounter: 162.6 cm (64\").    Weight as of this encounter: 90.3 kg (199 lb 1.6 oz).      Physical Exam  Vitals and nursing note reviewed.   Constitutional:       Appearance: Normal appearance. She is well-developed. She is obese.   Cardiovascular:      Rate and Rhythm: Normal rate and regular rhythm.      Heart sounds: Normal heart sounds.   Pulmonary:      Effort: Pulmonary effort is normal.      Breath sounds: Normal breath sounds.   Abdominal:      General: Bowel sounds are normal.      Palpations: Abdomen is soft.      Comments: 4, well-healing surgical incisions with surgical glue in place.  Edges well approximated.  No drainage, erythema, warmth or " other signs of infection.   Musculoskeletal:         General: Normal range of motion.      Cervical back: Normal range of motion and neck supple.   Skin:     General: Skin is warm.      Capillary Refill: Capillary refill takes less than 2 seconds.   Neurological:      Mental Status: She is alert and oriented to person, place, and time.   Psychiatric:         Behavior: Behavior normal.        Result Review :                Assessment and Plan   Diagnoses and all orders for this visit:    1. S/P laparoscopic cholecystectomy (Primary)    2. Hospital discharge follow-up      1.  S/p laparoscopic cholecystectomy:  Symptoms significantly improved  Seek emergency medical treatment for any new or worsening warmth, redness, drainage or other signs of infection to incision site    2.  Hospital discharge follow-up:  Current outpatient and discharge medications have been reconciled for the patient.  Reviewed by: MAYELA Flores         Follow Up   Return if symptoms worsen or fail to improve, for Next scheduled follow up.  Patient was given instructions and counseling regarding her condition or for health maintenance advice. Please see specific information pulled into the AVS if appropriate.         This document has been electronically signed by MAYELA Flores on December 29, 2022 15:31 CST

## 2023-01-03 ENCOUNTER — OFFICE VISIT (OUTPATIENT)
Dept: SURGERY | Facility: CLINIC | Age: 63
End: 2023-01-03
Payer: MEDICARE

## 2023-01-03 ENCOUNTER — READMISSION MANAGEMENT (OUTPATIENT)
Dept: CALL CENTER | Facility: HOSPITAL | Age: 63
End: 2023-01-03
Payer: MEDICARE

## 2023-01-03 VITALS
OXYGEN SATURATION: 99 % | DIASTOLIC BLOOD PRESSURE: 60 MMHG | HEART RATE: 93 BPM | TEMPERATURE: 96.8 F | SYSTOLIC BLOOD PRESSURE: 126 MMHG | WEIGHT: 197 LBS | HEIGHT: 64 IN | BODY MASS INDEX: 33.63 KG/M2

## 2023-01-03 DIAGNOSIS — K81.9 CHOLECYSTITIS: Primary | ICD-10-CM

## 2023-01-03 PROCEDURE — 3074F SYST BP LT 130 MM HG: CPT | Performed by: STUDENT IN AN ORGANIZED HEALTH CARE EDUCATION/TRAINING PROGRAM

## 2023-01-03 PROCEDURE — 99024 POSTOP FOLLOW-UP VISIT: CPT | Performed by: STUDENT IN AN ORGANIZED HEALTH CARE EDUCATION/TRAINING PROGRAM

## 2023-01-03 PROCEDURE — 3078F DIAST BP <80 MM HG: CPT | Performed by: STUDENT IN AN ORGANIZED HEALTH CARE EDUCATION/TRAINING PROGRAM

## 2023-01-03 PROCEDURE — 1159F MED LIST DOCD IN RCRD: CPT | Performed by: STUDENT IN AN ORGANIZED HEALTH CARE EDUCATION/TRAINING PROGRAM

## 2023-01-03 PROCEDURE — 1160F RVW MEDS BY RX/DR IN RCRD: CPT | Performed by: STUDENT IN AN ORGANIZED HEALTH CARE EDUCATION/TRAINING PROGRAM

## 2023-01-03 RX ORDER — TIRZEPATIDE 5 MG/.5ML
INJECTION, SOLUTION SUBCUTANEOUS
COMMUNITY
Start: 2022-12-24 | End: 2023-01-16

## 2023-01-03 NOTE — OUTREACH NOTE
General Surgery Week 2 Survey    Flowsheet Row Responses   [facility] patient discharged from? [place]   Does the patient have one of the following disease processes/diagnoses(primary or secondary)? General Surgery   Week 2 attempt successful? Yes   Call start time 0859   Call end time 0902   Discharge diagnosis Lap latisha   Is the patient taking all medications as directed (includes completed medication regime)? Yes   Does the patient have a follow up appointment scheduled with their surgeon? Yes   Has the patient kept scheduled appointments due by today? Yes   Comments Has appt with Dr. Hoffman today   Has home health visited the patient within 72 hours of discharge? N/A   Psychosocial issues? No   What is the patient's perception of their health status since discharge? New symptoms unrelated to diagnosis   Nursing interventions Nurse provided patient education   Is the patient /caregiver able to teach back basic post-op care? No tub bath, swimming, or hot tub until instructed by MD, Drive as instructed by MD in discharge instructions, Practice 'cough and deep breath', Lifting as instructed by MD in discharge instructions   Is the patient/caregiver able to teach back signs and symptoms of incisional infection? Increased redness, swelling or pain at the incisonal site, Increased drainage or bleeding, Incisional warmth, Pus or odor from incision   Is the patient/caregiver able to teach back steps to recovery at home? Set small, achievable goals for return to baseline health, Rest and rebuild strength, gradually increase activity, Eat a well-balance diet, Make a list of questions for surgeon's appointment   If the patient is a current smoker, are they able to teach back resources for cessation? Not a smoker   Is the patient/caregiver able to teach back the hierarchy of who to call/visit for symptoms/problems? PCP, Specialist, Home health nurse, Urgent Care, ED, 911 Yes   Additional teach back comments States  she is having a hard time taking a deep breath and is very sore.  She is going to discuss at her follow up appt today.   Week 2 call completed? Yes   Wrap up additional comments Pt to address concerns with surgeon today.           CHAITANYA PEREZ - Licensed Nurse

## 2023-01-03 NOTE — PROGRESS NOTES
CHIEF COMPLAINT:   Chief Complaint   Patient presents with   • Post-op     Post op lap latisha 12/22/2022       HPI: This patient presents for a post-operative visit after undergoing a laparoscopic  cholecystectomy.  Patient reports no problems. Eating well without any significant nausea. Having good bowel function. No problems with constipation or diarrhea. No urinary complaints. Denies fever. Ambulating well and slowly returning to normal activities.    PATHOLOGY:   Chronic cholecystitis    PHYSICAL EXAM:    ABD: Incisions are healing well without any erythema or signs of infection.    ASSESSMENT:    There are no diagnoses linked to this encounter.    PLAN:    1.The patient will follow-up on a prn basis unless there are any problems.  2. May shower.   3. May return to normal activity without restrictions.        This document has been electronically signed by Osmar Hoffman MD on January 3, 2023 09:50 CST

## 2023-01-10 ENCOUNTER — READMISSION MANAGEMENT (OUTPATIENT)
Dept: CALL CENTER | Facility: HOSPITAL | Age: 63
End: 2023-01-10
Payer: MEDICARE

## 2023-01-10 NOTE — OUTREACH NOTE
General Surgery Week 3 Survey    Flowsheet Row Responses   [facility] patient discharged from? [place]   Does the patient have one of the following disease processes/diagnoses(primary or secondary)? General Surgery   Week 3 attempt successful? Yes   Call start time 1522   Call end time 1524   Discharge diagnosis Lap latisha   Meds reviewed with patient/caregiver? Yes   Is the patient having any side effects they believe may be caused by any medication additions or changes? No   Does the patient have all medications related to this admission filled (includes all antibiotics, pain medications, etc.) Yes   Is the patient taking all medications as directed (includes completed medication regime)? Yes   Does the patient have a follow up appointment scheduled with their surgeon? Yes   Has the patient kept scheduled appointments due by today? Yes   Has home health visited the patient within 72 hours of discharge? N/A   Psychosocial issues? No   Did the patient receive a copy of their discharge instructions? Yes   Nursing interventions Reviewed instructions with patient   What is the patient's perception of their health status since discharge? Improving   Nursing interventions Nurse provided patient education   Is the patient /caregiver able to teach back basic post-op care? Continue use of incentive spirometry at least 1 week post discharge, Practice 'cough and deep breath', Drive as instructed by MD in discharge instructions, Lifting as instructed by MD in discharge instructions   Is the patient/caregiver able to teach back signs and symptoms of incisional infection? Increased redness, swelling or pain at the incisonal site, Increased drainage or bleeding, Incisional warmth, Pus or odor from incision, Fever   Is the patient/caregiver able to teach back steps to recovery at home? Set small, achievable goals for return to baseline health, Make a list of questions for surgeon's appointment, Eat a well-balance diet    If the patient is a current smoker, are they able to teach back resources for cessation? Not a smoker   Is the patient/caregiver able to teach back the hierarchy of who to call/visit for symptoms/problems? PCP, Specialist, Home health nurse, Urgent Care, ED, 911 Yes   Additional teach back comments Wants to know when she can begin exercise again.   Week 3 call completed? Yes   Is the patient interested in additional calls from an ambulatory ?  NOTE:  applies to high risk patients requiring additional follow-up. No   Wrap up additional comments She says no GI issues and sleep is good.          BRIDGET WADE - Registered Nurse

## 2023-01-16 ENCOUNTER — OFFICE VISIT (OUTPATIENT)
Dept: SLEEP MEDICINE | Facility: HOSPITAL | Age: 63
End: 2023-01-16
Payer: MEDICARE

## 2023-01-16 VITALS
HEIGHT: 64 IN | OXYGEN SATURATION: 96 % | WEIGHT: 195.2 LBS | HEART RATE: 82 BPM | SYSTOLIC BLOOD PRESSURE: 127 MMHG | DIASTOLIC BLOOD PRESSURE: 69 MMHG | BODY MASS INDEX: 33.32 KG/M2

## 2023-01-16 DIAGNOSIS — G47.33 OBSTRUCTIVE SLEEP APNEA: Primary | ICD-10-CM

## 2023-01-16 PROCEDURE — 99213 OFFICE O/P EST LOW 20 MIN: CPT | Performed by: NURSE PRACTITIONER

## 2023-01-16 NOTE — PROGRESS NOTES
Sleep Clinic Follow Up    Date: 2023  Primary Care Provider: Gretchen Gauthier APRN    Last office visit: 2022 (I reviewed this note)    CC: Follow up: MAX on CPAP      Interim History:  Since the last visit:    1) mild MAX -  Lashaun Bernal has remained compliant with CPAP. She denies mask and machine issues, dry mouth, headaches, or pressures intolerance. She denies abnormal dreams, sleep paralysis, nasal congestion, URI sx. She feels as though she needs more pressure at times.    2) Patient denies RLS symptoms.     Sleep Testin. PSG on 2019, AHI of 10    2. Currently on 10 cm H2O    PAP Data:    Time frame: 01/15/2022-2023   Compliance: 99.2%  Average use on days used: 9 hrs 54 min  Percent of days with usage greater than or equal to 4 hours: 98.9%  PAP range: 10 cm H2O  Average 90% pressure: 10 cmH2O  Leak: 0 minutes  Average AHI: 1.6 events/hr  Mask type: Nasal pillows  DME: [employer]  Machine type: [employer] DreamStation 2    Bed time: 2300  Sleep latency: 10 minutes  Number of times awakens during the night: 0  Wake time: 2566-6025  Estimated total sleep time at night: 8.5-9 hours  Caffeine intake: 0 cups of coffee, 0 cups of tea, and 0 sodas per day  Alcohol intake: 0 drinks per week  Nap time: occasional, short   Sleepiness with Driving: none     [place] - 7  [place] Sleepiness Scale  Sitting and reading: Slight chance of dozing  Watching TV: Slight chance of dozing  Sitting, inactive in a public place (e.g. a theatre or a meeting): Slight chance of dozing  As a passenger in a car for an hour without a break: Moderate chance of dozing  Lying down to rest in the afternoon when circumstances permit: Moderate chance of dozing  Sitting and talking to someone: Would never doze  Sitting quietly after a lunch without alcohol: Would never doze  In a car, while stopped for a few minutes in traffic: Would never doze  Total score: 7    PMHx, FH, SH reviewed and pertinent changes  "are:  Had cholecystectomy.       Review of Systems:   Negative for chest pain, SOA, fever, chills, cough, N/V/D, abdominal pain.    Smoking:none    Lashaun Bernal  reports that she has never smoked. She has never used smokeless tobacco.      Physical Exam:  Vitals:    01/16/23 1353   BP: 127/69   Pulse: 82   SpO2: 96%           01/16/23  1353   Weight: 88.5 kg (195 lb 3.2 oz)     Body mass index is 33.49 kg/m². BMI is >= 30 and <35. (Class 1 Obesity). The following options were offered after discussion;: referral to primary care    Gen:                No distress, conversant, pleasant, appears stated age, alert, oriented  Eyes:               Anicteric sclera, moist conjunctiva, no lid lag                           PERRL, EOMI   Heent:             NC/AT                          Oropharynx clear                          Normal hearing  Lungs:             Normal effort, non-labored breathing     CV:                  Normal S1/S2                          No lower extremity edema  ABD:               Soft, rounded, non-distended                   Psych:             Appropriate affect  Neuro:             CN 2-12 appear intact    Past Medical History:   Diagnosis Date   • Allergic rhinitis    • Arthritis    • Asthma    • Breast lump    • C. difficile diarrhea 2013   • Carotid artery disease (HCC)    • Chest pain    • Chronic low back pain    • Constipation    • Depressive disorder    • Diarrhea    • Diverticular disease of colon    • Epigastric pain    • DIETER (generalized anxiety disorder)    • GERD (gastroesophageal reflux disease)    • Head ache    • Hematochezia    • Herpes zoster     mid back, near spine   • Hypertension    • Periumbilical pain    • PONV (postoperative nausea and vomiting)    • [place] spotted fever     pt states \"currently has it\"   • Sleep apnea     using c-pap   • SVT (supraventricular tachycardia) ([facility])    • Tachycardia 12/012019    spent 4 days in [facility] in January for this.  "       Current Outpatient Medications:   •  albuterol sulfate  (90 Base) MCG/ACT inhaler, Inhale 2 puffs Every 4 (Four) Hours As Needed for Wheezing., Disp: 18 g, Rfl: 2  •  aspirin 81 MG EC tablet, Take 1 tablet by mouth Daily., Disp: 1 tablet, Rfl:   •  clonazePAM (KlonoPIN) 1 MG tablet, , Disp: , Rfl:   •  clopidogrel (PLAVIX) 75 MG tablet, Take 1 tablet by mouth Daily., Disp: 30 tablet, Rfl:   •  cyclobenzaprine (FLEXERIL) 10 MG tablet, TAKE 1 TABLET 3 TIMES A DAY AS NEEDED FOR MUSCLE SPASMS., Disp: 270 tablet, Rfl: 1  •  dilTIAZem (CARDIZEM) 30 MG tablet, Take 30 mg by mouth 2 (Two) Times a Day., Disp: , Rfl:   •  estradiol (ESTRACE) 1 MG tablet, Take 1 tablet by mouth Daily., Disp: 90 tablet, Rfl: 2  •  furosemide (LASIX) 40 MG tablet, Take 40 mg by mouth As Needed. Takes as needed, Disp: , Rfl:   •  gabapentin (NEURONTIN) 300 MG capsule, Take 1 capsule by mouth 2 (Two) Times a Day., Disp: 180 capsule, Rfl: 1  •  magnesium oxide (MAGOX) 400 (241.3 Mg) MG tablet tablet, Take 400 mg by mouth 2 (Two) Times a Day., Disp: , Rfl:   •  metoprolol tartrate (LOPRESSOR) 25 MG tablet, Take 25 mg by mouth Daily. Half tablet in morning and half at night, Disp: , Rfl:   •  omeprazole (priLOSEC) 20 MG capsule, Take 20 mg by mouth 2 (two) times a day., Disp: , Rfl:   •  ondansetron ODT (ZOFRAN-ODT) 4 MG disintegrating tablet, Place 1 tablet on the tongue Every 8 (Eight) Hours As Needed for Nausea or Vomiting., Disp: 90 tablet, Rfl: 11  •  spironolactone (ALDACTONE) 25 MG tablet, Take 25 mg by mouth Daily., Disp: , Rfl:   •  Tirzepatide (Mounjaro) 7.5 MG/0.5ML solution pen-injector, Inject 7.5 mg under the skin into the appropriate area as directed 1 (One) Time Per Week., Disp: 0.5 mL, Rfl: 1    WBC   Date Value Ref Range Status   12/22/2022 9.22 3.40 - 10.80 10*3/mm3 Final   02/13/2018 7.8 4.0 - 11.0 10*3/uL Final     RBC   Date Value Ref Range Status   12/22/2022 4.61 3.77 - 5.28 10*6/mm3 Final   02/13/2018 4.49 4.15 -  4.87 10*6/uL Final     Hemoglobin   Date Value Ref Range Status   12/22/2022 13.1 12.0 - 15.9 g/dL Final   02/13/2018 12.9 12.7 - 14.7 g/dL Final     Hematocrit   Date Value Ref Range Status   12/22/2022 39.5 34.0 - 46.6 % Final   02/13/2018 40.5 37.9 - 43.9 % Final     MCV   Date Value Ref Range Status   12/22/2022 85.7 79.0 - 97.0 fL Final   02/13/2018 90 85 - 95 fl Final     MCH   Date Value Ref Range Status   12/22/2022 28.4 26.6 - 33.0 pg Final   02/13/2018 28.7 28.0 - 32.0 pg Final     MCHC   Date Value Ref Range Status   12/22/2022 33.2 31.5 - 35.7 g/dL Final   02/13/2018 31.9 (L) 32.0 - 34.0 g/dL Final     RDW   Date Value Ref Range Status   12/22/2022 12.3 12.3 - 15.4 % Final   02/13/2018 41 37 - 54 fl Final     RDW-SD   Date Value Ref Range Status   12/22/2022 38.2 37.0 - 54.0 fl Final     MPV   Date Value Ref Range Status   12/22/2022 9.6 6.0 - 12.0 fL Final   02/13/2018 10.5 8.0 - 12.0 fl Final     Platelets   Date Value Ref Range Status   12/22/2022 292 140 - 450 10*3/mm3 Final   02/13/2018 332 150 - 450 10*3/uL Final     Neutrophil %   Date Value Ref Range Status   12/22/2022 50.4 42.7 - 76.0 % Final     Lymphocyte Rel %   Date Value Ref Range Status   02/13/2018 38.2 5 - 55 % Final     Lymphocyte %   Date Value Ref Range Status   12/22/2022 34.6 19.6 - 45.3 % Final     Monocyte Rel %   Date Value Ref Range Status   02/13/2018 7.9 3 - 8 % Final     Monocyte %   Date Value Ref Range Status   12/22/2022 7.8 5.0 - 12.0 % Final     Eosinophil Rel %   Date Value Ref Range Status   02/13/2018 2.7 0 - 5 % Final     Eosinophil %   Date Value Ref Range Status   12/22/2022 6.1 0.3 - 6.2 % Final     Basophil Rel %   Date Value Ref Range Status   02/13/2018 0.9 0 - 2 % Final     Basophil %   Date Value Ref Range Status   12/22/2022 0.9 0.0 - 1.5 % Final     Immature Grans %   Date Value Ref Range Status   12/22/2022 0.2 0.0 - 0.5 % Final   02/13/2018 50 45 - 80 % Final     Neutrophils, Absolute   Date Value Ref  Range Status   12/22/2022 4.65 1.70 - 7.00 10*3/mm3 Final     Lymphocytes, Absolute   Date Value Ref Range Status   12/22/2022 3.19 (H) 0.70 - 3.10 10*3/mm3 Final     Monocytes, Absolute   Date Value Ref Range Status   12/22/2022 0.72 0.10 - 0.90 10*3/mm3 Final     Eosinophils, Absolute   Date Value Ref Range Status   12/22/2022 0.56 (H) 0.00 - 0.40 10*3/mm3 Final     Basophils, Absolute   Date Value Ref Range Status   12/22/2022 0.08 0.00 - 0.20 10*3/mm3 Final     Immature Grans, Absolute   Date Value Ref Range Status   12/22/2022 0.02 0.00 - 0.05 10*3/mm3 Final     nRBC   Date Value Ref Range Status   12/22/2022 0.0 0.0 - 0.2 /100 WBC Final       Lab Results   Component Value Date    GLUCOSE 110 (H) 12/22/2022    BUN 18 12/22/2022    CREATININE 1.04 (H) 12/22/2022    EGFRIFNONA 47 (L) 12/21/2021    BCR 17.3 12/22/2022    K 4.1 12/22/2022    CO2 21.0 (L) 12/22/2022    CALCIUM 9.4 12/22/2022    PROTENTOTREF 6.5 05/01/2019    ALBUMIN 4.10 12/22/2022    LABIL2 1.0 05/01/2019    AST 62 (H) 12/22/2022    ALT 68 (H) 12/22/2022       Assessment and Plan:    1. Obstructive sleep apnea - Established, stable (1)  1. Compliant with PAP therapy  2. Continue PAP as prescribed  3. Increase pressure to 11 cm H2O for comfort  4. Script for PAP supplies  5. Pertinent labs were reviewed as listed above  6. Return to clinic in 1 year with compliance report unless change in symptoms in interim period      I spent 15 minutes caring for Lashaun on this date of service. This time includes time spent by me in the following activities: preparing for the visit, reviewing tests, obtaining and/or reviewing a separately obtained history, performing a medically appropriate examination and/or evaluation , counseling and educating the patient/family/caregiver, documenting information in the medical record and care coordination; discussing PAP therapy, PAP compliance and PAP maintenance    RTC in 12 months. Patient agrees to return sooner if changes  in symptoms.        This document has been electronically signed by MAYELA Sarabia on January 16, 2023 14:10 CST          CC: Gretchen Gauthier APRN          No ref. provider found

## 2023-01-19 DIAGNOSIS — R23.3 EASY BRUISING: Primary | ICD-10-CM

## 2023-01-23 ENCOUNTER — LAB (OUTPATIENT)
Dept: LAB | Facility: HOSPITAL | Age: 63
End: 2023-01-23
Payer: MEDICARE

## 2023-01-23 ENCOUNTER — TELEPHONE (OUTPATIENT)
Dept: FAMILY MEDICINE CLINIC | Facility: CLINIC | Age: 63
End: 2023-01-23
Payer: MEDICARE

## 2023-01-23 DIAGNOSIS — N18.30 STAGE 3 CHRONIC KIDNEY DISEASE, UNSPECIFIED WHETHER STAGE 3A OR 3B CKD: ICD-10-CM

## 2023-01-23 DIAGNOSIS — R23.3 EASY BRUISING: ICD-10-CM

## 2023-01-23 LAB
BASOPHILS # BLD AUTO: 0.09 10*3/MM3 (ref 0–0.2)
BASOPHILS NFR BLD AUTO: 1 % (ref 0–1.5)
DEPRECATED RDW RBC AUTO: 39.4 FL (ref 37–54)
EOSINOPHIL # BLD AUTO: 0.53 10*3/MM3 (ref 0–0.4)
EOSINOPHIL NFR BLD AUTO: 6.1 % (ref 0.3–6.2)
ERYTHROCYTE [DISTWIDTH] IN BLOOD BY AUTOMATED COUNT: 12.7 % (ref 12.3–15.4)
HCT VFR BLD AUTO: 38.3 % (ref 34–46.6)
HGB BLD-MCNC: 12.8 G/DL (ref 12–15.9)
IMM GRANULOCYTES # BLD AUTO: 0.02 10*3/MM3 (ref 0–0.05)
IMM GRANULOCYTES NFR BLD AUTO: 0.2 % (ref 0–0.5)
INR PPP: 0.97 (ref 0.8–1.2)
LYMPHOCYTES # BLD AUTO: 3.6 10*3/MM3 (ref 0.7–3.1)
LYMPHOCYTES NFR BLD AUTO: 41.5 % (ref 19.6–45.3)
MCH RBC QN AUTO: 29 PG (ref 26.6–33)
MCHC RBC AUTO-ENTMCNC: 33.4 G/DL (ref 31.5–35.7)
MCV RBC AUTO: 86.8 FL (ref 79–97)
MONOCYTES # BLD AUTO: 0.68 10*3/MM3 (ref 0.1–0.9)
MONOCYTES NFR BLD AUTO: 7.8 % (ref 5–12)
NEUTROPHILS NFR BLD AUTO: 3.76 10*3/MM3 (ref 1.7–7)
NEUTROPHILS NFR BLD AUTO: 43.4 % (ref 42.7–76)
NRBC BLD AUTO-RTO: 0.1 /100 WBC (ref 0–0.2)
PLATELET # BLD AUTO: 364 10*3/MM3 (ref 140–450)
PMV BLD AUTO: 10.5 FL (ref 6–12)
PROTHROMBIN TIME: 12.8 SECONDS (ref 11.1–15.3)
RBC # BLD AUTO: 4.41 10*6/MM3 (ref 3.77–5.28)
WBC NRBC COR # BLD: 8.68 10*3/MM3 (ref 3.4–10.8)

## 2023-01-23 PROCEDURE — 36415 COLL VENOUS BLD VENIPUNCTURE: CPT

## 2023-01-23 PROCEDURE — 85025 COMPLETE CBC W/AUTO DIFF WBC: CPT

## 2023-01-23 PROCEDURE — 84156 ASSAY OF PROTEIN URINE: CPT

## 2023-01-23 PROCEDURE — 82570 ASSAY OF URINE CREATININE: CPT

## 2023-01-23 PROCEDURE — 85610 PROTHROMBIN TIME: CPT

## 2023-01-23 NOTE — TELEPHONE ENCOUNTER
Per MAYELA Godinez, Ms. Bernal has been called with recent lab results & recommendations.  Continue current medications and follow-up as planned or sooner if any problems.       ----- Message from MAYELA Flores sent at 1/23/2023  9:45 AM CST -----  Labs normal, please call or send card!

## 2023-01-23 NOTE — PROGRESS NOTES
Per MAYELA Godinez, Ms. Bernal has been called with recent lab results & recommendations.  Continue current medications and follow-up as planned or sooner if any problems.

## 2023-01-24 ENCOUNTER — TELEPHONE (OUTPATIENT)
Dept: FAMILY MEDICINE CLINIC | Facility: CLINIC | Age: 63
End: 2023-01-24
Payer: MEDICARE

## 2023-01-24 LAB
CREAT UR-MCNC: 403.9 MG/DL
PROT ?TM UR-MCNC: 27.1 MG/DL
PROT/CREAT UR: 67.1 MG/G CREA (ref 0–200)

## 2023-01-24 NOTE — TELEPHONE ENCOUNTER
Per MAYELA Godinez, Ms. Bernal has been called with recent lab results & recommendations.  Continue current medications and follow-up as planned or sooner if any problems.       ----- Message from MAYELA Flores sent at 1/24/2023  6:11 AM CST -----  Hemoglobin and hematocrit within normal limits.  No anemia.  All other labs were essentially normal.

## 2023-01-26 DIAGNOSIS — K76.0 FATTY LIVER: ICD-10-CM

## 2023-01-26 DIAGNOSIS — R10.11 RIGHT UPPER QUADRANT ABDOMINAL PAIN: ICD-10-CM

## 2023-01-26 DIAGNOSIS — R74.8 ELEVATED LIVER ENZYMES: Primary | ICD-10-CM

## 2023-01-27 ENCOUNTER — LAB (OUTPATIENT)
Dept: LAB | Facility: HOSPITAL | Age: 63
End: 2023-01-27
Payer: MEDICARE

## 2023-01-27 DIAGNOSIS — N18.30 STAGE 3 CHRONIC KIDNEY DISEASE, UNSPECIFIED WHETHER STAGE 3A OR 3B CKD: ICD-10-CM

## 2023-01-27 LAB
ALBUMIN SERPL-MCNC: 3.9 G/DL (ref 3.5–5.2)
ALBUMIN SERPL-MCNC: 4 G/DL (ref 3.5–5.2)
ALP SERPL-CCNC: 86 U/L (ref 39–117)
ALT SERPL W P-5'-P-CCNC: 53 U/L (ref 1–33)
ANION GAP SERPL CALCULATED.3IONS-SCNC: 11 MMOL/L (ref 5–15)
AST SERPL-CCNC: 44 U/L (ref 1–32)
BILIRUB CONJ SERPL-MCNC: <0.2 MG/DL (ref 0–0.3)
BILIRUB INDIRECT SERPL-MCNC: ABNORMAL MG/DL
BILIRUB SERPL-MCNC: 0.2 MG/DL (ref 0–1.2)
BUN SERPL-MCNC: 16 MG/DL (ref 8–23)
BUN/CREAT SERPL: 15.5 (ref 7–25)
CALCIUM SPEC-SCNC: 9.5 MG/DL (ref 8.6–10.5)
CHLORIDE SERPL-SCNC: 107 MMOL/L (ref 98–107)
CO2 SERPL-SCNC: 20 MMOL/L (ref 22–29)
CREAT SERPL-MCNC: 1.03 MG/DL (ref 0.57–1)
EGFRCR SERPLBLD CKD-EPI 2021: 61.6 ML/MIN/1.73
GGT SERPL-CCNC: 16 U/L (ref 5–36)
GLUCOSE SERPL-MCNC: 104 MG/DL (ref 65–99)
INR PPP: 0.99 (ref 0.8–1.2)
PHOSPHATE SERPL-MCNC: 3.3 MG/DL (ref 2.5–4.5)
POTASSIUM SERPL-SCNC: 4.1 MMOL/L (ref 3.5–5.2)
PROT SERPL-MCNC: 6.4 G/DL (ref 6–8.5)
PROTHROMBIN TIME: 13 SECONDS (ref 11.1–15.3)
PTH-INTACT SERPL-MCNC: 23.9 PG/ML (ref 15–65)
SODIUM SERPL-SCNC: 138 MMOL/L (ref 136–145)

## 2023-01-27 PROCEDURE — 83970 ASSAY OF PARATHORMONE: CPT

## 2023-01-27 PROCEDURE — 84100 ASSAY OF PHOSPHORUS: CPT

## 2023-01-27 PROCEDURE — 80053 COMPREHEN METABOLIC PANEL: CPT

## 2023-01-27 PROCEDURE — 36415 COLL VENOUS BLD VENIPUNCTURE: CPT | Performed by: PHYSICIAN ASSISTANT

## 2023-01-27 PROCEDURE — 82977 ASSAY OF GGT: CPT | Performed by: PHYSICIAN ASSISTANT

## 2023-01-27 PROCEDURE — 85610 PROTHROMBIN TIME: CPT | Performed by: PHYSICIAN ASSISTANT

## 2023-01-27 PROCEDURE — 82248 BILIRUBIN DIRECT: CPT

## 2023-02-06 ENCOUNTER — OFFICE VISIT (OUTPATIENT)
Dept: ORTHOPEDIC SURGERY | Facility: CLINIC | Age: 63
End: 2023-02-06
Payer: MEDICARE

## 2023-02-06 VITALS — BODY MASS INDEX: 32.61 KG/M2 | WEIGHT: 191 LBS | HEIGHT: 64 IN

## 2023-02-06 DIAGNOSIS — M54.2 NECK PAIN: Primary | ICD-10-CM

## 2023-02-06 DIAGNOSIS — S16.1XXA ACUTE CERVICAL MYOFASCIAL STRAIN, INITIAL ENCOUNTER: ICD-10-CM

## 2023-02-06 DIAGNOSIS — M50.30 DEGENERATIVE DISC DISEASE, CERVICAL: ICD-10-CM

## 2023-02-06 PROCEDURE — 99214 OFFICE O/P EST MOD 30 MIN: CPT | Performed by: NURSE PRACTITIONER

## 2023-02-06 PROCEDURE — 96372 THER/PROPH/DIAG INJ SC/IM: CPT | Performed by: NURSE PRACTITIONER

## 2023-02-06 RX ORDER — TIZANIDINE 4 MG/1
4 TABLET ORAL EVERY 8 HOURS PRN
Qty: 90 TABLET | Refills: 1 | Status: SHIPPED | OUTPATIENT
Start: 2023-02-06

## 2023-02-06 RX ORDER — TRIAMCINOLONE ACETONIDE 40 MG/ML
80 INJECTION, SUSPENSION INTRA-ARTICULAR; INTRAMUSCULAR ONCE
Status: COMPLETED | OUTPATIENT
Start: 2023-02-06 | End: 2023-02-06

## 2023-02-06 RX ADMIN — TRIAMCINOLONE ACETONIDE 80 MG: 40 INJECTION, SUSPENSION INTRA-ARTICULAR; INTRAMUSCULAR at 13:35

## 2023-02-06 NOTE — PROGRESS NOTES
"Lashaun Bernal is a 62 y.o. female   Primary provider:  Gretchen Gauthier APRN       Chief Complaint   Patient presents with   • Cervical Spine - Pain, Initial Evaluation   • Establish Care     HISTORY OF PRESENT ILLNESS:    History of Present Illness  62-year-old female patient presents to office for evaluation of acute right-sided neck pain and possible injury.  Onset of pain occurred a few days ago after lifting a heavy mattress.  Patient has a history of some intermittent/chronic neck pain.  Currently, patient localizes her pain to the right side of her neck.  Pain is described as constant and moderate in severity.  Pain is described as aching in nature.  No numbness or tingling reported.  Patient denies any pain that radiates throughout her right arm.  Pain is worse with motion of her neck and activity but the patient also states that the pain is present even at rest.  Pain improves mildly with Tylenol.  X-rays of the cervical spine performed in office today.  Current pain scale is 6/10.     CONCURRENT MEDICAL HISTORY:    Past Medical History:   Diagnosis Date   • Allergic rhinitis    • Arthritis    • Asthma    • Breast lump    • C. difficile diarrhea 2013   • Carotid artery disease ([facility])    • Chest pain    • Chronic low back pain    • Constipation    • Depressive disorder    • Diarrhea    • Diverticular disease of colon    • Epigastric pain    • DIETER (generalized anxiety disorder)    • GERD (gastroesophageal reflux disease)    • Head ache    • Hematochezia    • Herpes zoster     mid back, near spine   • Hypertension    • Periumbilical pain    • PONV (postoperative nausea and vomiting)    • Maurice Mountain spotted fever     pt states \"currently has it\"   • Sleep apnea     using c-pap   • SVT (supraventricular tachycardia) ([facility])    • Tachycardia 12/012019    spent 4 days in [facility] in January for this.        Allergies   Allergen Reactions   • Hydrocodone-Acetaminophen Itching, Rash and GI Intolerance   • " Cefprozil Nausea And Vomiting and Rash   • Penicillins Rash         Current Outpatient Medications:   •  albuterol sulfate  (90 Base) MCG/ACT inhaler, Inhale 2 puffs Every 4 (Four) Hours As Needed for Wheezing., Disp: 18 g, Rfl: 2  •  aspirin 81 MG EC tablet, Take 1 tablet by mouth Daily., Disp: 1 tablet, Rfl:   •  clonazePAM (KlonoPIN) 1 MG tablet, , Disp: , Rfl:   •  clopidogrel (PLAVIX) 75 MG tablet, Take 1 tablet by mouth Daily., Disp: 30 tablet, Rfl:   •  cyclobenzaprine (FLEXERIL) 10 MG tablet, TAKE 1 TABLET 3 TIMES A DAY AS NEEDED FOR MUSCLE SPASMS., Disp: 270 tablet, Rfl: 1  •  dilTIAZem (CARDIZEM) 30 MG tablet, Take 30 mg by mouth 2 (Two) Times a Day., Disp: , Rfl:   •  estradiol (ESTRACE) 1 MG tablet, Take 1 tablet by mouth Daily., Disp: 90 tablet, Rfl: 2  •  furosemide (LASIX) 40 MG tablet, Take 40 mg by mouth As Needed. Takes as needed, Disp: , Rfl:   •  gabapentin (NEURONTIN) 300 MG capsule, Take 1 capsule by mouth 2 (Two) Times a Day., Disp: 180 capsule, Rfl: 1  •  magnesium oxide (MAGOX) 400 (241.3 Mg) MG tablet tablet, Take 400 mg by mouth 2 (Two) Times a Day., Disp: , Rfl:   •  metoprolol tartrate (LOPRESSOR) 25 MG tablet, Take 25 mg by mouth Daily. Half tablet in morning and half at night, Disp: , Rfl:   •  omeprazole (priLOSEC) 20 MG capsule, Take 20 mg by mouth 2 (two) times a day., Disp: , Rfl:   •  ondansetron ODT (ZOFRAN-ODT) 4 MG disintegrating tablet, Place 1 tablet on the tongue Every 8 (Eight) Hours As Needed for Nausea or Vomiting., Disp: 90 tablet, Rfl: 11  •  spironolactone (ALDACTONE) 25 MG tablet, Take 25 mg by mouth Daily., Disp: , Rfl:   •  Tirzepatide (Mounjaro) 7.5 MG/0.5ML solution pen-injector, Inject 7.5 mg under the skin into the appropriate area as directed 1 (One) Time Per Week., Disp: 0.5 mL, Rfl: 1  •  tiZANidine (ZANAFLEX) 4 MG tablet, Take 1 tablet by mouth Every 8 (Eight) Hours As Needed for Muscle Spasms., Disp: 90 tablet, Rfl: 1    Past Surgical History:    Procedure Laterality Date   • ABDOMINOPLASTY  12/22/2004   • AUGMENTATION MAMMAPLASTY     • BREAST AUGMENTATION BILATERAL MASTOPEXY     • BUNIONECTOMY Left 3/20/2017    Procedure: EXCISION OF CALCANEAL SPURS LEFT FOOT AND REATTACHMENT OF ACHILLES TENDON ;  Surgeon: Jarrell Mar MD;  Location: [facility] OR;  Service:    • CHOLECYSTECTOMY WITH INTRAOPERATIVE CHOLANGIOGRAM N/A 12/23/2022    Procedure: CHOLECYSTECTOMY LAPAROSCOPIC;  Surgeon: Osmar Hoffman MD;  Location: [facility] OR;  Service: General;  Laterality: N/A;   • COLONOSCOPY  01/23/2015   • COLONOSCOPY N/A 2/24/2020    Procedure: COLONOSCOPY;  Surgeon: Santhosh Patrick MD;  Location: [facility] ENDOSCOPY;  Service: Gastroenterology;  Laterality: N/A;   • CYST REMOVAL  08/18/1961    Excision of sebaceous cyst. Left ear   • FINGER/THUMB LESION/CYST EXCISION  10/29/2014   • HAND SURGERY  06/03/2013   • HYSTERECTOMY     • OOPHORECTOMY     • ORIF ULNA/RADIUS FRACTURES  07/22/2013   • TUBAL ABDOMINAL LIGATION  08/14/1985    Laparoscopic tubal sterilization; dilatation and curettage. Desires tubal ligation;         Family History   Problem Relation Age of Onset   • Diabetes Other    • Heart disease Other    • Hypertension Other    • Heart disease Mother         Myocardial infarct   • Hypertension Mother    • Diabetes Mother    • Heart disease Father         CHF        Social History     Socioeconomic History   • Marital status:    Tobacco Use   • Smoking status: Never   • Smokeless tobacco: Never   Vaping Use   • Vaping Use: Never used   Substance and Sexual Activity   • Alcohol use: No   • Drug use: No   • Sexual activity: Yes        Review of Systems   Constitutional: Positive for activity change.   HENT: Negative.    Eyes: Negative.    Respiratory: Negative.    Cardiovascular: Negative.    Gastrointestinal: Negative.    Endocrine: Negative.    Genitourinary: Negative.    Musculoskeletal: Positive for myalgias, neck pain and neck stiffness.        Right-sided  "neck pain.   Skin: Negative.    Allergic/Immunologic: Negative.    Neurological: Negative.  Negative for numbness.   Hematological: Negative.    Psychiatric/Behavioral: Negative.        PHYSICAL EXAMINATION:       Ht 162.6 cm (64.02\")   Wt 86.6 kg (191 lb)   LMP 03/09/1991 (Approximate)   BMI 32.76 kg/m²     Physical Exam  Vitals reviewed.   Constitutional:       General: She is not in acute distress.     Appearance: She is well-developed. She is not ill-appearing.   HENT:      Head: Normocephalic.   Pulmonary:      Effort: Pulmonary effort is normal. No respiratory distress.   Abdominal:      General: There is no distension.      Palpations: Abdomen is soft.   Musculoskeletal:         General: Tenderness (Cervical spine, Right paraspinal cervical muscles, Right trapezius) present. No swelling or deformity.   Skin:     General: Skin is warm and dry.      Capillary Refill: Capillary refill takes less than 2 seconds.      Findings: No erythema.   Neurological:      Mental Status: She is alert and oriented to person, place, and time.      GCS: GCS eye subscore is 4. GCS verbal subscore is 5. GCS motor subscore is 6.      Sensory: No sensory deficit.   Psychiatric:         Speech: Speech normal.         Behavior: Behavior normal.         Thought Content: Thought content normal.         Judgment: Judgment normal.         GAIT:     [x]  Normal  []  Antalgic    Assistive device: [x]  None  []  Walker     []  Crutches  []  Cane     []  Wheelchair  []  Stretcher    Back Exam     Tenderness   The patient is experiencing tenderness in the cervical.    Other   Sensation: normal  Gait: normal     Comments:  Pain and limitations with range of motion of the cervical spine.  Localized pain and tenderness at the lower cervical spine, right paraspinal cervical muscles and right trapezius.  No weakness appreciated.  No numbness or tingling.  No focal neurological deficits noted.            XR Spine Cervical 2 or 3 View    Result " Date: 2/8/2023  Narrative: PROCEDURE: XR SPINE CERVICAL 2 OR 3 VW VIEWS:  3 INDICATION: Maribell COMPARISON: None FINDINGS:  A cervical spine plain film examination was obtained. Fracture:  None Alignment:  No abrupt changes Prevertebral soft tissue swelling: None Diffuse facet arthropathy and uncovertebral arthropathy are present. Anterior osteophytes at C5-C6 and more prominently at C6-C7 are present.     Impression: CONCLUSION:  Degenerative changes diffusely as above. (Please note:  If pain or symptoms persist beyond reasonable expectations, a follow-up CT examination is suggested, as is deemed clinically appropriate). Electronically signed by:  Michaela Allen MD  2/8/2023 11:58 AM CST Workstation: [id number]    MRI cervical spine     HISTORY: Neck pain.     Prior exam: None.     TECHNIQUE: Multiplanar multisequence noncontrast images cervical  spine.     Lines: Moderate size central left paracentral broad-based  subligamentous disc protrusion at C6-C7 this causes some  effacement of the anterior aspect of the cervical subarachnoid  space but no significant central canal stenosis. The spinal cord  is unremarkable demonstrating no abnormalities of signal  intensity.     A small far left lateral disc protrusion at C3-C4 associated with  a annular tear. This causes mild left sided foraminal stenosis.     MRI cervical spine is otherwise remarkable.     IMPRESSION:  CONCLUSION: Small far left lateral disc protrusion at C3-C4  associated with a posterior annular tear. This causes mild  left-sided foraminal stenosis. No central canal stenosis.     Moderate-sized left paracentral broad-based subligamentous  protrusion at C6-C7 causing some effacement anterior aspect of  the cervical subarachnoid space but no significant central canal  stenosis.     MRI cervical spine is otherwise unremarkable.     Electronically signed by:  Rahat Cash MD  6/14/2019 7:37 AM CDT  Workstation: [id number]    ASSESSMENT:    Diagnoses and all  orders for this visit:    Neck pain  -     triamcinolone acetonide (KENALOG-40) injection 80 mg    Degenerative disc disease, cervical  -     triamcinolone acetonide (KENALOG-40) injection 80 mg    Acute cervical myofascial strain, initial encounter  -     triamcinolone acetonide (KENALOG-40) injection 80 mg    Other orders  -     tiZANidine (ZANAFLEX) 4 MG tablet; Take 1 tablet by mouth Every 8 (Eight) Hours As Needed for Muscle Spasms.    PLAN    X-rays of the cervical spine performed in office today and reviewed with no acute findings noted.  Patient has degenerative changes at multiple levels, primarily affecting the lower levels of the cervical spine with degenerative disc disease and osteophyte formations.  Patient does have a history of some degree of chronic neck pain.  Records reviewed indicate that she had MRI imaging performed of the cervical spine in June 2019 with no specific evidence of nerve compression at that time and no evidence of severe spinal stenosis.    Patient's current pain/symptoms have started acutely after she lifted a heavy mattress.  Subjective complaints and physical exam are most consistent with acute cervical strain.  She has localized pain and tenderness of the right paraspinal cervical muscles in the right trapezius muscle.  We discussed that this may take a few weeks to fully resolve and we discussed the importance of not reinjuring the muscle with returning to activity too soon.  She is not having any concerning radiculopathy affecting the right upper extremity and there is no indication at this point that she needs repeat MRI imaging.  However, we did discuss the possibility of repeat MRI imaging if her pain persists/worsens/changes.  We also discussed the possibility of physical therapy referral if her pain persists or worsens as she may benefit from stretching exercises and use of modalities.  Recommend intramuscular injection of Kenalog 80 mg today for management of  pain/inflammation.    Recommend the following:    -Rest and activity modification for now with avoidance of straining, lifting, pulling, tugging, etc.  -Use of ice therapy and/or heat therapy to the neck to minimize pain/inflammation/muscle tension.  We discussed that heat therapy would be the most beneficial to promote muscle relaxation.  -Tylenol as needed for pain/discomfort.  Patient should avoid oral NSAIDs due to her current use of Plavix as well as her history of chronic kidney disease.  We discussed topical for pain medication if needed but she declines this today.    Patient has Flexeril at home but this has been ineffective.  Recommend a trial of Zanaflex for suspected acute cervical strain.  Patient is cautioned that Zanaflex will likely cause drowsiness.     Follow-up in 2 to 4 weeks for recheck as needed for any new, worsening or persistent symptoms.  Consider MRI imaging of the cervical spine if indicated.  Consider referral to physical therapy.    Time spent of a minimum of 30 minutes including the face to face evaluation, reviewing of medical history and prior medial records, reviewing of diagnostic studies, prescription drug management, documentation, patient education and coordination of care.     Return in about 2 weeks (around 2/20/2023), or if symptoms worsen or fail to improve, for Recheck.        This document has been electronically signed by MAYELA John on February 8, 2023 12:04 CST      MAYELA John

## 2023-02-08 DIAGNOSIS — J45.21 MILD INTERMITTENT ASTHMA WITH ACUTE EXACERBATION: Chronic | ICD-10-CM

## 2023-02-08 DIAGNOSIS — R73.9 HYPERGLYCEMIA: ICD-10-CM

## 2023-02-08 RX ORDER — TIRZEPATIDE 7.5 MG/.5ML
INJECTION, SOLUTION SUBCUTANEOUS
Qty: 4 ML | Refills: 0 | OUTPATIENT
Start: 2023-02-08

## 2023-02-08 RX ORDER — ALBUTEROL SULFATE 90 UG/1
2 AEROSOL, METERED RESPIRATORY (INHALATION) EVERY 4 HOURS PRN
Qty: 18 G | Refills: 2 | Status: SHIPPED | OUTPATIENT
Start: 2023-02-08

## 2023-02-08 RX ORDER — TIRZEPATIDE 7.5 MG/.5ML
7.5 INJECTION, SOLUTION SUBCUTANEOUS WEEKLY
Qty: 0.5 ML | Refills: 1 | Status: SHIPPED | OUTPATIENT
Start: 2023-02-08 | End: 2023-02-15 | Stop reason: DRUGHIGH

## 2023-02-15 ENCOUNTER — OFFICE VISIT (OUTPATIENT)
Dept: FAMILY MEDICINE CLINIC | Facility: CLINIC | Age: 63
End: 2023-02-15
Payer: MEDICARE

## 2023-02-15 VITALS
WEIGHT: 186.2 LBS | HEIGHT: 64 IN | HEART RATE: 82 BPM | OXYGEN SATURATION: 96 % | SYSTOLIC BLOOD PRESSURE: 106 MMHG | BODY MASS INDEX: 31.79 KG/M2 | DIASTOLIC BLOOD PRESSURE: 72 MMHG

## 2023-02-15 DIAGNOSIS — E66.09 CLASS 1 OBESITY DUE TO EXCESS CALORIES WITH SERIOUS COMORBIDITY AND BODY MASS INDEX (BMI) OF 31.0 TO 31.9 IN ADULT: Primary | ICD-10-CM

## 2023-02-15 DIAGNOSIS — Z12.31 ENCOUNTER FOR SCREENING MAMMOGRAM FOR MALIGNANT NEOPLASM OF BREAST: ICD-10-CM

## 2023-02-15 PROCEDURE — 99213 OFFICE O/P EST LOW 20 MIN: CPT | Performed by: NURSE PRACTITIONER

## 2023-02-15 NOTE — PROGRESS NOTES
"Chief Complaint  Weight Check and Med Refill    Subjective   Three month follow-up for obesity.  Started on Mounjaro at previous appointment.  \"I have been doing great.  I am ready to move up to the next dose.\"        Lashaun Bernal presents to [facility] [place]  Obesity  This is a chronic problem. The current episode started more than 1 year ago. The problem has been gradually improving. The symptoms are aggravated by eating and drinking. Treatments tried: Mounjaro. The treatment provided moderate relief.     Current Outpatient Medications on File Prior to Visit   Medication Sig Dispense Refill   • albuterol sulfate  (90 Base) MCG/ACT inhaler Inhale 2 puffs Every 4 (Four) Hours As Needed for Wheezing. 18 g 2   • aspirin 81 MG EC tablet Take 1 tablet by mouth Daily. 1 tablet    • clonazePAM (KlonoPIN) 1 MG tablet      • clopidogrel (PLAVIX) 75 MG tablet Take 1 tablet by mouth Daily. 30 tablet    • cyclobenzaprine (FLEXERIL) 10 MG tablet TAKE 1 TABLET 3 TIMES A DAY AS NEEDED FOR MUSCLE SPASMS. 270 tablet 1   • dilTIAZem (CARDIZEM) 30 MG tablet Take 30 mg by mouth 2 (Two) Times a Day.     • estradiol (ESTRACE) 1 MG tablet Take 1 tablet by mouth Daily. 90 tablet 2   • furosemide (LASIX) 40 MG tablet Take 40 mg by mouth As Needed. Takes as needed     • gabapentin (NEURONTIN) 300 MG capsule Take 1 capsule by mouth 2 (Two) Times a Day. 180 capsule 1   • magnesium oxide (MAGOX) 400 (241.3 Mg) MG tablet tablet Take 400 mg by mouth 2 (Two) Times a Day.     • metoprolol tartrate (LOPRESSOR) 25 MG tablet Take 25 mg by mouth Daily. Half tablet in morning and half at night     • omeprazole (priLOSEC) 20 MG capsule Take 20 mg by mouth 2 (two) times a day.     • ondansetron ODT (ZOFRAN-ODT) 4 MG disintegrating tablet Place 1 tablet on the tongue Every 8 (Eight) Hours As Needed for Nausea or Vomiting. 90 tablet 11   • spironolactone (ALDACTONE) 25 MG tablet Take 25 mg by " "mouth Daily.     • tiZANidine (ZANAFLEX) 4 MG tablet Take 1 tablet by mouth Every 8 (Eight) Hours As Needed for Muscle Spasms. 90 tablet 1   • [DISCONTINUED] Tirzepatide (Mounjaro) 7.5 MG/0.5ML solution pen-injector Inject 0.5 mL under the skin into the appropriate area as directed 1 (One) Time Per Week. 0.5 mL 1     No current facility-administered medications on file prior to visit.     Allergies   Allergen Reactions   • Hydrocodone-Acetaminophen Itching, Rash and GI Intolerance   • Cefprozil Nausea And Vomiting and Rash   • Penicillins Rash       Objective   Vital Signs:  /72   Pulse 82   Ht 162.6 cm (64\")   Wt 84.5 kg (186 lb 3.2 oz)   SpO2 96%   BMI 31.96 kg/m²   Estimated body mass index is 31.96 kg/m² as calculated from the following:    Height as of this encounter: 162.6 cm (64\").    Weight as of this encounter: 84.5 kg (186 lb 3.2 oz).       Physical Exam  Vitals and nursing note reviewed.   Constitutional:       Appearance: Normal appearance. She is well-developed. She is obese.   Cardiovascular:      Rate and Rhythm: Normal rate and regular rhythm.      Heart sounds: Normal heart sounds.   Pulmonary:      Effort: Pulmonary effort is normal.      Breath sounds: Normal breath sounds.   Abdominal:      General: Bowel sounds are normal.      Palpations: Abdomen is soft.   Musculoskeletal:         General: Normal range of motion.   Skin:     General: Skin is warm.   Neurological:      Mental Status: She is alert and oriented to person, place, and time.   Psychiatric:         Behavior: Behavior normal.        Result Review :                   Assessment and Plan   Diagnoses and all orders for this visit:    1. Class 1 obesity due to excess calories with serious comorbidity and body mass index (BMI) of 31.0 to 31.9 in adult (Primary)  -     Tirzepatide 10 MG/0.5ML solution pen-injector; Inject 10 mg under the skin into the appropriate area as directed Every 7 (Seven) Days.  Dispense: 2 mL; Refill: " 1    2. Encounter for screening mammogram for malignant neoplasm of breast  -     Mammo Screening Digital Tomosynthesis Bilateral With CAD; Future      1.  Class I obesity due to excess calories with serious comorbidity and a body mass index of 31.0-31.9 in adult:  Body mass index is 31.96 kg/m².  BMI is >= 30 and <35. (Class 1 Obesity). The following options were offered after discussion;: exercise counseling/recommendations, nutrition counseling/recommendations and pharmacological intervention options  Positive encouragement provided for 34 pound weight loss since November 2022    2.  Encounter for screening mammogram for malignant neoplasm of breast:  Radiology will call to schedule bilateral mammogram  Encouraged monthly self breast exams at home         Follow Up   Return if symptoms worsen or fail to improve, for Next scheduled follow up.  Patient was given instructions and counseling regarding her condition or for health maintenance advice. Please see specific information pulled into the AVS if appropriate.         This document has been electronically signed by MAYELA Flores on February 15, 2023 10:30 CST

## 2023-03-21 ENCOUNTER — TELEPHONE (OUTPATIENT)
Dept: FAMILY MEDICINE CLINIC | Facility: CLINIC | Age: 63
End: 2023-03-21
Payer: MEDICARE

## 2023-03-21 NOTE — TELEPHONE ENCOUNTER
Per MAYELA Godinez, Ms. Bernal has been called with recent Screening Mammogram results & recommendations.  Continue current medications and follow-up as planned or sooner if any problems.       ----- Message from MAYELA Flores sent at 3/21/2023  1:28 PM CDT -----  Repeat mammogram yearly.  Please call or send letter.

## 2023-04-05 RX ORDER — ESTRADIOL 1 MG/1
TABLET ORAL
Qty: 90 TABLET | Refills: 2 | Status: SHIPPED | OUTPATIENT
Start: 2023-04-05

## 2023-04-07 ENCOUNTER — OFFICE VISIT (OUTPATIENT)
Dept: FAMILY MEDICINE CLINIC | Facility: CLINIC | Age: 63
End: 2023-04-07
Payer: MEDICARE

## 2023-04-07 ENCOUNTER — LAB (OUTPATIENT)
Dept: LAB | Facility: HOSPITAL | Age: 63
End: 2023-04-07
Payer: MEDICARE

## 2023-04-07 VITALS
HEART RATE: 78 BPM | DIASTOLIC BLOOD PRESSURE: 72 MMHG | OXYGEN SATURATION: 97 % | BODY MASS INDEX: 30.7 KG/M2 | HEIGHT: 64 IN | WEIGHT: 179.8 LBS | SYSTOLIC BLOOD PRESSURE: 134 MMHG

## 2023-04-07 DIAGNOSIS — M79.7 FIBROMYALGIA: ICD-10-CM

## 2023-04-07 DIAGNOSIS — Z79.899 HIGH RISK MEDICATION USE: ICD-10-CM

## 2023-04-07 DIAGNOSIS — E66.09 CLASS 1 OBESITY DUE TO EXCESS CALORIES WITH SERIOUS COMORBIDITY AND BODY MASS INDEX (BMI) OF 30.0 TO 30.9 IN ADULT: ICD-10-CM

## 2023-04-07 DIAGNOSIS — E78.00 HYPERCHOLESTEREMIA: ICD-10-CM

## 2023-04-07 DIAGNOSIS — Z13.29 SCREENING FOR THYROID DISORDER: ICD-10-CM

## 2023-04-07 DIAGNOSIS — E66.09 CLASS 1 OBESITY DUE TO EXCESS CALORIES WITH SERIOUS COMORBIDITY AND BODY MASS INDEX (BMI) OF 30.0 TO 30.9 IN ADULT: Primary | ICD-10-CM

## 2023-04-07 LAB
ALBUMIN SERPL-MCNC: 3.7 G/DL (ref 3.5–5.2)
ALBUMIN/GLOB SERPL: 1.3 G/DL
ALP SERPL-CCNC: 81 U/L (ref 39–117)
ALT SERPL W P-5'-P-CCNC: 29 U/L (ref 1–33)
AMPHET+METHAMPHET UR QL: NEGATIVE
AMPHETAMINES UR QL: NEGATIVE
ANION GAP SERPL CALCULATED.3IONS-SCNC: 8 MMOL/L (ref 5–15)
AST SERPL-CCNC: 23 U/L (ref 1–32)
BARBITURATES UR QL SCN: NEGATIVE
BASOPHILS # BLD AUTO: 0.07 10*3/MM3 (ref 0–0.2)
BASOPHILS NFR BLD AUTO: 0.7 % (ref 0–1.5)
BENZODIAZ UR QL SCN: NEGATIVE
BILIRUB SERPL-MCNC: <0.2 MG/DL (ref 0–1.2)
BUN SERPL-MCNC: 14 MG/DL (ref 8–23)
BUN/CREAT SERPL: 14.4 (ref 7–25)
BUPRENORPHINE SERPL-MCNC: NEGATIVE NG/ML
CALCIUM SPEC-SCNC: 9.1 MG/DL (ref 8.6–10.5)
CANNABINOIDS SERPL QL: NEGATIVE
CHLORIDE SERPL-SCNC: 105 MMOL/L (ref 98–107)
CHOLEST SERPL-MCNC: 202 MG/DL (ref 0–200)
CO2 SERPL-SCNC: 24 MMOL/L (ref 22–29)
COCAINE UR QL: NEGATIVE
CREAT SERPL-MCNC: 0.97 MG/DL (ref 0.57–1)
DEPRECATED RDW RBC AUTO: 41.8 FL (ref 37–54)
EGFRCR SERPLBLD CKD-EPI 2021: 66.2 ML/MIN/1.73
EOSINOPHIL # BLD AUTO: 0.2 10*3/MM3 (ref 0–0.4)
EOSINOPHIL NFR BLD AUTO: 2 % (ref 0.3–6.2)
ERYTHROCYTE [DISTWIDTH] IN BLOOD BY AUTOMATED COUNT: 12.7 % (ref 12.3–15.4)
GLOBULIN UR ELPH-MCNC: 2.9 GM/DL
GLUCOSE SERPL-MCNC: 82 MG/DL (ref 65–99)
HCT VFR BLD AUTO: 41.1 % (ref 34–46.6)
HDLC SERPL-MCNC: 45 MG/DL (ref 40–60)
HGB BLD-MCNC: 13.5 G/DL (ref 12–15.9)
IMM GRANULOCYTES # BLD AUTO: 0.03 10*3/MM3 (ref 0–0.05)
IMM GRANULOCYTES NFR BLD AUTO: 0.3 % (ref 0–0.5)
LDLC SERPL CALC-MCNC: 133 MG/DL (ref 0–100)
LDLC/HDLC SERPL: 2.89 {RATIO}
LYMPHOCYTES # BLD AUTO: 3.91 10*3/MM3 (ref 0.7–3.1)
LYMPHOCYTES NFR BLD AUTO: 39.2 % (ref 19.6–45.3)
MCH RBC QN AUTO: 29.4 PG (ref 26.6–33)
MCHC RBC AUTO-ENTMCNC: 32.8 G/DL (ref 31.5–35.7)
MCV RBC AUTO: 89.5 FL (ref 79–97)
METHADONE UR QL SCN: NEGATIVE
MONOCYTES # BLD AUTO: 0.59 10*3/MM3 (ref 0.1–0.9)
MONOCYTES NFR BLD AUTO: 5.9 % (ref 5–12)
NEUTROPHILS NFR BLD AUTO: 5.17 10*3/MM3 (ref 1.7–7)
NEUTROPHILS NFR BLD AUTO: 51.9 % (ref 42.7–76)
NRBC BLD AUTO-RTO: 0 /100 WBC (ref 0–0.2)
OPIATES UR QL: NEGATIVE
OXYCODONE UR QL SCN: NEGATIVE
PCP UR QL SCN: NEGATIVE
PLATELET # BLD AUTO: 378 10*3/MM3 (ref 140–450)
PMV BLD AUTO: 10.4 FL (ref 6–12)
POTASSIUM SERPL-SCNC: 4.1 MMOL/L (ref 3.5–5.2)
PROPOXYPH UR QL: NEGATIVE
PROT SERPL-MCNC: 6.6 G/DL (ref 6–8.5)
RBC # BLD AUTO: 4.59 10*6/MM3 (ref 3.77–5.28)
SODIUM SERPL-SCNC: 137 MMOL/L (ref 136–145)
TRICYCLICS UR QL SCN: POSITIVE
TRIGL SERPL-MCNC: 134 MG/DL (ref 0–150)
TSH SERPL DL<=0.05 MIU/L-ACNC: 3.34 UIU/ML (ref 0.27–4.2)
VLDLC SERPL-MCNC: 24 MG/DL (ref 5–40)
WBC NRBC COR # BLD: 9.97 10*3/MM3 (ref 3.4–10.8)

## 2023-04-07 PROCEDURE — 1160F RVW MEDS BY RX/DR IN RCRD: CPT | Performed by: NURSE PRACTITIONER

## 2023-04-07 PROCEDURE — 80306 DRUG TEST PRSMV INSTRMNT: CPT

## 2023-04-07 PROCEDURE — 99214 OFFICE O/P EST MOD 30 MIN: CPT | Performed by: NURSE PRACTITIONER

## 2023-04-07 PROCEDURE — 85025 COMPLETE CBC W/AUTO DIFF WBC: CPT

## 2023-04-07 PROCEDURE — 3078F DIAST BP <80 MM HG: CPT | Performed by: NURSE PRACTITIONER

## 2023-04-07 PROCEDURE — 80061 LIPID PANEL: CPT

## 2023-04-07 PROCEDURE — 36415 COLL VENOUS BLD VENIPUNCTURE: CPT | Performed by: NURSE PRACTITIONER

## 2023-04-07 PROCEDURE — 3075F SYST BP GE 130 - 139MM HG: CPT | Performed by: NURSE PRACTITIONER

## 2023-04-07 PROCEDURE — 80053 COMPREHEN METABOLIC PANEL: CPT | Performed by: NURSE PRACTITIONER

## 2023-04-07 PROCEDURE — 1159F MED LIST DOCD IN RCRD: CPT | Performed by: NURSE PRACTITIONER

## 2023-04-07 PROCEDURE — 84443 ASSAY THYROID STIM HORMONE: CPT

## 2023-04-07 RX ORDER — GABAPENTIN 100 MG/1
200 CAPSULE ORAL 2 TIMES DAILY
Qty: 120 CAPSULE | Refills: 5 | Status: SHIPPED | OUTPATIENT
Start: 2023-04-07

## 2023-04-07 NOTE — PROGRESS NOTES
"Chief Complaint  Fibromyalgia and Obesity    Subjective  3-month follow-up for obesity.  Currently on Mounjaro and has been tolerating the medication very well.  Needs refill on her gabapentin for fibromyalgia.  \"I was wondering if we could try decreasing the dose?  I feel better now than I have felt in years.\"        Lashaun Bernal presents to [facility] [place]  Obesity  This is a chronic problem. The current episode started more than 1 year ago. The problem has been gradually improving. The symptoms are aggravated by eating and drinking. Treatments tried: Mounjaro. The treatment provided moderate relief.   Fibromyalgia  This is a chronic problem. The current episode started more than 1 year ago. The problem occurs daily. The problem has been waxing and waning. Nothing aggravates the symptoms. Treatments tried: Gabapentin. The treatment provided significant relief.     Current Outpatient Medications on File Prior to Visit   Medication Sig Dispense Refill   • albuterol sulfate  (90 Base) MCG/ACT inhaler Inhale 2 puffs Every 4 (Four) Hours As Needed for Wheezing. 18 g 2   • aspirin 81 MG EC tablet Take 1 tablet by mouth Daily. 1 tablet    • clonazePAM (KlonoPIN) 1 MG tablet      • clopidogrel (PLAVIX) 75 MG tablet Take 1 tablet by mouth Daily. 30 tablet    • cyclobenzaprine (FLEXERIL) 10 MG tablet TAKE 1 TABLET 3 TIMES A DAY AS NEEDED FOR MUSCLE SPASMS. 270 tablet 1   • dilTIAZem (CARDIZEM) 30 MG tablet Take 1 tablet by mouth 2 (Two) Times a Day.     • estradiol (ESTRACE) 1 MG tablet TAKE 1 TABLET EVERY DAY 90 tablet 2   • furosemide (LASIX) 40 MG tablet Take 1 tablet by mouth As Needed. Takes as needed     • magnesium oxide (MAGOX) 400 (241.3 Mg) MG tablet tablet Take 1 tablet by mouth 2 (Two) Times a Day.     • metoprolol tartrate (LOPRESSOR) 25 MG tablet Take 1 tablet by mouth Daily. Half tablet in morning and half at night     • omeprazole (priLOSEC) 20 MG " "capsule Take 1 capsule by mouth 2 (two) times a day.     • ondansetron ODT (ZOFRAN-ODT) 4 MG disintegrating tablet Place 1 tablet on the tongue Every 8 (Eight) Hours As Needed for Nausea or Vomiting. 90 tablet 11   • spironolactone (ALDACTONE) 25 MG tablet Take 1 tablet by mouth Daily.     • tiZANidine (ZANAFLEX) 4 MG tablet Take 1 tablet by mouth Every 8 (Eight) Hours As Needed for Muscle Spasms. 90 tablet 1   • [DISCONTINUED] gabapentin (NEURONTIN) 300 MG capsule Take 1 capsule by mouth 2 (Two) Times a Day. 180 capsule 1   • [DISCONTINUED] Tirzepatide 10 MG/0.5ML solution pen-injector Inject 10 mg under the skin into the appropriate area as directed Every 7 (Seven) Days. 2 mL 1     No current facility-administered medications on file prior to visit.       Allergies   Allergen Reactions   • Hydrocodone-Acetaminophen Itching, Rash and GI Intolerance   • Cefprozil Nausea And Vomiting and Rash   • Penicillins Rash       Objective   Vital Signs:  /72   Pulse 78   Ht 162.6 cm (64\")   Wt 81.6 kg (179 lb 12.8 oz)   SpO2 97%   BMI 30.86 kg/m²   Estimated body mass index is 30.86 kg/m² as calculated from the following:    Height as of this encounter: 162.6 cm (64\").    Weight as of this encounter: 81.6 kg (179 lb 12.8 oz).         Physical Exam  Vitals and nursing note reviewed.   Constitutional:       Appearance: Normal appearance. She is well-developed. She is obese.   Cardiovascular:      Rate and Rhythm: Normal rate and regular rhythm.      Heart sounds: Normal heart sounds.   Pulmonary:      Effort: Pulmonary effort is normal.      Breath sounds: Normal breath sounds.   Abdominal:      General: Bowel sounds are normal.      Palpations: Abdomen is soft.   Musculoskeletal:         General: Normal range of motion.      Cervical back: Normal range of motion and neck supple.   Skin:     General: Skin is warm.      Capillary Refill: Capillary refill takes less than 2 seconds.   Neurological:      Mental Status: " She is alert and oriented to person, place, and time.   Psychiatric:         Behavior: Behavior normal.        Result Review :                   Assessment and Plan   Diagnoses and all orders for this visit:    1. Class 1 obesity due to excess calories with serious comorbidity and body mass index (BMI) of 30.0 to 30.9 in adult (Primary)  -     Tirzepatide 12.5 MG/0.5ML solution pen-injector; Inject 12.5 mg under the skin into the appropriate area as directed 1 (One) Time Per Week.  Dispense: 2 mL; Refill: 2  -     CBC & Differential; Future  -     Comprehensive Metabolic Panel    2. Fibromyalgia  -     gabapentin (NEURONTIN) 100 MG capsule; Take 2 capsules by mouth 2 (Two) Times a Day.  Dispense: 120 capsule; Refill: 5    3. Hypercholesteremia  -     Lipid panel; Future    4. Screening for thyroid disorder  -     TSH; Future    5. High risk medication use  -     Urine Drug Screen - Urine, Clean Catch; Future    1.  Class I obesity due to excess calories with serious comorbidity and a body mass index of 30.0-30.9 in adult:  Complete CBC and chemistry panel as ordered and will notify of results when available  Body mass index is 30.86 kg/m².  BMI is >= 30 and <35. (Class 1 Obesity). The following options were offered after discussion;: exercise counseling/recommendations, nutrition counseling/recommendations and pharmacological intervention options  Increase Mounjaro from 10 mg subcutaneous weekly to 12.5 mg subcutaneous weekly and new prescription sent to pharmacy    2.  Fibromyalgia:  Decrease gabapentin from 300 mg p.o. twice daily to 200 mg p.o. twice daily and new prescription sent to pharmacy  Continue Flexeril as previously prescribed    3.  Hypercholesterolemia:  Complete fasting lipid panel as ordered and will notify of results when available    4.  Screening for thyroid disorder:  Complete TSH as ordered and will notify of results when available  This is Gretchen  5.  High risk medication use:  Complete UDS as  ordered  PDMP reviewed         Follow Up   Return in 3 months (on 7/7/2023) for Medicare Wellness.  Patient was given instructions and counseling regarding her condition or for health maintenance advice. Please see specific information pulled into the AVS if appropriate.         This document has been electronically signed by MAYELA Flores on April 7, 2023 13:38 CDT

## 2023-04-10 ENCOUNTER — TELEPHONE (OUTPATIENT)
Dept: FAMILY MEDICINE CLINIC | Facility: CLINIC | Age: 63
End: 2023-04-10
Payer: MEDICARE

## 2023-04-10 DIAGNOSIS — E66.01 CLASS 2 SEVERE OBESITY DUE TO EXCESS CALORIES WITH SERIOUS COMORBIDITY AND BODY MASS INDEX (BMI) OF 38.0 TO 38.9 IN ADULT: Primary | ICD-10-CM

## 2023-04-10 DIAGNOSIS — E66.09 CLASS 1 OBESITY DUE TO EXCESS CALORIES WITH SERIOUS COMORBIDITY AND BODY MASS INDEX (BMI) OF 30.0 TO 30.9 IN ADULT: ICD-10-CM

## 2023-04-10 RX ORDER — TIRZEPATIDE 10 MG/.5ML
10 INJECTION, SOLUTION SUBCUTANEOUS WEEKLY
Qty: 2 ML | Refills: 2 | Status: SHIPPED | OUTPATIENT
Start: 2023-04-10 | End: 2023-04-11 | Stop reason: SDUPTHER

## 2023-04-10 NOTE — TELEPHONE ENCOUNTER
Lashaun said, her Mounjaro was sent to her mail order pharmacy and it was going to cost $900.00 to fill.  She told them not to fill it, but needs this sent to [facility] in [place].

## 2023-04-10 NOTE — TELEPHONE ENCOUNTER
Pt. Called back asking if she can be put on the Lower dose of the Monjarno medication, the Pt. Stated the Higher dose that was prescribed is way too expensive and the Pt. Cannot afford it.     Pharmacy: [facility]      You can call back at [phone]

## 2023-04-11 ENCOUNTER — TELEPHONE (OUTPATIENT)
Dept: FAMILY MEDICINE CLINIC | Facility: CLINIC | Age: 63
End: 2023-04-11
Payer: MEDICARE

## 2023-04-11 DIAGNOSIS — E66.01 CLASS 2 SEVERE OBESITY DUE TO EXCESS CALORIES WITH SERIOUS COMORBIDITY AND BODY MASS INDEX (BMI) OF 38.0 TO 38.9 IN ADULT: ICD-10-CM

## 2023-04-11 RX ORDER — TIRZEPATIDE 10 MG/.5ML
10 INJECTION, SOLUTION SUBCUTANEOUS WEEKLY
Qty: 2 ML | Refills: 2 | Status: SHIPPED | OUTPATIENT
Start: 2023-04-11

## 2023-04-11 NOTE — TELEPHONE ENCOUNTER
Ms. Bernal called and stated that she had called yesterday to have her Tirzepatide (Mounjaro) 10 MG/0.5ML solution pen-injector called into [facility].  [facility] still does not have it.  Was wanting to see what she needs to do.    Pt call back is [phone]

## 2023-04-11 NOTE — TELEPHONE ENCOUNTER
Rx was sent to her Mail order by mistake.    I have resent the Rx to her requested pharmacy [facility] in [place], [place]     has been called with this information

## 2023-04-13 ENCOUNTER — TELEPHONE (OUTPATIENT)
Dept: FAMILY MEDICINE CLINIC | Facility: CLINIC | Age: 63
End: 2023-04-13
Payer: MEDICARE

## 2023-04-13 NOTE — TELEPHONE ENCOUNTER
Per MAYELA Godinez, Ms. Bernal has been called with recent lab results & recommendations.  Continue current medications and follow-up as planned or sooner if any problems.       ----- Message from MAYELA Flores sent at 4/10/2023  6:27 AM CDT -----  Total cholesterol and bad cholesterol have increased.  She needs to reduce saturated fats in her diet and increase low-impact exercises such as walking.  All other labs were essentially normal.

## 2023-04-13 NOTE — TELEPHONE ENCOUNTER
Per MAYELA Godinez, Ms. Bernal has been called with recent lab results & recommendations.  Continue current medications and follow-up as planned or sooner if any problems.       ----- Message from MAYELA Flores sent at 4/10/2023  6:27 AM CDT -----  Labs normal, please call or send card!

## 2023-04-19 ENCOUNTER — TRANSCRIBE ORDERS (OUTPATIENT)
Dept: LAB | Facility: HOSPITAL | Age: 63
End: 2023-04-19
Payer: MEDICARE

## 2023-04-19 ENCOUNTER — LAB (OUTPATIENT)
Dept: LAB | Facility: HOSPITAL | Age: 63
End: 2023-04-19
Payer: MEDICARE

## 2023-04-19 DIAGNOSIS — N18.30 STAGE 3 CHRONIC KIDNEY DISEASE, UNSPECIFIED WHETHER STAGE 3A OR 3B CKD: ICD-10-CM

## 2023-04-19 DIAGNOSIS — N18.30 STAGE 3 CHRONIC KIDNEY DISEASE, UNSPECIFIED WHETHER STAGE 3A OR 3B CKD: Primary | ICD-10-CM

## 2023-04-19 PROCEDURE — 80048 BASIC METABOLIC PNL TOTAL CA: CPT

## 2023-04-19 PROCEDURE — 36415 COLL VENOUS BLD VENIPUNCTURE: CPT

## 2023-04-20 LAB
ANION GAP SERPL CALCULATED.3IONS-SCNC: 11 MMOL/L (ref 5–15)
BUN SERPL-MCNC: 15 MG/DL (ref 8–23)
BUN/CREAT SERPL: 14.6 (ref 7–25)
CALCIUM SPEC-SCNC: 9.2 MG/DL (ref 8.6–10.5)
CHLORIDE SERPL-SCNC: 106 MMOL/L (ref 98–107)
CO2 SERPL-SCNC: 22 MMOL/L (ref 22–29)
CREAT SERPL-MCNC: 1.03 MG/DL (ref 0.57–1)
EGFRCR SERPLBLD CKD-EPI 2021: 61.6 ML/MIN/1.73
GLUCOSE SERPL-MCNC: 101 MG/DL (ref 65–99)
POTASSIUM SERPL-SCNC: 4.2 MMOL/L (ref 3.5–5.2)
SODIUM SERPL-SCNC: 139 MMOL/L (ref 136–145)

## 2023-05-22 ENCOUNTER — OFFICE VISIT (OUTPATIENT)
Dept: GASTROENTEROLOGY | Facility: CLINIC | Age: 63
End: 2023-05-22

## 2023-05-22 VITALS
DIASTOLIC BLOOD PRESSURE: 78 MMHG | HEIGHT: 64 IN | HEART RATE: 69 BPM | WEIGHT: 178 LBS | SYSTOLIC BLOOD PRESSURE: 122 MMHG | BODY MASS INDEX: 30.39 KG/M2

## 2023-05-22 DIAGNOSIS — E71.30 DISORDER OF FATTY-ACID METABOLISM, UNSPECIFIED: ICD-10-CM

## 2023-05-22 DIAGNOSIS — R74.8 ELEVATED LIVER ENZYMES: Primary | ICD-10-CM

## 2023-05-22 DIAGNOSIS — K76.0 FATTY LIVER: ICD-10-CM

## 2023-05-22 DIAGNOSIS — K21.00 GASTROESOPHAGEAL REFLUX DISEASE WITH ESOPHAGITIS WITHOUT HEMORRHAGE: ICD-10-CM

## 2023-05-22 PROCEDURE — 3078F DIAST BP <80 MM HG: CPT | Performed by: PHYSICIAN ASSISTANT

## 2023-05-22 PROCEDURE — 3074F SYST BP LT 130 MM HG: CPT | Performed by: PHYSICIAN ASSISTANT

## 2023-05-22 PROCEDURE — 99214 OFFICE O/P EST MOD 30 MIN: CPT | Performed by: PHYSICIAN ASSISTANT

## 2023-05-22 PROCEDURE — 1160F RVW MEDS BY RX/DR IN RCRD: CPT | Performed by: PHYSICIAN ASSISTANT

## 2023-05-22 PROCEDURE — 1159F MED LIST DOCD IN RCRD: CPT | Performed by: PHYSICIAN ASSISTANT

## 2023-05-22 NOTE — PROGRESS NOTES
Chief Complaint   Patient presents with   • Heartburn   • Elevated Hepatic Enzymes   • Fatty Liver     6 Month Follow Up       ENDO PROCEDURE ORDERED:    Subjective    Lashaun Bernal is a 62 y.o. female. she is here today for follow-up.    History of Present Illness    The patient is seen on a recheck of her GERD, fatty liver, elevated liver enzymes, and known gallstones, F0/S1-S2/N1.  Last seen 11/21/2022.  Patient had a liver ultrasound 12/09/2022 that showed a fatty liver, cholelithiasis and a 6.2 mm common bile duct.  She ended up seeing Dr. Hoffman and had a laparoscopic cholecystectomy, was admitted 12/22-23/2022.  She currently denies abdominal pain.  GERD is doing well on the Prilosec 20 mg daily.  Denied nausea, vomiting and dysphagia.  Bowels are moving without blood or mucus.  Weight is down 29.7 pounds since last visit.  She had been on Mounjaro. It is on hold because of cough.  Last colonoscopy with family history 02/24/2020.      Patient had laboratories 01/27/2023.  CBC normal.  INR 0.99.  LFTs showed an AST of 44, ALT 53, otherwise normal.  Laboratories 04/07/2023 - urine drug screen was performed.  Normal TSH, CBC, and CMP.  Cholesterol 203, .  BMP on 04/19/2023 showed a glucose of 101, creatinine 1.03.    ASSESSMENT AND PLAN:  Patient with steatohepatitis with hepatic fibrosis.  LFTs much improved with continued weight loss. Dietary modifications recommended.  GERD doing better.  We will plan followup in 6 months with CADENA FibroSure and INR prior.  Further pending clinical course and the results of the above.       The following portions of the patient's history were reviewed and updated as appropriate:   Past Medical History:   Diagnosis Date   • Allergic rhinitis    • Arthritis    • Asthma    • Breast lump    • C. 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      Past Surgical History:   Procedure Laterality Date   • ABDOMINOPLASTY  2004   • AUGMENTATION MAMMAPLASTY     • BREAST AUGMENTATION BILATERAL MASTOPEXY     • BUNIONECTOMY Left 3/20/2017    Procedure: EXCISION OF CALCANEAL SPURS LEFT FOOT AND REATTACHMENT OF ACHILLES TENDON ;  Surgeon: Jarrell Mar MD;  Location: [facility] OR;  Service:    • CHOLECYSTECTOMY WITH INTRAOPERATIVE CHOLANGIOGRAM N/A 2022    Procedure: CHOLECYSTECTOMY LAPAROSCOPIC;  Surgeon: Osmar Hoffman MD;  Location: [facility] OR;  Service: General;  Laterality: N/A;   • COLONOSCOPY  2015   • COLONOSCOPY N/A 2020    Procedure: COLONOSCOPY;  Surgeon: Santhosh Patrick MD;  Location: [facility] ENDOSCOPY;  Service: Gastroenterology;  Laterality: N/A;   • CYST REMOVAL  1961    Excision of sebaceous cyst. Left ear   • FINGER/THUMB LESION/CYST EXCISION  10/29/2014   • HAND SURGERY  2013   • HYSTERECTOMY     • OOPHORECTOMY     • ORIF ULNA/RADIUS FRACTURES  2013   • TUBAL ABDOMINAL LIGATION  1985    Laparoscopic tubal sterilization; dilatation and curettage. Desires tubal ligation;       Family History   Problem Relation Age of Onset   • Diabetes Other    • Heart disease Other    • Hypertension Other    • Heart disease Mother         Myocardial infarct   • Hypertension Mother    • Diabetes Mother    • Heart disease Father         CHF     OB History        2    Para   2    Term   2            AB        Living     "       SAB        IAB        Ectopic        Molar        Multiple        Live Births                  Allergies   Allergen Reactions   • Hydrocodone-Acetaminophen Itching, Rash and GI Intolerance   • Cefprozil Nausea And Vomiting and Rash   • Penicillins Rash     Social History     Socioeconomic History   • Marital status:    Tobacco Use   • Smoking status: Never   • Smokeless tobacco: Never   Vaping Use   • Vaping Use: Never used   Substance and Sexual Activity   • Alcohol use: No   • Drug use: No   • Sexual activity: Yes     Current Medications:  Prior to Admission medications    Medication Sig Start Date End Date Taking? Authorizing Provider   albuterol sulfate  (90 Base) MCG/ACT inhaler Inhale 2 puffs Every 4 (Four) Hours As Needed for Wheezing. 2/8/23  Yes Susan Loco APRN   aspirin 81 MG EC tablet Take 1 tablet by mouth Daily. 12/26/22  Yes Osmar Hoffman MD   clonazePAM (KlonoPIN) 1 MG tablet  8/2/22  Yes Maryam Mustafa MD   clopidogrel (PLAVIX) 75 MG tablet Take 1 tablet by mouth Daily. 12/26/22  Yes Osmar Hoffman MD   cyclobenzaprine (FLEXERIL) 10 MG tablet TAKE 1 TABLET 3 TIMES A DAY AS NEEDED FOR MUSCLE SPASMS. 12/6/22  Yes Gretchen Gauthier APRN   dilTIAZem (CARDIZEM) 30 MG tablet Take 1 tablet by mouth 2 (Two) Times a Day.   Yes Maryam Mustafa MD   estradiol (ESTRACE) 1 MG tablet TAKE 1 TABLET EVERY DAY 4/5/23  Yes Gretchen Gauthier APRN   furosemide (LASIX) 40 MG tablet Take 1 tablet by mouth As Needed. Takes as needed 10/23/20  Yes Maryam Mustafa MD   gabapentin (NEURONTIN) 100 MG capsule Take 2 capsules by mouth 2 (Two) Times a Day. 4/7/23  Yes Gretchen Gauthier APRN   magnesium oxide (MAGOX) 400 (241.3 Mg) MG tablet tablet Take 1 tablet by mouth 2 (Two) Times a Day. 5/13/20  Yes Maryam Mustafa MD   metoprolol tartrate (LOPRESSOR) 25 MG tablet Take 1 tablet by mouth Daily. Half tablet in morning and half at night   Yes Maryam Mustafa MD   omeprazole  "(priLOSEC) 20 MG capsule Take 1 capsule by mouth 2 (two) times a day.   Yes Provider, MD Maryam   ondansetron ODT (ZOFRAN-ODT) 4 MG disintegrating tablet Place 1 tablet on the tongue Every 8 (Eight) Hours As Needed for Nausea or Vomiting. 12/7/22  Yes Susan Loco APRN   spironolactone (ALDACTONE) 25 MG tablet Take 1 tablet by mouth Daily. 9/27/22  Yes ProviderMaryam MD   tiZANidine (ZANAFLEX) 4 MG tablet Take 1 tablet by mouth Every 8 (Eight) Hours As Needed for Muscle Spasms. 2/6/23  Yes Carmencita Gr APRN   Tirzepatide (Mounjaro) 10 MG/0.5ML solution pen-injector Inject 0.5 mL under the skin into the appropriate area as directed 1 (One) Time Per Week.  Patient not taking: Reported on 5/22/2023 4/11/23   Gretchen Gauthier APRN     Review of Systems  Review of Systems       Objective    /78   Pulse 69   Ht 162.6 cm (64\")   Wt 80.7 kg (178 lb)   LMP 03/09/1991 (Approximate)   BMI 30.55 kg/m²   Physical Exam  Vitals and nursing note reviewed.   Constitutional:       General: She is not in acute distress.     Appearance: She is well-developed. She is obese.   HENT:      Head: Normocephalic and atraumatic.   Eyes:      Pupils: Pupils are equal, round, and reactive to light.   Cardiovascular:      Rate and Rhythm: Normal rate and regular rhythm.      Heart sounds: Normal heart sounds.   Pulmonary:      Effort: Pulmonary effort is normal.      Breath sounds: Normal breath sounds.   Abdominal:      General: Bowel sounds are normal. There is no distension or abdominal bruit.      Palpations: Abdomen is soft. Abdomen is not rigid. There is no shifting dullness or mass.      Tenderness: There is abdominal tenderness. There is no guarding or rebound.      Hernia: No hernia is present. There is no hernia in the ventral area.   Musculoskeletal:         General: Normal range of motion.      Cervical back: Normal range of motion.   Skin:     General: Skin is warm and dry.   Neurological:      Mental " Status: She is alert and oriented to person, place, and time.   Psychiatric:         Behavior: Behavior normal.         Thought Content: Thought content normal.         Judgment: Judgment normal.       Assessment & Plan      1. Elevated liver enzymes    2. Fatty liver    3. Gastroesophageal reflux disease with esophagitis without hemorrhage    4. Disorder of fatty-acid metabolism, unspecified    .   Diagnoses and all orders for this visit:    1. Elevated liver enzymes (Primary)  -     CADENA Fibrosure; Future  -     Protime-INR; Future    2. Fatty liver  -     CADENA Fibrosure; Future  -     Protime-INR; Future    3. Gastroesophageal reflux disease with esophagitis without hemorrhage    4. Disorder of fatty-acid metabolism, unspecified  -     CADENA Fibrosure; Future        Orders placed during this encounter include:  Orders Placed This Encounter   Procedures   • CADENA Fibrosure     Standing Status:   Future     Standing Expiration Date:   12/3/2023     Order Specific Question:   Release to patient     Answer:   Routine Release   • Protime-INR     Standing Status:   Future     Standing Expiration Date:   12/3/2023       Medications prescribed:  No orders of the defined types were placed in this encounter.      Requested Prescriptions      No prescriptions requested or ordered in this encounter       Review and/or summary of lab tests, radiology, procedures, medications. Review and summary of old records and obtaining of history. The risks and benefits of my recommendations, as well as other treatment options were discussed with the patient today. Questions were answered.    Follow-up: Return in about 6 months (around 11/22/2023), or if symptoms worsen or fail to improve, for lab prior.     * Surgery not found *      This document has been electronically signed by Marcial Walker PA-C on June 2, 2023 14:40 CDT      Results for orders placed or performed in visit on 04/19/23   Basic Metabolic Panel    Specimen: Blood    Result Value Ref Range    Glucose 101 (H) 65 - 99 mg/dL    BUN 15 8 - 23 mg/dL    Creatinine 1.03 (H) 0.57 - 1.00 mg/dL    Sodium 139 136 - 145 mmol/L    Potassium 4.2 3.5 - 5.2 mmol/L    Chloride 106 98 - 107 mmol/L    CO2 22.0 22.0 - 29.0 mmol/L    Calcium 9.2 8.6 - 10.5 mg/dL    BUN/Creatinine Ratio 14.6 7.0 - 25.0    Anion Gap 11.0 5.0 - 15.0 mmol/L    eGFR 61.6 >60.0 mL/min/1.73   Results for orders placed or performed in visit on 04/07/23   CBC Auto Differential    Specimen: Blood   Result Value Ref Range    WBC 9.97 3.40 - 10.80 10*3/mm3    RBC 4.59 3.77 - 5.28 10*6/mm3    Hemoglobin 13.5 12.0 - 15.9 g/dL    Hematocrit 41.1 34.0 - 46.6 %    MCV 89.5 79.0 - 97.0 fL    MCH 29.4 26.6 - 33.0 pg    MCHC 32.8 31.5 - 35.7 g/dL    RDW 12.7 12.3 - 15.4 %    RDW-SD 41.8 37.0 - 54.0 fl    MPV 10.4 6.0 - 12.0 fL    Platelets 378 140 - 450 10*3/mm3    Neutrophil % 51.9 42.7 - 76.0 %    Lymphocyte % 39.2 19.6 - 45.3 %    Monocyte % 5.9 5.0 - 12.0 %    Eosinophil % 2.0 0.3 - 6.2 %    Basophil % 0.7 0.0 - 1.5 %    Immature Grans % 0.3 0.0 - 0.5 %    Neutrophils, Absolute 5.17 1.70 - 7.00 10*3/mm3    Lymphocytes, Absolute 3.91 (H) 0.70 - 3.10 10*3/mm3    Monocytes, Absolute 0.59 0.10 - 0.90 10*3/mm3    Eosinophils, Absolute 0.20 0.00 - 0.40 10*3/mm3    Basophils, Absolute 0.07 0.00 - 0.20 10*3/mm3    Immature Grans, Absolute 0.03 0.00 - 0.05 10*3/mm3    nRBC 0.0 0.0 - 0.2 /100 WBC   Urine Drug Screen - Urine, Clean Catch    Specimen: Urine, Clean Catch   Result Value Ref Range    THC, Screen, Urine Negative Negative    Phencyclidine (PCP), Urine Negative Negative    Cocaine Screen, Urine Negative Negative    Methamphetamine, Ur Negative Negative    Opiate Screen Negative Negative    Amphetamine Screen, Urine Negative Negative    Benzodiazepine Screen, Urine Negative Negative    Tricyclic Antidepressants Screen Positive (A) Negative    Methadone Screen, Urine Negative Negative    Barbiturates Screen, Urine Negative Negative     Oxycodone Screen, Urine Negative Negative    Propoxyphene Screen Negative Negative    Buprenorphine, Screen, Urine Negative Negative   TSH    Specimen: Blood   Result Value Ref Range    TSH 3.340 0.270 - 4.200 uIU/mL   Lipid panel    Specimen: Blood   Result Value Ref Range    Total Cholesterol 202 (H) 0 - 200 mg/dL    Triglycerides 134 0 - 150 mg/dL    HDL Cholesterol 45 40 - 60 mg/dL    LDL Cholesterol  133 (H) 0 - 100 mg/dL    VLDL Cholesterol 24 5 - 40 mg/dL    LDL/HDL Ratio 2.89    Results for orders placed or performed in visit on 04/07/23   Comprehensive Metabolic Panel    Specimen: Blood   Result Value Ref Range    Glucose 82 65 - 99 mg/dL    BUN 14 8 - 23 mg/dL    Creatinine 0.97 0.57 - 1.00 mg/dL    Sodium 137 136 - 145 mmol/L    Potassium 4.1 3.5 - 5.2 mmol/L    Chloride 105 98 - 107 mmol/L    CO2 24.0 22.0 - 29.0 mmol/L    Calcium 9.1 8.6 - 10.5 mg/dL    Total Protein 6.6 6.0 - 8.5 g/dL    Albumin 3.7 3.5 - 5.2 g/dL    ALT (SGPT) 29 1 - 33 U/L    AST (SGOT) 23 1 - 32 U/L    Alkaline Phosphatase 81 39 - 117 U/L    Total Bilirubin <0.2 0.0 - 1.2 mg/dL    Globulin 2.9 gm/dL    A/G Ratio 1.3 g/dL    BUN/Creatinine Ratio 14.4 7.0 - 25.0    Anion Gap 8.0 5.0 - 15.0 mmol/L    eGFR 66.2 >60.0 mL/min/1.73   Results for orders placed or performed in visit on 01/27/23   PTH, Intact    Specimen: Blood   Result Value Ref Range    PTH, Intact 23.9 15.0 - 65.0 pg/mL   Renal Function Panel    Specimen: Blood   Result Value Ref Range    Glucose 104 (H) 65 - 99 mg/dL    BUN 16 8 - 23 mg/dL    Creatinine 1.03 (H) 0.57 - 1.00 mg/dL    Sodium 138 136 - 145 mmol/L    Potassium 4.1 3.5 - 5.2 mmol/L    Chloride 107 98 - 107 mmol/L    CO2 20.0 (L) 22.0 - 29.0 mmol/L    Calcium 9.5 8.6 - 10.5 mg/dL    Albumin 3.9 3.5 - 5.2 g/dL    Phosphorus 3.3 2.5 - 4.5 mg/dL    Anion Gap 11.0 5.0 - 15.0 mmol/L    BUN/Creatinine Ratio 15.5 7.0 - 25.0    eGFR 61.6 >60.0 mL/min/1.73   Results for orders placed or performed in visit on 01/26/23    Protime-INR    Specimen: Blood   Result Value Ref Range    Protime 13.0 11.1 - 15.3 Seconds    INR 0.99 0.80 - 1.20   Gamma GT    Specimen: Blood   Result Value Ref Range    GGT 16 5 - 36 U/L   Hepatic Function Panel    Specimen: Blood   Result Value Ref Range    Total Protein 6.4 6.0 - 8.5 g/dL    Albumin 4.0 3.5 - 5.2 g/dL    ALT (SGPT) 53 (H) 1 - 33 U/L    AST (SGOT) 44 (H) 1 - 32 U/L    Alkaline Phosphatase 86 39 - 117 U/L    Total Bilirubin 0.2 0.0 - 1.2 mg/dL    Bilirubin, Direct <0.2 0.0 - 0.3 mg/dL    Bilirubin, Indirect     Results for orders placed or performed in visit on 01/23/23   CBC Auto Differential    Specimen: Blood   Result Value Ref Range    WBC 8.68 3.40 - 10.80 10*3/mm3    RBC 4.41 3.77 - 5.28 10*6/mm3    Hemoglobin 12.8 12.0 - 15.9 g/dL    Hematocrit 38.3 34.0 - 46.6 %    MCV 86.8 79.0 - 97.0 fL    MCH 29.0 26.6 - 33.0 pg    MCHC 33.4 31.5 - 35.7 g/dL    RDW 12.7 12.3 - 15.4 %    RDW-SD 39.4 37.0 - 54.0 fl    MPV 10.5 6.0 - 12.0 fL    Platelets 364 140 - 450 10*3/mm3    Neutrophil % 43.4 42.7 - 76.0 %    Lymphocyte % 41.5 19.6 - 45.3 %    Monocyte % 7.8 5.0 - 12.0 %    Eosinophil % 6.1 0.3 - 6.2 %    Basophil % 1.0 0.0 - 1.5 %    Immature Grans % 0.2 0.0 - 0.5 %    Neutrophils, Absolute 3.76 1.70 - 7.00 10*3/mm3    Lymphocytes, Absolute 3.60 (H) 0.70 - 3.10 10*3/mm3    Monocytes, Absolute 0.68 0.10 - 0.90 10*3/mm3    Eosinophils, Absolute 0.53 (H) 0.00 - 0.40 10*3/mm3    Basophils, Absolute 0.09 0.00 - 0.20 10*3/mm3    Immature Grans, Absolute 0.02 0.00 - 0.05 10*3/mm3    nRBC 0.1 0.0 - 0.2 /100 WBC     *Note: Due to a large number of results and/or encounters for the requested time period, some results have not been displayed. A complete set of results can be found in Results Review.

## 2023-07-11 PROBLEM — E78.6 LIPOPROTEIN DEFICIENCY: Status: ACTIVE | Noted: 2023-07-11

## 2023-07-11 PROBLEM — E78.1 PURE HYPERTRIGLYCERIDEMIA: Status: ACTIVE | Noted: 2023-07-11

## 2023-07-26 ENCOUNTER — OFFICE VISIT (OUTPATIENT)
Dept: FAMILY MEDICINE CLINIC | Facility: CLINIC | Age: 63
End: 2023-07-26
Payer: MEDICARE

## 2023-07-26 ENCOUNTER — LAB (OUTPATIENT)
Dept: LAB | Facility: HOSPITAL | Age: 63
End: 2023-07-26
Payer: MEDICARE

## 2023-07-26 VITALS
HEIGHT: 64 IN | DIASTOLIC BLOOD PRESSURE: 74 MMHG | WEIGHT: 177 LBS | SYSTOLIC BLOOD PRESSURE: 132 MMHG | HEART RATE: 91 BPM | OXYGEN SATURATION: 97 % | BODY MASS INDEX: 30.22 KG/M2

## 2023-07-26 DIAGNOSIS — R00.2 PALPITATIONS: ICD-10-CM

## 2023-07-26 DIAGNOSIS — G44.209 ACUTE NON INTRACTABLE TENSION-TYPE HEADACHE: Primary | ICD-10-CM

## 2023-07-26 LAB
ALBUMIN SERPL-MCNC: 3.9 G/DL (ref 3.5–5.2)
ALBUMIN/GLOB SERPL: 1 G/DL
ALP SERPL-CCNC: 93 U/L (ref 39–117)
ALT SERPL W P-5'-P-CCNC: 20 U/L (ref 1–33)
ANION GAP SERPL CALCULATED.3IONS-SCNC: 12 MMOL/L (ref 5–15)
AST SERPL-CCNC: 19 U/L (ref 1–32)
BASOPHILS # BLD AUTO: 0.06 10*3/MM3 (ref 0–0.2)
BASOPHILS NFR BLD AUTO: 0.5 % (ref 0–1.5)
BILIRUB SERPL-MCNC: 0.4 MG/DL (ref 0–1.2)
BUN SERPL-MCNC: 20 MG/DL (ref 8–23)
BUN/CREAT SERPL: 16.8 (ref 7–25)
CALCIUM SPEC-SCNC: 9.2 MG/DL (ref 8.6–10.5)
CHLORIDE SERPL-SCNC: 101 MMOL/L (ref 98–107)
CK MB SERPL-CCNC: <1 NG/ML
CO2 SERPL-SCNC: 23 MMOL/L (ref 22–29)
CREAT SERPL-MCNC: 1.19 MG/DL (ref 0.57–1)
DEPRECATED RDW RBC AUTO: 40.1 FL (ref 37–54)
EGFRCR SERPLBLD CKD-EPI 2021: 51.8 ML/MIN/1.73
EOSINOPHIL # BLD AUTO: 0.11 10*3/MM3 (ref 0–0.4)
EOSINOPHIL NFR BLD AUTO: 1 % (ref 0.3–6.2)
ERYTHROCYTE [DISTWIDTH] IN BLOOD BY AUTOMATED COUNT: 12.7 % (ref 12.3–15.4)
GLOBULIN UR ELPH-MCNC: 4.1 GM/DL
GLUCOSE SERPL-MCNC: 91 MG/DL (ref 65–99)
HCT VFR BLD AUTO: 46.2 % (ref 34–46.6)
HGB BLD-MCNC: 15.4 G/DL (ref 12–15.9)
IMM GRANULOCYTES # BLD AUTO: 0.05 10*3/MM3 (ref 0–0.05)
IMM GRANULOCYTES NFR BLD AUTO: 0.4 % (ref 0–0.5)
LYMPHOCYTES # BLD AUTO: 2.45 10*3/MM3 (ref 0.7–3.1)
LYMPHOCYTES NFR BLD AUTO: 21.2 % (ref 19.6–45.3)
MCH RBC QN AUTO: 28.9 PG (ref 26.6–33)
MCHC RBC AUTO-ENTMCNC: 33.3 G/DL (ref 31.5–35.7)
MCV RBC AUTO: 86.8 FL (ref 79–97)
MONOCYTES # BLD AUTO: 0.71 10*3/MM3 (ref 0.1–0.9)
MONOCYTES NFR BLD AUTO: 6.2 % (ref 5–12)
NEUTROPHILS NFR BLD AUTO: 70.7 % (ref 42.7–76)
NEUTROPHILS NFR BLD AUTO: 8.16 10*3/MM3 (ref 1.7–7)
NRBC BLD AUTO-RTO: 0 /100 WBC (ref 0–0.2)
PLATELET # BLD AUTO: 433 10*3/MM3 (ref 140–450)
PMV BLD AUTO: 9.4 FL (ref 6–12)
POTASSIUM SERPL-SCNC: 4.9 MMOL/L (ref 3.5–5.2)
PROT SERPL-MCNC: 8 G/DL (ref 6–8.5)
QT INTERVAL: 364 MS
QTC INTERVAL: 447 MS
RBC # BLD AUTO: 5.32 10*6/MM3 (ref 3.77–5.28)
SODIUM SERPL-SCNC: 136 MMOL/L (ref 136–145)
TROPONIN T SERPL HS-MCNC: 8 NG/L
TSH SERPL DL<=0.05 MIU/L-ACNC: 2.6 UIU/ML (ref 0.27–4.2)
WBC NRBC COR # BLD: 11.54 10*3/MM3 (ref 3.4–10.8)

## 2023-07-26 PROCEDURE — 1160F RVW MEDS BY RX/DR IN RCRD: CPT | Performed by: NURSE PRACTITIONER

## 2023-07-26 PROCEDURE — 3075F SYST BP GE 130 - 139MM HG: CPT | Performed by: NURSE PRACTITIONER

## 2023-07-26 PROCEDURE — 85025 COMPLETE CBC W/AUTO DIFF WBC: CPT | Performed by: NURSE PRACTITIONER

## 2023-07-26 PROCEDURE — 80053 COMPREHEN METABOLIC PANEL: CPT | Performed by: NURSE PRACTITIONER

## 2023-07-26 PROCEDURE — 1159F MED LIST DOCD IN RCRD: CPT | Performed by: NURSE PRACTITIONER

## 2023-07-26 PROCEDURE — 99214 OFFICE O/P EST MOD 30 MIN: CPT | Performed by: NURSE PRACTITIONER

## 2023-07-26 PROCEDURE — 84484 ASSAY OF TROPONIN QUANT: CPT | Performed by: NURSE PRACTITIONER

## 2023-07-26 PROCEDURE — 84443 ASSAY THYROID STIM HORMONE: CPT | Performed by: NURSE PRACTITIONER

## 2023-07-26 PROCEDURE — 3078F DIAST BP <80 MM HG: CPT | Performed by: NURSE PRACTITIONER

## 2023-07-26 PROCEDURE — 93005 ELECTROCARDIOGRAM TRACING: CPT | Performed by: NURSE PRACTITIONER

## 2023-07-26 PROCEDURE — 82553 CREATINE MB FRACTION: CPT | Performed by: NURSE PRACTITIONER

## 2023-07-26 RX ORDER — ATOGEPANT 60 MG/1
60 TABLET ORAL
Qty: 16 TABLET | Refills: 3 | Status: SHIPPED | OUTPATIENT
Start: 2023-07-26

## 2023-07-26 NOTE — PROGRESS NOTES
"Chief Complaint  Headache and Fatigue (/)    Subjective        Lashaun Bernal presents to [facility] - [place]  Three days ago began having left-sided headache with nausea.  Has been taking Tylenol with little to no relief of symptoms.  \"This morning ever since I got a every time I move I feel like I am on a pass out and my heart is racing.\"  Has history of migraines \"but this feels different.  I never had a migraine last this long.\"  Reports decrease in oral intake    Headache  Headache pattern:  Some headache always there, and the pain level varies  Duration:  Less than 2 weeks  ADL impact frequency:  Fewer than 1 per month  Time of day symptoms are worse:  No specific time of day  Days of the week symptoms are worse:  No specific day of the week  Season symptoms are worse:  No particular season  Quality:  Throbbing  Laterality:  Left side only  Location:  Front/forehead and temples/sides  Headaches last more than three days?: Yes    Aggravating factors:  Light  Abortive medications tried:  Acetaminophen  Palpitations   This is a new problem. The current episode started today. The problem has been waxing and waning. Nothing aggravates the symptoms. Associated symptoms comments: Near syncope. She has tried bed rest for the symptoms. The treatment provided no relief. Risk factors include obesity, post menopause and sedentary lifestyle.         This document has been electronically signed by MAYELA Flores on July 26, 2023 15:21 CDT     Objective   Vital Signs:  /74   Pulse 91   Ht 162.6 cm (64\")   Wt 80.3 kg (177 lb)   SpO2 97%   BMI 30.38 kg/m²   Estimated body mass index is 30.38 kg/m² as calculated from the following:    Height as of this encounter: 162.6 cm (64\").    Weight as of this encounter: 80.3 kg (177 lb).               Physical Exam  Vitals and nursing note reviewed.   Constitutional:       Appearance: Normal appearance. She is " well-developed.   HENT:      Head: Normocephalic.   Eyes:      Pupils: Pupils are equal, round, and reactive to light.   Cardiovascular:      Rate and Rhythm: Normal rate and regular rhythm.      Heart sounds: Normal heart sounds.   Pulmonary:      Effort: Pulmonary effort is normal.      Breath sounds: Normal breath sounds.   Abdominal:      General: Bowel sounds are normal.      Palpations: Abdomen is soft.   Musculoskeletal:         General: Normal range of motion.      Cervical back: Normal range of motion and neck supple.   Skin:     General: Skin is warm.      Capillary Refill: Capillary refill takes less than 2 seconds.   Neurological:      Mental Status: She is alert and oriented to person, place, and time.   Psychiatric:         Behavior: Behavior normal.      Result Review :                   Assessment and Plan   Diagnoses and all orders for this visit:    1. Acute non intractable tension-type headache (Primary)  -     Comprehensive Metabolic Panel  -     Atogepant (Qulipta) 60 MG tablet; Take 1 tablet by mouth Every Other Day As Needed (migraine).  Dispense: 16 tablet; Refill: 3    2. Palpitations  -     CBC & Differential  -     TSH  -     CK-MB  -     High Sensitivity Troponin T  -     ECG 12 Lead      1.  Acute non-intractable tension-type headache:  Complete chemistry panel as ordered will notify of results when available  Zavapret 10 mg given intranasally in office  Lot #[id number] Exp: 02/2025  Educated on possible side effects of this medication including not limited to increased risk for rhinitis  Patient waited in office and within 20 minutes was headache free, all  symptoms improved    2.  Palpitations:  Stat CBC, TSH CK-MB and troponin ordered and discussed results in office   Twelve-lead ECG performed and will be sent to cardiology for further review  Encouraged to seek emergency medical treatment for any new or worsening palpitations, chest pain         Follow Up   Return if symptoms worsen or  fail to improve, for Next scheduled follow up.  Patient was given instructions and counseling regarding her condition or for health maintenance advice. Please see specific information pulled into the AVS if appropriate.         This document has been electronically signed by MAYELA Flores on July 26, 2023 15:21 CDT

## 2023-07-27 ENCOUNTER — EXTERNAL PBMM DATA (OUTPATIENT)
Dept: PHARMACY | Facility: OTHER | Age: 63
End: 2023-07-27
Payer: MEDICARE

## 2023-07-28 ENCOUNTER — TELEPHONE (OUTPATIENT)
Dept: FAMILY MEDICINE CLINIC | Facility: CLINIC | Age: 63
End: 2023-07-28

## 2023-07-28 NOTE — TELEPHONE ENCOUNTER
Patient called and said that she saw Gretchen and she told her to let her know if she was any better. She is not any better and still having diarrhea. She said that she may have to send her to get iv fluids for dehydration. Phone is [phone].

## 2023-07-29 ENCOUNTER — HOSPITAL ENCOUNTER (EMERGENCY)
Facility: HOSPITAL | Age: 63
Discharge: HOME | End: 2023-07-29
Attending: STUDENT IN AN ORGANIZED HEALTH CARE EDUCATION/TRAINING PROGRAM
Payer: MEDICARE

## 2023-07-29 ENCOUNTER — APPOINTMENT (OUTPATIENT)
Dept: CT IMAGING | Facility: HOSPITAL | Age: 63
End: 2023-07-29
Payer: MEDICARE

## 2023-07-29 VITALS
SYSTOLIC BLOOD PRESSURE: 172 MMHG | HEIGHT: 63 IN | WEIGHT: 177 LBS | DIASTOLIC BLOOD PRESSURE: 74 MMHG | HEART RATE: 97 BPM | OXYGEN SATURATION: 97 % | RESPIRATION RATE: 20 BRPM | BODY MASS INDEX: 31.36 KG/M2 | TEMPERATURE: 98.3 F

## 2023-07-29 DIAGNOSIS — R19.7 DIARRHEA, UNSPECIFIED TYPE: Primary | ICD-10-CM

## 2023-07-29 DIAGNOSIS — R10.9 ABDOMINAL PAIN, UNSPECIFIED ABDOMINAL LOCATION: ICD-10-CM

## 2023-07-29 LAB
ALBUMIN SERPL-MCNC: 3.5 G/DL (ref 3.5–5.2)
ALBUMIN/GLOB SERPL: 0.8 G/DL
ALP SERPL-CCNC: 91 U/L (ref 39–117)
ALT SERPL W P-5'-P-CCNC: 25 U/L (ref 1–33)
ANION GAP SERPL CALCULATED.3IONS-SCNC: 11 MMOL/L (ref 5–15)
AST SERPL-CCNC: 24 U/L (ref 1–32)
BACTERIA UR QL AUTO: ABNORMAL /HPF
BASOPHILS # BLD AUTO: 0.07 10*3/MM3 (ref 0–0.2)
BASOPHILS NFR BLD AUTO: 0.5 % (ref 0–1.5)
BILIRUB SERPL-MCNC: 0.2 MG/DL (ref 0–1.2)
BILIRUB UR QL STRIP: NEGATIVE
BUN SERPL-MCNC: 15 MG/DL (ref 8–23)
BUN/CREAT SERPL: 14.6 (ref 7–25)
CALCIUM SPEC-SCNC: 9.1 MG/DL (ref 8.6–10.5)
CHLORIDE SERPL-SCNC: 107 MMOL/L (ref 98–107)
CLARITY UR: ABNORMAL
CO2 SERPL-SCNC: 19 MMOL/L (ref 22–29)
COLOR UR: ABNORMAL
CREAT SERPL-MCNC: 1.03 MG/DL (ref 0.57–1)
DEPRECATED RDW RBC AUTO: 39.8 FL (ref 37–54)
EGFRCR SERPLBLD CKD-EPI 2021: 61.6 ML/MIN/1.73
EOSINOPHIL # BLD AUTO: 0.44 10*3/MM3 (ref 0–0.4)
EOSINOPHIL NFR BLD AUTO: 3.3 % (ref 0.3–6.2)
ERYTHROCYTE [DISTWIDTH] IN BLOOD BY AUTOMATED COUNT: 12.6 % (ref 12.3–15.4)
GLOBULIN UR ELPH-MCNC: 4.2 GM/DL
GLUCOSE SERPL-MCNC: 98 MG/DL (ref 65–99)
GLUCOSE UR STRIP-MCNC: NEGATIVE MG/DL
HCT VFR BLD AUTO: 42.9 % (ref 34–46.6)
HGB BLD-MCNC: 14.2 G/DL (ref 12–15.9)
HGB UR QL STRIP.AUTO: NEGATIVE
HOLD SPECIMEN: NORMAL
HYALINE CASTS UR QL AUTO: ABNORMAL /LPF
IMM GRANULOCYTES # BLD AUTO: 0.06 10*3/MM3 (ref 0–0.05)
IMM GRANULOCYTES NFR BLD AUTO: 0.4 % (ref 0–0.5)
KETONES UR QL STRIP: ABNORMAL
LEUKOCYTE ESTERASE UR QL STRIP.AUTO: ABNORMAL
LYMPHOCYTES # BLD AUTO: 3.32 10*3/MM3 (ref 0.7–3.1)
LYMPHOCYTES NFR BLD AUTO: 24.8 % (ref 19.6–45.3)
MAGNESIUM SERPL-MCNC: 1.9 MG/DL (ref 1.6–2.4)
MCH RBC QN AUTO: 28.7 PG (ref 26.6–33)
MCHC RBC AUTO-ENTMCNC: 33.1 G/DL (ref 31.5–35.7)
MCV RBC AUTO: 86.7 FL (ref 79–97)
MONOCYTES # BLD AUTO: 0.85 10*3/MM3 (ref 0.1–0.9)
MONOCYTES NFR BLD AUTO: 6.4 % (ref 5–12)
NEUTROPHILS NFR BLD AUTO: 64.6 % (ref 42.7–76)
NEUTROPHILS NFR BLD AUTO: 8.63 10*3/MM3 (ref 1.7–7)
NITRITE UR QL STRIP: NEGATIVE
NRBC BLD AUTO-RTO: 0 /100 WBC (ref 0–0.2)
PH UR STRIP.AUTO: 5.5 [PH] (ref 5–9)
PLATELET # BLD AUTO: 407 10*3/MM3 (ref 140–450)
PMV BLD AUTO: 9.3 FL (ref 6–12)
POTASSIUM SERPL-SCNC: 4 MMOL/L (ref 3.5–5.2)
PROT SERPL-MCNC: 7.7 G/DL (ref 6–8.5)
PROT UR QL STRIP: ABNORMAL
RBC # BLD AUTO: 4.95 10*6/MM3 (ref 3.77–5.28)
RBC # UR STRIP: ABNORMAL /HPF
REF LAB TEST METHOD: ABNORMAL
SODIUM SERPL-SCNC: 137 MMOL/L (ref 136–145)
SP GR UR STRIP: 1.02 (ref 1–1.03)
SQUAMOUS #/AREA URNS HPF: ABNORMAL /HPF
UROBILINOGEN UR QL STRIP: ABNORMAL
WBC # UR STRIP: ABNORMAL /HPF
WBC NRBC COR # BLD: 13.37 10*3/MM3 (ref 3.4–10.8)
WHOLE BLOOD HOLD COAG: NORMAL

## 2023-07-29 PROCEDURE — 85025 COMPLETE CBC W/AUTO DIFF WBC: CPT | Performed by: STUDENT IN AN ORGANIZED HEALTH CARE EDUCATION/TRAINING PROGRAM

## 2023-07-29 PROCEDURE — 83735 ASSAY OF MAGNESIUM: CPT | Performed by: STUDENT IN AN ORGANIZED HEALTH CARE EDUCATION/TRAINING PROGRAM

## 2023-07-29 PROCEDURE — 99284 EMERGENCY DEPT VISIT MOD MDM: CPT

## 2023-07-29 PROCEDURE — 81001 URINALYSIS AUTO W/SCOPE: CPT | Performed by: STUDENT IN AN ORGANIZED HEALTH CARE EDUCATION/TRAINING PROGRAM

## 2023-07-29 PROCEDURE — 74176 CT ABD & PELVIS W/O CONTRAST: CPT

## 2023-07-29 PROCEDURE — 80053 COMPREHEN METABOLIC PANEL: CPT | Performed by: STUDENT IN AN ORGANIZED HEALTH CARE EDUCATION/TRAINING PROGRAM

## 2023-07-29 RX ADMIN — SODIUM CHLORIDE 1000 ML: 9 INJECTION, SOLUTION INTRAVENOUS at 14:48

## 2023-07-29 NOTE — ED PROVIDER NOTES
"Subjective   History of Present Illness  62-year-old female comes to the ER with a several day history of watery diarrhea and right abdominal pain.  She denies other symptoms.  Anything she eats she says passes straight through her.    History provided by:  Patient   used: No      Review of Systems   Constitutional:  Negative for chills and fever.   HENT:  Negative for drooling.    Eyes:  Negative for redness.   Respiratory:  Negative for shortness of breath.    Cardiovascular:  Negative for chest pain.   Gastrointestinal:  Positive for abdominal pain and diarrhea. Negative for nausea and vomiting.   Genitourinary:  Negative for flank pain.   Skin:  Negative for color change.   Neurological:  Negative for seizures.   Psychiatric/Behavioral:  Negative for confusion.      Past Medical History:   Diagnosis Date    Allergic rhinitis     Arthritis     Asthma     Breast lump     C. difficile diarrhea 2013    Carotid artery disease     Chest pain     Chronic low back pain     Constipation     Depressive disorder     Diarrhea     Diverticular disease of colon     Epigastric pain     DIETER (generalized anxiety disorder)     GERD (gastroesophageal reflux disease)     Head ache     Hematochezia     Herpes zoster     mid back, near spine    Hypertension     Periumbilical pain     PONV (postoperative nausea and vomiting)     Maurice Mountain spotted fever     pt states \"currently has it\"    Sleep apnea     using c-pap    SVT (supraventricular tachycardia)     Tachycardia 12/012019    spent 4 days in [facility] in January for this.        Allergies   Allergen Reactions    Hydrocodone-Acetaminophen Itching, Rash and GI Intolerance    Cefprozil Nausea And Vomiting and Rash    Penicillins Rash       Past Surgical History:   Procedure Laterality Date    ABDOMINOPLASTY  12/22/2004    AUGMENTATION MAMMAPLASTY      BREAST AUGMENTATION BILATERAL MASTOPEXY      BUNIONECTOMY Left 3/20/2017    Procedure: EXCISION OF " "CALCANEAL SPURS LEFT FOOT AND REATTACHMENT OF ACHILLES TENDON ;  Surgeon: Jarrell Mar MD;  Location: [facility] OR;  Service:     CHOLECYSTECTOMY WITH INTRAOPERATIVE CHOLANGIOGRAM N/A 12/23/2022    Procedure: CHOLECYSTECTOMY LAPAROSCOPIC;  Surgeon: Osmar Hoffman MD;  Location: [facility] OR;  Service: General;  Laterality: N/A;    COLONOSCOPY  01/23/2015    COLONOSCOPY N/A 2/24/2020    Procedure: COLONOSCOPY;  Surgeon: Santhosh Patrick MD;  Location: [facility] ENDOSCOPY;  Service: Gastroenterology;  Laterality: N/A;    CYST REMOVAL  08/18/1961    Excision of sebaceous cyst. Left ear    FINGER/THUMB LESION/CYST EXCISION  10/29/2014    HAND SURGERY  06/03/2013    HYSTERECTOMY      OOPHORECTOMY      ORIF ULNA/RADIUS FRACTURES  07/22/2013    TUBAL ABDOMINAL LIGATION  08/14/1985    Laparoscopic tubal sterilization; dilatation and curettage. Desires tubal ligation;         Family History   Problem Relation Age of Onset    Diabetes Other     Heart disease Other     Hypertension Other     Heart disease Mother         Myocardial infarct    Hypertension Mother     Diabetes Mother     Heart disease Father         CHF       Social History     Socioeconomic History    Marital status:    Tobacco Use    Smoking status: Never    Smokeless tobacco: Never   Vaping Use    Vaping Use: Never used   Substance and Sexual Activity    Alcohol use: No    Drug use: No    Sexual activity: Yes           Objective   Vitals:    07/29/23 1434 07/29/23 1518 07/29/23 1547   BP: 146/89 172/74    BP Location: Left arm     Pulse: 97     Resp: 20     Temp: 98.3 °F (36.8 °C)     TempSrc: Oral     SpO2: 96%  97%   Weight: 80.3 kg (177 lb)     Height: 160 cm (63\")         Physical Exam  Vitals and nursing note reviewed.   Constitutional:       General: She is not in acute distress.     Appearance: She is well-developed. She is obese. She is ill-appearing. She is not toxic-appearing or diaphoretic.   Eyes:      General: No scleral icterus.     " Conjunctiva/sclera: Conjunctivae normal.   Pulmonary:      Effort: Pulmonary effort is normal. No accessory muscle usage or respiratory distress.   Chest:      Chest wall: No tenderness.   Abdominal:      Palpations: Abdomen is soft.      Tenderness: There is abdominal tenderness (deep palpation). There is no right CVA tenderness, left CVA tenderness, guarding or rebound.   Skin:     General: Skin is warm and dry.      Capillary Refill: Capillary refill takes less than 2 seconds.   Neurological:      Mental Status: She is alert and oriented to person, place, and time.       Procedures           ED Course      Results for orders placed or performed during the hospital encounter of 07/29/23   Comprehensive Metabolic Panel    Specimen: Blood   Result Value Ref Range    Glucose 98 65 - 99 mg/dL    BUN 15 8 - 23 mg/dL    Creatinine 1.03 (H) 0.57 - 1.00 mg/dL    Sodium 137 136 - 145 mmol/L    Potassium 4.0 3.5 - 5.2 mmol/L    Chloride 107 98 - 107 mmol/L    CO2 19.0 (L) 22.0 - 29.0 mmol/L    Calcium 9.1 8.6 - 10.5 mg/dL    Total Protein 7.7 6.0 - 8.5 g/dL    Albumin 3.5 3.5 - 5.2 g/dL    ALT (SGPT) 25 1 - 33 U/L    AST (SGOT) 24 1 - 32 U/L    Alkaline Phosphatase 91 39 - 117 U/L    Total Bilirubin 0.2 0.0 - 1.2 mg/dL    Globulin 4.2 gm/dL    A/G Ratio 0.8 g/dL    BUN/Creatinine Ratio 14.6 7.0 - 25.0    Anion Gap 11.0 5.0 - 15.0 mmol/L    eGFR 61.6 >60.0 mL/min/1.73   Urinalysis With Microscopic If Indicated (No Culture) - Urine, Clean Catch    Specimen: Urine, Clean Catch   Result Value Ref Range    Color, UA Dark Yellow Yellow, Straw, Dark Yellow, Carla    Appearance, UA Turbid (A) Clear    pH, UA 5.5 5.0 - 9.0    Specific Gravity, UA 1.025 1.003 - 1.030    Glucose, UA Negative Negative    Ketones, UA Trace (A) Negative    Bilirubin, UA Negative Negative    Blood, UA Negative Negative    Protein, UA 30 mg/dL (1+) (A) Negative    Leuk Esterase, UA Small (1+) (A) Negative    Nitrite, UA Negative Negative    Urobilinogen,  UA 1.0 E.U./dL 0.2 - 1.0 E.U./dL   Magnesium    Specimen: Blood   Result Value Ref Range    Magnesium 1.9 1.6 - 2.4 mg/dL   CBC Auto Differential    Specimen: Blood   Result Value Ref Range    WBC 13.37 (H) 3.40 - 10.80 10*3/mm3    RBC 4.95 3.77 - 5.28 10*6/mm3    Hemoglobin 14.2 12.0 - 15.9 g/dL    Hematocrit 42.9 34.0 - 46.6 %    MCV 86.7 79.0 - 97.0 fL    MCH 28.7 26.6 - 33.0 pg    MCHC 33.1 31.5 - 35.7 g/dL    RDW 12.6 12.3 - 15.4 %    RDW-SD 39.8 37.0 - 54.0 fl    MPV 9.3 6.0 - 12.0 fL    Platelets 407 140 - 450 10*3/mm3    Neutrophil % 64.6 42.7 - 76.0 %    Lymphocyte % 24.8 19.6 - 45.3 %    Monocyte % 6.4 5.0 - 12.0 %    Eosinophil % 3.3 0.3 - 6.2 %    Basophil % 0.5 0.0 - 1.5 %    Immature Grans % 0.4 0.0 - 0.5 %    Neutrophils, Absolute 8.63 (H) 1.70 - 7.00 10*3/mm3    Lymphocytes, Absolute 3.32 (H) 0.70 - 3.10 10*3/mm3    Monocytes, Absolute 0.85 0.10 - 0.90 10*3/mm3    Eosinophils, Absolute 0.44 (H) 0.00 - 0.40 10*3/mm3    Basophils, Absolute 0.07 0.00 - 0.20 10*3/mm3    Immature Grans, Absolute 0.06 (H) 0.00 - 0.05 10*3/mm3    nRBC 0.0 0.0 - 0.2 /100 WBC   Urinalysis, Microscopic Only - Urine, Clean Catch    Specimen: Urine, Clean Catch   Result Value Ref Range    RBC, UA None Seen None Seen /HPF    WBC, UA 3-5 None Seen, 0-2, 3-5 /HPF    Bacteria, UA 2+ (A) None Seen /HPF    Squamous Epithelial Cells, UA 13-20 (A) None Seen, 0-2 /HPF    Hyaline Casts, UA None Seen None Seen /LPF    Methodology Manual Light Microscopy    Gold Top - SST   Result Value Ref Range    Extra Tube Hold for add-ons.    Light Blue Top   Result Value Ref Range    Extra Tube Hold for add-ons.      CT Abdomen Pelvis Without Contrast   Final Result   1. No acute inflammatory process is identified in the abdomen or pelvis..                      Medical Decision Making  Vital signs are stable, afebrile.  Labs obtained and are unremarkable.  UA shows trace ketones.  1+ leukocytes, nitrate negative.  Some contamination noted.  CT abdomen  pelvis negative for acute findings.  IVF bolus given.  Symptomatic care discussed.  Recommend follow-up with her PCP.    Problems Addressed:  Abdominal pain, unspecified abdominal location: complicated acute illness or injury  Diarrhea, unspecified type: complicated acute illness or injury    Amount and/or Complexity of Data Reviewed  Labs: ordered.  Radiology: ordered.        Final diagnoses:   Diarrhea, unspecified type   Abdominal pain, unspecified abdominal location       ED Disposition  ED Disposition       ED Disposition   Discharge    Condition   Stable    Comment   --               Gretchen Gauthier, APRN  [address]  [address] [facility] [address]  [place] [address]  [phone]    Schedule an appointment as soon as possible for a visit in 2 days  As needed, ER follow up         Medication List      No changes were made to your prescriptions during this visit.            Branden Corona MD  07/29/23 [id number]

## 2023-08-01 LAB
QT INTERVAL: 364 MS
QTC INTERVAL: 447 MS

## 2023-08-03 ENCOUNTER — TELEPHONE (OUTPATIENT)
Dept: FAMILY MEDICINE CLINIC | Facility: CLINIC | Age: 63
End: 2023-08-03
Payer: MEDICARE

## 2023-08-04 DIAGNOSIS — G44.209 ACUTE NON INTRACTABLE TENSION-TYPE HEADACHE: ICD-10-CM

## 2023-08-04 RX ORDER — ATOGEPANT 60 MG/1
60 TABLET ORAL AS NEEDED
Qty: 16 TABLET | Refills: 3 | Status: SHIPPED | OUTPATIENT
Start: 2023-08-04

## 2023-08-11 ENCOUNTER — APPOINTMENT (OUTPATIENT)
Dept: GENERAL RADIOLOGY | Facility: HOSPITAL | Age: 63
End: 2023-08-11
Payer: MEDICARE

## 2023-08-11 ENCOUNTER — HOSPITAL ENCOUNTER (EMERGENCY)
Facility: HOSPITAL | Age: 63
Discharge: HOME | End: 2023-08-11
Attending: EMERGENCY MEDICINE
Payer: MEDICARE

## 2023-08-11 VITALS
OXYGEN SATURATION: 96 % | SYSTOLIC BLOOD PRESSURE: 143 MMHG | RESPIRATION RATE: 16 BRPM | DIASTOLIC BLOOD PRESSURE: 67 MMHG | TEMPERATURE: 97.9 F | HEIGHT: 62 IN | BODY MASS INDEX: 33.13 KG/M2 | HEART RATE: 76 BPM | WEIGHT: 180 LBS

## 2023-08-11 DIAGNOSIS — R55 SYNCOPE, UNSPECIFIED SYNCOPE TYPE: Primary | ICD-10-CM

## 2023-08-11 DIAGNOSIS — E86.0 DEHYDRATION: ICD-10-CM

## 2023-08-11 LAB
ALBUMIN SERPL-MCNC: 3.6 G/DL (ref 3.5–5.2)
ALBUMIN/GLOB SERPL: 1.1 G/DL
ALP SERPL-CCNC: 81 U/L (ref 39–117)
ALT SERPL W P-5'-P-CCNC: 20 U/L (ref 1–33)
ANION GAP SERPL CALCULATED.3IONS-SCNC: 14 MMOL/L (ref 5–15)
AST SERPL-CCNC: 21 U/L (ref 1–32)
BASOPHILS # BLD AUTO: 0.09 10*3/MM3 (ref 0–0.2)
BASOPHILS NFR BLD AUTO: 0.7 % (ref 0–1.5)
BILIRUB SERPL-MCNC: 0.4 MG/DL (ref 0–1.2)
BILIRUB UR QL STRIP: NEGATIVE
BUN SERPL-MCNC: 21 MG/DL (ref 8–23)
BUN/CREAT SERPL: 17.1 (ref 7–25)
CALCIUM SPEC-SCNC: 8.7 MG/DL (ref 8.6–10.5)
CHLORIDE SERPL-SCNC: 104 MMOL/L (ref 98–107)
CLARITY UR: ABNORMAL
CO2 SERPL-SCNC: 18 MMOL/L (ref 22–29)
COLOR UR: ABNORMAL
CREAT SERPL-MCNC: 1.23 MG/DL (ref 0.57–1)
DEPRECATED RDW RBC AUTO: 39.1 FL (ref 37–54)
EGFRCR SERPLBLD CKD-EPI 2021: 49.8 ML/MIN/1.73
EOSINOPHIL # BLD AUTO: 0.33 10*3/MM3 (ref 0–0.4)
EOSINOPHIL NFR BLD AUTO: 2.7 % (ref 0.3–6.2)
ERYTHROCYTE [DISTWIDTH] IN BLOOD BY AUTOMATED COUNT: 12.4 % (ref 12.3–15.4)
GEN 5 2HR TROPONIN T REFLEX: <6 NG/L
GLOBULIN UR ELPH-MCNC: 3.3 GM/DL
GLUCOSE BLDC GLUCOMTR-MCNC: 103 MG/DL (ref 70–130)
GLUCOSE SERPL-MCNC: 91 MG/DL (ref 65–99)
GLUCOSE UR STRIP-MCNC: NEGATIVE MG/DL
HCT VFR BLD AUTO: 39.3 % (ref 34–46.6)
HGB BLD-MCNC: 13.2 G/DL (ref 12–15.9)
HGB UR QL STRIP.AUTO: NEGATIVE
HOLD SPECIMEN: NORMAL
HOLD SPECIMEN: NORMAL
IMM GRANULOCYTES # BLD AUTO: 0.06 10*3/MM3 (ref 0–0.05)
IMM GRANULOCYTES NFR BLD AUTO: 0.5 % (ref 0–0.5)
KETONES UR QL STRIP: ABNORMAL
LEUKOCYTE ESTERASE UR QL STRIP.AUTO: NEGATIVE
LYMPHOCYTES # BLD AUTO: 2.55 10*3/MM3 (ref 0.7–3.1)
LYMPHOCYTES NFR BLD AUTO: 20.6 % (ref 19.6–45.3)
MCH RBC QN AUTO: 29.1 PG (ref 26.6–33)
MCHC RBC AUTO-ENTMCNC: 33.6 G/DL (ref 31.5–35.7)
MCV RBC AUTO: 86.8 FL (ref 79–97)
MONOCYTES # BLD AUTO: 0.93 10*3/MM3 (ref 0.1–0.9)
MONOCYTES NFR BLD AUTO: 7.5 % (ref 5–12)
NEUTROPHILS NFR BLD AUTO: 68 % (ref 42.7–76)
NEUTROPHILS NFR BLD AUTO: 8.39 10*3/MM3 (ref 1.7–7)
NITRITE UR QL STRIP: NEGATIVE
NRBC BLD AUTO-RTO: 0 /100 WBC (ref 0–0.2)
NT-PROBNP SERPL-MCNC: 57.8 PG/ML (ref 0–900)
PH UR STRIP.AUTO: <=5 [PH] (ref 5–9)
PLATELET # BLD AUTO: 361 10*3/MM3 (ref 140–450)
PMV BLD AUTO: 9.9 FL (ref 6–12)
POTASSIUM SERPL-SCNC: 3.6 MMOL/L (ref 3.5–5.2)
PROT SERPL-MCNC: 6.9 G/DL (ref 6–8.5)
PROT UR QL STRIP: ABNORMAL
RBC # BLD AUTO: 4.53 10*6/MM3 (ref 3.77–5.28)
SODIUM SERPL-SCNC: 136 MMOL/L (ref 136–145)
SP GR UR STRIP: 1.03 (ref 1–1.03)
TROPONIN T DELTA: NORMAL
TROPONIN T SERPL HS-MCNC: <6 NG/L
UROBILINOGEN UR QL STRIP: ABNORMAL
WBC NRBC COR # BLD: 12.35 10*3/MM3 (ref 3.4–10.8)
WHOLE BLOOD HOLD COAG: NORMAL
WHOLE BLOOD HOLD SPECIMEN: NORMAL

## 2023-08-11 PROCEDURE — 36415 COLL VENOUS BLD VENIPUNCTURE: CPT

## 2023-08-11 PROCEDURE — 80053 COMPREHEN METABOLIC PANEL: CPT | Performed by: EMERGENCY MEDICINE

## 2023-08-11 PROCEDURE — 85025 COMPLETE CBC W/AUTO DIFF WBC: CPT | Performed by: EMERGENCY MEDICINE

## 2023-08-11 PROCEDURE — 84484 ASSAY OF TROPONIN QUANT: CPT | Performed by: EMERGENCY MEDICINE

## 2023-08-11 PROCEDURE — 81003 URINALYSIS AUTO W/O SCOPE: CPT | Performed by: EMERGENCY MEDICINE

## 2023-08-11 PROCEDURE — 83880 ASSAY OF NATRIURETIC PEPTIDE: CPT | Performed by: EMERGENCY MEDICINE

## 2023-08-11 PROCEDURE — 93010 ELECTROCARDIOGRAM REPORT: CPT | Performed by: INTERNAL MEDICINE

## 2023-08-11 PROCEDURE — 99284 EMERGENCY DEPT VISIT MOD MDM: CPT

## 2023-08-11 PROCEDURE — 82948 REAGENT STRIP/BLOOD GLUCOSE: CPT

## 2023-08-11 PROCEDURE — 71045 X-RAY EXAM CHEST 1 VIEW: CPT

## 2023-08-11 PROCEDURE — 93005 ELECTROCARDIOGRAM TRACING: CPT

## 2023-08-11 PROCEDURE — 93005 ELECTROCARDIOGRAM TRACING: CPT | Performed by: EMERGENCY MEDICINE

## 2023-08-11 RX ORDER — ASPIRIN 325 MG
325 TABLET ORAL ONCE
Status: COMPLETED | OUTPATIENT
Start: 2023-08-11 | End: 2023-08-11

## 2023-08-11 RX ORDER — SODIUM CHLORIDE 0.9 % (FLUSH) 0.9 %
10 SYRINGE (ML) INJECTION AS NEEDED
Status: DISCONTINUED | OUTPATIENT
Start: 2023-08-11 | End: 2023-08-12 | Stop reason: HOSPADM

## 2023-08-11 RX ADMIN — ASPIRIN 325 MG: 325 TABLET ORAL at 19:14

## 2023-08-11 RX ADMIN — SODIUM CHLORIDE 1000 ML: 9 INJECTION, SOLUTION INTRAVENOUS at 19:14

## 2023-08-12 LAB
QT INTERVAL: 368 MS
QTC INTERVAL: 429 MS

## 2023-08-12 NOTE — DISCHARGE INSTRUCTIONS
Please return with new or worsening symptoms.  Follow-up with primary care as discussed.  Discontinue Mounjaro.

## 2023-08-12 NOTE — ED PROVIDER NOTES
Subjective   History of Present Illness  62-year-old female presents the emergency department complaint of 2 syncopal episodes and she is now having chest pain.  Reportedly she Moad for about 20 minutes today and only ate a cinnamon roll.  She was at a ball game sitting out in the heat when she became nauseous.  She went to get up and go somewhere else to cool off and fell like she was in a pass out and sat down on the sidewalk.  She then later self back before she lost consciousness.  When family went to get her up again she got lightheaded and they lowered her to the ground again.  On the way here in the ambulance she began having very mild sharp anterior chest pain that does not radiate.  No shortness of breath.  She does report she has history of SVT and she takes Cardizem with no recent medication doses changes.  No missed doses.  She reports she did not feel like she was having the palpitations she usually has with SVT.  Family history of congestive heart failure but no cardiac history herself.  She reports she takes Mounjaro for weight loss and took a dose about 4 days ago and she has been not feeling well with that medication in general.  No fevers or chills.  No vomiting or diarrhea.  Reports significant decreased appetite.    Family history, surgical history, social history, current medications and allergies are reviewed with the patient and triage documentation and vitals are reviewed.         History provided by:  Patient and medical records   used: No      Review of Systems   Constitutional:  Positive for appetite change and fatigue. Negative for chills and fever.   HENT:  Negative for congestion and sore throat.    Eyes:  Negative for photophobia and visual disturbance.   Respiratory:  Negative for cough, shortness of breath and wheezing.    Cardiovascular:  Positive for chest pain. Negative for palpitations and leg swelling.   Gastrointestinal:  Positive for nausea. Negative for  "abdominal pain, constipation, diarrhea and vomiting.   Endocrine: Negative for polydipsia, polyphagia and polyuria.   Genitourinary:  Negative for dysuria, frequency and urgency.   Musculoskeletal:  Negative for arthralgias, back pain, myalgias and neck pain.   Skin:  Negative for rash and wound.   Allergic/Immunologic: Negative.    Neurological:  Positive for syncope and light-headedness. Negative for facial asymmetry, speech difficulty, weakness, numbness and headaches.   Hematological: Negative.    Psychiatric/Behavioral: Negative.       Past Medical History:   Diagnosis Date    Allergic rhinitis     Arthritis     Asthma     Breast lump     C. difficile diarrhea 2013    Carotid artery disease     Chest pain     Chronic low back pain     Constipation     Depressive disorder     Diarrhea     Diverticular disease of colon     Epigastric pain     DIETER (generalized anxiety disorder)     GERD (gastroesophageal reflux disease)     Head ache     Hematochezia     Herpes zoster     mid back, near spine    Hypertension     Periumbilical pain     PONV (postoperative nausea and vomiting)     Maurice Mountain spotted fever     pt states \"currently has it\"    Sleep apnea     using c-pap    SVT (supraventricular tachycardia)     Tachycardia 12/012019    spent 4 days in [facility] [place] in January for this.        Allergies   Allergen Reactions    Hydrocodone-Acetaminophen Itching, Rash and GI Intolerance    Cefprozil Nausea And Vomiting and Rash    Penicillins Rash       Past Surgical History:   Procedure Laterality Date    ABDOMINOPLASTY  12/22/2004    AUGMENTATION MAMMAPLASTY      BREAST AUGMENTATION BILATERAL MASTOPEXY      BUNIONECTOMY Left 3/20/2017    Procedure: EXCISION OF CALCANEAL SPURS LEFT FOOT AND REATTACHMENT OF ACHILLES TENDON ;  Surgeon: Jarrell Mar MD;  Location: [facility];  Service:     CHOLECYSTECTOMY WITH INTRAOPERATIVE CHOLANGIOGRAM N/A 12/23/2022    Procedure: CHOLECYSTECTOMY LAPAROSCOPIC;  Surgeon: Yasmin" Osmar WATERMAN MD;  Location: [facility] OR;  Service: General;  Laterality: N/A;    COLONOSCOPY  01/23/2015    COLONOSCOPY N/A 2/24/2020    Procedure: COLONOSCOPY;  Surgeon: Santhosh Patrick MD;  Location: [facility] ENDOSCOPY;  Service: Gastroenterology;  Laterality: N/A;    CYST REMOVAL  08/18/1961    Excision of sebaceous cyst. Left ear    FINGER/THUMB LESION/CYST EXCISION  10/29/2014    HAND SURGERY  06/03/2013    HYSTERECTOMY      OOPHORECTOMY      ORIF ULNA/RADIUS FRACTURES  07/22/2013    TUBAL ABDOMINAL LIGATION  08/14/1985    Laparoscopic tubal sterilization; dilatation and curettage. Desires tubal ligation;         Family History   Problem Relation Age of Onset    Diabetes Other     Heart disease Other     Hypertension Other     Heart disease Mother         Myocardial infarct    Hypertension Mother     Diabetes Mother     Heart disease Father         CHF       Social History     Socioeconomic History    Marital status:    Tobacco Use    Smoking status: Never    Smokeless tobacco: Never   Vaping Use    Vaping Use: Never used   Substance and Sexual Activity    Alcohol use: No    Drug use: No    Sexual activity: Yes           Objective   Physical Exam  Vitals and nursing note reviewed.   Constitutional:       General: She is not in acute distress.     Appearance: She is well-developed. She is obese. She is not ill-appearing, toxic-appearing or diaphoretic.   HENT:      Head: Normocephalic and atraumatic.   Eyes:      Pupils: Pupils are equal, round, and reactive to light.   Neck:      Vascular: No JVD.   Cardiovascular:      Rate and Rhythm: Normal rate and regular rhythm. No extrasystoles are present.     Pulses:           Radial pulses are 2+ on the right side and 2+ on the left side.        Dorsalis pedis pulses are 2+ on the right side and 2+ on the left side.      Heart sounds: Normal heart sounds. No murmur heard.  Pulmonary:      Effort: Pulmonary effort is normal. No accessory muscle usage or respiratory  distress.      Breath sounds: No decreased breath sounds, wheezing, rhonchi or rales.   Chest:      Chest wall: No tenderness or crepitus.   Abdominal:      General: Bowel sounds are normal.      Palpations: Abdomen is soft. There is no hepatomegaly or splenomegaly.      Tenderness: There is no abdominal tenderness. There is no guarding or rebound.   Musculoskeletal:         General: Normal range of motion.      Cervical back: Normal range of motion and neck supple.      Right lower leg: No tenderness. No edema.      Left lower leg: No tenderness. No edema.   Skin:     General: Skin is warm and dry.      Capillary Refill: Capillary refill takes less than 2 seconds.      Coloration: Skin is not pale.      Findings: No rash.   Neurological:      General: No focal deficit present.      Mental Status: She is alert and oriented to person, place, and time.   Psychiatric:         Mood and Affect: Mood normal.         Behavior: Behavior normal.       ECG 12 Lead      Date/Time: 8/11/2023 6:29 PM  Performed by: Hunter Reyna DO  Authorized by: Hunter Reyna DO   Interpreted by physician  Comparison: compared with previous ECG   Similar to previous ECG  Comments: EKG August 11, 2023 at 1829 reveals normal sinus rhythm with left axis deviation at a rate of 82 bpm.  Unchanged from previous EKG.             ED Course      Labs Reviewed   COMPREHENSIVE METABOLIC PANEL - Abnormal; Notable for the following components:       Result Value    Creatinine 1.23 (*)     CO2 18.0 (*)     eGFR 49.8 (*)     All other components within normal limits    Narrative:     GFR Normal >60  Chronic Kidney Disease <60  Kidney Failure <15     CBC WITH AUTO DIFFERENTIAL - Abnormal; Notable for the following components:    WBC 12.35 (*)     Neutrophils, Absolute 8.39 (*)     Monocytes, Absolute 0.93 (*)     Immature Grans, Absolute 0.06 (*)     All other components within normal limits   URINALYSIS W/ MICROSCOPIC IF INDICATED (NO CULTURE) -  Abnormal; Notable for the following components:    Appearance, UA Turbid (*)     Ketones, UA 15 mg/dL (1+) (*)     Protein, UA Trace (*)     All other components within normal limits    Narrative:     Urine microscopic not indicated.   TROPONIN - Normal    Narrative:     High Sensitive Troponin T Reference Range:  <10.0 ng/L- Negative Female for AMI  <15.0 ng/L- Negative Male for AMI  >=10 - Abnormal Female indicating possible myocardial injury.  >=15 - Abnormal Male indicating possible myocardial injury.   Clinicians would have to utilize clinical acumen, EKG, Troponin, and serial changes to determine if it is an Acute Myocardial Infarction or myocardial injury due to an underlying chronic condition.        BNP (IN-HOUSE) - Normal    Narrative:     Among patients with dyspnea, NT-proBNP is highly sensitive for the detection of acute congestive heart failure. In addition NT-proBNP of <300 pg/ml effectively rules out acute congestive heart failure with 99% negative predictive value.     POCT GLUCOSE FINGERSTICK - Normal   RAINBOW DRAW    Narrative:     The following orders were created for panel order [place] Draw.  Procedure                               Abnormality         Status                     ---------                               -----------         ------                     Green Top (Gel)[[id number]]                                  Final result               Lavender Top[[id number]]                                     Final result               Gold Top - SST[[id number]]                                   Final result               Light Blue Top[[id number]]                                   Final result                 Please view results for these tests on the individual orders.   HIGH SENSITIVITIY TROPONIN T 2HR    Narrative:     High Sensitive Troponin T Reference Range:  <10.0 ng/L- Negative Female for AMI  <15.0 ng/L- Negative Male for AMI  >=10 - Abnormal Female indicating possible myocardial  injury.  >=15 - Abnormal Male indicating possible myocardial injury.   Clinicians would have to utilize clinical acumen, EKG, Troponin, and serial changes to determine if it is an Acute Myocardial Infarction or myocardial injury due to an underlying chronic condition.        CBC AND DIFFERENTIAL    Narrative:     The following orders were created for panel order CBC & Differential.  Procedure                               Abnormality         Status                     ---------                               -----------         ------                     CBC Auto Differential[[id number]]        Abnormal            Final result                 Please view results for these tests on the individual orders.   GREEN TOP   LAVENDER TOP   GOLD TOP - SST   LIGHT BLUE TOP     CT Abdomen Pelvis Without Contrast    Result Date: 7/29/2023  Narrative: HISTORY: flank pain COMPARISON: None. Automated exposure control was also utilized to decrease patient radiation dose. TECHNIQUE: Helical CT tomographic images of the abdomen and pelvis were acquired. Multiplanar reformatted images were provided for review.  No contrast was administered. FINDINGS: The lung bases are clear. There are no osseous lesions. The liver and spleen are normal. The pancreas and adrenal glands are normal. The kidneys are normal. There are no dilated loops of bowel. There is no free air or bowel obstruction.     Impression: 1. No acute inflammatory process is identified in the abdomen or pelvis..     XR Chest 1 View    Result Date: 8/11/2023  Narrative: XR CHEST 1 VIEW HISTORY: Chest Pain triage protocolChest Pain Triage Protocol FINDINGS: No pneumothorax.  No pleural effusion.  No focal airspace opacities. The cardiomediastinal silhouette and upper abdomen are unremarkable. No acute osseous abnormality.            Medical Decision Making  Problems Addressed:  Dehydration: acute illness or injury  Syncope, unspecified syncope type: acute illness or  injury    Amount and/or Complexity of Data Reviewed  Independent Historian: EMS  Labs: ordered. Decision-making details documented in ED Course.  Radiology: ordered. Decision-making details documented in ED Course.  ECG/medicine tests: ordered and independent interpretation performed. Decision-making details documented in ED Course.    Risk  OTC drugs.  Prescription drug management.  Decision regarding hospitalization.    Troponin negative x2.  Patient with increased creatinine from baseline likely dehydrational component given her decreased appetite and intake on the Mounjaro with the nausea.  She is given a liter of IV fluids and is feeling better.  Not orthostatic.  Vital signs stable.  No signs of infectious process.  Chest pain has resolved.  No change on EKG or cardiac enzymes.  Advised of findings and she is feeling better and agreeable to discharge with outpatient management follow-up with her primary care provider.  She will discontinue Mounjaro.    Final diagnoses:   Syncope, unspecified syncope type   Dehydration        ED Disposition  ED Disposition       ED Disposition   Discharge    Condition   Stable    Comment   --               Gretchen Gauthier, APRN  [address]  [facility] [address] [id number] [phone]    Schedule an appointment as soon as possible for a visit            Medication List        Stop      Mounjaro 10 MG/0.5ML solution pen-injector  Generic drug: Hunter May, DO  08/11/23 221

## 2023-08-14 ENCOUNTER — OFFICE VISIT (OUTPATIENT)
Dept: FAMILY MEDICINE CLINIC | Facility: CLINIC | Age: 63
End: 2023-08-14
Payer: MEDICARE

## 2023-08-14 ENCOUNTER — LAB (OUTPATIENT)
Dept: LAB | Facility: HOSPITAL | Age: 63
End: 2023-08-14
Payer: MEDICARE

## 2023-08-14 VITALS
HEIGHT: 62 IN | BODY MASS INDEX: 31.27 KG/M2 | WEIGHT: 169.9 LBS | OXYGEN SATURATION: 97 % | DIASTOLIC BLOOD PRESSURE: 70 MMHG | SYSTOLIC BLOOD PRESSURE: 124 MMHG | HEART RATE: 87 BPM

## 2023-08-14 DIAGNOSIS — I10 BENIGN ESSENTIAL HYPERTENSION: Primary | Chronic | ICD-10-CM

## 2023-08-14 DIAGNOSIS — R19.7 DIARRHEA, UNSPECIFIED TYPE: ICD-10-CM

## 2023-08-14 DIAGNOSIS — M79.7 FIBROMYALGIA: ICD-10-CM

## 2023-08-14 DIAGNOSIS — M06.09 RHEUMATOID ARTHRITIS WITHOUT RHEUMATOID FACTOR, MULTIPLE SITES: ICD-10-CM

## 2023-08-14 DIAGNOSIS — N18.30 STAGE 3 CHRONIC KIDNEY DISEASE, UNSPECIFIED WHETHER STAGE 3A OR 3B CKD: ICD-10-CM

## 2023-08-14 DIAGNOSIS — E78.1 PURE HYPERTRIGLYCERIDEMIA: ICD-10-CM

## 2023-08-14 DIAGNOSIS — F32.A DEPRESSIVE DISORDER: ICD-10-CM

## 2023-08-14 DIAGNOSIS — R55 SYNCOPE, UNSPECIFIED SYNCOPE TYPE: ICD-10-CM

## 2023-08-14 DIAGNOSIS — J45.20 MILD INTERMITTENT ASTHMA WITHOUT COMPLICATION: Chronic | ICD-10-CM

## 2023-08-14 PROCEDURE — 85025 COMPLETE CBC W/AUTO DIFF WBC: CPT | Performed by: NURSE PRACTITIONER

## 2023-08-14 PROCEDURE — 80053 COMPREHEN METABOLIC PANEL: CPT | Performed by: NURSE PRACTITIONER

## 2023-08-14 NOTE — PROGRESS NOTES
"Chief Complaint  Hypertension (Follow up )    Subjective        Lashaun Beranl presents to [facility] [place]  Three month follow-up for HTN ,high cholesterol, CKD arthritis, obesity, anxiety and depression.  On August 11, 2023 had been outside mowing her yard earlier in the day and then went to her granddaughter's soccer game.  \"There was no shade.  I began to feel really bad and I thought I am going to have to go.  I got up and went to the bathroom and before I could make it back up the hill I told my granddaughter had to sit down.  Soon as I sat down I told her I had to lay down and that the last thing I remember.  My daughter says they got me up and I took 2 or 3 steps and then passed out again.\"  She was evaluated at [facility] emergency department and had essentially negative work-up.  \"I have been having diarrhea almost every night since that happened.\"  Has not tried any medications for the diarrhea.    Hypertension  This is a chronic problem. The current episode started more than 1 year ago. The problem is unchanged. The problem is controlled. Associated symptoms include chest pain, peripheral edema and shortness of breath. Risk factors for coronary artery disease include dyslipidemia, family history, obesity, post-menopausal state, sedentary lifestyle and stress. Current antihypertension treatment includes lifestyle changes. Compliance problems include exercise and diet.    Fibromyalgia  This is a chronic problem. The current episode started more than 1 year ago. The problem occurs constantly. The problem has been gradually improving. Associated symptoms include arthralgias and chest pain. Pertinent negatives include no chills, coughing, diaphoresis, fatigue, fever or sore throat. Nothing aggravates the symptoms. Treatments tried: Cymbalta. The treatment provided mild relief.   Depression  Visit Type: follow-up  Patient presents " with the following symptoms: decreased concentration, depressed mood, malaise, restlessness and shortness of breath.  Patient is not experiencing: confusion.  Frequency of symptoms: occasionally   Severity: mild   Sleep quality: fair  Nighttime awakenings: occasional  Compliance with medications:  %    Hyperlipidemia  This is a chronic problem. The current episode started more than 1 year ago. Recent lipid tests were reviewed and are normal. Exacerbating diseases include obesity. Factors aggravating her hyperlipidemia include fatty foods. Associated symptoms include chest pain and shortness of breath. Current antihyperlipidemic treatment includes diet change. The current treatment provides mild improvement of lipids. Compliance problems include adherence to exercise and adherence to diet.    Chronic Kidney Disease  This is a chronic problem. The current episode started more than 1 year ago. The problem occurs constantly. The problem has been unchanged. Associated symptoms include arthralgias and chest pain. Pertinent negatives include no chills, coughing, diaphoresis, fatigue, fever or sore throat. Exacerbated by: medications  Treatments tried: Sees Dr. Will nepherologist  The treatment provided moderate relief.   Arthritis  Presents for follow-up visit. She complains of pain and stiffness. The symptoms have been stable. Associated symptoms include diarrhea, pain at night and pain while resting. Pertinent negatives include no fatigue or fever. Compliance with total regimen is %. Compliance with medications is %.   Obesity  This is a chronic problem. The current episode started more than 1 year ago. The problem occurs daily. The problem has been gradually improving. Associated symptoms include arthralgias and chest pain. Pertinent negatives include no chills, coughing, diaphoresis, fatigue, fever or sore throat. The symptoms are aggravated by drinking and eating. Treatments tried: Diet changes and  increased exercising. The treatment provided moderate relief.   Diarrhea   This is a new problem. The current episode started in the past 7 days. The problem occurs 2 to 4 times per day. The problem has been unchanged. The stool consistency is described as Watery. Associated symptoms include arthralgias. Pertinent negatives include no chills, coughing or fever. Nothing aggravates the symptoms. She has tried increased fluids for the symptoms. The treatment provided no relief.     Current Outpatient Medications on File Prior to Visit   Medication Sig Dispense Refill    albuterol sulfate  (90 Base) MCG/ACT inhaler Inhale 2 puffs Every 4 (Four) Hours As Needed for Wheezing. 18 g 2    aspirin 81 MG EC tablet Take 1 tablet by mouth Daily. 1 tablet     Atogepant (Qulipta) 60 MG tablet Take 1 tablet by mouth As Needed (migraine). 16 tablet 3    clonazePAM (KlonoPIN) 0.5 MG tablet Take 1 tablet by mouth At Night As Needed.      clopidogrel (PLAVIX) 75 MG tablet Take 1 tablet by mouth Daily. 30 tablet     dilTIAZem (CARDIZEM) 30 MG tablet Take 1 tablet by mouth 2 (Two) Times a Day.      estradiol (ESTRACE) 1 MG tablet TAKE 1 TABLET EVERY DAY 90 tablet 2    furosemide (LASIX) 40 MG tablet Take 1 tablet by mouth As Needed. Takes as needed      magnesium oxide (MAGOX) 400 (241.3 Mg) MG tablet tablet Take 1 tablet by mouth 2 (Two) Times a Day.      metoprolol tartrate (LOPRESSOR) 25 MG tablet Take 1 tablet by mouth Daily. Half tablet in morning and half at night      omeprazole (priLOSEC) 20 MG capsule Take 1 capsule by mouth 2 (two) times a day.      ondansetron ODT (ZOFRAN-ODT) 4 MG disintegrating tablet Place 1 tablet on the tongue Every 8 (Eight) Hours As Needed for Nausea or Vomiting. 90 tablet 11    spironolactone (ALDACTONE) 25 MG tablet Take 1 tablet by mouth Daily.      tiZANidine (ZANAFLEX) 4 MG tablet Take 1 tablet by mouth Every 8 (Eight) Hours As Needed for Muscle Spasms. 90 tablet 1    [DISCONTINUED]  "cyclobenzaprine (FLEXERIL) 10 MG tablet TAKE 1 TABLET 3 TIMES A DAY AS NEEDED FOR MUSCLE SPASMS. 270 tablet 1     No current facility-administered medications on file prior to visit.     Allergies   Allergen Reactions    Hydrocodone-Acetaminophen Itching, Rash and GI Intolerance    Cefprozil Nausea And Vomiting and Rash    Penicillins Rash       Objective   Vital Signs:  /70   Pulse 87   Ht 157.5 cm (62\")   Wt 77.1 kg (169 lb 14.4 oz)   SpO2 97%   BMI 31.08 kg/mý   Estimated body mass index is 31.08 kg/mý as calculated from the following:    Height as of this encounter: 157.5 cm (62\").    Weight as of this encounter: 77.1 kg (169 lb 14.4 oz).         Physical Exam  Vitals and nursing note reviewed.   Constitutional:       Appearance: Normal appearance. She is well-developed. She is obese.   HENT:      Head: Normocephalic.   Eyes:      Pupils: Pupils are equal, round, and reactive to light.   Cardiovascular:      Rate and Rhythm: Normal rate and regular rhythm.      Heart sounds: Normal heart sounds.   Pulmonary:      Effort: Pulmonary effort is normal.      Breath sounds: Normal breath sounds.   Abdominal:      General: Bowel sounds are normal.      Palpations: Abdomen is soft.   Musculoskeletal:         General: Normal range of motion.      Cervical back: Normal range of motion and neck supple.   Skin:     General: Skin is warm.      Capillary Refill: Capillary refill takes less than 2 seconds.   Neurological:      Mental Status: She is alert and oriented to person, place, and time.   Psychiatric:         Behavior: Behavior normal.      Result Review :                   Assessment and Plan   Diagnoses and all orders for this visit:    1. Benign essential hypertension (Primary)    2. Pure hypertriglyceridemia    3. Mild intermittent asthma without complication    4. Fibromyalgia    5. Stage 3 chronic kidney disease, unspecified whether stage 3a or 3b CKD    6. Depressive disorder    7. Rheumatoid " arthritis without rheumatoid factor, multiple sites    8. Syncope, unspecified syncope type  -     Holter Monitor - 72 Hour Up To 15 Days  -     CBC & Differential  -     Comprehensive Metabolic Panel    9. Diarrhea, unspecified type  -     CBC & Differential  -     Comprehensive Metabolic Panel      1. Benign essential hypertension:  Normotensive  Continue on Cardizem as previously prescribed  Continue metoprolol tartrate as previously prescribed  Continue on lasix as previously prescribed   Continue low-sodium diet      2.  Pure hypertriglyceridemia  Continue to decrease saturated fat in the diet     3. Mild intermittent asthma without complication:  Continue on albuterol inhaler as previously prescribed   Continue on albuterol nebulizer as previously prescribed      4. Fibromyalgia   Continue Zanaflex as previously prescribed     5.  Stage III chronic kidney disease:  Continue neurology follow-up with Dr. Will as scheduled     6.  Depressive disorder:  Continue on Prozac as previously prescribed     7.  Rheumatoid arthritis without rheumatoid factor, multiple sites:  Continue on Plaquenil as previously prescribed  Continue Zanaflex as discussed above    8.  Syncope, unspecified syncope type:  Complete CBC and chemistry panel as ordered and will notify of results when available  Cardiology will call to schedule Holter monitor and will notify of results when available  Seek emergency medical treatment for any new or worsening chest pain, palpitations near syncope or syncopal episodes  Continue cardiology follow-up as scheduled with Dr. Reyes    9.  Diarrhea, unspecified type:  Complete CBC and chemistry panel as ordered and will notify of results when available  Encouraged to begin Imodium OTC according to package directions  Encouraged to increase electrolyte replacement drinks such as Gatorade and Pedialyte to help reduce risk of electrolyte imbalance       Follow Up   Return in about 3 months (around  11/14/2023) for Recheck.  Patient was given instructions and counseling regarding her condition or for health maintenance advice. Please see specific information pulled into the AVS if appropriate.         This document has been electronically signed by MAYELA Flores on August 14, 2023 13:54 CDT

## 2023-08-15 ENCOUNTER — TELEPHONE (OUTPATIENT)
Dept: FAMILY MEDICINE CLINIC | Facility: CLINIC | Age: 63
End: 2023-08-15
Payer: MEDICARE

## 2023-08-15 LAB
ALBUMIN SERPL-MCNC: 4 G/DL (ref 3.5–5.2)
ALBUMIN/GLOB SERPL: 1.4 G/DL
ALP SERPL-CCNC: 81 U/L (ref 39–117)
ALT SERPL W P-5'-P-CCNC: 21 U/L (ref 1–33)
ANION GAP SERPL CALCULATED.3IONS-SCNC: 13.2 MMOL/L (ref 5–15)
AST SERPL-CCNC: 26 U/L (ref 1–32)
BASOPHILS # BLD AUTO: 0.03 10*3/MM3 (ref 0–0.2)
BASOPHILS NFR BLD AUTO: 0.3 % (ref 0–1.5)
BILIRUB SERPL-MCNC: 0.3 MG/DL (ref 0–1.2)
BUN SERPL-MCNC: 15 MG/DL (ref 8–23)
BUN/CREAT SERPL: 14.9 (ref 7–25)
CALCIUM SPEC-SCNC: 9.2 MG/DL (ref 8.6–10.5)
CHLORIDE SERPL-SCNC: 107 MMOL/L (ref 98–107)
CO2 SERPL-SCNC: 18.8 MMOL/L (ref 22–29)
CREAT SERPL-MCNC: 1.01 MG/DL (ref 0.57–1)
DEPRECATED RDW RBC AUTO: 40.3 FL (ref 37–54)
EGFRCR SERPLBLD CKD-EPI 2021: 63.1 ML/MIN/1.73
EOSINOPHIL # BLD AUTO: 0.25 10*3/MM3 (ref 0–0.4)
EOSINOPHIL NFR BLD AUTO: 2.3 % (ref 0.3–6.2)
ERYTHROCYTE [DISTWIDTH] IN BLOOD BY AUTOMATED COUNT: 12.6 % (ref 12.3–15.4)
GLOBULIN UR ELPH-MCNC: 2.9 GM/DL
GLUCOSE SERPL-MCNC: 74 MG/DL (ref 65–99)
HCT VFR BLD AUTO: 42.3 % (ref 34–46.6)
HGB BLD-MCNC: 14.1 G/DL (ref 12–15.9)
IMM GRANULOCYTES # BLD AUTO: 0.04 10*3/MM3 (ref 0–0.05)
IMM GRANULOCYTES NFR BLD AUTO: 0.4 % (ref 0–0.5)
LYMPHOCYTES # BLD AUTO: 3.03 10*3/MM3 (ref 0.7–3.1)
LYMPHOCYTES NFR BLD AUTO: 27.5 % (ref 19.6–45.3)
MCH RBC QN AUTO: 29.1 PG (ref 26.6–33)
MCHC RBC AUTO-ENTMCNC: 33.3 G/DL (ref 31.5–35.7)
MCV RBC AUTO: 87.2 FL (ref 79–97)
MONOCYTES # BLD AUTO: 0.73 10*3/MM3 (ref 0.1–0.9)
MONOCYTES NFR BLD AUTO: 6.6 % (ref 5–12)
NEUTROPHILS NFR BLD AUTO: 6.95 10*3/MM3 (ref 1.7–7)
NEUTROPHILS NFR BLD AUTO: 62.9 % (ref 42.7–76)
NRBC BLD AUTO-RTO: 0 /100 WBC (ref 0–0.2)
PLATELET # BLD AUTO: 415 10*3/MM3 (ref 140–450)
PMV BLD AUTO: 10.3 FL (ref 6–12)
POTASSIUM SERPL-SCNC: 4.6 MMOL/L (ref 3.5–5.2)
PROT SERPL-MCNC: 6.9 G/DL (ref 6–8.5)
RBC # BLD AUTO: 4.85 10*6/MM3 (ref 3.77–5.28)
SODIUM SERPL-SCNC: 139 MMOL/L (ref 136–145)
WBC NRBC COR # BLD: 11.03 10*3/MM3 (ref 3.4–10.8)

## 2023-08-15 NOTE — TELEPHONE ENCOUNTER
Per MAYELA Godinez, Ms. Bernal has been called with recent lab results & recommendations.  Continue current medications and follow-up as planned or sooner if any problems.     ----- Message from MAYELA Flores sent at 8/15/2023  6:20 AM CDT -----  White blood cell count still slightly elevated but has improved and is down to 11.03.  Creatinine slightly elevated but has improved.  eGFR now within normal limits.  Make sure she is drinking plenty of water along with electrolyte replacement drinks such as Gatorade or Pedialyte.  All other labs were essentially normal.

## 2023-08-24 ENCOUNTER — EXTERNAL PBMM DATA (OUTPATIENT)
Dept: PHARMACY | Facility: OTHER | Age: 63
End: 2023-08-24
Payer: MEDICARE

## 2023-08-30 ENCOUNTER — POP HEALTH PHARMACY (OUTPATIENT)
Dept: PHARMACY | Facility: OTHER | Age: 63
End: 2023-08-30
Payer: MEDICARE

## 2023-09-20 ENCOUNTER — LAB (OUTPATIENT)
Dept: LAB | Facility: HOSPITAL | Age: 63
End: 2023-09-20
Payer: MEDICARE

## 2023-09-20 ENCOUNTER — OFFICE VISIT (OUTPATIENT)
Dept: FAMILY MEDICINE CLINIC | Facility: CLINIC | Age: 63
End: 2023-09-20
Payer: MEDICARE

## 2023-09-20 VITALS
DIASTOLIC BLOOD PRESSURE: 76 MMHG | SYSTOLIC BLOOD PRESSURE: 128 MMHG | HEIGHT: 62 IN | HEART RATE: 82 BPM | BODY MASS INDEX: 34.12 KG/M2 | OXYGEN SATURATION: 98 % | WEIGHT: 185.4 LBS

## 2023-09-20 DIAGNOSIS — F41.9 ANXIETY: ICD-10-CM

## 2023-09-20 DIAGNOSIS — R55 SYNCOPE, UNSPECIFIED SYNCOPE TYPE: Primary | ICD-10-CM

## 2023-09-20 DIAGNOSIS — R55 SYNCOPE, UNSPECIFIED SYNCOPE TYPE: ICD-10-CM

## 2023-09-20 DIAGNOSIS — E66.09 CLASS 1 OBESITY DUE TO EXCESS CALORIES WITH SERIOUS COMORBIDITY AND BODY MASS INDEX (BMI) OF 33.0 TO 33.9 IN ADULT: ICD-10-CM

## 2023-09-20 DIAGNOSIS — Z79.899 HIGH RISK MEDICATION USE: ICD-10-CM

## 2023-09-20 LAB
AMPHET+METHAMPHET UR QL: NEGATIVE
AMPHETAMINES UR QL: NEGATIVE
BARBITURATES UR QL SCN: NEGATIVE
BENZODIAZ UR QL SCN: NEGATIVE
BUPRENORPHINE SERPL-MCNC: NEGATIVE NG/ML
CANNABINOIDS SERPL QL: NEGATIVE
COCAINE UR QL: NEGATIVE
FENTANYL UR-MCNC: NEGATIVE NG/ML
METHADONE UR QL SCN: NEGATIVE
OPIATES UR QL: NEGATIVE
OXYCODONE UR QL SCN: NEGATIVE
PCP UR QL SCN: NEGATIVE
PROPOXYPH UR QL: NEGATIVE
TRICYCLICS UR QL SCN: NEGATIVE

## 2023-09-20 PROCEDURE — 1160F RVW MEDS BY RX/DR IN RCRD: CPT | Performed by: NURSE PRACTITIONER

## 2023-09-20 PROCEDURE — 80307 DRUG TEST PRSMV CHEM ANLYZR: CPT

## 2023-09-20 PROCEDURE — 3078F DIAST BP <80 MM HG: CPT | Performed by: NURSE PRACTITIONER

## 2023-09-20 PROCEDURE — 3074F SYST BP LT 130 MM HG: CPT | Performed by: NURSE PRACTITIONER

## 2023-09-20 PROCEDURE — 99214 OFFICE O/P EST MOD 30 MIN: CPT | Performed by: NURSE PRACTITIONER

## 2023-09-20 PROCEDURE — 1159F MED LIST DOCD IN RCRD: CPT | Performed by: NURSE PRACTITIONER

## 2023-09-20 PROCEDURE — 85025 COMPLETE CBC W/AUTO DIFF WBC: CPT

## 2023-09-20 PROCEDURE — 36415 COLL VENOUS BLD VENIPUNCTURE: CPT

## 2023-09-20 PROCEDURE — 83735 ASSAY OF MAGNESIUM: CPT

## 2023-09-20 PROCEDURE — 80053 COMPREHEN METABOLIC PANEL: CPT

## 2023-09-20 RX ORDER — CLONAZEPAM 0.5 MG/1
0.5 TABLET ORAL NIGHTLY PRN
Qty: 30 TABLET | Refills: 0 | Status: SHIPPED | OUTPATIENT
Start: 2023-09-20

## 2023-09-20 RX ORDER — ESTRADIOL 1 MG/1
1 TABLET ORAL DAILY
Qty: 90 TABLET | Refills: 3 | Status: SHIPPED | OUTPATIENT
Start: 2023-09-20

## 2023-09-20 NOTE — PROGRESS NOTES
"Chief Complaint  Loss of Consciousness and Obesity    Subjective        Lashaun Bernal presents to [facility] [place]  One month follow-up for syncopal episode.  All symptoms have resolved.  Would like to start back on Mounjaro for weight loss.  Has anxiety and is currently seeing Brinda Davis who has been prescribing her Klonopin for several years.  \"She is retiring and I was wondering if you would be able to fill my prescription ?\"    History of Present Illness  HPI: Syncope.  Onset more than 4 weeks ago.  Frequency is rarely.  Progression since onset is resolved.  Obesity  This is a chronic problem. The current episode started more than 1 year ago. The problem occurs daily. The problem has been unchanged. The symptoms are aggravated by drinking and eating. Treatments tried: Diet changes and increased exercising. The treatment provided mild relief.   Anxiety  Presents for follow-up visit. Symptoms include decreased concentration and nervous/anxious behavior. Symptoms occur most days. The severity of symptoms is moderate. The quality of sleep is fair. Nighttime awakenings: occasional.     Compliance with medications is %.     Current Outpatient Medications on File Prior to Visit   Medication Sig Dispense Refill    albuterol sulfate  (90 Base) MCG/ACT inhaler Inhale 2 puffs Every 4 (Four) Hours As Needed for Wheezing. 18 g 2    aspirin 81 MG EC tablet Take 1 tablet by mouth Daily. 1 tablet     Atogepant (Qulipta) 60 MG tablet Take 1 tablet by mouth As Needed (migraine). 16 tablet 3    clopidogrel (PLAVIX) 75 MG tablet Take 1 tablet by mouth Daily. 30 tablet     dilTIAZem (CARDIZEM) 30 MG tablet Take 1 tablet by mouth 2 (Two) Times a Day.      furosemide (LASIX) 40 MG tablet Take 1 tablet by mouth As Needed. Takes as needed      magnesium oxide (MAGOX) 400 (241.3 Mg) MG tablet tablet Take 1 tablet by mouth 2 (Two) Times a Day.      metoprolol tartrate " "(LOPRESSOR) 25 MG tablet Take 1 tablet by mouth Daily. Half tablet in morning and half at night      omeprazole (priLOSEC) 20 MG capsule Take 1 capsule by mouth 2 (two) times a day.      ondansetron ODT (ZOFRAN-ODT) 4 MG disintegrating tablet Place 1 tablet on the tongue Every 8 (Eight) Hours As Needed for Nausea or Vomiting. 90 tablet 11    spironolactone (ALDACTONE) 25 MG tablet Take 1 tablet by mouth Daily.      tiZANidine (ZANAFLEX) 4 MG tablet Take 1 tablet by mouth Every 8 (Eight) Hours As Needed for Muscle Spasms. 90 tablet 1     No current facility-administered medications on file prior to visit.     Allergies   Allergen Reactions    Hydrocodone-Acetaminophen Itching, Rash and GI Intolerance    Cefprozil Nausea And Vomiting and Rash    Penicillins Rash       Objective   Vital Signs:  /76   Pulse 82   Ht 157.5 cm (62\")   Wt 84.1 kg (185 lb 6.4 oz)   SpO2 98%   BMI 33.91 kg/m²   Estimated body mass index is 33.91 kg/m² as calculated from the following:    Height as of this encounter: 157.5 cm (62\").    Weight as of this encounter: 84.1 kg (185 lb 6.4 oz).           Physical Exam  Vitals and nursing note reviewed.   Constitutional:       Appearance: Normal appearance. She is well-developed. She is obese.   HENT:      Head: Normocephalic.      Right Ear: External ear normal.      Left Ear: External ear normal.   Cardiovascular:      Rate and Rhythm: Normal rate and regular rhythm.      Heart sounds: Normal heart sounds.   Pulmonary:      Effort: Pulmonary effort is normal.      Breath sounds: Normal breath sounds.   Abdominal:      General: Bowel sounds are normal.      Palpations: Abdomen is soft.   Musculoskeletal:         General: Normal range of motion.      Cervical back: Normal range of motion and neck supple.   Skin:     General: Skin is warm.      Capillary Refill: Capillary refill takes less than 2 seconds.   Neurological:      Mental Status: She is alert and oriented to person, place, and " time.   Psychiatric:         Mood and Affect: Mood is anxious.         Behavior: Behavior normal.      Result Review :                   Assessment and Plan   Diagnoses and all orders for this visit:    1. Syncope, unspecified syncope type (Primary)  -     CBC & Differential; Future  -     Comprehensive Metabolic Panel; Future  -     Magnesium; Future    2. Class 1 obesity due to excess calories with serious comorbidity and body mass index (BMI) of 33.0 to 33.9 in adult  -     Tirzepatide 2.5 MG/0.5ML solution pen-injector; Inject 0.5 mL under the skin into the appropriate area as directed 1 (One) Time Per Week.  Dispense: 2 mL; Refill: 1    3. Anxiety  -     clonazePAM (KlonoPIN) 0.5 MG tablet; Take 1 tablet by mouth At Night As Needed for Anxiety.  Dispense: 30 tablet; Refill: 0    4. High risk medication use  -     Urine Drug Screen - Urine, Clean Catch; Future    Other orders  -     estradiol (ESTRACE) 1 MG tablet; Take 1 tablet by mouth Daily.  Dispense: 90 tablet; Refill: 3      1.  Syncope, unspecified syncope type:  Symptoms have resolved  Complete CBC, chemistry panel and magnesium level as ordered and will notify of results when available  Reeducated on importance of staying well-hydrated especially when outdoors    2.  Class I obesity due to excess calories with serious comorbidity and body mass index of 33.0-33.9 in adult:  Body mass index is 33.91 kg/m².  BMI is >= 30 and <35. (Class 1 Obesity). The following options were offered after discussion;: exercise counseling/recommendations, nutrition counseling/recommendations, and pharmacological intervention options  Begin Mounjaro as prescribed  Educated on possible adverse reactions of this medication including not limited to medullary thyroid cancer, endocrine neoplasia type II, pancreatitis and cholecystitis  Educated on common side effects of this medication including not limited to increased risk for gastrointestinal discomfort, nausea, indigestion,  belching, diarrhea and/or constipation  Reduce caloric intake to no more than 1500 chao/day  Encouraged use of food journal or karlo such as Jobr pal to improve adherence to low calorie/exercise regimen  Increase physical activity regimen to a minimum of 150 minutes/week    3.  Anxiety:  Continue Klonopin as previously prescribed and refill prescription sent to pharmacy  Continue stress reducing techniques such as deep breathing, meditation and guided imagery    4.  Half medication use  PDMP reviewed  Complete UDS as ordered  Controlled substance contract signed will be scanned into medical record         Follow Up   Return in about 4 weeks (around 10/18/2023) for Recheck.  Patient was given instructions and counseling regarding her condition or for health maintenance advice. Please see specific information pulled into the AVS if appropriate.         This document has been electronically signed by MAYELA Flores on September 26, 2023 07:16 CDT

## 2023-09-21 ENCOUNTER — TELEPHONE (OUTPATIENT)
Dept: FAMILY MEDICINE CLINIC | Facility: CLINIC | Age: 63
End: 2023-09-21
Payer: MEDICARE

## 2023-09-21 LAB
ALBUMIN SERPL-MCNC: 3.9 G/DL (ref 3.5–5.2)
ALBUMIN/GLOB SERPL: 1.2 G/DL
ALP SERPL-CCNC: 116 U/L (ref 39–117)
ALT SERPL W P-5'-P-CCNC: 22 U/L (ref 1–33)
ANION GAP SERPL CALCULATED.3IONS-SCNC: 10 MMOL/L (ref 5–15)
AST SERPL-CCNC: 27 U/L (ref 1–32)
BASOPHILS # BLD AUTO: 0.1 10*3/MM3 (ref 0–0.2)
BASOPHILS NFR BLD AUTO: 0.9 % (ref 0–1.5)
BILIRUB SERPL-MCNC: 0.2 MG/DL (ref 0–1.2)
BUN SERPL-MCNC: 14 MG/DL (ref 8–23)
BUN/CREAT SERPL: 14.1 (ref 7–25)
CALCIUM SPEC-SCNC: 9.4 MG/DL (ref 8.6–10.5)
CHLORIDE SERPL-SCNC: 108 MMOL/L (ref 98–107)
CO2 SERPL-SCNC: 22 MMOL/L (ref 22–29)
CREAT SERPL-MCNC: 0.99 MG/DL (ref 0.57–1)
DEPRECATED RDW RBC AUTO: 38.8 FL (ref 37–54)
EGFRCR SERPLBLD CKD-EPI 2021: 64.6 ML/MIN/1.73
EOSINOPHIL # BLD AUTO: 0.32 10*3/MM3 (ref 0–0.4)
EOSINOPHIL NFR BLD AUTO: 2.8 % (ref 0.3–6.2)
ERYTHROCYTE [DISTWIDTH] IN BLOOD BY AUTOMATED COUNT: 12.4 % (ref 12.3–15.4)
GLOBULIN UR ELPH-MCNC: 3.2 GM/DL
GLUCOSE SERPL-MCNC: 106 MG/DL (ref 65–99)
HCT VFR BLD AUTO: 40.4 % (ref 34–46.6)
HGB BLD-MCNC: 13.5 G/DL (ref 12–15.9)
IMM GRANULOCYTES # BLD AUTO: 0.06 10*3/MM3 (ref 0–0.05)
IMM GRANULOCYTES NFR BLD AUTO: 0.5 % (ref 0–0.5)
LYMPHOCYTES # BLD AUTO: 3.54 10*3/MM3 (ref 0.7–3.1)
LYMPHOCYTES NFR BLD AUTO: 30.9 % (ref 19.6–45.3)
MAGNESIUM SERPL-MCNC: 2.2 MG/DL (ref 1.6–2.4)
MCH RBC QN AUTO: 28.9 PG (ref 26.6–33)
MCHC RBC AUTO-ENTMCNC: 33.4 G/DL (ref 31.5–35.7)
MCV RBC AUTO: 86.5 FL (ref 79–97)
MONOCYTES # BLD AUTO: 0.95 10*3/MM3 (ref 0.1–0.9)
MONOCYTES NFR BLD AUTO: 8.3 % (ref 5–12)
NEUTROPHILS NFR BLD AUTO: 56.6 % (ref 42.7–76)
NEUTROPHILS NFR BLD AUTO: 6.47 10*3/MM3 (ref 1.7–7)
NRBC BLD AUTO-RTO: 0 /100 WBC (ref 0–0.2)
PLATELET # BLD AUTO: 406 10*3/MM3 (ref 140–450)
PMV BLD AUTO: 10.1 FL (ref 6–12)
POTASSIUM SERPL-SCNC: 5.1 MMOL/L (ref 3.5–5.2)
PROT SERPL-MCNC: 7.1 G/DL (ref 6–8.5)
RBC # BLD AUTO: 4.67 10*6/MM3 (ref 3.77–5.28)
SODIUM SERPL-SCNC: 140 MMOL/L (ref 136–145)
WBC NRBC COR # BLD: 11.44 10*3/MM3 (ref 3.4–10.8)

## 2023-09-21 NOTE — TELEPHONE ENCOUNTER
-Per MAYELA Chow, Ms. Bernal has been called with recent lab results & recommendations.  Continue current medications and follow-up as planned or sooner if any problems.     ---- Message from MAYEAL Flores sent at 9/21/2023  7:08 AM CDT -----  Glucose slightly elevated but this was not a fasting lab so expected finding.  White blood cell count remains slightly elevated.  We will continue to monitor on a routine basis.  All other labs were essentially normal.

## 2023-09-21 NOTE — PROGRESS NOTES
Per MAYELA Chow, Ms. Bernal has been called with recent lab results & recommendations.  Continue current medications and follow-up as planned or sooner if any problems.

## 2024-06-27 NOTE — PROGRESS NOTES
Per  MAYELA Terry, Ms. Bernal has been called with recent lab results & recommendations.  Continue current medications and follow-up as planned or sooner if any problems.
Unremarkable

## (undated) DEVICE — LAPAROVUE VISIBILITY SYSTEM LAPAROSCOPIC SOLUTIONS: Brand: LAPAROVUE

## (undated) DEVICE — CANN SMPL SOFTECH BIFLO ETCO2 A/M 7FT

## (undated) DEVICE — GLV SURG SIGNATURE ESSENTIAL PF LTX SZ6

## (undated) DEVICE — GLV SURG SENSICARE GREEN W/ALOE PF LF 7 STRL

## (undated) DEVICE — SPNG GZ WOVN 4X4IN 12PLY 10/BX STRL

## (undated) DEVICE — GOWN,AURORA,NOREINF,RAGLAN,XL,STERILE: Brand: MEDLINE

## (undated) DEVICE — SUT VIC 0/0 UR6 27IN DYED J603H

## (undated) DEVICE — ANTIBACTERIAL UNDYED BRAIDED (POLYGLACTIN 910), SYNTHETIC ABSORBABLE SUTURE: Brand: COATED VICRYL

## (undated) DEVICE — SYS CLS PORTSITE CT CLOSESURE 5AND10/12

## (undated) DEVICE — ADHS SKIN PREMIERPRO EXOFIN TOPICAL HI/VISC .5ML

## (undated) DEVICE — TP CAST SCOTCHCAST PLS 5IN 4YD BLU

## (undated) DEVICE — GAUZE,SPONGE,FLUFF,6"X6.75",STRL,5/TRAY: Brand: MEDLINE

## (undated) DEVICE — CORE TRUMPET FOR SINGLE SOLUTION BAG: Brand: CORE DYNAMICS

## (undated) DEVICE — GLV SURG TRIUMPH ORTHO W/ALOE PF LTX 7.5 STRL

## (undated) DEVICE — STERILE FOAM PAD: Brand: CENTURION

## (undated) DEVICE — GLV SURG TRIUMPH LT PF LTX 7.5 STRL

## (undated) DEVICE — SUT ETHLN 4/0 FS2 18IN 662H

## (undated) DEVICE — SOL IRR NACL 0.9PCT BT 1000ML

## (undated) DEVICE — SYR LUERLOK 20CC BX/50

## (undated) DEVICE — Device

## (undated) DEVICE — GOWN,PREVENTION PLUS,XLNG/XXLARGE,STRL: Brand: MEDLINE

## (undated) DEVICE — ENDOPATH XCEL BLADELESS TROCARS WITH STABILITY SLEEVES: Brand: ENDOPATH XCEL

## (undated) DEVICE — APPL HEMOS FOR DELIVERY FLOSEAL

## (undated) DEVICE — SOL IRR NACL 0.9PCT 1000ML

## (undated) DEVICE — TOWEL,OR,DSP,ST,BLUE,DLX,4/PK,20PK/CS: Brand: MEDLINE

## (undated) DEVICE — GLV SURG SENSICARE MICRO PF LF 7.5 STRL

## (undated) DEVICE — PRECISION THIN (9.0 X 0.38 X 31.0MM)

## (undated) DEVICE — STERILE POLYISOPRENE POWDER-FREE SURGICAL GLOVES WITH EMOLLIENT COATING: Brand: PROTEXIS

## (undated) DEVICE — COVER,MAYO STAND,STERILE: Brand: MEDLINE

## (undated) DEVICE — PK LAP CHOLE LF 60

## (undated) DEVICE — GLV SURG TRIUMPH NATURAL W/ALOE PF LTX 7 STRL

## (undated) DEVICE — STERILE POLYISOPRENE POWDER-FREE SURGICAL GLOVES: Brand: PROTEXIS

## (undated) DEVICE — DISPOSABLE TOURNIQUET CUFF SINGLE BLADDER, DUAL PORT AND QUICK CONNECT CONNECTOR: Brand: COLOR CUFF

## (undated) DEVICE — PATIENT RETURN ELECTRODE, SINGLE-USE, CONTACT QUALITY MONITORING, ADULT, WITH 9FT CORD, FOR PATIENTS WEIGING OVER 33LBS. (15KG): Brand: MEGADYNE

## (undated) DEVICE — ENDOPOUCH RETRIEVER SPECIMEN RETRIEVAL BAGS: Brand: ENDOPOUCH RETRIEVER

## (undated) DEVICE — ELECTRD E/S MEGADYNE EZCLEAN L/WR LAP 5MM 33CM 1P/U

## (undated) DEVICE — GLV SURG SENSICARE PI ORTHO PF SZ7 LF STRL

## (undated) DEVICE — MONOPOLAR METZENBAUM SCISSOR TIP, DISPOSABLE: Brand: MONOPOLAR METZENBAUM SCISSOR TIP, DISPOSABLE

## (undated) DEVICE — PK EXTRM LF 60

## (undated) DEVICE — GLV SURG SENSICARE GREEN W/ALOE PF LF 8 STRL

## (undated) DEVICE — DRSNG GZ CURAD XEROFORM NONADHR OVERWRAP 5X9IN

## (undated) DEVICE — BNDG ELAS ELITE V/CLOSE 4IN 5YD LF STRL

## (undated) DEVICE — SOL IRRIG NACL 1000ML

## (undated) DEVICE — GLV SURG SENSICARE GREEN W/ALOE PF LF 8.5 STRL

## (undated) DEVICE — PENCL BUTN/SWTCH MEGADYNE W/HOLSTR 10FT SS

## (undated) DEVICE — WEBRIL* CAST PADDING: Brand: DEROYAL

## (undated) DEVICE — SUT MONOCRYL 4/0 PS2 27IN Y426H ETY426H

## (undated) DEVICE — UNDRPD BREATH 23X36 BG/10

## (undated) DEVICE — GLV SURG TRIUMPH ORTHO W/ALOE PF LTX 8 STRL

## (undated) DEVICE — TP CAST SCOTCHCAST PLS 4IN 4YD BLK